# Patient Record
Sex: FEMALE | Race: BLACK OR AFRICAN AMERICAN | NOT HISPANIC OR LATINO | Employment: OTHER | URBAN - METROPOLITAN AREA
[De-identification: names, ages, dates, MRNs, and addresses within clinical notes are randomized per-mention and may not be internally consistent; named-entity substitution may affect disease eponyms.]

---

## 2017-05-24 ENCOUNTER — APPOINTMENT (OUTPATIENT)
Dept: PHYSICAL THERAPY | Facility: CLINIC | Age: 67
End: 2017-05-24
Payer: MEDICARE

## 2017-05-24 PROCEDURE — G8979 MOBILITY GOAL STATUS: HCPCS

## 2017-05-24 PROCEDURE — G8978 MOBILITY CURRENT STATUS: HCPCS

## 2017-05-24 PROCEDURE — 97162 PT EVAL MOD COMPLEX 30 MIN: CPT

## 2017-05-30 ENCOUNTER — APPOINTMENT (OUTPATIENT)
Dept: PHYSICAL THERAPY | Facility: CLINIC | Age: 67
End: 2017-05-30
Payer: MEDICARE

## 2017-05-30 PROCEDURE — 97140 MANUAL THERAPY 1/> REGIONS: CPT

## 2017-05-30 PROCEDURE — 97110 THERAPEUTIC EXERCISES: CPT

## 2017-06-06 ENCOUNTER — APPOINTMENT (OUTPATIENT)
Dept: PHYSICAL THERAPY | Facility: CLINIC | Age: 67
End: 2017-06-06
Payer: MEDICARE

## 2017-06-06 PROCEDURE — 97110 THERAPEUTIC EXERCISES: CPT

## 2017-06-06 PROCEDURE — 97140 MANUAL THERAPY 1/> REGIONS: CPT

## 2017-06-08 ENCOUNTER — APPOINTMENT (OUTPATIENT)
Dept: PHYSICAL THERAPY | Facility: CLINIC | Age: 67
End: 2017-06-08
Payer: MEDICARE

## 2017-06-08 PROCEDURE — 97140 MANUAL THERAPY 1/> REGIONS: CPT

## 2017-06-08 PROCEDURE — 97110 THERAPEUTIC EXERCISES: CPT

## 2017-06-13 ENCOUNTER — APPOINTMENT (OUTPATIENT)
Dept: PHYSICAL THERAPY | Facility: CLINIC | Age: 67
End: 2017-06-13
Payer: MEDICARE

## 2017-06-13 PROCEDURE — 97140 MANUAL THERAPY 1/> REGIONS: CPT

## 2017-06-13 PROCEDURE — 97110 THERAPEUTIC EXERCISES: CPT

## 2017-06-15 ENCOUNTER — APPOINTMENT (OUTPATIENT)
Dept: PHYSICAL THERAPY | Facility: CLINIC | Age: 67
End: 2017-06-15
Payer: MEDICARE

## 2017-06-15 PROCEDURE — 97110 THERAPEUTIC EXERCISES: CPT

## 2017-06-22 ENCOUNTER — APPOINTMENT (OUTPATIENT)
Dept: PHYSICAL THERAPY | Facility: CLINIC | Age: 67
End: 2017-06-22
Payer: MEDICARE

## 2017-06-22 PROCEDURE — 97110 THERAPEUTIC EXERCISES: CPT

## 2017-06-27 ENCOUNTER — APPOINTMENT (OUTPATIENT)
Dept: PHYSICAL THERAPY | Facility: CLINIC | Age: 67
End: 2017-06-27
Payer: MEDICARE

## 2017-06-27 PROCEDURE — 97110 THERAPEUTIC EXERCISES: CPT

## 2017-06-29 ENCOUNTER — APPOINTMENT (OUTPATIENT)
Dept: PHYSICAL THERAPY | Facility: CLINIC | Age: 67
End: 2017-06-29
Payer: MEDICARE

## 2017-06-29 PROCEDURE — 97140 MANUAL THERAPY 1/> REGIONS: CPT

## 2017-06-29 PROCEDURE — 97110 THERAPEUTIC EXERCISES: CPT

## 2017-07-03 ENCOUNTER — APPOINTMENT (OUTPATIENT)
Dept: PHYSICAL THERAPY | Facility: CLINIC | Age: 67
End: 2017-07-03
Payer: MEDICARE

## 2017-07-03 PROCEDURE — 97140 MANUAL THERAPY 1/> REGIONS: CPT

## 2017-07-03 PROCEDURE — 97110 THERAPEUTIC EXERCISES: CPT

## 2017-07-03 PROCEDURE — G8978 MOBILITY CURRENT STATUS: HCPCS

## 2017-07-03 PROCEDURE — G8979 MOBILITY GOAL STATUS: HCPCS

## 2017-07-07 ENCOUNTER — APPOINTMENT (OUTPATIENT)
Dept: PHYSICAL THERAPY | Facility: CLINIC | Age: 67
End: 2017-07-07
Payer: MEDICARE

## 2017-07-11 ENCOUNTER — APPOINTMENT (OUTPATIENT)
Dept: PHYSICAL THERAPY | Facility: CLINIC | Age: 67
End: 2017-07-11
Payer: MEDICARE

## 2017-07-11 PROCEDURE — 97110 THERAPEUTIC EXERCISES: CPT

## 2017-07-13 ENCOUNTER — APPOINTMENT (OUTPATIENT)
Dept: PHYSICAL THERAPY | Facility: CLINIC | Age: 67
End: 2017-07-13
Payer: MEDICARE

## 2017-07-13 PROCEDURE — 97110 THERAPEUTIC EXERCISES: CPT

## 2018-06-21 ENCOUNTER — TRANSCRIBE ORDERS (OUTPATIENT)
Dept: ADMINISTRATIVE | Facility: HOSPITAL | Age: 68
End: 2018-06-21

## 2018-06-21 DIAGNOSIS — Z12.39 SCREENING BREAST EXAMINATION: Primary | ICD-10-CM

## 2018-06-22 ENCOUNTER — HOSPITAL ENCOUNTER (OUTPATIENT)
Dept: RADIOLOGY | Facility: HOSPITAL | Age: 68
Discharge: HOME | End: 2018-06-22
Payer: MEDICARE

## 2018-06-22 DIAGNOSIS — Z12.39 SCREENING BREAST EXAMINATION: ICD-10-CM

## 2018-06-22 PROCEDURE — 77067 SCR MAMMO BI INCL CAD: CPT

## 2018-08-07 ENCOUNTER — EVALUATION (OUTPATIENT)
Dept: PHYSICAL THERAPY | Facility: CLINIC | Age: 68
End: 2018-08-07
Payer: MEDICARE

## 2018-08-07 DIAGNOSIS — M33.20 POLYMYOSITIS (HCC): Primary | ICD-10-CM

## 2018-08-07 PROCEDURE — G8979 MOBILITY GOAL STATUS: HCPCS | Performed by: PHYSICAL THERAPIST

## 2018-08-07 PROCEDURE — 97161 PT EVAL LOW COMPLEX 20 MIN: CPT | Performed by: PHYSICAL THERAPIST

## 2018-08-07 PROCEDURE — G8978 MOBILITY CURRENT STATUS: HCPCS | Performed by: PHYSICAL THERAPIST

## 2018-08-07 NOTE — LETTER
2018    Ez Jones  [place] [address]    Patient: Pam Navarro   YOB: 1950   Date of Visit: 2018     Encounter Diagnosis     ICD-10-CM    1  Polymyositis [facility]) M33 20        Dear Dr Dickson Bring:    Please review the attached Plan of Care from [facility] 131 recent visit  Please verify that you agree therapy should continue by signing the attached document and sending it back to our office  If you have any questions or concerns, please don't hesitate to call  Sincerely,    Donavan Dillard, PT      Referring Provider:      I certify that I have read the below Plan of Care and certify the need for these services furnished under this plan of treatment while under my care  Ez Bravo [facility] [address]  [address]: [phone]          PT Evaluation     Today's date: 2018  Patient name: Pam Navarro  : 1950  MRN: [id number]  Referring provider: Catherine Shields  Dx:   Encounter Diagnosis     ICD-10-CM    1  Polymy[facility] ([facility] ) M33 20        Start Time: 0335  Stop Time: 4235  Total time in clinic (min): 45 minutes    Assessment  Impairments: abnormal gait and activity intolerance    Assessment details: Pam Navarro is a 76 y o  female who presents to physical therapy with pain, decreased LE range of motion, decreased LE strength, poor balance, impaired function and decreased activity tolerance  Patient's clinical presentation is consistent with their referring diagnosis of Pol[facility]yositis ([facility] )  (primary encounter diagnosis)  The pt presents with functional limitations of ADLs, ambulation, performing household chores, self-care and stair negotiation  Pt would benefit from physical therapy services to address these limitations and maximize function  Understanding of Dx/Px/POC: good   Prognosis: good    Goals  Short term goals  (3 weeks)  1  Patient will have no pain bilateral leg  2    Patient will have full range of motion bilateral leg  3  Patient will report a 25% improvement with activities of daily living     Long term goals - (6 weeks)  1  Patient will be independent with home exercise program  2  Patient will ambulating on level surfaces with rollator for 500 feet  3  Patient will report 75 % improvement with activity of daily living function  4   Patient will negotiate stairs up and down pain free with use of railings  Plan  Patient would benefit from: PT eval and skilled physical therapy  Planned modality interventions: cryotherapy and thermotherapy: hydrocollator packs  Planned therapy interventions: abdominal trunk stabilization, ADL training, massage, neuromuscular re-education, patient education, strengthening, stretching, therapeutic exercise, balance/weight bearing training, functional ROM exercises, gait training and home exercise program  Frequency: 2x week  Duration in visits: 16  Duration in weeks: 8  Treatment plan discussed with: patient and family        Subjective Evaluation    History of Present Illness  Mechanism of injury: She reports 6 to 8 weeks ago she fell in her kitchen when she tried to turn around  She had some resultant left posterior hip pain following  She followed up with Dr Theo Bull for a regularly scheduled visit  She was referred to PT at that time because an increase in leg weakness was noted  Pain  Current pain ratin  At best pain ratin  At worst pain ratin  Quality: sharp and dull ache      Diagnostic Tests  No diagnostic tests performed  Patient Goals  Patient goals for therapy: increased strength  Patient's goals regarding treatment: Sit to stand independently ,         Objective     Palpation   Left   Hypertonic in the adductor longus, lateral gastrocnemius, proximal semitendinosus and rectus femoris  Tenderness of the adductor longus, lateral gastrocnemius, proximal semitendinosus and rectus femoris       Right   Hypertonic in the adductor longus, lateral gastrocnemius, proximal semitendinosus and rectus femoris  Tenderness of the adductor longus, lateral gastrocnemius, proximal semitendinosus and rectus femoris  Passive Range of Motion     Additional Passive Range of Motion Details  SLR -  Left = 39 degrees, Right = 48 degrees    Strength/Myotome Testing     Left Hip   Planes of Motion   Flexion: 3+  Extension: 3+  Abduction: 3    Right Hip   Planes of Motion   Flexion: 3+  Extension: 3+  Abduction: 3    Left Knee   Flexion: 4-  Extension: 3+    Right Knee   Flexion: 4-  Extension: 3    Left Ankle/Foot   Dorsiflexion: 3-  Plantar flexion: 4-    Right Ankle/Foot   Dorsiflexion: 3+  Plantar flexion: 4-    Ambulation   Weight-Bearing Status   Assistive device used: rollator walker with seat    Quality of Movement During Gait     Pelvis    Pelvis (Left): Positive Trendelenburg  Pelvis (Right): Positive Trendelenburg  Hip    Hip (Left): Positive circumducted  Hip (Right): Positive circumducted  Knee    Knee (Left): Positive recurvatum  Knee (Right): Positive recurvatum  Functional Assessment     Comments  Sit to stand at 27 inches high    -  She was independent but needed upper extremity assist       Flowsheet Rows      Most Recent Value   PT/OT G-Codes   Current Score  31   Projected Score  38   FOTO information reviewed  Yes   Assessment Type  Evaluation   G code set  Mobility: Walking & Moving Around   Mobility: Walking and Moving Around Current Status ()  CL   Mobility: Walking and Moving Around Goal Status ()  CK          Precautions: - Diabetes, Hypertension, Asthma    Specialty Daily Treatment Diary     Manual  8/7/18       STM quad and HS        Stretch HS                                    Exercise Diary         Alter G        Knee ext        Sit to stand from table        Ball squeeze        Stair taps        Alternating knee lift to arms        SLR        Push rollator with resistance Modalities                        Visit # 1

## 2018-08-09 ENCOUNTER — OFFICE VISIT (OUTPATIENT)
Dept: PHYSICAL THERAPY | Facility: CLINIC | Age: 68
End: 2018-08-09
Payer: MEDICARE

## 2018-08-09 DIAGNOSIS — M33.20 POLYMYOSITIS (HCC): Primary | ICD-10-CM

## 2018-08-09 PROCEDURE — 97140 MANUAL THERAPY 1/> REGIONS: CPT | Performed by: PHYSICAL THERAPIST

## 2018-08-09 PROCEDURE — 97110 THERAPEUTIC EXERCISES: CPT | Performed by: PHYSICAL THERAPIST

## 2018-08-09 NOTE — PROGRESS NOTES
Daily Note     Today's date: 2018  Patient name: Pierrette Mortimer  : 1950  MRN: [id number]  Referring provider: Tiburcio Karimi  Dx:   Encounter Diagnosis     ICD-10-CM    1  Polymyositis (HCC) M33 20                   Subjective:   Pain today 3 / 10  Objective: See treatment diary below     Precautions: - Diabetes, Hypertension, Asthma     Specialty Daily Treatment Diary      Manual  18         STM quad and HS    5 min         Stretch HS , gastroc    5 min                                                          Exercise Diary              Alter G  walk    5 min 0 4mph         Alter G  HR    20x         Alter G hip abd    10x         Alter G march    20x         Alter G SLS    20x         Alter G box step    20x         Alter G retro    3 min 0  1mph         Alter G squat                                          Ball squeeze    20x          Kick walk - seated    20x                                                                                                                               Modalities                                         Visit # 1                  Assessment: Tolerated treatment well  Patient demonstrated fatigue post treatment but was able to walk easier following Alter G  Plan: Continue per plan of care  Progress treatment as tolerated

## 2018-08-14 ENCOUNTER — APPOINTMENT (OUTPATIENT)
Dept: PHYSICAL THERAPY | Facility: CLINIC | Age: 68
End: 2018-08-14
Payer: MEDICARE

## 2018-08-16 ENCOUNTER — APPOINTMENT (OUTPATIENT)
Dept: PHYSICAL THERAPY | Facility: CLINIC | Age: 68
End: 2018-08-16
Payer: MEDICARE

## 2018-08-20 ENCOUNTER — OFFICE VISIT (OUTPATIENT)
Dept: PHYSICAL THERAPY | Facility: CLINIC | Age: 68
End: 2018-08-20
Payer: MEDICARE

## 2018-08-20 DIAGNOSIS — M33.20 POLYMYOSITIS (HCC): Primary | ICD-10-CM

## 2018-08-20 PROCEDURE — 97110 THERAPEUTIC EXERCISES: CPT | Performed by: PHYSICAL THERAPIST

## 2018-08-20 PROCEDURE — 97140 MANUAL THERAPY 1/> REGIONS: CPT | Performed by: PHYSICAL THERAPIST

## 2018-08-20 NOTE — PROGRESS NOTES
Daily Note     Today's date: 2018  Patient name: Shikha Del Valle  : 1950  MRN: [id number]  Referring provider: Howard Ashford  Dx:   Encounter Diagnosis     ICD-10-CM    1  Polymyositis (HCC) M33 20                   Subjective:   Pain today 2/10  Objective: See treatment diary below     Precautions: - Diabetes, Hypertension, Asthma     Specialty Daily Treatment Diary      Manual  18       STM quad and HS    5 min  5 min       Stretch HS , gastroc    5 min  5 min                                                        Exercise Diary              Alter G  walk    5 min 0 4mph  5'    0 4mph       Alter G  HR    20x  20x       Alter G hip abd    10x  20x       Alter G march    20x  20x       Alter G SLS    20x 20x       Alter G box step    20x  20x       Alter G retro    3 min 0  1mph  3'   0 1mph       Alter G squat      20x                                    Ball squeeze    20x  30x        Kick walk - seated    20x  20x                                                                                                                             Modalities                                         Visit # 1  2  3              Assessment:   Unable to get a ride to her appts last week  Plan:   Continue strengthening activities in reduced weight environment of [facility]-

## 2018-08-22 ENCOUNTER — OFFICE VISIT (OUTPATIENT)
Dept: PHYSICAL THERAPY | Facility: CLINIC | Age: 68
End: 2018-08-22
Payer: MEDICARE

## 2018-08-22 DIAGNOSIS — M33.20 POLYMYOSITIS (HCC): Primary | ICD-10-CM

## 2018-08-22 PROCEDURE — 97140 MANUAL THERAPY 1/> REGIONS: CPT

## 2018-08-22 PROCEDURE — 97110 THERAPEUTIC EXERCISES: CPT

## 2018-08-22 NOTE — PROGRESS NOTES
Daily Note     Today's date: 2018  Patient name: Boris Peabody  : 1950  MRN: [id number]  Referring provider: Edvin Mcneill  Dx:   Encounter Diagnosis     ICD-10-CM    1  Polymyositis (HCC) M33 20                   Subjective:   Pain today 3/10  R knee pain today      Objective: See treatment diary below    ALL alter G exercises performed at 40%  10 min nustep     Precautions: - Diabetes, Hypertension, Asthma     Specialty Daily Treatment Diary      Manual  18     STM quad and HS    5 min  5 min  5 min     Stretch HS , gastroc    5 min  5 min  5 min                                                      Exercise Diary             Alter G  walk    5 min 0 4mph  5'    0 4mph  5 min   4 mph @ 40%     Alter G  HR    20x  20x  20     Alter G hip abd    10x  20x  20     Alter G march    20x  20x  20     Alter G SLS    20x 20x 20     Alter G box step    20x  20x  20     Alter G retro    3 min 0  1mph  3'   0 1mph  5 min   3mph     Alter G squat      20x  20                                  Ball squeeze    20x  30x        Kick walk - seated    20x  20x                                                                                                                             Modalities                     ----------                   Visit # 1  2  3  4            Assessment:   Pt displays some swelling in R knee and requires self manual assist for functional activities such as lift the LE while performing sit to sup      Plan:   Continue strengthening activities in reduced weight environment of [facility]-G

## 2018-08-27 ENCOUNTER — OFFICE VISIT (OUTPATIENT)
Dept: PHYSICAL THERAPY | Facility: CLINIC | Age: 68
End: 2018-08-27
Payer: MEDICARE

## 2018-08-27 DIAGNOSIS — M33.20 POLYMYOSITIS (HCC): Primary | ICD-10-CM

## 2018-08-27 PROCEDURE — 97110 THERAPEUTIC EXERCISES: CPT

## 2018-08-27 PROCEDURE — 97140 MANUAL THERAPY 1/> REGIONS: CPT

## 2018-08-27 NOTE — PROGRESS NOTES
Daily Note     Today's date: 2018  Patient name: Gamaliel Hardin  : 1950  MRN: [id number]  Referring provider: Jonathan Nugent  Dx:   Encounter Diagnosis     ICD-10-CM    1  Polymyositis (HCC) M33 20                   Subjective:   Pain today 3/10  R knee pain today  Pt stated that She "just doesn't feel right today"      Objective: See treatment diary below    ALL alter G exercises performed at 40%  10 min nustep     Precautions: - Diabetes, Hypertension, Asthma     Specialty Daily Treatment Diary      Manual  18   STM quad and HS    5 min  5 min  5 min  5 min   Stretch HS , gastroc    5 min  5 min  5 min  5 min                                                    Exercise Diary           Alter G  walk    5 min 0 4mph  5'    0 4mph  5 min   4 mph @ 40%  7 min / 40 % @ 4 mph   Alter G  HR    20x  20x  20  20   Alter G hip abd    10x  20x  20  20   Alter G march    20x  20x  20  20   Alter G SLS    20x 20x 20  20 X 3 sec   Alter G box step    20x  20x  20  20   Alter G retro    3 min 0  1mph  3'   0 1mph  5 min   3mph  5  Min @  3   Alter G squat      20x  20 20    knee flexion          20                  Ball squeeze    20x  30x    30    Kick walk - seated    20x  20x    20    sitting box taps          X10 with Mod assist Bilat                                                                                                           Modalities                   ----------                   Visit # 1  2  3  4 5          Assessment:   Pt displays  tightness in Bilat gastroc   Hyper extension Bilat knees       Plan:   Continue strengthening activities in reduced weight environment of [facility]-G  regular

## 2018-08-29 ENCOUNTER — OFFICE VISIT (OUTPATIENT)
Dept: PHYSICAL THERAPY | Facility: CLINIC | Age: 68
End: 2018-08-29
Payer: MEDICARE

## 2018-08-29 DIAGNOSIS — M33.20 POLYMYOSITIS (HCC): Primary | ICD-10-CM

## 2018-08-29 PROCEDURE — 97110 THERAPEUTIC EXERCISES: CPT

## 2018-08-29 PROCEDURE — 97140 MANUAL THERAPY 1/> REGIONS: CPT

## 2018-08-29 NOTE — PROGRESS NOTES
Daily Note     Today's date: 2018  Patient name: Sebas Casillas  : 1950  MRN: [id number]  Referring provider: Daniel Cao  Dx:   Encounter Diagnosis     ICD-10-CM    1  Polymyositis (HCC) M33 20                   Subjective:   Pain today 3/10  R knee pain today  Pt stated that her back feels better today    Objective: See treatment diary below    ALL alter G exercises performed at 45%        Precautions: - Diabetes, Hypertension, Asthma     Specialty Daily Treatment Diary      Manual  18       STM quad and HS  5 min       Stretch HS , gastroc  5 min                                                        Exercise Diary           Alter G  walk  10 min    4mph @ 45 %       Alter G  HR  20       Alter G hip abd  20       Alter G march  20       Alter G SLS 20 x 3 sec        Alter G box step  20       Alter G retro  5 min   3 mph       Alter G squat  20        knee flexion  20                   Ball squeeze  30        Kick walk - seated  20        sitting box taps  10 to 6 inch              Clams   20 X red            Sit to stand from HI/LO                                                                                         Modalities                             Visit # 6 FOTO              Assessment:   Pt displays  tightness in Bilat gastroc   Hyper extension Bilat knees showed improved sitting hip flexion  And less difficulty getting on and off alter G requiring min assist X 2      Plan:   Continue strengthening activities in reduced weight environment of [facility]-G

## 2018-09-04 ENCOUNTER — OFFICE VISIT (OUTPATIENT)
Dept: PHYSICAL THERAPY | Facility: CLINIC | Age: 68
End: 2018-09-04
Payer: MEDICARE

## 2018-09-04 DIAGNOSIS — M33.20 POLYMYOSITIS (HCC): Primary | ICD-10-CM

## 2018-09-04 PROCEDURE — 97110 THERAPEUTIC EXERCISES: CPT | Performed by: PHYSICAL THERAPIST

## 2018-09-04 NOTE — PROGRESS NOTES
Daily Note     Today's date: 2018  Patient name: Collins Heck  : 1950  MRN: [id number]  Referring provider: Omega Mariano  Dx:   Encounter Diagnosis     ICD-10-CM    1  Polymyositis (HCC) M33 20                   Subjective:  She c/o fatigue today  Objective: See treatment diary below    ALL alter G exercises performed at 45%        Precautions: - Diabetes, Hypertension, Asthma     Specialty Daily Treatment Diary      Manual  18      STM quad and HS  5 min       Stretch HS , gastroc  5 min 5 min                                                       Exercise Diary         Alter G  walk  10 min    4mph @ 45 % 10 min    4mph @ 45 %      AlterG  HR  20 20      AlterG hip abd  20 20      AlterG march  20 20      AlterG SLS 20 x 3 sec 20 x 3"       AlterG box step  20 20      AlterG retro  5 min   3 mph  5 min   3 mph      AlterG squat  20 20      AlterG knee flex  20 20                  Ball squeeze  30 30x       Kick walk - seated  20 20x       sitting box taps  10 to 6 inch              Clams   20 X red            Sit to stand from HI/LO                                                                                         Modalities                            Visit # 6 FOTO 7             Assessment:   Pt is showing improvement with lifting her leg actively and stepping up onto alter-G with min to mod assist for those activities as opposed to max assist       Plan:   Continue strengthening activities in reduced weight environment of [facility]-G

## 2018-09-06 ENCOUNTER — OFFICE VISIT (OUTPATIENT)
Dept: PHYSICAL THERAPY | Facility: CLINIC | Age: 68
End: 2018-09-06
Payer: MEDICARE

## 2018-09-06 DIAGNOSIS — M33.20 POLYMYOSITIS (HCC): Primary | ICD-10-CM

## 2018-09-06 PROCEDURE — 97110 THERAPEUTIC EXERCISES: CPT | Performed by: PHYSICAL THERAPIST

## 2018-09-06 NOTE — PROGRESS NOTES
Daily Note     Today's date: 2018  Patient name: Angela Anderson  : 1950  MRN: [id number]  Referring provider: Zabrina Keane  Dx:   Encounter Diagnosis     ICD-10-CM    1  Polymyositis ([facility] ) M33 20        Start Time:   Stop Time: 0645  Total time in clinic (min): 50 minutes    Subjective:  She c/o fatigue today  Objective: See treatment diary below    ALL alter G exercises performed at 45%        Precautions: - Diabetes, Hypertension, Asthma     Specialty Daily Treatment Diary      Manual  18     STM quad and HS  5 min       Stretch HS , gastroc  5 min 5 min                                                       Exercise Diary        Alter G  walk  10 min    4mph @ 45 % 10 min    4mph @ 45 % 10 min 0 4 mph @45%     AlterG  HR  20 20 20     AlterG hip abd  20 20 20     AlterG march  20 20 20     AlterG SLS 20 x 3 sec 20 x 3" 20      AlterG box step  20 20 20     AlterG retro  5 min   3 mph  5 min   3 mph 4 min  0 2 mph     AlterG squat  20 20 20     AlterG knee flex  20 20 20                 Ball squeeze  30 30x 30      Kick walk - seated  20 20x 30      sitting box taps  10 to 6 inch              Clams   20 X red            Sit to stand from HI/LO                                                                                         Modalities                           Visit # 6 FOTO 7 8            Assessment:   Pt had some knee discomfort today and was able to complete only 4 min of retro walking  Her ability to step up onto AlterG is improving but she still needs mod to max assist x 2  Plan:   Continue strengthening activities in reduced weight environment of [facility]

## 2018-09-11 ENCOUNTER — OFFICE VISIT (OUTPATIENT)
Dept: PHYSICAL THERAPY | Facility: CLINIC | Age: 68
End: 2018-09-11
Payer: MEDICARE

## 2018-09-11 DIAGNOSIS — M33.20 POLYMYOSITIS (HCC): Primary | ICD-10-CM

## 2018-09-11 PROCEDURE — 97110 THERAPEUTIC EXERCISES: CPT

## 2018-09-11 PROCEDURE — G8979 MOBILITY GOAL STATUS: HCPCS

## 2018-09-11 PROCEDURE — G8978 MOBILITY CURRENT STATUS: HCPCS

## 2018-09-11 NOTE — PROGRESS NOTES
Daily Note     Today's date: 2018  Patient name: Nova Salamanca  : 1950  MRN: [id number]  Referring provider: Rosana Nesbitt  Dx:   Encounter Diagnosis     ICD-10-CM    1  Polymyositis ([facility] [address] ) M33 20                   Subjective:  She reports 8/10 R knee pain today    Objective: See treatment diary below    ALL alter G exercises performed at 45%        Precautions: - Diabetes, Hypertension, Asthma     Specialty Daily Treatment Diary      Manual  18    STM quad and HS  5 min   5 min    Stretch HS , gastroc  5 min 5 min                                                       Exercise Diary       Alter G  walk  10 min    4mph @ 45 % 10 min    4mph @ 45 % 10 min 0 4 mph @45% !0 min @ 4 mph    AlterG  HR  20 20 20 20    AlterG hip abd  20 20 20 20    AlterG march  20 20 20 20    AlterG SLS 20 x 3 sec 20 x 3" 20 20     AlterG box step  20 20 20 20    AlterG retro  5 min   3 mph  5 min   3 mph 4 min  0 2 mph 4 min @  3    AlterG squat  20 20 20 20    AlterG knee flex  20 20 20 20                Ball squeeze  30 30x 30 30     Kick walk - seated  20 20x 30 30     sitting box taps  10 to 6 inch      2 x10        Clams   20 X red      20 X red      Sit to stand from HI/LO        2 X 5 @ 26 inches                                                                                 Modalities                          Visit # 6 FOTO 7 8 9           Assessment:   Pt had some knee discomfort today and was able to complete only 4 min of retro walking  Her ability to step up onto AlterG is improving but she still needs mod to max assist x 2  C/O pain along R ITB       Plan:   Continue strengthening activities in reduced weight environment of [facility]-G

## 2018-09-13 ENCOUNTER — APPOINTMENT (OUTPATIENT)
Dept: PHYSICAL THERAPY | Facility: CLINIC | Age: 68
End: 2018-09-13
Payer: MEDICARE

## 2018-09-18 ENCOUNTER — APPOINTMENT (OUTPATIENT)
Dept: PHYSICAL THERAPY | Facility: CLINIC | Age: 68
End: 2018-09-18
Payer: MEDICARE

## 2018-09-20 ENCOUNTER — APPOINTMENT (OUTPATIENT)
Dept: PHYSICAL THERAPY | Facility: CLINIC | Age: 68
End: 2018-09-20
Payer: MEDICARE

## 2018-09-21 ENCOUNTER — APPOINTMENT (EMERGENCY)
Dept: RADIOLOGY | Facility: HOSPITAL | Age: 68
DRG: 392 | End: 2018-09-21
Payer: MEDICARE

## 2018-09-21 ENCOUNTER — HOSPITAL ENCOUNTER (INPATIENT)
Facility: HOSPITAL | Age: 68
LOS: 2 days | Discharge: HOME | DRG: 392 | End: 2018-09-23
Attending: EMERGENCY MEDICINE | Admitting: INTERNAL MEDICINE
Payer: MEDICARE

## 2018-09-21 DIAGNOSIS — K57.92 ACUTE DIVERTICULITIS: ICD-10-CM

## 2018-09-21 DIAGNOSIS — K57.92 DIVERTICULITIS: Primary | ICD-10-CM

## 2018-09-21 DIAGNOSIS — R10.9 ABDOMINAL PAIN: ICD-10-CM

## 2018-09-21 DIAGNOSIS — R53.1 WEAKNESS: ICD-10-CM

## 2018-09-21 PROBLEM — A41.9 SEPSIS (HCC): Status: ACTIVE | Noted: 2018-09-21

## 2018-09-21 PROBLEM — A41.9 SEPSIS (HCC): Status: RESOLVED | Noted: 2018-09-21 | Resolved: 2018-09-21

## 2018-09-21 LAB
ALBUMIN SERPL BCP-MCNC: 3.4 G/DL (ref 3.5–5)
ALP SERPL-CCNC: 87 U/L (ref 46–116)
ALT SERPL W P-5'-P-CCNC: 29 U/L (ref 12–78)
ANION GAP SERPL CALCULATED.3IONS-SCNC: 7 MMOL/L (ref 4–13)
AST SERPL W P-5'-P-CCNC: 12 U/L (ref 5–45)
BACTERIA UR QL AUTO: ABNORMAL /HPF
BASOPHILS # BLD AUTO: 0.04 THOUSANDS/ΜL (ref 0–0.1)
BASOPHILS NFR BLD AUTO: 0 % (ref 0–1)
BILIRUB SERPL-MCNC: 0.3 MG/DL (ref 0.2–1)
BILIRUB UR QL STRIP: NEGATIVE
BUN SERPL-MCNC: 9 MG/DL (ref 5–25)
CALCIUM SERPL-MCNC: 8.9 MG/DL (ref 8.3–10.1)
CHLORIDE SERPL-SCNC: 101 MMOL/L (ref 100–108)
CLARITY UR: CLEAR
CO2 SERPL-SCNC: 28 MMOL/L (ref 21–32)
COLOR UR: YELLOW
CREAT SERPL-MCNC: 0.59 MG/DL (ref 0.6–1.3)
EOSINOPHIL # BLD AUTO: 0.27 THOUSAND/ΜL (ref 0–0.61)
EOSINOPHIL NFR BLD AUTO: 2 % (ref 0–6)
ERYTHROCYTE [DISTWIDTH] IN BLOOD BY AUTOMATED COUNT: 15.8 % (ref 11.6–15.1)
GFR SERPL CREATININE-BSD FRML MDRD: 109 ML/MIN/1.73SQ M
GLUCOSE SERPL-MCNC: 159 MG/DL (ref 65–140)
GLUCOSE UR STRIP-MCNC: NEGATIVE MG/DL
HCT VFR BLD AUTO: 36.2 % (ref 34.8–46.1)
HGB BLD-MCNC: 12 G/DL (ref 11.5–15.4)
HGB UR QL STRIP.AUTO: NEGATIVE
IMM GRANULOCYTES # BLD AUTO: 0.08 THOUSAND/UL (ref 0–0.2)
IMM GRANULOCYTES NFR BLD AUTO: 1 % (ref 0–2)
KETONES UR STRIP-MCNC: NEGATIVE MG/DL
LACTATE SERPL-SCNC: 1.7 MMOL/L (ref 0.5–2)
LEUKOCYTE ESTERASE UR QL STRIP: ABNORMAL
LYMPHOCYTES # BLD AUTO: 2.56 THOUSANDS/ΜL (ref 0.6–4.47)
LYMPHOCYTES NFR BLD AUTO: 16 % (ref 14–44)
MCH RBC QN AUTO: 24.9 PG (ref 26.8–34.3)
MCHC RBC AUTO-ENTMCNC: 33.1 G/DL (ref 31.4–37.4)
MCV RBC AUTO: 75 FL (ref 82–98)
MONOCYTES # BLD AUTO: 0.97 THOUSAND/ΜL (ref 0.17–1.22)
MONOCYTES NFR BLD AUTO: 6 % (ref 4–12)
NEUTROPHILS # BLD AUTO: 12.22 THOUSANDS/ΜL (ref 1.85–7.62)
NEUTS SEG NFR BLD AUTO: 75 % (ref 43–75)
NITRITE UR QL STRIP: NEGATIVE
NON-SQ EPI CELLS URNS QL MICRO: ABNORMAL /HPF
NRBC BLD AUTO-RTO: 0 /100 WBCS
PH UR STRIP.AUTO: 6 [PH] (ref 5–9)
PLATELET # BLD AUTO: 369 THOUSANDS/UL (ref 149–390)
PMV BLD AUTO: 10.1 FL (ref 8.9–12.7)
POTASSIUM SERPL-SCNC: 3.7 MMOL/L (ref 3.5–5.3)
PROT SERPL-MCNC: 7.6 G/DL (ref 6.4–8.2)
PROT UR STRIP-MCNC: NEGATIVE MG/DL
RBC # BLD AUTO: 4.82 MILLION/UL (ref 3.81–5.12)
RBC #/AREA URNS AUTO: ABNORMAL /HPF
SODIUM SERPL-SCNC: 136 MMOL/L (ref 136–145)
SP GR UR STRIP.AUTO: <=1.005 (ref 1–1.03)
UROBILINOGEN UR QL STRIP.AUTO: 0.2 E.U./DL
WBC # BLD AUTO: 16.14 THOUSAND/UL (ref 4.31–10.16)
WBC #/AREA URNS AUTO: ABNORMAL /HPF

## 2018-09-21 PROCEDURE — 87081 CULTURE SCREEN ONLY: CPT | Performed by: INTERNAL MEDICINE

## 2018-09-21 PROCEDURE — 80053 COMPREHEN METABOLIC PANEL: CPT | Performed by: EMERGENCY MEDICINE

## 2018-09-21 PROCEDURE — 36415 COLL VENOUS BLD VENIPUNCTURE: CPT | Performed by: EMERGENCY MEDICINE

## 2018-09-21 PROCEDURE — 81001 URINALYSIS AUTO W/SCOPE: CPT | Performed by: EMERGENCY MEDICINE

## 2018-09-21 PROCEDURE — 99285 EMERGENCY DEPT VISIT HI MDM: CPT

## 2018-09-21 PROCEDURE — 99219 PR INITIAL OBSERVATION CARE/DAY 50 MINUTES: CPT | Performed by: NURSE PRACTITIONER

## 2018-09-21 PROCEDURE — 96360 HYDRATION IV INFUSION INIT: CPT

## 2018-09-21 PROCEDURE — 74177 CT ABD & PELVIS W/CONTRAST: CPT

## 2018-09-21 PROCEDURE — 96361 HYDRATE IV INFUSION ADD-ON: CPT

## 2018-09-21 PROCEDURE — 82948 REAGENT STRIP/BLOOD GLUCOSE: CPT

## 2018-09-21 PROCEDURE — 83605 ASSAY OF LACTIC ACID: CPT | Performed by: EMERGENCY MEDICINE

## 2018-09-21 PROCEDURE — 85025 COMPLETE CBC W/AUTO DIFF WBC: CPT | Performed by: EMERGENCY MEDICINE

## 2018-09-21 RX ORDER — PREDNISONE 1 MG/1
1 TABLET ORAL DAILY
Status: DISCONTINUED | OUTPATIENT
Start: 2018-09-22 | End: 2018-09-23 | Stop reason: HOSPADM

## 2018-09-21 RX ORDER — LEVOFLOXACIN 5 MG/ML
750 INJECTION, SOLUTION INTRAVENOUS ONCE
Status: COMPLETED | OUTPATIENT
Start: 2018-09-21 | End: 2018-09-23

## 2018-09-21 RX ORDER — ONDANSETRON 2 MG/ML
4 INJECTION INTRAMUSCULAR; INTRAVENOUS ONCE
Status: DISCONTINUED | OUTPATIENT
Start: 2018-09-21 | End: 2018-09-21

## 2018-09-21 RX ORDER — METRONIDAZOLE 500 MG/1
500 TABLET ORAL ONCE
Status: COMPLETED | OUTPATIENT
Start: 2018-09-21 | End: 2018-09-21

## 2018-09-21 RX ORDER — SODIUM CHLORIDE 9 MG/ML
125 INJECTION, SOLUTION INTRAVENOUS CONTINUOUS
Status: DISCONTINUED | OUTPATIENT
Start: 2018-09-21 | End: 2018-09-22

## 2018-09-21 RX ORDER — INSULIN GLARGINE 100 [IU]/ML
18 INJECTION, SOLUTION SUBCUTANEOUS
Status: DISCONTINUED | OUTPATIENT
Start: 2018-09-21 | End: 2018-09-23 | Stop reason: HOSPADM

## 2018-09-21 RX ORDER — BENAZEPRIL HYDROCHLORIDE 10 MG/1
10 TABLET ORAL DAILY
COMMUNITY
End: 2022-05-04 | Stop reason: SDUPTHER

## 2018-09-21 RX ORDER — AMLODIPINE BESYLATE 5 MG/1
5 TABLET ORAL DAILY
Status: DISCONTINUED | OUTPATIENT
Start: 2018-09-22 | End: 2018-09-23 | Stop reason: HOSPADM

## 2018-09-21 RX ORDER — MYCOPHENOLIC ACID 180 MG/1
360 TABLET, DELAYED RELEASE ORAL 2 TIMES DAILY
Status: DISCONTINUED | OUTPATIENT
Start: 2018-09-22 | End: 2018-09-23 | Stop reason: HOSPADM

## 2018-09-21 RX ORDER — LEVOTHYROXINE SODIUM 0.07 MG/1
75 TABLET ORAL DAILY
Status: DISCONTINUED | OUTPATIENT
Start: 2018-09-22 | End: 2018-09-23 | Stop reason: HOSPADM

## 2018-09-21 RX ORDER — MYCOPHENOLIC ACID 360 MG/1
360 TABLET, DELAYED RELEASE ORAL 2 TIMES DAILY
COMMUNITY
End: 2022-03-31

## 2018-09-21 RX ORDER — LISINOPRIL 10 MG/1
10 TABLET ORAL DAILY
Status: DISCONTINUED | OUTPATIENT
Start: 2018-09-22 | End: 2018-09-23 | Stop reason: HOSPADM

## 2018-09-21 RX ORDER — GABAPENTIN 300 MG/1
900 CAPSULE ORAL
Status: DISCONTINUED | OUTPATIENT
Start: 2018-09-21 | End: 2018-09-23 | Stop reason: HOSPADM

## 2018-09-21 RX ORDER — AMLODIPINE BESYLATE 5 MG/1
5 TABLET ORAL DAILY
COMMUNITY
End: 2022-05-04 | Stop reason: SDUPTHER

## 2018-09-21 RX ORDER — LEVOFLOXACIN 5 MG/ML
750 INJECTION, SOLUTION INTRAVENOUS EVERY 24 HOURS
Status: DISCONTINUED | OUTPATIENT
Start: 2018-09-22 | End: 2018-09-23 | Stop reason: HOSPADM

## 2018-09-21 RX ORDER — MORPHINE SULFATE 4 MG/ML
2 INJECTION, SOLUTION INTRAMUSCULAR; INTRAVENOUS ONCE
Status: DISCONTINUED | OUTPATIENT
Start: 2018-09-21 | End: 2018-09-21

## 2018-09-21 RX ADMIN — IOHEXOL 50 ML: 240 INJECTION, SOLUTION INTRATHECAL; INTRAVASCULAR; INTRAVENOUS; ORAL at 18:25

## 2018-09-21 RX ADMIN — LEVOFLOXACIN 750 MG: 5 INJECTION, SOLUTION INTRAVENOUS at 21:30

## 2018-09-21 RX ADMIN — METRONIDAZOLE 500 MG: 500 TABLET ORAL at 21:30

## 2018-09-21 RX ADMIN — INSULIN GLARGINE 18 UNITS: 100 INJECTION, SOLUTION SUBCUTANEOUS at 23:51

## 2018-09-21 RX ADMIN — GABAPENTIN 900 MG: 300 CAPSULE ORAL at 23:50

## 2018-09-21 RX ADMIN — IOHEXOL 100 ML: 350 INJECTION, SOLUTION INTRAVENOUS at 20:03

## 2018-09-21 RX ADMIN — SODIUM CHLORIDE 125 ML/HR: 0.9 INJECTION, SOLUTION INTRAVENOUS at 17:45

## 2018-09-21 RX ADMIN — SODIUM CHLORIDE 125 ML/HR: 0.9 INJECTION, SOLUTION INTRAVENOUS at 23:50

## 2018-09-21 RX ADMIN — HYDROMORPHONE HYDROCHLORIDE 0.5 MG: 1 INJECTION, SOLUTION INTRAMUSCULAR; INTRAVENOUS; SUBCUTANEOUS at 21:21

## 2018-09-21 NOTE — ED NOTES
Patient transported to [facility], [address] [place] [address]  09/21/18 1951

## 2018-09-21 NOTE — ED PROVIDER NOTES
History  Chief Complaint   Patient presents with    Abdominal Pain     Pt reports LLQ pain since yesterday, started with diarrhea today  Pt also reports increasing weakness  Pt had bloodwork done and was told her inflammation level was high  75 yo female C/o worsening abdominal pain since yesterday  Pain is described as crampy, severe  Pain is off and on   + loose stools  Pain is in left lower quadrant and low back but has h/o back pain all the time  Has been increasingly weak over the last 2-3 weeks  Saw her dermatologist who ordered blood work done 4 days ago  She was called yesterday and told the blood test for inflammation was abnormal and she should see her rheumatologist   No fever  No vomiting  History provided by:  Patient   used: No    Abdominal Pain   Associated symptoms: diarrhea    Associated symptoms: no chest pain, no cough, no dysuria, no fever, no nausea, no shortness of breath and no vomiting        Prior to Admission Medications   Prescriptions Last Dose Informant Patient Reported? Taking? GABAPENTIN PO   Yes Yes   Sig: Take 900 mg by mouth daily at bedtime   albuterol (PROVENTIL HFA,VENTOLIN HFA) 90 mcg/act inhaler   Yes Yes   Sig: Inhale 2 puffs every 6 (six) hours as needed for wheezing  amLODIPine-benazepril (LOTREL 5-10) 5-10 MG per capsule   Yes Yes   Sig: Take 1 capsule by mouth daily  insulin glargine (LANTUS) 100 units/mL subcutaneous injection   Yes Yes   Sig: Inject 36 Units under the skin daily at bedtime     levothyroxine 75 mcg tablet   Yes Yes   Sig: Take 75 mcg by mouth daily  metFORMIN (GLUCOPHAGE) 1000 MG tablet   Yes Yes   Sig: Take 1,000 mg by mouth 2 (two) times a day with meals     mycophenolate (MYFORTIC) 360 MG TBEC   Yes Yes   Sig: Take 360 mg by mouth 2 (two) times a day   predniSONE 10 mg tablet   Yes No   Sig: Take 1 mg by mouth daily        Facility-Administered Medications: None       Past Medical History:   Diagnosis Date    Anemia     Asthma     Chronic pain     bulging discs in back,polymyositis    Diabetes mellitus ([address] )     Disease of thyroid gland     Hyperlipidemia     Hypertension     Polymyositis (HCC)        Past Surgical History:   Procedure Laterality Date    HYSTERECTOMY      SINUS SURGERY      THYROIDECTOMY      TUBAL LIGATION         Family History   Problem Relation Age of Onset    Hepatitis Sister         acute hepatits C virus     I have reviewed and agree with the history as documented  Social History   Substance Use Topics    Smoking status: Never Smoker    Smokeless tobacco: Never Used    Alcohol use No        Review of Systems   Constitutional: Negative for fever  HENT: Negative  Eyes: Negative  Respiratory: Negative  Negative for cough and shortness of breath  Cardiovascular: Negative  Negative for chest pain  Gastrointestinal: Positive for abdominal pain and diarrhea  Negative for nausea and vomiting  Genitourinary: Negative  Negative for dysuria and flank pain  Musculoskeletal: Positive for back pain  Negative for myalgias  Skin: Negative  Negative for rash and wound  Neurological: Positive for weakness  Negative for dizziness and headaches  Hematological: Does not bruise/bleed easily  Psychiatric/Behavioral: Negative  All other systems reviewed and are negative  Physical Exam  Physical Exam   Constitutional: She is oriented to person, place, and time  She appears well-developed and well-nourished  No distress  HENT:   Head: Normocephalic and atraumatic  Eyes: Conjunctivae are normal  Pupils are equal, round, and reactive to light  No scleral icterus  Neck: Normal range of motion  Neck supple  Cardiovascular: Normal rate, regular rhythm and normal heart sounds  No murmur heard  Pulmonary/Chest: Effort normal and breath sounds normal  No respiratory distress  Abdominal: Soft  Bowel sounds are normal  She exhibits no distension   There is tenderness  There is guarding  + ttp LLQ   Musculoskeletal: Normal range of motion  She exhibits edema  She exhibits no deformity  Neurological: She is alert and oriented to person, place, and time  No cranial nerve deficit  She exhibits normal muscle tone  Skin: Skin is warm and dry  No rash noted  She is not diaphoretic  No pallor  Psychiatric: She has a normal mood and affect  Her behavior is normal    Nursing note and vitals reviewed        Vital Signs  ED Triage Vitals [09/21/18 1721]   Temperature Pulse Respirations Blood Pressure SpO2   98 7 °F (37 1 °C) 92 18 159/84 98 %      Temp Source Heart Rate Source Patient Position - Orthostatic VS BP Location FiO2 (%)   Temporal Monitor Sitting Left arm --      Pain Score       --           Vitals:    09/21/18 1721   BP: 159/84   Pulse: 92   Patient Position - Orthostatic VS: Sitting       Visual Acuity      ED Medications  Medications   sodium chloride 0 9 % infusion (0 mL/hr Intravenous Stopped 9/21/18 1921)   levofloxacin (LEVAQUIN) IVPB (premix) 750 mg (not administered)   metroNIDAZOLE (FLAGYL) tablet 500 mg (not administered)   HYDROmorphone (DILAUDID) injection 0 5 mg (not administered)   iohexol (OMNIPAQUE) 240 MG/ML solution 50 mL (50 mL Oral Given 9/21/18 1825)   iohexol (OMNIPAQUE) 350 MG/ML injection (MULTI-DOSE) 100 mL (100 mL Intravenous Given 9/21/18 2003)       Diagnostic Studies  Results Reviewed     Procedure Component Value Units Date/Time    Urine Microscopic [[id number]]  (Abnormal) Collected:  09/21/18 1859    Lab Status:  Final result Specimen:  Urine from Urine, Clean Catch Updated:  09/21/18 1914     RBC, UA None Seen /hpf      WBC, UA 2-4 (A) /hpf      Epithelial Cells Occasional /hpf      Bacteria, UA None Seen /hpf     UA w Reflex to Microscopic [[id number]]  (Abnormal) Collected:  09/21/18 1859    Lab Status:  Final result Specimen:  Urine from Urine, Clean Catch Updated:  09/21/18 1906     Color, UA Yellow     Clarity, UA Clear Specific Gravity, UA <=1 005     pH, UA 6 0     Leukocytes, UA Trace (A)     Nitrite, UA Negative     Protein, UA Negative mg/dl      Glucose, UA Negative mg/dl      Ketones, UA Negative mg/dl      Urobilinogen, UA 0 2 E U /dl      Bilirubin, UA Negative     Blood, UA Negative    Lactic acid, plasma [[id number]]  (Normal) Collected:  09/21/18 1745    Lab Status:  Final result Specimen:  Blood from Arm, Right Updated:  09/21/18 1818     LACTIC ACID 1 7 mmol/L     Narrative:         Result may be elevated if tourniquet was used during collection  Comprehensive metabolic panel [[id number]]  (Abnormal) Collected:  09/21/18 1745    Lab Status:  Final result Specimen:  Blood from Arm, Right Updated:  09/21/18 1813     Sodium 136 mmol/L      Potassium 3 7 mmol/L      Chloride 101 mmol/L      CO2 28 mmol/L      ANION GAP 7 mmol/L      BUN 9 mg/dL      Creatinine 0 59 (L) mg/dL      Glucose 159 (H) mg/dL      Calcium 8 9 mg/dL      AST 12 U/L      ALT 29 U/L      Alkaline Phosphatase 87 U/L      Total Protein 7 6 g/dL      Albumin 3 4 (L) g/dL      Total Bilirubin 0 30 mg/dL      eGFR 109 ml/min/1 73sq m     Narrative:         National Kidney Disease Education Program recommendations are as follows:  GFR calculation is accurate only with a steady state creatinine  Chronic Kidney disease less than 60 ml/min/1 73 sq  meters  Kidney failure less than 15 ml/min/1 73 sq  meters      CBC and differential [[id number]]  (Abnormal) Collected:  09/21/18 1745    Lab Status:  Final result Specimen:  Blood from Arm, Right Updated:  09/21/18 1753     WBC 16 14 (H) Thousand/uL      RBC 4 82 Million/uL      Hemoglobin 12 0 g/dL      Hematocrit 36 2 %      MCV 75 (L) fL      MCH 24 9 (L) pg      MCHC 33 1 g/dL      RDW 15 8 (H) %      MPV 10 1 fL      Platelets 153 Thousands/uL      nRBC 0 /100 WBCs      Neutrophils Relative 75 %      Immat GRANS % 1 %      Lymphocytes Relative 16 %      Monocytes Relative 6 %      Eosinophils Relative 2 % Basophils Relative 0 %      Neutrophils Absolute 12 22 (H) Thousands/µL      Immature Grans Absolute 0 08 Thousand/uL      Lymphocytes Absolute 2 56 Thousands/µL      Monocytes Absolute 0 97 Thousand/µL      Eosinophils Absolute 0 27 Thousand/µL      Basophils Absolute 0 04 Thousands/µL                  CT abdomen pelvis with contrast   Final Result by Radu Pavon MD (09/21 2039)      1  Findings consistent with acute uncomplicated diverticulitis  2   Calcification in the pancreas, can be seen with prior episodes of pancreatitis  The study was marked in [facility] for immediate notification  Workstation performed: [id number]                    Procedures  Procedures       Phone Contacts  ED Phone Contact    ED Course                               MDM  Number of Diagnoses or Management Options  Abdominal pain:   Diverticulitis:   Weakness:   Diagnosis management comments: Note:  Pt  Does not want morphine or anything stronger right now  Is allergic to aspirin and naproxen so can't have toradol  Pt  Says she will hold off on pain medicine right now  2050- pt  Still in pain, she will take something now  CT confirms diverticulitis, abx ordered and will medicate for pain  WBC 16 and pt  With chronic illness and pain and weakness, I advised admission      CritCare Time    Disposition  Final diagnoses:   Diverticulitis   Abdominal pain   Weakness     Time reflects when diagnosis was documented in both MDM as applicable and the Disposition within this note     Time User Action Codes Description Comment    0/38/2308  4:01 PM Jf Singh Add [Y23 28] Diverticulitis     7/86/2320  8:51 PM Jfeliezer Singh Add [H28 3] Abdominal pain     3/24/7199  4:46 PM Adealejandrae Maribell A Add [Y91 1] Weakness       ED Disposition     ED Disposition Condition Comment    Admit  Case was discussed with **hospitalist* and the patient's admission status         Follow-up Information    None         Patient's Medications Discharge Prescriptions    No medications on file     No discharge procedures on file      ED Provider  Electronically Signed by           Danyelle Billy MD  79/59/85 7508

## 2018-09-22 LAB
ANION GAP SERPL CALCULATED.3IONS-SCNC: 10 MMOL/L (ref 4–13)
BUN SERPL-MCNC: 6 MG/DL (ref 5–25)
CALCIUM SERPL-MCNC: 8.2 MG/DL (ref 8.3–10.1)
CHLORIDE SERPL-SCNC: 106 MMOL/L (ref 100–108)
CHOLEST SERPL-MCNC: 138 MG/DL (ref 50–200)
CO2 SERPL-SCNC: 25 MMOL/L (ref 21–32)
CREAT SERPL-MCNC: 0.5 MG/DL (ref 0.6–1.3)
ERYTHROCYTE [DISTWIDTH] IN BLOOD BY AUTOMATED COUNT: 15.8 % (ref 11.6–15.1)
EST. AVERAGE GLUCOSE BLD GHB EST-MCNC: 146 MG/DL
GFR SERPL CREATININE-BSD FRML MDRD: 115 ML/MIN/1.73SQ M
GLUCOSE SERPL-MCNC: 106 MG/DL (ref 65–140)
GLUCOSE SERPL-MCNC: 117 MG/DL (ref 65–140)
GLUCOSE SERPL-MCNC: 120 MG/DL (ref 65–140)
GLUCOSE SERPL-MCNC: 131 MG/DL (ref 65–140)
GLUCOSE SERPL-MCNC: 151 MG/DL (ref 65–140)
GLUCOSE SERPL-MCNC: 98 MG/DL (ref 65–140)
HBA1C MFR BLD: 6.7 % (ref 4.2–6.3)
HCT VFR BLD AUTO: 31.8 % (ref 34.8–46.1)
HDLC SERPL-MCNC: 52 MG/DL (ref 40–60)
HGB BLD-MCNC: 10.3 G/DL (ref 11.5–15.4)
LDLC SERPL CALC-MCNC: 71 MG/DL (ref 0–100)
MCH RBC QN AUTO: 24.6 PG (ref 26.8–34.3)
MCHC RBC AUTO-ENTMCNC: 32.4 G/DL (ref 31.4–37.4)
MCV RBC AUTO: 76 FL (ref 82–98)
NONHDLC SERPL-MCNC: 86 MG/DL
PLATELET # BLD AUTO: 308 THOUSANDS/UL (ref 149–390)
PMV BLD AUTO: 10.2 FL (ref 8.9–12.7)
POTASSIUM SERPL-SCNC: 3.2 MMOL/L (ref 3.5–5.3)
RBC # BLD AUTO: 4.18 MILLION/UL (ref 3.81–5.12)
SODIUM SERPL-SCNC: 141 MMOL/L (ref 136–145)
TRIGL SERPL-MCNC: 75 MG/DL
TSH SERPL DL<=0.05 MIU/L-ACNC: 1.6 UIU/ML (ref 0.36–3.74)
WBC # BLD AUTO: 10.7 THOUSAND/UL (ref 4.31–10.16)

## 2018-09-22 PROCEDURE — 83036 HEMOGLOBIN GLYCOSYLATED A1C: CPT | Performed by: NURSE PRACTITIONER

## 2018-09-22 PROCEDURE — 99232 SBSQ HOSP IP/OBS MODERATE 35: CPT | Performed by: INTERNAL MEDICINE

## 2018-09-22 PROCEDURE — 85027 COMPLETE CBC AUTOMATED: CPT | Performed by: NURSE PRACTITIONER

## 2018-09-22 PROCEDURE — 84443 ASSAY THYROID STIM HORMONE: CPT | Performed by: NURSE PRACTITIONER

## 2018-09-22 PROCEDURE — 80048 BASIC METABOLIC PNL TOTAL CA: CPT | Performed by: NURSE PRACTITIONER

## 2018-09-22 PROCEDURE — 82948 REAGENT STRIP/BLOOD GLUCOSE: CPT

## 2018-09-22 PROCEDURE — 80061 LIPID PANEL: CPT | Performed by: NURSE PRACTITIONER

## 2018-09-22 PROCEDURE — 99221 1ST HOSP IP/OBS SF/LOW 40: CPT | Performed by: INTERNAL MEDICINE

## 2018-09-22 RX ORDER — SODIUM CHLORIDE 9 MG/ML
75 INJECTION, SOLUTION INTRAVENOUS CONTINUOUS
Status: DISCONTINUED | OUTPATIENT
Start: 2018-09-22 | End: 2018-09-23

## 2018-09-22 RX ORDER — POTASSIUM CHLORIDE 20 MEQ/1
40 TABLET, EXTENDED RELEASE ORAL ONCE
Status: COMPLETED | OUTPATIENT
Start: 2018-09-22 | End: 2018-09-22

## 2018-09-22 RX ORDER — POTASSIUM CHLORIDE 14.9 MG/ML
20 INJECTION INTRAVENOUS ONCE
Status: COMPLETED | OUTPATIENT
Start: 2018-09-22 | End: 2018-09-22

## 2018-09-22 RX ORDER — POTASSIUM CHLORIDE 29.8 MG/ML
40 INJECTION INTRAVENOUS ONCE
Status: DISCONTINUED | OUTPATIENT
Start: 2018-09-22 | End: 2018-09-22 | Stop reason: CLARIF

## 2018-09-22 RX ADMIN — METRONIDAZOLE 500 MG: 500 INJECTION, SOLUTION INTRAVENOUS at 21:18

## 2018-09-22 RX ADMIN — MYCOPHENOLIC ACID 360 MG: 180 TABLET, DELAYED RELEASE ORAL at 09:12

## 2018-09-22 RX ADMIN — METRONIDAZOLE 500 MG: 500 INJECTION, SOLUTION INTRAVENOUS at 06:40

## 2018-09-22 RX ADMIN — LISINOPRIL 10 MG: 10 TABLET ORAL at 09:12

## 2018-09-22 RX ADMIN — PREDNISONE 1 MG: 1 TABLET ORAL at 09:12

## 2018-09-22 RX ADMIN — ENOXAPARIN SODIUM 40 MG: 40 INJECTION SUBCUTANEOUS at 09:11

## 2018-09-22 RX ADMIN — AMLODIPINE BESYLATE 5 MG: 5 TABLET ORAL at 09:11

## 2018-09-22 RX ADMIN — MYCOPHENOLIC ACID 360 MG: 180 TABLET, DELAYED RELEASE ORAL at 18:16

## 2018-09-22 RX ADMIN — POTASSIUM CHLORIDE 20 MEQ: 200 INJECTION, SOLUTION INTRAVENOUS at 09:06

## 2018-09-22 RX ADMIN — SODIUM CHLORIDE 125 ML/HR: 0.9 INJECTION, SOLUTION INTRAVENOUS at 09:55

## 2018-09-22 RX ADMIN — LEVOFLOXACIN 750 MG: 5 INJECTION, SOLUTION INTRAVENOUS at 22:31

## 2018-09-22 RX ADMIN — POTASSIUM CHLORIDE 40 MEQ: 1500 TABLET, EXTENDED RELEASE ORAL at 09:12

## 2018-09-22 RX ADMIN — METRONIDAZOLE 500 MG: 500 INJECTION, SOLUTION INTRAVENOUS at 14:10

## 2018-09-22 RX ADMIN — LEVOTHYROXINE SODIUM 75 MCG: 75 TABLET ORAL at 06:40

## 2018-09-22 RX ADMIN — INSULIN LISPRO 1 UNITS: 100 INJECTION, SOLUTION INTRAVENOUS; SUBCUTANEOUS at 21:18

## 2018-09-22 RX ADMIN — GABAPENTIN 900 MG: 300 CAPSULE ORAL at 21:18

## 2018-09-22 RX ADMIN — INSULIN GLARGINE 18 UNITS: 100 INJECTION, SOLUTION SUBCUTANEOUS at 21:18

## 2018-09-22 RX ADMIN — POTASSIUM CHLORIDE 20 MEQ: 200 INJECTION, SOLUTION INTRAVENOUS at 11:52

## 2018-09-22 NOTE — ASSESSMENT & PLAN NOTE
· CT scan of the abdomen showed acute uncomplicated did diverticulitis of the sigmoid colon  · Patient is still having tenderness  · Patient did have a colonoscopy almost 5-7 years ago and had few colon polyps removed  · Continue NPO, IV hydration, Levaquin and Flagyl  · GI evaluation as patient will need outpatient colonoscopy

## 2018-09-22 NOTE — CONSULTS
Consultation - [facility]  Mena Godoy 76 y o  female MRN: [id number]  Unit/Bed#: [address] Encounter: [id number]        Consults    ASSESSMENT/PLAN:   1  Acute uncomplicated sigmoid diverticulitis-initial episode, no evidence of perforation or abscess on CT scan  Abdominal exam is benign, nontender, no guarding or rigidity  Her last colonoscopy was 5-7 years ago and was noted to have 3 polyps  She has not had subsequent colonoscopy since then   -would recommend to continue IV antibiotics including Levaquin and Flagyl for 7-10 days   -continue supportive care with IV fluids, pain medications as necessary   -given symptomatic improvement, can start on clear liquid diet and advance to low residue as tolerated  -will plan for outpatient colonoscopy in the next 4-6 weeks  2  Change in bowel habits with diarrhea over the past several weeks-suspect likely infectious versus overflow diarrhea in setting of colonic lesion  If diarrhea persists, would recommend to check stool studies for C diff, ova and parasites and stool culture  3  Microcytic anemia-likely dilutional, hemoglobin was normal on admission, no reports of hematochezia, melena or hematemesis  Would recommend to monitor the color stools, check iron studies, vitamin B12 and folate     ______________________________________________________________________    Reason for Consult / Principal Problem: Acute diverticulitis    HPI: Mena Godoy is a 76y o  year old female who presents with Acute diverticulitis  This is a 70-year-old female with history of diabetes, asthma, polymyositis, hypertension, hyperlipidemia who presents with acute onset of left lower quadrant pain  Patient states that she has been feeling unwell for the past several weeks  She states that she has been having loose watery diarrhea multiple times during the day  She states that symptoms became increasingly worse over the past 1 day    She states that diarrhea seems to have gotten worse in addition, she developed left lower quadrant pain bring her to the hospital   She also endorses chills but does not endorse fever  Labs on admission were notable for leukocytosis  CT of abdomen and pelvis was notable for acute uncomplicated sigmoid diverticulitis  She states that she underwent colonoscopy 5-7 years ago and was noted to have several polyps  She denies family history of GI malignancy otherwise  Review of Systems: The remainder of the review of systems was negative except for the pertinent positives noted in HPI       Historical Information   Past Medical History:   Diagnosis Date    Anemia     Asthma     Chronic pain     bulging discs in back,polymyositis    Diabetes mellitus ([address] )     Disease of thyroid gland     Hyperlipidemia     Hypertension     Polymyositis (HCC)      Past Surgical History:   Procedure Laterality Date    HYSTERECTOMY      SINUS SURGERY      THYROIDECTOMY      TUBAL LIGATION       Social History   History   Alcohol Use No     History   Drug Use No     History   Smoking Status    Never Smoker   Smokeless Tobacco    Never Used     Family History   Problem Relation Age of Onset    Hepatitis Sister         acute hepatits C virus       Meds/Allergies     Prescriptions Prior to Admission   Medication    albuterol (PROVENTIL HFA,VENTOLIN HFA) 90 mcg/act inhaler    amLODIPine (NORVASC) 5 mg tablet    benazepril (LOTENSIN) 10 mg tablet    GABAPENTIN PO    insulin glargine (LANTUS) 100 units/mL subcutaneous injection    levothyroxine 75 mcg tablet    metFORMIN (GLUCOPHAGE) 1000 MG tablet    mycophenolate (MYFORTIC) 360 MG TBEC    predniSONE 10 mg tablet     Current Facility-Administered Medications   Medication Dose Route Frequency    amLODIPine (NORVASC) tablet 5 mg  5 mg Oral Daily    enoxaparin (LOVENOX) subcutaneous injection 40 mg  40 mg Subcutaneous Daily    gabapentin (NEURONTIN) capsule 900 mg  900 mg Oral HS  insulin glargine (LANTUS) subcutaneous injection 18 Units 0 18 mL  18 Units Subcutaneous HS    insulin lispro (HumaLOG) 100 units/mL subcutaneous injection 1-6 Units  1-6 Units Subcutaneous Q6H [facility]    levofloxacin (LEVAQUIN) IVPB (premix) 750 mg  750 mg Intravenous Q24H    And    metroNIDAZOLE (FLAGYL) IVPB (premix) 500 mg  500 mg Intravenous Q8H    levothyroxine tablet 75 mcg  75 mcg Oral Daily    lisinopril (ZESTRIL) tablet 10 mg  10 mg Oral Daily    mycophenolic acid (MYFORTIC) EC tablet 360 mg  360 mg Oral BID    predniSONE tablet 1 mg  1 mg Oral Daily    sodium chloride 0 9 % infusion  125 mL/hr Intravenous Continuous       Allergies   Allergen Reactions    Anaprox [Naproxen Sodium] Shortness Of Breath    Penicillins Swelling    Aspirin Rash    Sulfa Antibiotics Rash       Objective     Blood pressure 138/67, pulse 82, temperature 98 4 °F (36 9 °C), temperature source Oral, resp  rate 18, height 5' 11" (1 803 m), weight 106 kg (234 lb 2 1 oz), SpO2 98 %        Intake/Output Summary (Last 24 hours) at 09/22/18 1620  Last data filed at 09/22/18 0955   Gross per 24 hour   Intake             2000 ml   Output              200 ml   Net             1800 ml       PHYSICAL EXAM     GEN: well nourished, well developed, no acute distress  HEENT: anicteric, MMM, no cervical or supraclavicular lymphadenopathy  CV: RRR, no m/r/g  CHEST: CTA b/l, no WRR  ABD: +BS, soft, NT/ND, no hepatosplenomegaly  EXT: no c/c/e  SKIN: no rashes,  NEURO: aaox3    Lab Results:   Admission on 09/21/2018   Component Date Value    Sodium 09/21/2018 136     Potassium 09/21/2018 3 7     Chloride 09/21/2018 101     CO2 09/21/2018 28     ANION GAP 09/21/2018 7     BUN 09/21/2018 9     Creatinine 09/21/2018 0 59*    Glucose 09/21/2018 159*    Calcium 09/21/2018 8 9     AST 09/21/2018 12     ALT 09/21/2018 29     Alkaline Phosphatase 09/21/2018 87     Total Protein 09/21/2018 7 6     Albumin 09/21/2018 3 4*    Total Bilirubin 09/21/2018 0 30     eGFR 09/21/2018 109     WBC 09/21/2018 16 14*    RBC 09/21/2018 4 82     Hemoglobin 09/21/2018 12 0     Hematocrit 09/21/2018 36 2     MCV 09/21/2018 75*    MCH 09/21/2018 24 9*    MCHC 09/21/2018 33 1     RDW 09/21/2018 15 8*    MPV 09/21/2018 10 1     Platelets 06/31/7758 369     nRBC 09/21/2018 0     Neutrophils Relative 09/21/2018 75     Immat GRANS % 09/21/2018 1     Lymphocytes Relative 09/21/2018 16     Monocytes Relative 09/21/2018 6     Eosinophils Relative 09/21/2018 2     Basophils Relative 09/21/2018 0     Neutrophils Absolute 09/21/2018 12 22*    Immature Grans Absolute 09/21/2018 0 08     Lymphocytes Absolute 09/21/2018 2 56     Monocytes Absolute 09/21/2018 0 97     Eosinophils Absolute 09/21/2018 0 27     Basophils Absolute 09/21/2018 0 04     Color, UA 09/21/2018 Yellow     Clarity, UA 09/21/2018 Clear     Specific Gravity, UA 09/21/2018 <=1 005     pH, UA 09/21/2018 6 0     Leukocytes, UA 09/21/2018 Trace*    Nitrite, UA 09/21/2018 Negative     Protein, UA 09/21/2018 Negative     Glucose, UA 09/21/2018 Negative     Ketones, UA 09/21/2018 Negative     Urobilinogen, UA 09/21/2018 0 2     Bilirubin, UA 09/21/2018 Negative     Blood, UA 09/21/2018 Negative     LACTIC ACID 09/21/2018 1 7     RBC, UA 09/21/2018 None Seen     WBC, UA 09/21/2018 2-4*    Epithelial Cells 09/21/2018 Occasional     Bacteria, UA 09/21/2018 None Seen     TSH 3RD GENERATON 09/22/2018 1 600     Hemoglobin A1C 09/22/2018 6 7*    EAG 09/22/2018 146     POC Glucose 09/21/2018 120     Sodium 09/22/2018 141     Potassium 09/22/2018 3 2*    Chloride 09/22/2018 106     CO2 09/22/2018 25     ANION GAP 09/22/2018 10     BUN 09/22/2018 6     Creatinine 09/22/2018 0 50*    Glucose 09/22/2018 117     Calcium 09/22/2018 8 2*    eGFR 09/22/2018 115     WBC 09/22/2018 10 70*    RBC 09/22/2018 4 18     Hemoglobin 09/22/2018 10 3*    Hematocrit 09/22/2018 31 8*    MCV 09/22/2018 76*    MCH 09/22/2018 24 6*    MCHC 09/22/2018 32 4     RDW 09/22/2018 15 8*    Platelets 45/77/1290 308     MPV 09/22/2018 10 2     Cholesterol 09/22/2018 138     Triglycerides 09/22/2018 75     HDL, Direct 09/22/2018 52     LDL Calculated 09/22/2018 71     Non-HDL-Chol (CHOL-HDL) 09/22/2018 86     POC Glucose 09/22/2018 131     POC Glucose 09/22/2018 106     POC Glucose 09/22/2018 98      Imaging Studies: I have personally reviewed pertinent films in PACS

## 2018-09-22 NOTE — H&P
H&P- Benjamin Campbell 1950, 76 y o  female MRN: [id number]    Unit/Bed#: MAXIMINO Encounter: [id number]    Primary Care Provider: Shruthi Segura MD   Date and time admitted to hospital: 9/21/2018  5:15 PM        * Diverticulitis   Assessment & Plan    · Patient has been feeling weak and tired for about 2 weeks and has had some diarrhea, no blood in the stool  · Yesterday she began with left lower quadrant pain and chills, nausea with no vomiting  · She had had routine blood work with her rheumatologist on Tuesday and was advised today to come to the emergency department as her labs were abnormal  · In the ER patient was found to have a white count of 16 14 and a CT revealing uncomplicated diverticulitis  · Admit patient to observation  · IV hydration, continue Levaquin and Flagyl  · NPO and advance diet as patient tolerates        Chronic pain   Assessment & Plan    · Continue gabapentin        Asthma   Assessment & Plan    · Albuterol p r n          Diabetes mellitus ([facility] [address] )   Assessment & Plan    · Hold metformin  · Patient is NPO at this time, will give half of her nightly dose of Lantus and check Accu-Cheks with sliding scale coverage every 6 hours  · Check hemoglobin A1c          Disease of thyroid gland   Assessment & Plan    · Continue Synthroid, check TSH        Hyperlipidemia   Assessment & Plan    · Not on any medications, check lipid profile        Hypertension   Assessment & Plan    · Continue Norvasc and benazepril        Polymyositis (HCC)   Assessment & Plan    · Continue Myfortic, prednisone  · At baseline, patient struggles to ambulate however she states she has all the resources at home she needs at this time  · Case Management consult        Sepsis (HCC)-resolved as of 9/21/2018   Assessment & Plan    · As evidenced by heart rate of 92 and WBC count of 16 14 - afebrile, normal lactate  · Sepsis secondary to diverticulitis, see treatment plan above            VTE Prophylaxis: Enoxaparin (Lovenox)  / sequential compression device   Code Status: No Order    Anticipated Length of Stay:  Patient will be admitted on an Observation basis with an anticipated length of stay of  less than 2 midnights  Justification for Hospital Stay:  Diverticulitis    Total Time for Visit, including Counseling / Coordination of Care: 30 minutes  Greater than 50% of this total time spent on direct patient counseling and coordination of care  Chief Complaint:   Abdominal Pain (Pt reports LLQ pain since yesterday, started with diarrhea today  Pt also reports increasing weakness  Pt had bloodwork done and was told her inflammation level was high )      History of Present Illness:    Ricki Pearce is a 76 y o  female with a PMH of hyperlipidemia, diabetes on insulin, asthma, polymyositis, hypertension, hyperlipidemia who presents with complaints of left lower quadrant pain  Patient was in her usual state of health until about 2 weeks ago when she started to feel weak and had multiple episodes of diarrhea daily  She states the diarrhea was intermittent since then  She denies any blood in her stool  She reports subjective chills, but did not check her temperature  She went for routine doctor's appointment on Tuesday and received the result of her blood work today which revealed that her white blood cell count was high and she was advised to come to the emergency department  Yesterday patient states she developed left lower quadrant pain which is constant and sharp  There is no radiation of the pain  Upon arrival to the emergency department patient was found to have leukocytosis with a white blood cell count of 16 and a CT consistent with uncomplicated diverticulitis  She is admitted for observation  Review of Systems:    Review of Systems   Constitutional: Positive for chills and fatigue  HENT: Negative  Eyes: Negative  Respiratory: Negative  Cardiovascular: Negative      Gastrointestinal: Positive for abdominal pain, diarrhea and nausea  Negative for blood in stool and vomiting  Endocrine: Negative  Genitourinary: Negative  Musculoskeletal: Positive for gait problem and myalgias  Skin: Negative  Allergic/Immunologic: Negative  Hematological: Negative  Psychiatric/Behavioral: Negative  Past Medical and Surgical History:     Past Medical History:   Diagnosis Date    Anemia     Asthma     Chronic pain     bulging discs in back,polymyositis    Diabetes mellitus ([facility] [address] )     Disease of thyroid gland     Hyperlipidemia     Hypertension     Polymyositis (HCC)        Past Surgical History:   Procedure Laterality Date    HYSTERECTOMY      SINUS SURGERY      THYROIDECTOMY      TUBAL LIGATION         Meds/Allergies:    Prior to Admission medications    Medication Sig Start Date End Date Taking? Authorizing Provider   albuterol (PROVENTIL HFA,VENTOLIN HFA) 90 mcg/act inhaler Inhale 2 puffs every 6 (six) hours as needed for wheezing  Yes Historical Provider, MD   amLODIPine (NORVASC) 5 mg tablet Take 5 mg by mouth daily   Yes Historical Provider, MD   benazepril (LOTENSIN) 10 mg tablet Take 10 mg by mouth daily   Yes Historical Provider, MD   GABAPENTIN PO Take 900 mg by mouth daily at bedtime   Yes Historical Provider, MD   insulin glargine (LANTUS) 100 units/mL subcutaneous injection Inject 36 Units under the skin daily at bedtime     Yes Historical Provider, MD   levothyroxine 75 mcg tablet Take 75 mcg by mouth daily  Yes Historical Provider, MD   metFORMIN (GLUCOPHAGE) 1000 MG tablet Take 1,000 mg by mouth 2 (two) times a day with meals  Yes Historical Provider, MD   mycophenolate (MYFORTIC) 360 MG TBEC Take 360 mg by mouth 2 (two) times a day   Yes Historical Provider, MD   predniSONE 10 mg tablet Take 1 mg by mouth daily     Yes Historical Provider, MD   amLODIPine-benazepril (LOTREL 5-10) 5-10 MG per capsule Take 1 capsule by mouth daily    9/21/18 Yes Historical Provider, MD calcium carbonate 200 MG capsule Take 500 mg by mouth daily  9/21/18  Historical Provider, MD   ferrous sulfate 325 (65 Fe) mg tablet Take 325 mg by mouth daily with breakfast   9/21/18  Historical Provider, MD   methocarbamol (ROBAXIN) 500 mg tablet 1000mg po qid prn muscle spasm (may start with 1500mg po qid x2-3 days) 4/8/16 9/21/18  Milla Gomes MD   multivitamin [facility]) TABS Take 1 tablet by mouth daily  9/21/18  Historical Provider, MD   mycophenolate (CELLCEPT) 500 mg tablet Take 1,000 mg by mouth 2 (two) times a day Indications: polymyositis  9/21/18  Historical Provider, MD   omeprazole (PriLOSEC) 40 MG capsule Take 40 mg by mouth daily  9/21/18  Historical Provider, MD   oxybutynin (DITROPAN-XL) 10 MG 24 hr tablet Take 10 mg by mouth daily  9/21/18  Historical Provider, MD   oxyCODONE-acetaminophen (PERCOCET) 5-325 mg per tablet Take 1-2 tablets by mouth every 6 (six) hours as needed for severe pain for up to 30 doses  Max Daily Amount: 8 tablets 4/8/16 9/21/18  Milla Gomes MD   Vitamin D, Cholecalciferol, 1000 UNITS TABS Take by mouth   9/21/18  Historical Provider, MD       Allergies: Allergies   Allergen Reactions    Anaprox [Naproxen Sodium] Shortness Of Breath    Penicillins Swelling    Aspirin Rash    Sulfa Antibiotics Rash       Social History:     Marital Status: Single   Substance Use History:   History   Alcohol Use No     History   Smoking Status    Never Smoker   Smokeless Tobacco    Never Used     History   Drug Use No       Family History:    non-contributory    Physical Exam:     Vitals:   Blood Pressure: (!) 178/87 (09/21/18 2145)  Pulse: 84 (09/21/18 2137)  Temperature: 98 7 °F (37 1 °C) (09/21/18 2230)  Temp Source: Temporal (09/21/18 2230)  Respirations: 18 (09/21/18 2137)  Weight - Scale: 103 kg (227 lb) (09/21/18 1721)  SpO2: 98 % (09/21/18 2137)    Physical Exam   Constitutional: She is oriented to person, place, and time   She appears well-developed and well-nourished  HENT:   Head: Normocephalic  Eyes: Pupils are equal, round, and reactive to light  Neck: Normal range of motion  Neck supple  Cardiovascular: Normal rate, regular rhythm and normal heart sounds  Pulmonary/Chest: Effort normal and breath sounds normal    Abdominal: Soft  Bowel sounds are normal  There is tenderness in the left lower quadrant  Musculoskeletal:        Right shoulder: She exhibits deformity  Bilateral hands contracted, patient is stiff   Neurological: She is alert and oriented to person, place, and time  Skin: Skin is warm and dry  Psychiatric: She has a normal mood and affect  Additional Data:     Lab Results: I have personally reviewed pertinent reports  Results from last 7 days  Lab Units 09/21/18  1745   WBC Thousand/uL 16 14*   HEMOGLOBIN g/dL 12 0   HEMATOCRIT % 36 2   PLATELETS Thousands/uL 369   NEUTROS PCT % 75   LYMPHS PCT % 16   MONOS PCT % 6   EOS PCT % 2       Results from last 7 days  Lab Units 09/21/18  1745   SODIUM mmol/L 136   POTASSIUM mmol/L 3 7   CHLORIDE mmol/L 101   CO2 mmol/L 28   BUN mg/dL 9   CREATININE mg/dL 0 59*   CALCIUM mg/dL 8 9   ALK PHOS U/L 87   ALT U/L 29   AST U/L 12           Imaging: I have personally reviewed pertinent reports  CT abdomen pelvis with contrast   Final Result by Mariza Sandoval MD (09/21 2039)      1  Findings consistent with acute uncomplicated diverticulitis  2   Calcification in the pancreas, can be seen with prior episodes of pancreatitis  The study was marked in [facility] for immediate notification  Workstation performed: [id number]             CT abdomen pelvis with contrast   Final Result      1  Findings consistent with acute uncomplicated diverticulitis  2   Calcification in the pancreas, can be seen with prior episodes of pancreatitis  The study was marked in [facility] for immediate notification              Workstation performed: [id number]             EKG, Pathology, and Other Studies Reviewed on Admission:   · EKG:  Not applicable    Allscripts / Epic Records Reviewed: Yes     ** Please Note: This note has been constructed using a voice recognition system   **

## 2018-09-22 NOTE — ASSESSMENT & PLAN NOTE
· Patient has been feeling weak and tired for about 2 weeks and has had some diarrhea, no blood in the stool  · Yesterday she began with left lower quadrant pain and chills, nausea with no vomiting  · She had had routine blood work with her rheumatologist on Tuesday and was advised today to come to the emergency department as her labs were abnormal  · In the ER patient was found to have a white count of 16 14 and a CT revealing uncomplicated diverticulitis  · Admit patient to observation  · IV hydration, continue Levaquin and Flagyl  · NPO and advance diet as patient tolerates

## 2018-09-22 NOTE — CASE MANAGEMENT
Initial Clinical Review    Admission: Date/Time/Statement: Pt placed in Observation status  On 9/21/18 @ 2120     Orders Placed This Encounter   Procedures    Place in Observation (expected length of stay for this patient is less than two midnights)     Standing Status:   Standing     Number of Occurrences:   1     Order Specific Question:   Admitting Physician     Answer:   Lionel Bae     Order Specific Question:   Level of Care     Answer:   Med Surg [16]         ED: Date/Time/Mode of Arrival:   ED Arrival Information     Expected Arrival Acuity Means of Arrival Escorted By Service Admission Type    - 9/21/2018 17:08 Urgent Walk-In Family Member Hospitalist Urgent    Arrival Complaint    abdominal pain          Chief Complaint:   Chief Complaint   Patient presents with    Abdominal Pain     Pt reports LLQ pain since yesterday, started with diarrhea today  Pt also reports increasing weakness  Pt had bloodwork done and was told her inflammation level was high  History of Illness: Rosa Walker is a 76 y o  female  who presents with complaints of left lower quadrant pain  Patient was in her usual state of health until about 2 weeks ago when she started to feel weak and had multiple episodes of diarrhea daily  She states the diarrhea was intermittent since then  She denies any blood in her stool  She reports subjective chills, but did not check her temperature  She went for routine doctor's appointment on Tuesday and received the result of her blood work today which revealed that her white blood cell count was high and she was advised to come to the emergency department  Yesterday patient states she developed left lower quadrant pain which is constant and sharp  There is no radiation of the pain  Upon arrival to the emergency department patient was found to have leukocytosis with a white blood cell count of 16 and a CT consistent with uncomplicated diverticulitis    She is admitted for observation  ED Vital Signs:   ED Triage Vitals   Temperature Pulse Respirations Blood Pressure SpO2   09/21/18 1721 09/21/18 1721 09/21/18 1721 09/21/18 1721 09/21/18 1721   98 7 °F (37 1 °C) 92 18 159/84 98 %      Temp Source Heart Rate Source Patient Position - Orthostatic VS BP Location FiO2 (%)   09/21/18 1721 09/21/18 1721 09/21/18 1721 09/21/18 1721 --   Temporal Monitor Sitting Left arm       Pain Score       09/21/18 2121       6        Wt Readings from Last 1 Encounters:   09/21/18 106 kg (234 lb 2 1 oz)       Abnormal Labs/Diagnostic Test Results:   9/21 - Bun/cr 9/0 59 - Glucose 159 - Wbc 16 14   Urine Leukocytes - trace   9/22 - K 3 2 - Bun/cr 6/0 50 - Aleksander 8 2 - Wbc 10 70 - H/H 10 3/31 8    Ct A & P -   1   Findings consistent with acute uncomplicated diverticulitis  2   Calcification in the pancreas, can be seen with prior episodes of pancreatitis        ED Treatment:   Medication Administration from 09/21/2018 1708 to 09/21/2018 2256       Date/Time Order Dose Route Action     09/21/2018 1745 sodium chloride 0 9 % infusion 125 mL/hr Intravenous New Bag     09/21/2018 1825 iohexol (OMNIPAQUE) 240 MG/ML solution 50 mL 50 mL Oral Given     09/21/2018 2003 iohexol (OMNIPAQUE) 350 MG/ML injection (MULTI-DOSE) 100 mL 100 mL Intravenous Given     09/21/2018 2130 levofloxacin (LEVAQUIN) IVPB (premix) 750 mg 750 mg Intravenous New Bag     09/21/2018 2130 metroNIDAZOLE (FLAGYL) tablet 500 mg 500 mg Oral Given     09/21/2018 2121 HYDROmorphone (DILAUDID) injection 0 5 mg 0 5 mg Intravenous Given       Past Medical/Surgical History:   Diagnosis    Anemia    Asthma    Chronic pain    Diabetes mellitus ([facility] [address] )    Disease of thyroid gland    Hyperlipidemia    Hypertension    Polymyositis ([facility] [address] )       Admitting Diagnosis: Diverticulitis [K57 92]  Weakness [R53 1]  Abdominal pain [R10 9]    Age/Sex: 76 y o  female    Assessment/Plan:   Diverticulitis   Assessment & Plan     · Patient has been feeling weak and tired for about 2 weeks and has had some diarrhea, no blood in the stool  · Yesterday she began with left lower quadrant pain and chills, nausea with no vomiting  · She had had routine blood work with her rheumatologist on Tuesday and was advised today to come to the emergency department as her labs were abnormal  · In the ER patient was found to have a white count of 16 14 and a CT revealing uncomplicated diverticulitis  · Admit patient to observation  · IV hydration, continue Levaquin and Flagyl  · NPO and advance diet as patient tolerates          Chronic pain   Assessment & Plan     · Continue gabapentin          Asthma   Assessment & Plan     · Albuterol p r n           Diabetes mellitus ([facility] [address] )   Assessment & Plan     · Hold metformin  · Patient is NPO at this time, will give half of her nightly dose of Lantus and check Accu-Cheks with sliding scale coverage every 6 hours  · Check hemoglobin A1c             Disease of thyroid gland   Assessment & Plan     · Continue Synthroid, check TSH          Hyperlipidemia   Assessment & Plan     · Not on any medications, check lipid profile          Hypertension   Assessment & Plan     · Continue Norvasc and benazepril          Polymyositis (HCC)   Assessment & Plan     · Continue Myfortic, prednisone  · At baseline, patient struggles to ambulate however she states she has all the resources at home she needs at this time  · Case Management consult          Sepsis (HCC)-resolved as of 9/21/2018   Assessment & Plan     · As evidenced by heart rate of 92 and WBC count of 16 14 - afebrile, normal lactate  · Sepsis secondary to diverticulitis, see treatment plan above            Admission Orders:  Scheduled Meds:   Current Facility-Administered Medications:  amLODIPine 5 mg Oral Daily    enoxaparin 40 mg Subcutaneous Daily    gabapentin 900 mg Oral HS    insulin glargine 18 Units Subcutaneous HS    insulin lispro 1-6 Units Subcutaneous Q6H [address]    levofloxacin 750 mg Intravenous Q24H    And       metroNIDAZOLE 500 mg Intravenous Q8H    levothyroxine 75 mcg Oral Daily    lisinopril 10 mg Oral Daily    mycophenolate 360 mg Oral BID    potassium chloride 40 mEq Oral Once    potassium chloride 20 mEq Intravenous Once    Followed by       potassium chloride 20 mEq Intravenous Once    predniSONE 1 mg Oral Daily      Continuous Infusions:   sodium chloride 125 mL/hr     Nursing orders - VS q 4 - Up with assistance - scd's to le's - Diet NPO

## 2018-09-22 NOTE — ASSESSMENT & PLAN NOTE
· Hold metformin  · Continue Lantus 18 units subcutaneously daily with Humalog sliding scale  · Hemoglobin A1c 6 7

## 2018-09-22 NOTE — ASSESSMENT & PLAN NOTE
· Continue Myfortic, prednisone  · At baseline, patient struggles to ambulate however she states she has all the resources at home she needs at this time  · Case Management consult

## 2018-09-22 NOTE — PROGRESS NOTES
Progress Note - Clay Purdy 1950, 76 y o  female MRN: [id number]    Unit/Bed#: [address] [place] [address] Encounter: [id number]    Primary Care Provider: Adarsh Tobar MD   Date and time admitted to hospital: 9/21/2018  5:15 PM        * Acute diverticulitis   Assessment & Plan    · CT scan of the abdomen showed acute uncomplicated did diverticulitis of the sigmoid colon  · Patient is still having tenderness  · Patient did have a colonoscopy almost 5-7 years ago and had few colon polyps removed  · Continue NPO, IV hydration, Levaquin and Flagyl  · GI evaluation as patient will need outpatient colonoscopy        Diabetes mellitus ([facility] [address] )   Assessment & Plan    · Hold metformin  · Continue Lantus 18 units subcutaneously daily with Humalog sliding scale  · Hemoglobin A1c 6 7          Hyperlipidemia   Assessment & Plan    · Lipid panel reviewed-LDL is 71        Hypertension   Assessment & Plan    · Continue Norvasc and benazepril        Polymyositis (HCC)   Assessment & Plan    · Continue Myfortic, prednisone  · At baseline, patient struggles to ambulate however she states she has all the resources at home she needs at this time  · PT/OT evaluation treatment        Disease of thyroid gland   Assessment & Plan    · Continue Synthroid        Asthma   Assessment & Plan    · Albuterol p r n  Chronic pain   Assessment & Plan    · Continue gabapentin              VTE Pharmacologic Prophylaxis:   Pharmacologic: Enoxaparin (Lovenox)  Mechanical VTE Prophylaxis in Place: No    Patient Centered Rounds: I have performed bedside rounds with nursing staff today  Discussions with Specialists or Other Care Team Provider: No    Education and Discussions with Family / Patient:Yes    Time Spent for Care: 30 minutes  More than 50% of total time spent on counseling and coordination of care as described above      Current Length of Stay: 1 day(s)    Current Patient Status: Inpatient     Discharge Plan: Home    Code Status: Level 1 - Full Code      Subjective:   Patient is still having some abdominal tenderness  Denies any nausea or vomiting  Did not have any bowel movement  Objective:       Vitals:   Temp (24hrs), Av 1 °F (32 8 °C), Min:47 7 °F (8 7 °C), Max:98 7 °F (37 1 °C)    HR:  [78-92] 82  Resp:  [18] 18  BP: (108-178)/(55-87) 138/67  SpO2:  [94 %-98 %] 98 %  Body mass index is 32 65 kg/m²  Input and Output Summary (last 24 hours): Intake/Output Summary (Last 24 hours) at 18 1326  Last data filed at 18 0955   Gross per 24 hour   Intake             2000 ml   Output              200 ml   Net             1800 ml       Physical Exam:     Physical Exam   Constitutional: No distress  HENT:   Head: Normocephalic and atraumatic  Nose: Nose normal    Eyes: Conjunctivae and EOM are normal  Pupils are equal, round, and reactive to light  Neck: Normal range of motion  Neck supple  No JVD present  Cardiovascular: Normal rate, regular rhythm and normal heart sounds  Exam reveals no gallop and no friction rub  No murmur heard  Pulmonary/Chest: Effort normal and breath sounds normal  No respiratory distress  She has no wheezes  She has no rales  She exhibits no tenderness  Abdominal: Soft  Bowel sounds are normal  She exhibits no distension  There is tenderness  There is no rebound and no guarding  Moderate tenderness in the left lower quadrant   Musculoskeletal: She exhibits no edema  Neurological: She is alert  No cranial nerve deficit  Skin: Skin is warm and dry  No rash noted  Psychiatric: She has a normal mood and affect         Additional Data:     Labs:      Results from last 7 days  Lab Units 1857 18  1745   WBC Thousand/uL 10 70* 16 14*   HEMOGLOBIN g/dL 10 3* 12 0   HEMATOCRIT % 31 8* 36 2   PLATELETS Thousands/uL 308 369   NEUTROS PCT %  --  75   LYMPHS PCT %  --  16   MONOS PCT %  --  6   EOS PCT %  --  2       Results from last 7 days  Lab Units 18  0557 09/21/18  1745   SODIUM mmol/L 141 136   POTASSIUM mmol/L 3 2* 3 7   CHLORIDE mmol/L 106 101   CO2 mmol/L 25 28   BUN mg/dL 6 9   CREATININE mg/dL 0 50* 0 59*   CALCIUM mg/dL 8 2* 8 9   ALK PHOS U/L  --  87   ALT U/L  --  29   AST U/L  --  12           * I Have Reviewed All Lab Data Listed Above  * Additional Pertinent Lab Tests Reviewed: Kristy 66 Admission Reviewed    Imaging:  Ct Abdomen Pelvis With Contrast    Result Date: 9/21/2018  Narrative: CT ABDOMEN AND PELVIS WITH IV CONTRAST INDICATION:   LLQ pain, suspect diverticulitis  COMPARISON:  None  TECHNIQUE:  CT examination of the abdomen and pelvis was performed  Axial, sagittal, and coronal 2D reformatted images were created from the source data and submitted for interpretation  Radiation dose length product (DLP) for this visit:  923 mGy-cm   This examination, like all CT scans performed in the [facility], was performed utilizing techniques to minimize radiation dose exposure, including the use of iterative reconstruction and automated exposure control  IV Contrast:  50 mL of iohexol (OMNIPAQUE) 100 mL of iohexol (OMNIPAQUE) Enteric Contrast:  Enteric contrast was administered  FINDINGS: ABDOMEN LOWER CHEST:  No clinically significant abnormality identified in the visualized lower chest  LIVER/BILIARY TREE:  Unremarkable  GALLBLADDER:  No calcified gallstones  No pericholecystic inflammatory change  SPLEEN:  Unremarkable  PANCREAS:  Ossification near the body/tail ADRENAL GLANDS:  Unremarkable  KIDNEYS/URETERS:  Unremarkable  No hydronephrosis  STOMACH AND BOWEL:  Short segment area of wall thickening and adjacent fat stranding seen within the sigmoid colon  There is a adjacent loop of small bowel, however fat planes are maintained  Colonic diverticulosis  APPENDIX:  No findings to suggest appendicitis  ABDOMINOPELVIC CAVITY:  No ascites or free intraperitoneal air  No lymphadenopathy   VESSELS:  Atherosclerotic changes are present  No evidence of aneurysm  PELVIS REPRODUCTIVE ORGANS:  Unremarkable for patient's age  URINARY BLADDER:  Unremarkable  ABDOMINAL WALL/INGUINAL REGIONS:  There is a small fat-containing umbilical hernia  OSSEOUS STRUCTURES:  No acute fracture or destructive osseous lesion  Impression: 1  Findings consistent with acute uncomplicated diverticulitis  2   Calcification in the pancreas, can be seen with prior episodes of pancreatitis  The study was marked in [facility] for immediate notification  Workstation performed: [id number]     Imaging Reports Reviewed by myself    Cultures:   Blood Culture: No results found for: BLOODCX  Urine Culture: No results found for: URINECX  Sputum Culture: No components found for: SPUTUMCX  Wound Culture: No results found for: WOUNDCULT    Last 24 Hours Medication List:     Current Facility-Administered Medications:  amLODIPine 5 mg Oral Daily Clayborne Don, CRNP    enoxaparin 40 mg Subcutaneous Daily Clayborne Don, CRNP    gabapentin 900 mg Oral HS Clayborne Don, CRNP    insulin glargine 18 Units Subcutaneous HS Clayborne Don, CRNP    insulin lispro 1-6 Units Subcutaneous Q6H [address] Clayborne Don, CRNP    levofloxacin 750 mg Intravenous Q24H Clayborne Don, CRNP    And        metroNIDAZOLE 500 mg Intravenous Q8H Clayborne Don, CRNP Last Rate: 500 mg (09/22/18 0640)   levothyroxine 75 mcg Oral Daily Clayborne Don, CRNP    lisinopril 10 mg Oral Daily Clayborne Don, CRNP    mycophenolate 360 mg Oral BID Clayborne Don, CRNP    potassium chloride 20 mEq Intravenous Once Davis Helm MD Last Rate: 20 mEq (09/22/18 1152)   predniSONE 1 mg Oral Daily Clayborne Don, CRNP    sodium chloride 125 mL/hr Intravenous Continuous Danyelle Billy MD Last Rate: 477 mL/hr (09/22/18 0955)        Today, Patient Was Seen By: Stacia Lee MD    ** Please Note: Dragon 360 Dictation voice to text software may have been used in the creation of this document  **

## 2018-09-22 NOTE — ASSESSMENT & PLAN NOTE
· As evidenced by heart rate of 92 and WBC count of 16 14 - afebrile, normal lactate  · Sepsis secondary to diverticulitis, see treatment plan above

## 2018-09-22 NOTE — ASSESSMENT & PLAN NOTE
· Hold metformin  · Patient is NPO at this time, will give half of her nightly dose of Lantus and check Accu-Cheks with sliding scale coverage every 6 hours  · Check hemoglobin A1c

## 2018-09-22 NOTE — ASSESSMENT & PLAN NOTE
· Continue Myfortic, prednisone  · At baseline, patient struggles to ambulate however she states she has all the resources at home she needs at this time  · PT/OT evaluation treatment

## 2018-09-23 VITALS
HEIGHT: 71 IN | BODY MASS INDEX: 32.78 KG/M2 | WEIGHT: 234.13 LBS | DIASTOLIC BLOOD PRESSURE: 83 MMHG | OXYGEN SATURATION: 97 % | RESPIRATION RATE: 18 BRPM | SYSTOLIC BLOOD PRESSURE: 156 MMHG | HEART RATE: 82 BPM | TEMPERATURE: 98.4 F

## 2018-09-23 LAB
ANION GAP SERPL CALCULATED.3IONS-SCNC: 9 MMOL/L (ref 4–13)
BASOPHILS # BLD AUTO: 0.03 THOUSANDS/ΜL (ref 0–0.1)
BASOPHILS NFR BLD AUTO: 0 % (ref 0–1)
BUN SERPL-MCNC: 4 MG/DL (ref 5–25)
CALCIUM SERPL-MCNC: 8.2 MG/DL (ref 8.3–10.1)
CHLORIDE SERPL-SCNC: 107 MMOL/L (ref 100–108)
CO2 SERPL-SCNC: 24 MMOL/L (ref 21–32)
CREAT SERPL-MCNC: 0.54 MG/DL (ref 0.6–1.3)
EOSINOPHIL # BLD AUTO: 0.28 THOUSAND/ΜL (ref 0–0.61)
EOSINOPHIL NFR BLD AUTO: 4 % (ref 0–6)
ERYTHROCYTE [DISTWIDTH] IN BLOOD BY AUTOMATED COUNT: 15.6 % (ref 11.6–15.1)
GFR SERPL CREATININE-BSD FRML MDRD: 112 ML/MIN/1.73SQ M
GLUCOSE SERPL-MCNC: 113 MG/DL (ref 65–140)
GLUCOSE SERPL-MCNC: 122 MG/DL (ref 65–140)
GLUCOSE SERPL-MCNC: 144 MG/DL (ref 65–140)
GLUCOSE SERPL-MCNC: 229 MG/DL (ref 65–140)
HCT VFR BLD AUTO: 32.7 % (ref 34.8–46.1)
HGB BLD-MCNC: 10.9 G/DL (ref 11.5–15.4)
IMM GRANULOCYTES # BLD AUTO: 0.04 THOUSAND/UL (ref 0–0.2)
IMM GRANULOCYTES NFR BLD AUTO: 1 % (ref 0–2)
LYMPHOCYTES # BLD AUTO: 1.26 THOUSANDS/ΜL (ref 0.6–4.47)
LYMPHOCYTES NFR BLD AUTO: 17 % (ref 14–44)
MCH RBC QN AUTO: 25.2 PG (ref 26.8–34.3)
MCHC RBC AUTO-ENTMCNC: 33.3 G/DL (ref 31.4–37.4)
MCV RBC AUTO: 76 FL (ref 82–98)
MONOCYTES # BLD AUTO: 0.59 THOUSAND/ΜL (ref 0.17–1.22)
MONOCYTES NFR BLD AUTO: 8 % (ref 4–12)
MRSA NOSE QL CULT: NORMAL
NEUTROPHILS # BLD AUTO: 5.2 THOUSANDS/ΜL (ref 1.85–7.62)
NEUTS SEG NFR BLD AUTO: 70 % (ref 43–75)
NRBC BLD AUTO-RTO: 0 /100 WBCS
PLATELET # BLD AUTO: 325 THOUSANDS/UL (ref 149–390)
PMV BLD AUTO: 10.8 FL (ref 8.9–12.7)
POTASSIUM SERPL-SCNC: 3.6 MMOL/L (ref 3.5–5.3)
RBC # BLD AUTO: 4.33 MILLION/UL (ref 3.81–5.12)
SODIUM SERPL-SCNC: 140 MMOL/L (ref 136–145)
WBC # BLD AUTO: 7.4 THOUSAND/UL (ref 4.31–10.16)

## 2018-09-23 PROCEDURE — 99239 HOSP IP/OBS DSCHRG MGMT >30: CPT | Performed by: INTERNAL MEDICINE

## 2018-09-23 PROCEDURE — G8979 MOBILITY GOAL STATUS: HCPCS

## 2018-09-23 PROCEDURE — 97162 PT EVAL MOD COMPLEX 30 MIN: CPT

## 2018-09-23 PROCEDURE — 82948 REAGENT STRIP/BLOOD GLUCOSE: CPT

## 2018-09-23 PROCEDURE — G8978 MOBILITY CURRENT STATUS: HCPCS

## 2018-09-23 PROCEDURE — 80048 BASIC METABOLIC PNL TOTAL CA: CPT | Performed by: INTERNAL MEDICINE

## 2018-09-23 PROCEDURE — 85025 COMPLETE CBC W/AUTO DIFF WBC: CPT | Performed by: INTERNAL MEDICINE

## 2018-09-23 PROCEDURE — 97530 THERAPEUTIC ACTIVITIES: CPT

## 2018-09-23 RX ORDER — PANTOPRAZOLE SODIUM 40 MG/1
40 TABLET, DELAYED RELEASE ORAL
Status: DISCONTINUED | OUTPATIENT
Start: 2018-09-23 | End: 2018-09-23 | Stop reason: HOSPADM

## 2018-09-23 RX ORDER — MAGNESIUM HYDROXIDE/ALUMINUM HYDROXICE/SIMETHICONE 120; 1200; 1200 MG/30ML; MG/30ML; MG/30ML
15 SUSPENSION ORAL EVERY 4 HOURS PRN
Status: DISCONTINUED | OUTPATIENT
Start: 2018-09-23 | End: 2018-09-23 | Stop reason: HOSPADM

## 2018-09-23 RX ORDER — ONDANSETRON 2 MG/ML
4 INJECTION INTRAMUSCULAR; INTRAVENOUS EVERY 6 HOURS PRN
Status: DISCONTINUED | OUTPATIENT
Start: 2018-09-23 | End: 2018-09-23 | Stop reason: HOSPADM

## 2018-09-23 RX ORDER — LEVOFLOXACIN 500 MG/1
500 TABLET, FILM COATED ORAL DAILY
Qty: 8 TABLET | Refills: 0 | Status: SHIPPED | OUTPATIENT
Start: 2018-09-23 | End: 2018-10-01

## 2018-09-23 RX ORDER — METRONIDAZOLE 500 MG/1
500 TABLET ORAL EVERY 8 HOURS SCHEDULED
Qty: 24 TABLET | Refills: 0 | Status: SHIPPED | OUTPATIENT
Start: 2018-09-23 | End: 2018-10-01

## 2018-09-23 RX ADMIN — INSULIN LISPRO 3 UNITS: 100 INJECTION, SOLUTION INTRAVENOUS; SUBCUTANEOUS at 12:19

## 2018-09-23 RX ADMIN — PANTOPRAZOLE SODIUM 40 MG: 40 TABLET, DELAYED RELEASE ORAL at 05:07

## 2018-09-23 RX ADMIN — METRONIDAZOLE 500 MG: 500 INJECTION, SOLUTION INTRAVENOUS at 13:24

## 2018-09-23 RX ADMIN — LISINOPRIL 10 MG: 10 TABLET ORAL at 09:14

## 2018-09-23 RX ADMIN — MYCOPHENOLIC ACID 360 MG: 180 TABLET, DELAYED RELEASE ORAL at 09:15

## 2018-09-23 RX ADMIN — LEVOTHYROXINE SODIUM 75 MCG: 75 TABLET ORAL at 05:07

## 2018-09-23 RX ADMIN — AMLODIPINE BESYLATE 5 MG: 5 TABLET ORAL at 09:14

## 2018-09-23 RX ADMIN — METRONIDAZOLE 500 MG: 500 INJECTION, SOLUTION INTRAVENOUS at 05:07

## 2018-09-23 RX ADMIN — PREDNISONE 1 MG: 1 TABLET ORAL at 09:14

## 2018-09-23 RX ADMIN — ALUMINUM HYDROXIDE, MAGNESIUM HYDROXIDE, AND SIMETHICONE 15 ML: 200; 200; 20 SUSPENSION ORAL at 01:31

## 2018-09-23 RX ADMIN — ENOXAPARIN SODIUM 40 MG: 40 INJECTION SUBCUTANEOUS at 09:15

## 2018-09-23 NOTE — PHYSICAL THERAPY NOTE
PT EVALUATION     09/23/18 0854   Note Type   Note type Eval/Treat   Pain Assessment   Pain Assessment No/denies pain   Home Living   Type of [address] Two level  (pt has stair glide, no steps)   Bathroom Shower/Tub Tub only   Bathroom Toilet Raised   Bathroom Equipment Shower chair;Grab bars in shower   Home Equipment Quad cane   Prior Function   Level of [place] (ambulatory with rollator or cane, assist to shower)   Lives With Daughter   Receives Help From Family   ADL Assistance Needs assistance   Falls in the last 6 months 1 to 4   Comments pt has been going to PT for LE strengthening   Restrictions/Precautions   Other Precautions Fall Risk   General   Additional Pertinent History pt admitted with diverticultis now improved  pt has history of polymyositis and shingles with significant whole body weakness   Cognition   Overall Cognitive Status WFL   Arousal/Participation Cooperative   Orientation Level Oriented X4   Memory Within functional limits   Following Commands Follows all commands and directions without difficulty   RLE Assessment   RLE Assessment (ROM WFL, MMT 2/5)   LLE Assessment   LLE Assessment (ROM WFL, MMT 2/5)   Light Touch   RLE Light Touch Grossly intact   LLE Light Touch Grossly intact   Bed Mobility   Supine to Sit 5  Supervision   Sit to Supine 5  Supervision   Transfers   Sit to Stand 5  Supervision   Additional items (from raised bed and commode)   Stand to Sit 5  Supervision   Stand pivot 5  Supervision   Additional items (from raised bed/commode)   Toilet transfer 5  Supervision   Ambulation/Elevation   Gait pattern (no heel strike, genu recurvatum)   Gait Assistance 5  Supervision   Assistive Device Rolling walker   Distance 10 feet in room   Balance   Static Sitting Good   Dynamic Sitting Fair   Static Standing Fair   Dynamic Standing Fair -   Assessment   Prognosis Good   Problem List Decreased strength; Impaired balance;Decreased mobility   Assessment Patient seen for Physical Therapy evaluation  Patient admitted with Acute diverticulitis  Comorbidities affecting patient's physical performance include: polymyositis, DM, shingles/chronic pain, htn, falls   Personal factors affecting patient at time of initial evaluation include: ambulating with assistive device and positive fall history  Prior to admission, patient was independent with functional mobility with rollator and requiring assist for ADLS  Please find objective findings from Physical Therapy assessment regarding body systems outlined above with impairments and limitations including weakness, impaired balance, decreased endurance, decreased activity tolerance, decreased functional mobility tolerance and fall risk  The Barthel Index was used as a functional outcome tool presenting with a score of 65 today indicating moderate limitations of functional mobility and ADLS  Patient's clinical presentation is currently evolving as seen in patient's presentation of increased fall risk, new onset of impairment of functional mobility and new onset of weakness  Pt would benefit from continued Physical Therapy treatment to address deficits as defined above and maximize level of functional mobility  As demonstrated by objective findings, the assigned level of complexity for this evaluation is moderate  Goals   Patient Goals "get stronger, remain independent"   STG Expiration Date 09/30/18   LTG Expiration Date 10/07/18   Treatment Day 1   Plan   Treatment/Interventions Functional transfer training;LE strengthening/ROM; Gait training;Equipment eval/education;Patient/family training; Therapeutic exercise   PT Frequency 5x/wk   Recommendation   Recommendation (resume OP PT, recommend continue exercise at wellness center)   Equipment Recommended (pt would like a bedside commode to raise her toilet seat hei)   Barthel Index   Feeding 10   Bathing 0   Grooming Score 5   Dressing Score 5   Bladder Score 10   Bowels Score 10 Toilet Use Score 5   Transfers (Bed/Chair) Score 10   Mobility (Level Surface) Score 10   Stairs Score 0   Barthel Index Score 72   Licensure   [place] License Number  Mary Grace Gómez PT  [id number]       Time PZ:6171  Time DOS:5405  Total Time: 39      S:  "I feel so good that I got up"  O: Supervision transfer from raised bed  Pt stood with supervision without UE support x 5 minutes for therapist to adjust height of walker and commode  Pt ambulated 150 feet with supervision with slow felecia with rolling walker  No heel strike and genu recurvatum  Discussed with pt using a commode over her downstairs bathroom, pt in agreement  A:  Pt is doing well, likely near baseline level of function  P:  Plan to ask CM to order pt a commode for home  Pt would like it to be able to fold away as her bathroom is used by the whole family      Sincere Wells, PT [phone]

## 2018-09-23 NOTE — ASSESSMENT & PLAN NOTE
· Continue Myfortic, prednisone  · At baseline, patient struggles to ambulate however she states she has all the resources at home she needs at this time  · Patient did have physical therapy and she was doing well with walker

## 2018-09-23 NOTE — PLAN OF CARE
DISCHARGE PLANNING     Discharge to home or other facility with appropriate resources Progressing        GASTROINTESTINAL - ADULT     Minimal or absence of nausea and/or vomiting Progressing        INFECTION - ADULT     Absence or prevention of progression during hospitalization Progressing     Absence of fever/infection during neutropenic period Progressing        Knowledge Deficit     Patient/family/caregiver demonstrates understanding of disease process, treatment plan, medications, and discharge instructions Progressing        Nutrition/Hydration-ADULT     Nutrient/Hydration intake appropriate for improving, restoring or maintaining nutritional needs Progressing        PAIN - ADULT     Verbalizes/displays adequate comfort level or baseline comfort level Progressing        Potential for Falls     Patient will remain free of falls Progressing

## 2018-09-23 NOTE — SOCIAL WORK
DASH discussion completed  Discussed goals of making sure pt's needs are met upon discharge, pt's preferences are taken into account, pt understands her health condition, medications and symptoms to watch for after returning home and pt is aware of any follow up appointments recommended by hospital physician  CM spoke with the pt and her family at the bedside  Pt lives with her dtr at home and has a cane, walker, Shower chair and WC at home  Pt has no HHC but does go to the OP Physical Therapy  Pt does not drive and uses the [address] in [place] [address] Yasmeen Michelle spoke with PT this am, recommending an adjustable commode for pt d/t her height, referral placed to [address] [place] [address], will deliver from consignment if pt DC today, otherwise, [facility] delivery to the room tomorrow or on DC

## 2018-09-23 NOTE — ASSESSMENT & PLAN NOTE
· CT scan of the abdomen showed acute uncomplicated did diverticulitis of the sigmoid colon  · Patient is still having tenderness  · Patient did have a colonoscopy almost 5-7 years ago and had few colon polyps removed  · Patient was advanced to clear liquid diet which she was tolerated well yesterday and today she was advanced to low residue low-fat diet which she tolerated without any abdominal pain  · Patient be discharged on Levaquin and Flagyl for another 8 days to follow up with GI outpatient for colonoscopy

## 2018-09-23 NOTE — SOCIAL WORK
Per PCP, pt stable for DC to home  Pt asked for [facility] that is adjustable, referral out to [place], [facility] delivered to room  No further DCP needs noted

## 2018-09-23 NOTE — NURSING NOTE
Discharge instructions discussed with family, all questions answered  Patient left 2S stable, all belongings at side

## 2018-09-23 NOTE — DISCHARGE SUMMARY
Discharge- Nataliya Jones 1950, 76 y o  female MRN: [id number]    Unit/Bed#: [address] [place] [address] Encounter: [id number]    Primary Care Provider: Lexis Denney MD   Date and time admitted to hospital: 9/21/2018  5:15 PM        * Acute diverticulitis   Assessment & Plan    · CT scan of the abdomen showed acute uncomplicated did diverticulitis of the sigmoid colon  · Patient is still having tenderness  · Patient did have a colonoscopy almost 5-7 years ago and had few colon polyps removed  · Patient was advanced to clear liquid diet which she was tolerated well yesterday and today she was advanced to low residue low-fat diet which she tolerated without any abdominal pain  · Patient be discharged on Levaquin and Flagyl for another 8 days to follow up with GI outpatient for colonoscopy  Diabetes mellitus ([facility] [address] )   Assessment & Plan      · Continue Lantus 36 units subcu a c  daily and metformin  · Hemoglobin A1c 6 7          Hyperlipidemia   Assessment & Plan    · Lipid panel reviewed-LDL is 71        Hypertension   Assessment & Plan    · Continue Norvasc and benazepril        Polymyositis (HCC)   Assessment & Plan    · Continue Myfortic, prednisone  · At baseline, patient struggles to ambulate however she states she has all the resources at home she needs at this time  · Patient did have physical therapy and she was doing well with walker  Disease of thyroid gland   Assessment & Plan    · Continue Synthroid        Asthma   Assessment & Plan    · Albuterol p r n  Chronic pain   Assessment & Plan    · Continue gabapentin              Discharging Physician / Practitioner: Emily Weaver MD  PCP: Lexis Denney MD  Admission Date: 9/21/2018  Discharge Date: 09/23/18    Reason for Admission: Abdominal Pain (Pt reports LLQ pain since yesterday, started with diarrhea today  Pt also reports increasing weakness    Pt had bloodwork done and was told her inflammation level was high )        Resolved Problems Date Reviewed: 9/23/2018          Resolved    Sepsis ([address] ) 9/21/2018     Resolved by  Dakotah Mendosa, [address] [place] [address] Stay:  Juma Steel    Significant Findings / Test Results:     ·   Ct Abdomen Pelvis With Contrast    Result Date: 9/21/2018    Impression: 1  Findings consistent with acute uncomplicated diverticulitis  2   Calcification in the pancreas, can be seen with prior episodes of pancreatitis  The study was marked in [facility] for immediate notification  Workstation performed: [id number]      Outpatient Tests Requested:  · Follow up outpatient with GI for colonoscopy    Complications:  None    Reason for Admission:  Abdominal pain    Hospital Course:     Eliana Thornton is a 76 y o  female patient with a PMH of  diabetes, asthma, polymyositis, hypertension, hyperlipidemia who originally presented to the hospital on 9/21/2018 due to left lower quadrant abdominal pain and also generalized weakness associated some diarrhea in the ED patient had CT scan of the abdomen which showed uncomplicated diverticulitis and patient's white count was elevated to 16,000  Patient was started on IV Levaquin and Flagyl, NPO and IV hydration  Patient was seen by Gastroenterology  Patient's diet was slowly advanced to low-fat low residue diet which she was tolerating well  Patient had resolution of abdominal pain, white count and patient remained in a stable condition be discharged home on antibiotics for 8 more days to follow up with outpatient GI for colonoscopy  Please see above list of diagnoses and related plan for additional information  Condition at Discharge: stable     Discharge Day Visit / Exam:     Subjective:  Patient denies any abdominal pain, nausea, vomiting    Patient did have 2 bowel movements yesterday P  Vitals: Blood Pressure: 156/83 (09/23/18 1442)  Pulse: 82 (09/23/18 1442)  Temperature: 98 4 °F (36 9 °C) (09/23/18 1442)  Temp Source: Oral (09/23/18 1442)  Respirations: 18 (09/23/18 1442)  Height: 5' 11" (180 3 cm) (09/21/18 2300)  Weight - Scale: 106 kg (234 lb 2 1 oz) (09/21/18 2300)  SpO2: 97 % (09/23/18 1442)  Exam:   Physical Exam   Constitutional: No distress  HENT:   Head: Normocephalic and atraumatic  Nose: Nose normal    Eyes: Conjunctivae and EOM are normal  Pupils are equal, round, and reactive to light  Neck: Normal range of motion  Neck supple  No JVD present  Cardiovascular: Normal rate, regular rhythm and normal heart sounds  Exam reveals no gallop and no friction rub  No murmur heard  Pulmonary/Chest: Effort normal and breath sounds normal  No respiratory distress  She has no wheezes  She has no rales  She exhibits no tenderness  Abdominal: Soft  Bowel sounds are normal  She exhibits no distension  There is no tenderness  There is no rebound and no guarding  Musculoskeletal: She exhibits no edema  Neurological: She is alert  No cranial nerve deficit  Skin: Skin is warm and dry  No rash noted  Psychiatric: She has a normal mood and affect  Discharge instructions/Information to patient and family:   See after visit summary for information provided to patient and family  Provisions for Follow-Up Care:  See after visit summary for information related to follow-up care and any pertinent home health orders  Disposition:     Home    Planned Readmission: No     Discharge Statement:  I spent 35 minutes discharging the patient  This time was spent on the day of discharge  I had direct contact with the patient on the day of discharge  Greater than 50% of the total time was spent examining patient, answering all patient questions, arranging and discussing plan of care with patient as well as directly providing post-discharge instructions  Additional time then spent on discharge activities      Discharge Medications:  See after visit summary for reconciled discharge medications provided to patient and family        ** Please Note: This note has been constructed using a voice recognition system **

## 2018-09-23 NOTE — PLAN OF CARE
Problem: DISCHARGE PLANNING - CARE MANAGEMENT  Goal: Discharge to post-acute care or home with appropriate resources  INTERVENTIONS:  - Conduct assessment to determine patient/family and health care team treatment goals, and need for post-acute services based on payer coverage, community resources, and patient preferences, and barriers to discharge  - Address psychosocial, clinical, and financial barriers to discharge as identified in assessment in conjunction with the patient/family and health care team  - Arrange appropriate level of post-acute services according to patient's   needs and preference and payer coverage in collaboration with the physician and health care team  - Communicate with and update the patient/family, physician, and health care team regarding progress on the discharge plan  - Arrange appropriate transportation to post-acute venues  Return to home with OP services     Outcome: Progressing

## 2018-09-24 NOTE — CASE MANAGEMENT
Continued Stay Review 9/22/18  OBSERVATION 9/21/18 @ 2120 CONVERTED TO INPATIENT ADMISSION 9/22/18 @ 1320 FOR CONTINUED CARE & TX FOR ACUTE DIVERTICULITIS  PATIENT WITH PERSISTENT TENDERNESS LLQ, REMAINS NPO WITH IVF & IVABT THERAPY CONTINUED   TRIAL ADVANCING DIET TO CLEAR LIQUIDS, GI FOLLOWING  Vital Signs: /83 (BP Location: Right arm)   Pulse 82   Temp 98 4 °F (36 9 °C) (Oral)   Resp 18   Ht 5' 11" (1 803 m)   Wt 106 kg (234 lb 2 1 oz)   SpO2 97%   BMI 32 65 kg/m²     Medications:   Scheduled Meds:   Current Facility-Administered Medications:  amLODIPine 5 mg Oral Daily     enoxaparin 40 mg Subcutaneous Daily     gabapentin 900 mg Oral HS     insulin glargine 18 Units Subcutaneous HS     insulin lispro 1-6 Units Subcutaneous Q6H ANTONETTE     levofloxacin 750 mg Intravenous Q24H     And           metroNIDAZOLE 500 mg Intravenous Q8H     levothyroxine 75 mcg Oral Daily     lisinopril 10 mg Oral Daily     mycophenolate 360 mg Oral BID     potassium chloride 40 mEq Oral Once     potassium chloride 20 mEq Intravenous Once     Followed by           potassium chloride 20 mEq Intravenous Once     predniSONE 1 mg Oral Daily        Continuous Infusions:   sodium chloride 125 mL/hr     Abnormal Labs/Diagnostic Results:   HGB A1C 6 7 K 3 2 CR 0 50 CA 8 2 WBC 10 70 HGB 10 3  Age/Sex: 76 y o  female   Discharge Plan: TBD

## 2018-09-24 NOTE — PHYSICIAN ADVISOR
Current patient class: Inpatient  The patient is currently on Hospital Day: 3 at [address] [place] [address]      The patient was admitted to the hospital at 2119 on 9/21/18 for the following diagnosis:  Diverticulitis [K57 92]  Weakness [R53 1]  Abdominal pain [R10 9]       There is documentation in the medical record of an expected length of stay of at least 2 midnights  The patient is therefore expected to satisfy the 2 midnight benchmark and given the 2 midnight presumption is appropriate for INPATIENT ADMISSION  Given this expectation of a satisfying stay, CMS instructs us that the patient is most often appropriate for inpatient admission under part A provided medical necessity is documented in the chart  After review of the relevant documentation, labs, vital signs and test results, the patient is appropriate for INPATIENT ADMISSION  Admission to the hospital as an inpatient is a complex decision making process which requires the practitioner to consider the patients presenting complaint, history and physical examination and all relevant testing  With this in mind, in this case, the patient was deemed appropriate for INPATIENT ADMISSION  After review of the documentation and testing available at the time of the admission I concur with this clinical determination of medical necessity  35-year-old male admitted to the hospital for diverticulitis which was treated with IV antibiotics and NPO status until he was able to tolerate a diet  Since the patient required more than 2 midnight stay for the evaluation of their condition, they are appropriate for INPATIENT status  Rationale is as follows: The patient is a 76 yrs old Female who presented to the ED at 9/21/2018  5:15 PM with a chief complaint of Abdominal Pain (Pt reports LLQ pain since yesterday, started with diarrhea today  Pt also reports increasing weakness    Pt had bloodwork done and was told her inflammation level was high )    The patients vitals on arrival were ED Triage Vitals   Temperature Pulse Respirations Blood Pressure SpO2   09/21/18 1721 09/21/18 1721 09/21/18 1721 09/21/18 1721 09/21/18 1721   98 7 °F (37 1 °C) 92 18 159/84 98 %      Temp Source Heart Rate Source Patient Position - Orthostatic VS BP Location FiO2 (%)   09/21/18 1721 09/21/18 1721 09/21/18 1721 09/21/18 1721 --   Temporal Monitor Sitting Left arm       Pain Score       09/21/18 2121       6           Past Medical History:   Diagnosis Date    Anemia     Asthma     Chronic pain     bulging discs in back,polymyositis    [facility] [address] [facility]     Disease of thyroid gland     Hyperlipidemia     Hypertension     Polymyositis ([facility] [address] )      Past Surgical History:   Procedure Laterality Date    HYSTERECTOMY      SINUS SURGERY      THYROIDECTOMY      TUBAL LIGATION             Consults have been placed to:   IP CONSULT TO CASE MANAGEMENT  IP CONSULT TO GASTROENTEROLOGY    Vitals:    09/22/18 1313 09/22/18 2010 09/23/18 0805 09/23/18 1442   BP: 138/67 147/75 120/64 156/83   BP Location: Left arm Left arm Left arm Right arm   Pulse: 82 85 71 82   Resp: 18 18 18 18   Temp: 98 4 °F (36 9 °C) 98 3 °F (36 8 °C) 98 8 °F (37 1 °C) 98 4 °F (36 9 °C)   TempSrc: Oral Oral Oral Oral   SpO2: 98% 97% 97% 97%   Weight:       Height:           Most recent labs:    Recent Labs      09/21/18   1745   09/23/18   0514   WBC  16 14*   < >  7 40   HGB  12 0   < >  10 9*   HCT  36 2   < >  32 7*   PLT  369   < >  325   K  3 7   < >  3 6   NA  136   < >  140   CALCIUM  8 9   < >  8 2*   BUN  9   < >  4*   CREATININE  0 59*   < >  0 54*   AST  12   --    --    ALT  29   --    --    ALKPHOS  87   --    --     < > = values in this interval not displayed  Scheduled Meds:  Continuous Infusions:  No current facility-administered medications for this encounter

## 2018-09-25 ENCOUNTER — APPOINTMENT (OUTPATIENT)
Dept: PHYSICAL THERAPY | Facility: CLINIC | Age: 68
End: 2018-09-25
Payer: MEDICARE

## 2018-09-25 ENCOUNTER — TELEPHONE (OUTPATIENT)
Dept: GASTROENTEROLOGY | Facility: AMBULARY SURGERY CENTER | Age: 68
End: 2018-09-25

## 2018-09-25 DIAGNOSIS — R19.7 DIARRHEA, UNSPECIFIED TYPE: Primary | ICD-10-CM

## 2018-09-25 NOTE — TELEPHONE ENCOUNTER
I spoke with the patient  She states that she developed a rash after she was started on antibiotics in the hospital   She states that the rash is not extending to her arms and back  She is on day 3 of antibiotics including Levaquin and Flagyl  I have asked her to stop the Levaquin and continue Flagyl for now  In addition she has also contained planing of explosive nonbloody diarrhea, she states that the pain is better, was much more intense earlier but she does not have any pain now  I have ordered stool studies for C diff, ova and parasites and stool culture  Also check CBC and CMP, please call her to have these done tomorrow  Patient is advised to go to the emergency room should she have recurrent abdominal pain or worsening diarrhea  In addition, she is to remain on liquid diet for the next 1 day, if symptoms are improving to advance to low residue diet  In addition, she will need a colonoscopy in 4-6 weeks  Please call the patient tomorrow to see how she is doing  Furthermore, she needs to see her PCP regarding rash if it persists

## 2018-09-25 NOTE — TELEPHONE ENCOUNTER
DR Sonya Velasquez PT    Pt daughter called requesting advise  Pt is experiencing diarrhea and severe abd pain  They were also advise to schedule a 4 weeks fu but requesting advise now  unknown

## 2018-09-25 NOTE — TELEPHONE ENCOUNTER
Patient is s/p ED , dx diverticulitis, placed on levaquin daily and flagyl 3x/day  She developed a rash on neck prior to discharge, but was told ok to continue medication as no pruritis  Her daughter is reporting rash is now spreading from neck now on chest, arms and back with itching  I advised to hold abx and take benadyl (she will only be missing one dose of flagyl today) and go to ED with tongue,lip swelling and/or SOB  Also, reporting what seems to be explosive non-bloody diarrhea x 2 today with abdominal pain "10" earlier today, denies abdominal pain presently  She had increased her diet to low fiber, solid foods today  I advised to reduce to clear liquids for bowel rest but to go to ED if abdominal pain becomes severe with increase in frequency of diarrhea  Please advise your recommendation moving forward  Thank you!

## 2018-09-26 ENCOUNTER — TELEPHONE (OUTPATIENT)
Dept: GASTROENTEROLOGY | Facility: CLINIC | Age: 68
End: 2018-09-26

## 2018-09-26 DIAGNOSIS — R19.7 DIARRHEA OF PRESUMED INFECTIOUS ORIGIN: Primary | ICD-10-CM

## 2018-09-26 NOTE — TELEPHONE ENCOUNTER
----- Message from Aurora Esquivel MD sent at 9/23/2018  4:18 PM EDT -----  Regarding: follow up   Patient is being discharged from our hospital for acute diverticulitis, needs outpatient colonoscopy in 4-6 weeks  Also needs to follow up in the office in the next 4 weeks with the PA

## 2018-09-26 NOTE — TELEPHONE ENCOUNTER
I spoke to the pt re: scheduling a 4 week office visit w/ isaak and also out pt colonoscopy  Pt states her daughter will have to call and coordinate her schedule  Pt needs her daughters assistance with her transportation  She will call me back to arrange her appt's

## 2018-09-26 NOTE — TELEPHONE ENCOUNTER
I spoke to patient, she will have stool studies and blood work done today  She will go to a [facility], orders are in her chart in EPIC  I also clarified instructions for abx; patient stopped both;, I advised Dr Deangelo Sue recommended she resume flagyl and stop levaquin  She will resume flagyl; if rash recurs, she will hold

## 2018-09-27 ENCOUNTER — TELEPHONE (OUTPATIENT)
Dept: GASTROENTEROLOGY | Facility: CLINIC | Age: 68
End: 2018-09-27

## 2018-09-27 ENCOUNTER — APPOINTMENT (OUTPATIENT)
Dept: PHYSICAL THERAPY | Facility: CLINIC | Age: 68
End: 2018-09-27
Payer: MEDICARE

## 2019-04-12 ENCOUNTER — APPOINTMENT (EMERGENCY)
Dept: RADIOLOGY | Facility: HOSPITAL | Age: 69
End: 2019-04-12
Payer: MEDICARE

## 2019-04-12 ENCOUNTER — HOSPITAL ENCOUNTER (EMERGENCY)
Facility: HOSPITAL | Age: 69
Discharge: HOME | End: 2019-04-12
Attending: EMERGENCY MEDICINE | Admitting: EMERGENCY MEDICINE
Payer: MEDICARE

## 2019-04-12 VITALS
TEMPERATURE: 99.2 F | HEART RATE: 76 BPM | RESPIRATION RATE: 18 BRPM | OXYGEN SATURATION: 98 % | DIASTOLIC BLOOD PRESSURE: 95 MMHG | SYSTOLIC BLOOD PRESSURE: 162 MMHG

## 2019-04-12 DIAGNOSIS — R10.32 LEFT LOWER QUADRANT PAIN: Primary | ICD-10-CM

## 2019-04-12 LAB
ALBUMIN SERPL BCP-MCNC: 3.7 G/DL (ref 3.5–5)
ALP SERPL-CCNC: 91 U/L (ref 46–116)
ALT SERPL W P-5'-P-CCNC: 26 U/L (ref 12–78)
ANION GAP SERPL CALCULATED.3IONS-SCNC: 9 MMOL/L (ref 4–13)
APTT PPP: 26 SECONDS (ref 26–38)
AST SERPL W P-5'-P-CCNC: 12 U/L (ref 5–45)
BASOPHILS # BLD AUTO: 0.03 THOUSANDS/ΜL (ref 0–0.1)
BASOPHILS NFR BLD AUTO: 0 % (ref 0–1)
BILIRUB SERPL-MCNC: 0.2 MG/DL (ref 0.2–1)
BILIRUB UR QL STRIP: NEGATIVE
BUN SERPL-MCNC: 11 MG/DL (ref 5–25)
CALCIUM SERPL-MCNC: 9.3 MG/DL (ref 8.3–10.1)
CHLORIDE SERPL-SCNC: 103 MMOL/L (ref 100–108)
CLARITY UR: CLEAR
CO2 SERPL-SCNC: 28 MMOL/L (ref 21–32)
COLOR UR: YELLOW
CREAT SERPL-MCNC: 0.55 MG/DL (ref 0.6–1.3)
EOSINOPHIL # BLD AUTO: 0.07 THOUSAND/ΜL (ref 0–0.61)
EOSINOPHIL NFR BLD AUTO: 1 % (ref 0–6)
ERYTHROCYTE [DISTWIDTH] IN BLOOD BY AUTOMATED COUNT: 15.5 % (ref 11.6–15.1)
GFR SERPL CREATININE-BSD FRML MDRD: 111 ML/MIN/1.73SQ M
GLUCOSE SERPL-MCNC: 181 MG/DL (ref 65–140)
GLUCOSE UR STRIP-MCNC: ABNORMAL MG/DL
HCT VFR BLD AUTO: 37.7 % (ref 34.8–46.1)
HGB BLD-MCNC: 12.4 G/DL (ref 11.5–15.4)
HGB UR QL STRIP.AUTO: NEGATIVE
IMM GRANULOCYTES # BLD AUTO: 0.03 THOUSAND/UL (ref 0–0.2)
IMM GRANULOCYTES NFR BLD AUTO: 0 % (ref 0–2)
INR PPP: 1.01 (ref 0.86–1.16)
KETONES UR STRIP-MCNC: NEGATIVE MG/DL
LEUKOCYTE ESTERASE UR QL STRIP: NEGATIVE
LYMPHOCYTES # BLD AUTO: 2.65 THOUSANDS/ΜL (ref 0.6–4.47)
LYMPHOCYTES NFR BLD AUTO: 32 % (ref 14–44)
MCH RBC QN AUTO: 24.5 PG (ref 26.8–34.3)
MCHC RBC AUTO-ENTMCNC: 32.9 G/DL (ref 31.4–37.4)
MCV RBC AUTO: 75 FL (ref 82–98)
MONOCYTES # BLD AUTO: 0.66 THOUSAND/ΜL (ref 0.17–1.22)
MONOCYTES NFR BLD AUTO: 8 % (ref 4–12)
NEUTROPHILS # BLD AUTO: 4.96 THOUSANDS/ΜL (ref 1.85–7.62)
NEUTS SEG NFR BLD AUTO: 59 % (ref 43–75)
NITRITE UR QL STRIP: NEGATIVE
NRBC BLD AUTO-RTO: 0 /100 WBCS
PH UR STRIP.AUTO: 7.5 [PH]
PLATELET # BLD AUTO: 381 THOUSANDS/UL (ref 149–390)
PMV BLD AUTO: 9.4 FL (ref 8.9–12.7)
POTASSIUM SERPL-SCNC: 4 MMOL/L (ref 3.5–5.3)
PROT SERPL-MCNC: 7.7 G/DL (ref 6.4–8.2)
PROT UR STRIP-MCNC: NEGATIVE MG/DL
PROTHROMBIN TIME: 10.6 SECONDS (ref 9.4–11.7)
RBC # BLD AUTO: 5.06 MILLION/UL (ref 3.81–5.12)
SODIUM SERPL-SCNC: 140 MMOL/L (ref 136–145)
SP GR UR STRIP.AUTO: 1.01 (ref 1–1.03)
UROBILINOGEN UR QL STRIP.AUTO: 0.2 E.U./DL
WBC # BLD AUTO: 8.4 THOUSAND/UL (ref 4.31–10.16)

## 2019-04-12 PROCEDURE — 80053 COMPREHEN METABOLIC PANEL: CPT | Performed by: EMERGENCY MEDICINE

## 2019-04-12 PROCEDURE — 81003 URINALYSIS AUTO W/O SCOPE: CPT | Performed by: EMERGENCY MEDICINE

## 2019-04-12 PROCEDURE — 85610 PROTHROMBIN TIME: CPT | Performed by: EMERGENCY MEDICINE

## 2019-04-12 PROCEDURE — 36415 COLL VENOUS BLD VENIPUNCTURE: CPT | Performed by: EMERGENCY MEDICINE

## 2019-04-12 PROCEDURE — 96374 THER/PROPH/DIAG INJ IV PUSH: CPT

## 2019-04-12 PROCEDURE — 99285 EMERGENCY DEPT VISIT HI MDM: CPT

## 2019-04-12 PROCEDURE — 85025 COMPLETE CBC W/AUTO DIFF WBC: CPT | Performed by: EMERGENCY MEDICINE

## 2019-04-12 PROCEDURE — 96361 HYDRATE IV INFUSION ADD-ON: CPT

## 2019-04-12 PROCEDURE — 93005 ELECTROCARDIOGRAM TRACING: CPT

## 2019-04-12 PROCEDURE — 74177 CT ABD & PELVIS W/CONTRAST: CPT

## 2019-04-12 PROCEDURE — 85730 THROMBOPLASTIN TIME PARTIAL: CPT | Performed by: EMERGENCY MEDICINE

## 2019-04-12 RX ADMIN — SODIUM CHLORIDE 1000 ML: 0.9 INJECTION, SOLUTION INTRAVENOUS at 20:17

## 2019-04-12 RX ADMIN — IOHEXOL 100 ML: 350 INJECTION, SOLUTION INTRAVENOUS at 20:56

## 2019-04-12 RX ADMIN — MEROPENEM 1000 MG: 1 INJECTION, POWDER, FOR SOLUTION INTRAVENOUS at 21:41

## 2019-04-14 LAB
ATRIAL RATE: 73 BPM
P AXIS: 48 DEGREES
PR INTERVAL: 162 MS
QRS AXIS: 15 DEGREES
QRSD INTERVAL: 78 MS
QT INTERVAL: 354 MS
QTC INTERVAL: 389 MS
T WAVE AXIS: 28 DEGREES
VENTRICULAR RATE: 73 BPM

## 2019-04-14 PROCEDURE — 93010 ELECTROCARDIOGRAM REPORT: CPT | Performed by: INTERNAL MEDICINE

## 2019-04-24 ENCOUNTER — EVALUATION (OUTPATIENT)
Dept: PHYSICAL THERAPY | Facility: CLINIC | Age: 69
End: 2019-04-24
Payer: MEDICARE

## 2019-04-24 DIAGNOSIS — M33.20 POLYMYOSITIS (HCC): Primary | ICD-10-CM

## 2019-04-24 PROCEDURE — G8979 MOBILITY GOAL STATUS: HCPCS | Performed by: PHYSICAL THERAPIST

## 2019-04-24 PROCEDURE — 97162 PT EVAL MOD COMPLEX 30 MIN: CPT | Performed by: PHYSICAL THERAPIST

## 2019-04-24 PROCEDURE — G8978 MOBILITY CURRENT STATUS: HCPCS | Performed by: PHYSICAL THERAPIST

## 2019-04-29 ENCOUNTER — TRANSCRIBE ORDERS (OUTPATIENT)
Dept: PHYSICAL THERAPY | Facility: CLINIC | Age: 69
End: 2019-04-29

## 2019-04-29 DIAGNOSIS — M33.20 POLYMYOSITIS (HCC): Primary | ICD-10-CM

## 2019-04-30 ENCOUNTER — OFFICE VISIT (OUTPATIENT)
Dept: PHYSICAL THERAPY | Facility: CLINIC | Age: 69
End: 2019-04-30
Payer: MEDICARE

## 2019-04-30 DIAGNOSIS — M33.20 POLYMYOSITIS (HCC): Primary | ICD-10-CM

## 2019-04-30 PROCEDURE — 97110 THERAPEUTIC EXERCISES: CPT | Performed by: PHYSICAL THERAPIST

## 2019-05-02 ENCOUNTER — OFFICE VISIT (OUTPATIENT)
Dept: PHYSICAL THERAPY | Facility: CLINIC | Age: 69
End: 2019-05-02
Payer: MEDICARE

## 2019-05-02 DIAGNOSIS — M33.20 POLYMYOSITIS (HCC): Primary | ICD-10-CM

## 2019-05-02 PROCEDURE — 97110 THERAPEUTIC EXERCISES: CPT

## 2019-05-07 ENCOUNTER — OFFICE VISIT (OUTPATIENT)
Dept: PHYSICAL THERAPY | Facility: CLINIC | Age: 69
End: 2019-05-07
Payer: MEDICARE

## 2019-05-07 DIAGNOSIS — M33.20 POLYMYOSITIS (HCC): Primary | ICD-10-CM

## 2019-05-07 PROCEDURE — 97110 THERAPEUTIC EXERCISES: CPT | Performed by: PHYSICAL THERAPIST

## 2019-05-09 ENCOUNTER — OFFICE VISIT (OUTPATIENT)
Dept: PHYSICAL THERAPY | Facility: CLINIC | Age: 69
End: 2019-05-09
Payer: MEDICARE

## 2019-05-09 DIAGNOSIS — M33.20 POLYMYOSITIS (HCC): Primary | ICD-10-CM

## 2019-05-09 PROCEDURE — 97110 THERAPEUTIC EXERCISES: CPT

## 2019-05-14 ENCOUNTER — OFFICE VISIT (OUTPATIENT)
Dept: PHYSICAL THERAPY | Facility: CLINIC | Age: 69
End: 2019-05-14
Payer: MEDICARE

## 2019-05-14 DIAGNOSIS — M33.20 POLYMYOSITIS (HCC): Primary | ICD-10-CM

## 2019-05-14 PROCEDURE — 97110 THERAPEUTIC EXERCISES: CPT | Performed by: PHYSICAL THERAPIST

## 2019-05-16 ENCOUNTER — APPOINTMENT (OUTPATIENT)
Dept: PHYSICAL THERAPY | Facility: CLINIC | Age: 69
End: 2019-05-16
Payer: MEDICARE

## 2019-05-21 ENCOUNTER — OFFICE VISIT (OUTPATIENT)
Dept: PHYSICAL THERAPY | Facility: CLINIC | Age: 69
End: 2019-05-21
Payer: MEDICARE

## 2019-05-21 DIAGNOSIS — M33.20 POLYMYOSITIS (HCC): Primary | ICD-10-CM

## 2019-05-21 PROCEDURE — 97110 THERAPEUTIC EXERCISES: CPT | Performed by: PHYSICAL THERAPIST

## 2019-05-23 ENCOUNTER — OFFICE VISIT (OUTPATIENT)
Dept: PHYSICAL THERAPY | Facility: CLINIC | Age: 69
End: 2019-05-23
Payer: MEDICARE

## 2019-05-23 DIAGNOSIS — M33.20 POLYMYOSITIS (HCC): Primary | ICD-10-CM

## 2019-05-23 PROCEDURE — 97110 THERAPEUTIC EXERCISES: CPT

## 2019-05-28 ENCOUNTER — APPOINTMENT (OUTPATIENT)
Dept: PHYSICAL THERAPY | Facility: CLINIC | Age: 69
End: 2019-05-28
Payer: MEDICARE

## 2019-05-30 ENCOUNTER — OFFICE VISIT (OUTPATIENT)
Dept: PHYSICAL THERAPY | Facility: CLINIC | Age: 69
End: 2019-05-30
Payer: MEDICARE

## 2019-05-30 DIAGNOSIS — M33.20 POLYMYOSITIS (HCC): Primary | ICD-10-CM

## 2019-05-30 PROCEDURE — 97110 THERAPEUTIC EXERCISES: CPT

## 2019-06-04 ENCOUNTER — OFFICE VISIT (OUTPATIENT)
Dept: PHYSICAL THERAPY | Facility: CLINIC | Age: 69
End: 2019-06-04
Payer: MEDICARE

## 2019-06-04 DIAGNOSIS — M33.20 POLYMYOSITIS (HCC): Primary | ICD-10-CM

## 2019-06-04 PROCEDURE — 97110 THERAPEUTIC EXERCISES: CPT

## 2019-06-11 ENCOUNTER — OFFICE VISIT (OUTPATIENT)
Dept: PHYSICAL THERAPY | Facility: CLINIC | Age: 69
End: 2019-06-11
Payer: MEDICARE

## 2019-06-11 DIAGNOSIS — M33.20 POLYMYOSITIS (HCC): Primary | ICD-10-CM

## 2019-06-11 PROCEDURE — 97110 THERAPEUTIC EXERCISES: CPT

## 2019-06-13 ENCOUNTER — APPOINTMENT (OUTPATIENT)
Dept: PHYSICAL THERAPY | Facility: CLINIC | Age: 69
End: 2019-06-13
Payer: MEDICARE

## 2019-06-18 ENCOUNTER — OFFICE VISIT (OUTPATIENT)
Dept: PHYSICAL THERAPY | Facility: CLINIC | Age: 69
End: 2019-06-18
Payer: MEDICARE

## 2019-06-18 DIAGNOSIS — M33.20 POLYMYOSITIS (HCC): Primary | ICD-10-CM

## 2019-06-18 PROCEDURE — 97110 THERAPEUTIC EXERCISES: CPT

## 2019-08-09 ENCOUNTER — APPOINTMENT (EMERGENCY)
Dept: RADIOLOGY | Facility: HOSPITAL | Age: 69
End: 2019-08-09
Payer: MEDICARE

## 2019-08-09 ENCOUNTER — HOSPITAL ENCOUNTER (EMERGENCY)
Facility: HOSPITAL | Age: 69
Discharge: HOME | End: 2019-08-09
Attending: EMERGENCY MEDICINE
Payer: MEDICARE

## 2019-08-09 VITALS
OXYGEN SATURATION: 97 % | RESPIRATION RATE: 18 BRPM | WEIGHT: 225 LBS | TEMPERATURE: 97.9 F | HEART RATE: 93 BPM | DIASTOLIC BLOOD PRESSURE: 100 MMHG | BODY MASS INDEX: 31.38 KG/M2 | SYSTOLIC BLOOD PRESSURE: 162 MMHG

## 2019-08-09 DIAGNOSIS — S92.902A CLOSED FRACTURE OF LEFT FOOT, INITIAL ENCOUNTER: Primary | ICD-10-CM

## 2019-08-09 PROCEDURE — 99283 EMERGENCY DEPT VISIT LOW MDM: CPT

## 2019-08-09 PROCEDURE — 73630 X-RAY EXAM OF FOOT: CPT

## 2019-08-09 NOTE — ED PROVIDER NOTES
History  Chief Complaint   Patient presents with    Foot Injury     States she fell yesterday injurying left foot  pain behind toes  Denies other injuries     Patient presents for evaluation of left foot injury  States yesterday had a mechanical fall and twisted her left foot under her  Denies head injury or LOC  No blood thinners  Hisotry of polymyositis and uses a walker at baseline  Only complaint from the fall is foot pain  History provided by:  Patient   used: No        Prior to Admission Medications   Prescriptions Last Dose Informant Patient Reported? Taking? GABAPENTIN PO   Yes No   Sig: Take 900 mg by mouth daily at bedtime   albuterol (PROVENTIL HFA,VENTOLIN HFA) 90 mcg/act inhaler   Yes No   Sig: Inhale 2 puffs every 6 (six) hours as needed for wheezing  amLODIPine (NORVASC) 5 mg tablet   Yes No   Sig: Take 5 mg by mouth daily   benazepril (LOTENSIN) 10 mg tablet   Yes No   Sig: Take 10 mg by mouth daily   insulin glargine (LANTUS) 100 units/mL subcutaneous injection   Yes No   Sig: Inject 36 Units under the skin daily at bedtime     levothyroxine 75 mcg tablet   Yes No   Sig: Take 75 mcg by mouth daily  metFORMIN (GLUCOPHAGE) 1000 MG tablet   Yes No   Sig: Take 1,000 mg by mouth 2 (two) times a day with meals     mycophenolate (MYFORTIC) 360 MG TBEC   Yes No   Sig: Take 360 mg by mouth 2 (two) times a day   predniSONE 10 mg tablet   Yes No   Sig: Take 1 mg by mouth daily        Facility-Administered Medications: None       Past Medical History:   Diagnosis Date    Anemia     Asthma     Chronic pain     bulging discs in back,polymyositis    Diabetes mellitus ([address] )     Disease of thyroid gland     Diverticulitis     Hyperlipidemia     Hypertension     Polymyositis ([address] )        Past Surgical History:   Procedure Laterality Date    HYSTERECTOMY      SINUS SURGERY      THYROIDECTOMY      TUBAL LIGATION         Family History   Problem Relation Age of Onset    Hepatitis Sister         acute hepatits C virus     I have reviewed and agree with the history as documented  Social History     Tobacco Use    Smoking status: Never Smoker    Smokeless tobacco: Never Used   Substance Use Topics    Alcohol use: No    Drug use: No        Review of Systems   All other systems reviewed and are negative  Physical Exam  Physical Exam   Constitutional: No distress  HENT:   Head: Atraumatic  Cardiovascular: Normal rate, regular rhythm and intact distal pulses  Pulmonary/Chest: Effort normal and breath sounds normal  No respiratory distress  Musculoskeletal: She exhibits tenderness  Feet:    Neurological: She is alert  Skin: Capillary refill takes less than 2 seconds  She is not diaphoretic  Nursing note and vitals reviewed  Vital Signs  ED Triage Vitals [08/09/19 1415]   Temperature Pulse Respirations Blood Pressure SpO2   97 9 °F (36 6 °C) 93 18 162/100 97 %      Temp Source Heart Rate Source Patient Position - Orthostatic VS BP Location FiO2 (%)   Tympanic Monitor Lying Right arm --      Pain Score       Worst Possible Pain           Vitals:    08/09/19 1415   BP: 162/100   Pulse: 93   Patient Position - Orthostatic VS: Lying         Visual Acuity      ED Medications  Medications - No data to display    Diagnostic Studies  Results Reviewed     None                 XR foot 3+ views LEFT   Final Result by Donal Duke MD (08/09 1449)      Nondisplaced fracture 3rd metatarsal neck              Workstation performed: [id number]                    Procedures  Procedures       ED Course                               MDM  Number of Diagnoses or Management Options  Closed fracture of left foot, initial encounter:   Diagnosis management comments: Pulse ox 97% on RA indicating adequate oxygenation  Xray L foot: 3rd metatarsal fx as read by me           Amount and/or Complexity of Data Reviewed  Tests in the radiology section of CPT®: ordered and reviewed  Decide to obtain previous medical records or to obtain history from someone other than the patient: yes  Review and summarize past medical records: yes  Independent visualization of images, tracings, or specimens: yes    Patient Progress  Patient progress: stable      Disposition  Final diagnoses:   Closed fracture of left foot, initial encounter     Time reflects when diagnosis was documented in both MDM as applicable and the Disposition within this note     Time User Action Codes Description Comment    8/9/2019  3:12 PM Earl, [address] Closed fracture of left foot, initial encounter       ED Disposition     ED Disposition Condition Date/Time Comment    Discharge Stable Fri Aug 9, 2019  3:12 PM Brian Rosa discharge to home/self care  Follow-up Information     Follow up With Specialties Details Why D[address] , DO Orthopedic Surgery In 3 days  [address]  [phone]            Patient's Medications   Discharge Prescriptions    No medications on file     No discharge procedures on file      ED Provider  Electronically Signed by           Julisa Karimi DO  08/09/19 8570

## 2019-08-09 NOTE — ED NOTES
Surgical shoe applied as ordered,  Follow up reviewed with pt   Pt discharged home with [address] [place] [address] Rd, RN  08/09/19 1182

## 2020-03-12 ENCOUNTER — TRANSCRIBE ORDERS (OUTPATIENT)
Dept: ADMINISTRATIVE | Facility: HOSPITAL | Age: 70
End: 2020-03-12

## 2020-03-12 DIAGNOSIS — K57.30 DIVERTICULOSIS OF LARGE INTESTINE WITHOUT DIVERTICULITIS: Primary | ICD-10-CM

## 2020-03-12 DIAGNOSIS — R10.9 STOMACH ACHE: ICD-10-CM

## 2020-03-13 ENCOUNTER — TRANSCRIBE ORDERS (OUTPATIENT)
Dept: ADMINISTRATIVE | Facility: HOSPITAL | Age: 70
End: 2020-03-13

## 2020-03-13 ENCOUNTER — HOSPITAL ENCOUNTER (OUTPATIENT)
Dept: RADIOLOGY | Facility: HOSPITAL | Age: 70
Discharge: HOME | End: 2020-03-13
Attending: INTERNAL MEDICINE
Payer: MEDICARE

## 2020-03-13 ENCOUNTER — APPOINTMENT (OUTPATIENT)
Dept: LAB | Facility: HOSPITAL | Age: 70
End: 2020-03-13
Attending: INTERNAL MEDICINE
Payer: MEDICARE

## 2020-03-13 DIAGNOSIS — E08.00 DIABETES MELLITUS DUE TO UNDERLYING CONDITION WITH HYPEROSMOLARITY WITHOUT COMA, WITHOUT LONG-TERM CURRENT USE OF INSULIN (HCC): ICD-10-CM

## 2020-03-13 DIAGNOSIS — M54.9 DORSALGIA: ICD-10-CM

## 2020-03-13 DIAGNOSIS — Z00.00 ROUTINE GENERAL MEDICAL EXAMINATION AT A HEALTH CARE FACILITY: ICD-10-CM

## 2020-03-13 DIAGNOSIS — E03.9 MYXEDEMA HEART DISEASE: ICD-10-CM

## 2020-03-13 DIAGNOSIS — R10.9 STOMACH ACHE: Primary | ICD-10-CM

## 2020-03-13 DIAGNOSIS — I51.9 MYXEDEMA HEART DISEASE: ICD-10-CM

## 2020-03-13 DIAGNOSIS — N39.3 STRESS INCONTINENCE OF URINE: ICD-10-CM

## 2020-03-13 DIAGNOSIS — I10 ESSENTIAL HYPERTENSION, MALIGNANT: ICD-10-CM

## 2020-03-13 DIAGNOSIS — R10.9 STOMACH ACHE: ICD-10-CM

## 2020-03-13 DIAGNOSIS — K57.30 DIVERTICULOSIS OF LARGE INTESTINE WITHOUT DIVERTICULITIS: ICD-10-CM

## 2020-03-13 DIAGNOSIS — K21.00 GASTROESOPHAGEAL REFLUX DISEASE WITH ESOPHAGITIS: ICD-10-CM

## 2020-03-13 LAB
25(OH)D3 SERPL-MCNC: 37.7 NG/ML (ref 30–100)
ALBUMIN SERPL BCP-MCNC: 3.5 G/DL (ref 3.5–5)
ALP SERPL-CCNC: 90 U/L (ref 46–116)
ALT SERPL W P-5'-P-CCNC: 25 U/L (ref 12–78)
ANION GAP SERPL CALCULATED.3IONS-SCNC: 12 MMOL/L (ref 4–13)
AST SERPL W P-5'-P-CCNC: 10 U/L (ref 5–45)
BACTERIA UR QL AUTO: ABNORMAL /HPF
BASOPHILS # BLD AUTO: 0.02 THOUSANDS/ΜL (ref 0–0.1)
BASOPHILS NFR BLD AUTO: 0 % (ref 0–1)
BILIRUB SERPL-MCNC: 0.4 MG/DL (ref 0.2–1)
BILIRUB UR QL STRIP: NEGATIVE
BUN SERPL-MCNC: 10 MG/DL (ref 5–25)
CALCIUM SERPL-MCNC: 8.6 MG/DL (ref 8.3–10.1)
CHLORIDE SERPL-SCNC: 98 MMOL/L (ref 100–108)
CLARITY UR: CLEAR
CO2 SERPL-SCNC: 28 MMOL/L (ref 21–32)
COLOR UR: YELLOW
CREAT SERPL-MCNC: 0.53 MG/DL (ref 0.6–1.3)
EOSINOPHIL # BLD AUTO: 0.08 THOUSAND/ΜL (ref 0–0.61)
EOSINOPHIL NFR BLD AUTO: 1 % (ref 0–6)
ERYTHROCYTE [DISTWIDTH] IN BLOOD BY AUTOMATED COUNT: 16.8 % (ref 11.6–15.1)
GFR SERPL CREATININE-BSD FRML MDRD: 112 ML/MIN/1.73SQ M
GLUCOSE P FAST SERPL-MCNC: 130 MG/DL (ref 65–99)
GLUCOSE UR STRIP-MCNC: NEGATIVE MG/DL
HCT VFR BLD AUTO: 35.7 % (ref 34.8–46.1)
HGB BLD-MCNC: 11.6 G/DL (ref 11.5–15.4)
HGB UR QL STRIP.AUTO: NEGATIVE
IMM GRANULOCYTES # BLD AUTO: 0.05 THOUSAND/UL (ref 0–0.2)
IMM GRANULOCYTES NFR BLD AUTO: 1 % (ref 0–2)
IRON SERPL-MCNC: 43 UG/DL (ref 50–170)
KETONES UR STRIP-MCNC: NEGATIVE MG/DL
LEUKOCYTE ESTERASE UR QL STRIP: ABNORMAL
LYMPHOCYTES # BLD AUTO: 1.8 THOUSANDS/ΜL (ref 0.6–4.47)
LYMPHOCYTES NFR BLD AUTO: 18 % (ref 14–44)
MAGNESIUM SERPL-MCNC: 1.7 MG/DL (ref 1.6–2.6)
MCH RBC QN AUTO: 23.9 PG (ref 26.8–34.3)
MCHC RBC AUTO-ENTMCNC: 32.5 G/DL (ref 31.4–37.4)
MCV RBC AUTO: 74 FL (ref 82–98)
MONOCYTES # BLD AUTO: 0.54 THOUSAND/ΜL (ref 0.17–1.22)
MONOCYTES NFR BLD AUTO: 5 % (ref 4–12)
NEUTROPHILS # BLD AUTO: 7.49 THOUSANDS/ΜL (ref 1.85–7.62)
NEUTS SEG NFR BLD AUTO: 75 % (ref 43–75)
NITRITE UR QL STRIP: NEGATIVE
NON-SQ EPI CELLS URNS QL MICRO: ABNORMAL /HPF
NRBC BLD AUTO-RTO: 0 /100 WBCS
PH UR STRIP.AUTO: 6 [PH]
PHOSPHATE SERPL-MCNC: 3.3 MG/DL (ref 2.3–4.1)
PLATELET # BLD AUTO: 373 THOUSANDS/UL (ref 149–390)
PMV BLD AUTO: 9.7 FL (ref 8.9–12.7)
POTASSIUM SERPL-SCNC: 3.8 MMOL/L (ref 3.5–5.3)
PROT SERPL-MCNC: 7.2 G/DL (ref 6.4–8.2)
PROT UR STRIP-MCNC: NEGATIVE MG/DL
RBC # BLD AUTO: 4.85 MILLION/UL (ref 3.81–5.12)
RBC #/AREA URNS AUTO: ABNORMAL /HPF
SODIUM SERPL-SCNC: 138 MMOL/L (ref 136–145)
SP GR UR STRIP.AUTO: 1.01 (ref 1–1.03)
UROBILINOGEN UR QL STRIP.AUTO: 0.2 E.U./DL
VIT B12 SERPL-MCNC: 251 PG/ML (ref 100–900)
WBC # BLD AUTO: 9.98 THOUSAND/UL (ref 4.31–10.16)
WBC #/AREA URNS AUTO: ABNORMAL /HPF

## 2020-03-13 PROCEDURE — 83540 ASSAY OF IRON: CPT

## 2020-03-13 PROCEDURE — 80053 COMPREHEN METABOLIC PANEL: CPT | Performed by: INTERNAL MEDICINE

## 2020-03-13 PROCEDURE — 82306 VITAMIN D 25 HYDROXY: CPT

## 2020-03-13 PROCEDURE — 36415 COLL VENOUS BLD VENIPUNCTURE: CPT

## 2020-03-13 PROCEDURE — 74177 CT ABD & PELVIS W/CONTRAST: CPT

## 2020-03-13 PROCEDURE — 85025 COMPLETE CBC W/AUTO DIFF WBC: CPT

## 2020-03-13 PROCEDURE — 87147 CULTURE TYPE IMMUNOLOGIC: CPT

## 2020-03-13 PROCEDURE — 84100 ASSAY OF PHOSPHORUS: CPT | Performed by: INTERNAL MEDICINE

## 2020-03-13 PROCEDURE — 83735 ASSAY OF MAGNESIUM: CPT | Performed by: INTERNAL MEDICINE

## 2020-03-13 PROCEDURE — 82607 VITAMIN B-12: CPT

## 2020-03-13 PROCEDURE — 81001 URINALYSIS AUTO W/SCOPE: CPT

## 2020-03-13 PROCEDURE — 87086 URINE CULTURE/COLONY COUNT: CPT

## 2020-03-13 RX ADMIN — IOHEXOL 100 ML: 350 INJECTION, SOLUTION INTRAVENOUS at 09:31

## 2020-03-14 LAB — BACTERIA UR CULT: ABNORMAL

## 2021-05-05 ENCOUNTER — HOSPITAL ENCOUNTER (EMERGENCY)
Facility: HOSPITAL | Age: 71
Discharge: HOME | End: 2021-05-05
Attending: EMERGENCY MEDICINE
Payer: MEDICARE

## 2021-05-05 ENCOUNTER — APPOINTMENT (EMERGENCY)
Dept: RADIOLOGY | Facility: HOSPITAL | Age: 71
End: 2021-05-05
Payer: MEDICARE

## 2021-05-05 VITALS
DIASTOLIC BLOOD PRESSURE: 87 MMHG | TEMPERATURE: 97 F | HEART RATE: 79 BPM | OXYGEN SATURATION: 99 % | SYSTOLIC BLOOD PRESSURE: 159 MMHG | RESPIRATION RATE: 18 BRPM

## 2021-05-05 DIAGNOSIS — R53.1 WEAKNESS: ICD-10-CM

## 2021-05-05 DIAGNOSIS — M33.20 POLYMYOSITIS (HCC): Primary | ICD-10-CM

## 2021-05-05 DIAGNOSIS — R26.9 NEUROLOGIC GAIT DYSFUNCTION: ICD-10-CM

## 2021-05-05 LAB
ALBUMIN SERPL BCP-MCNC: 3.2 G/DL (ref 3.5–5)
ALP SERPL-CCNC: 78 U/L (ref 46–116)
ALT SERPL W P-5'-P-CCNC: 32 U/L (ref 12–78)
ANION GAP SERPL CALCULATED.3IONS-SCNC: 10 MMOL/L (ref 4–13)
APTT PPP: 29 SECONDS (ref 23–37)
AST SERPL W P-5'-P-CCNC: 30 U/L (ref 5–45)
ATRIAL RATE: 85 BPM
BACTERIA UR QL AUTO: ABNORMAL /HPF
BASOPHILS # BLD AUTO: 0.04 THOUSANDS/ΜL (ref 0–0.1)
BASOPHILS NFR BLD AUTO: 0 % (ref 0–1)
BILIRUB SERPL-MCNC: 0.34 MG/DL (ref 0.2–1)
BILIRUB UR QL STRIP: NEGATIVE
BUN SERPL-MCNC: 18 MG/DL (ref 5–25)
CALCIUM ALBUM COR SERPL-MCNC: 9.3 MG/DL (ref 8.3–10.1)
CALCIUM SERPL-MCNC: 8.7 MG/DL (ref 8.3–10.1)
CHLORIDE SERPL-SCNC: 104 MMOL/L (ref 100–108)
CLARITY UR: CLEAR
CO2 SERPL-SCNC: 28 MMOL/L (ref 21–32)
COLOR UR: YELLOW
CREAT SERPL-MCNC: 0.66 MG/DL (ref 0.6–1.3)
CRP SERPL QL: 16.6 MG/L
EOSINOPHIL # BLD AUTO: 0.44 THOUSAND/ΜL (ref 0–0.61)
EOSINOPHIL NFR BLD AUTO: 5 % (ref 0–6)
ERYTHROCYTE [DISTWIDTH] IN BLOOD BY AUTOMATED COUNT: 17.2 % (ref 11.6–15.1)
GFR SERPL CREATININE-BSD FRML MDRD: 104 ML/MIN/1.73SQ M
GLUCOSE SERPL-MCNC: 149 MG/DL (ref 65–140)
GLUCOSE UR STRIP-MCNC: NEGATIVE MG/DL
HCT VFR BLD AUTO: 33.9 % (ref 34.8–46.1)
HGB BLD-MCNC: 11 G/DL (ref 11.5–15.4)
HGB UR QL STRIP.AUTO: NEGATIVE
IMM GRANULOCYTES # BLD AUTO: 0.05 THOUSAND/UL (ref 0–0.2)
IMM GRANULOCYTES NFR BLD AUTO: 1 % (ref 0–2)
INR PPP: 1 (ref 0.84–1.19)
KETONES UR STRIP-MCNC: NEGATIVE MG/DL
LEUKOCYTE ESTERASE UR QL STRIP: ABNORMAL
LYMPHOCYTES # BLD AUTO: 2.94 THOUSANDS/ΜL (ref 0.6–4.47)
LYMPHOCYTES NFR BLD AUTO: 30 % (ref 14–44)
MCH RBC QN AUTO: 23.3 PG (ref 26.8–34.3)
MCHC RBC AUTO-ENTMCNC: 32.4 G/DL (ref 31.4–37.4)
MCV RBC AUTO: 72 FL (ref 82–98)
MONOCYTES # BLD AUTO: 0.8 THOUSAND/ΜL (ref 0.17–1.22)
MONOCYTES NFR BLD AUTO: 8 % (ref 4–12)
NEUTROPHILS # BLD AUTO: 5.5 THOUSANDS/ΜL (ref 1.85–7.62)
NEUTS SEG NFR BLD AUTO: 56 % (ref 43–75)
NITRITE UR QL STRIP: NEGATIVE
NON-SQ EPI CELLS URNS QL MICRO: ABNORMAL /HPF
NRBC BLD AUTO-RTO: 0 /100 WBCS
P AXIS: 47 DEGREES
PH UR STRIP.AUTO: 6 [PH]
PLATELET # BLD AUTO: 384 THOUSANDS/UL (ref 149–390)
PMV BLD AUTO: 10.2 FL (ref 8.9–12.7)
POTASSIUM SERPL-SCNC: 4.7 MMOL/L (ref 3.5–5.3)
PR INTERVAL: 160 MS
PROT SERPL-MCNC: 7.2 G/DL (ref 6.4–8.2)
PROT UR STRIP-MCNC: NEGATIVE MG/DL
PROTHROMBIN TIME: 13.1 SECONDS (ref 11.6–14.5)
QRS AXIS: 11 DEGREES
QRSD INTERVAL: 74 MS
QT INTERVAL: 350 MS
QTC INTERVAL: 416 MS
RBC # BLD AUTO: 4.72 MILLION/UL (ref 3.81–5.12)
RBC #/AREA URNS AUTO: ABNORMAL /HPF
SARS-COV-2 RNA RESP QL NAA+PROBE: NEGATIVE
SODIUM SERPL-SCNC: 142 MMOL/L (ref 136–145)
SP GR UR STRIP.AUTO: 1.01 (ref 1–1.03)
T WAVE AXIS: 23 DEGREES
TROPONIN I SERPL-MCNC: <0.02 NG/ML
TSH SERPL DL<=0.05 MIU/L-ACNC: 2.43 UIU/ML (ref 0.36–3.74)
UROBILINOGEN UR QL STRIP.AUTO: 0.2 E.U./DL
VENTRICULAR RATE: 85 BPM
WBC # BLD AUTO: 9.77 THOUSAND/UL (ref 4.31–10.16)
WBC #/AREA URNS AUTO: ABNORMAL /HPF

## 2021-05-05 PROCEDURE — 80053 COMPREHEN METABOLIC PANEL: CPT | Performed by: EMERGENCY MEDICINE

## 2021-05-05 PROCEDURE — 93005 ELECTROCARDIOGRAM TRACING: CPT

## 2021-05-05 PROCEDURE — 86140 C-REACTIVE PROTEIN: CPT | Performed by: EMERGENCY MEDICINE

## 2021-05-05 PROCEDURE — 81001 URINALYSIS AUTO W/SCOPE: CPT | Performed by: EMERGENCY MEDICINE

## 2021-05-05 PROCEDURE — 85730 THROMBOPLASTIN TIME PARTIAL: CPT | Performed by: EMERGENCY MEDICINE

## 2021-05-05 PROCEDURE — 85025 COMPLETE CBC W/AUTO DIFF WBC: CPT | Performed by: EMERGENCY MEDICINE

## 2021-05-05 PROCEDURE — U0003 INFECTIOUS AGENT DETECTION BY NUCLEIC ACID (DNA OR RNA); SEVERE ACUTE RESPIRATORY SYNDROME CORONAVIRUS 2 (SARS-COV-2) (CORONAVIRUS DISEASE [COVID-19]), AMPLIFIED PROBE TECHNIQUE, MAKING USE OF HIGH THROUGHPUT TECHNOLOGIES AS DESCRIBED BY CMS-2020-01-R: HCPCS | Performed by: EMERGENCY MEDICINE

## 2021-05-05 PROCEDURE — 36415 COLL VENOUS BLD VENIPUNCTURE: CPT | Performed by: EMERGENCY MEDICINE

## 2021-05-05 PROCEDURE — 99285 EMERGENCY DEPT VISIT HI MDM: CPT | Performed by: EMERGENCY MEDICINE

## 2021-05-05 PROCEDURE — 85610 PROTHROMBIN TIME: CPT | Performed by: EMERGENCY MEDICINE

## 2021-05-05 PROCEDURE — 84484 ASSAY OF TROPONIN QUANT: CPT | Performed by: EMERGENCY MEDICINE

## 2021-05-05 PROCEDURE — 71045 X-RAY EXAM CHEST 1 VIEW: CPT

## 2021-05-05 PROCEDURE — 99285 EMERGENCY DEPT VISIT HI MDM: CPT

## 2021-05-05 PROCEDURE — 84443 ASSAY THYROID STIM HORMONE: CPT | Performed by: EMERGENCY MEDICINE

## 2021-05-05 PROCEDURE — 93010 ELECTROCARDIOGRAM REPORT: CPT | Performed by: INTERNAL MEDICINE

## 2021-05-05 PROCEDURE — 97167 OT EVAL HIGH COMPLEX 60 MIN: CPT

## 2021-05-05 PROCEDURE — U0005 INFEC AGEN DETEC AMPLI PROBE: HCPCS | Performed by: EMERGENCY MEDICINE

## 2021-05-05 PROCEDURE — 97163 PT EVAL HIGH COMPLEX 45 MIN: CPT

## 2021-05-05 RX ORDER — PREDNISONE 20 MG/1
60 TABLET ORAL DAILY
Qty: 15 TABLET | Refills: 0
Start: 2021-05-05 | End: 2021-05-10

## 2021-05-05 RX ORDER — PREDNISONE 20 MG/1
60 TABLET ORAL ONCE
Status: COMPLETED | OUTPATIENT
Start: 2021-05-05 | End: 2021-05-05

## 2021-05-05 RX ADMIN — PREDNISONE 60 MG: 20 TABLET ORAL at 10:09

## 2021-05-05 NOTE — ED NOTES
Pt transported by family to [facility] after nursing supervisor approval        Mandi Bullard, NOAH  05/05/21 7512

## 2021-05-05 NOTE — TRANSPORTATION MEDICAL NECESSITY
Section I - General Information    Name of Patient: Linda Gusman                 : 1950    Medicare #: [id number]  Transport Date: 21 (PCS is valid for round trips on this date and for all repetitive trips in the 60-day range as noted below )  Origin: [address]: [facility]  Is the pt's stay covered under Medicare Part A (PPS/DRG)   []     Closest appropriate facility? If no, why is transport to more distant facility required? Yes  If hospice pt, is this transport related to pt's terminal illness? NA       Section II - Medical Necessity Questionnaire  Ambulance transportation is medically necessary only if other means of transport are contraindicated or would be potentially harmful to the patient  To meet this requirement, the patient must either be "bed confined" or suffer from a condition such that transport by means other than ambulance is contraindicated by the patient's condition  The following questions must be answered by the medical professional signing below for this form to be valid:    1)  Describe the MEDICAL CONDITION (physical and/or mental) of this patient AT [address] [place] [address] that requires the patient to be transported in an ambulance and why transport by other means is contraindicated by the patient's condition: Ambulatory Dysfunction, Generalized Weakness, Polymyositis    2) Is the patient "bed confined" as defined below? No  To be "be confined" the patient must satisfy all three of the following conditions: (1) unable to get up from bed without Assistance; AND (2) unable to ambulate; AND (3) unable to sit in a chair or wheelchair  3) Can this patient safely be transported by car or wheelchair van (i e , seated during transport without a medical attendant or monitoring)?    No    4) In addition to completing questions 1-3 above, please check any of the following conditions that apply*:   *Note: supporting documentation for any boxes checked must be maintained in the patient's medical records  If hosp-hosp transfer, describe services needed at 2nd facility not available at 1st facility? Moderate/severe pain on movement   Unable to tolerate seated position for time needed to transport       Section III - Signature of Physician or Healthcare Professional  I certify that the above information is true and correct based on my evaluation of this patient, and represent that the patient requires transport by ambulance and that other forms of transport are contraindicated  I understand that this information will be used by the Centers for Medicare and Medicaid Services (CMS) to support the determination of medical necessity for ambulance services, and I represent that I have personal knowledge of the patient's condition at time of transport  []  If this box is checked, I also certify that the patient is physically or mentally incapable of signing the ambulance service's claim and that the institution with which I am affiliated has furnished care, services, or assistance to the patient  My signature below is made on behalf of the patient pursuant to 42 CFR §424 36(b)(4)  In accordance with 42 CFR §424 37, the specific reason(s) that the patient is physically or mentally incapable of signing the claim form is as follows:       Signature of Physician* or Healthcare Professional______________________________________________________________  Signature Date 05/05/21 (For scheduled repetitive transports, this form is not valid for transports performed more than 60 days after this date)    Printed Name & Credentials of Physician or Healthcare Professional (MD, DO, RN, etc )__LIZABETH Simons_  *Form must be signed by patient's attending physician for scheduled, repetitive transports   For non-repetitive, unscheduled ambulance transports, if unable to obtain the signature of the attending physician, any of the following may sign (choose appropriate option below)  [] Physician Assistant []  Clinical Nurse Specialist []  Registered Nurse  []  Nurse Practitioner  [x] Discharge Planner

## 2021-05-05 NOTE — CASE MANAGEMENT
SW consulted by the ED Attending for assistance in STR placement  Pt presented to [facility] ED with complaint of generalized weakness/ambulatory dysfunction  PT/OT evaluated pt and is recommending STR  ALEJANDRO met with pt bedside  Dtr Tisha Torre ([phone]) was on speaker phone  SW discussed recommendation, Medicare SNF benefit, and discussed SNF preference list  Pt requested to be close to home as possible and chose [facility] as #[facility] preference  [facility] referral sent  ALEJANDRO spoke with liaison [facility]re[facility] who confirmed they can accept pt today  [facility] transport requested  Awaiting confirmation of pickup time  Attending and unit nurse made aware

## 2021-05-05 NOTE — PHYSICAL THERAPY NOTE
PT EVALUATION     05/05/21 1105   Note Type   Note type Evaluation   Pain Assessment   Pain Assessment Tool 0-10   Pain Score Worst Possible Pain  (R knee and LS area with mobility)   Home Living   Type of [address] [facility] Two level;Stairs to enter with rails  (3 stairs to enter)   [address]; Wheelchair-manual;Stair glide  (2 recliner lift chairs)   Additional Comments patient ambulating short distances in home from bed to bathroom with roller walker prior to one week ago   Prior Function   Level of [place] Needs assistance with ADLs and functional mobility   Lives With Daughter   Receives Help From Family   ADL Assistance Needs assistance   IADLs Needs assistance   Comments patient reports assist with ADLs required and increasing needs over the past week with declinig strength   Restrictions/Precautions   Other Precautions Chair Alarm; Bed Alarm; Fall Risk;Pain   General   Additional Pertinent History chart reviewed, patient admitted with weakness, polymositis   Patient now presents as generally weak and requiring mod to max assist of 2 persons for safe transfers and gait and will require STR prior to return home   Family/Caregiver Present Yes   Cognition   Overall Cognitive Status WFL   Arousal/Participation Cooperative   Attention Within functional limits   Orientation Level Oriented X4   Following Commands Follows all commands and directions without difficulty   RLE Assessment   RLE Assessment   (ROM WFL, strength 2+/5)   LLE Assessment   LLE Assessment   (ROM WFL, strength 3/3-)   Coordination   Movements are Fluid and Coordinated 0   Bed Mobility   Supine to Sit 3  Moderate assistance   Additional items Assist x 2   Sit to Supine 3  Moderate assistance   Additional items Assist x 2   Transfers   Sit to Stand 3  Moderate assistance   Additional items Assist x 2   Stand to Sit 3  Moderate assistance   Additional items Assist x 2   Ambulation/Elevation   Gait Assistance 3  Moderate assist   Additional items Assist x 2;Verbal cues; Tactile cues   Assistive Device Rolling walker   Distance 3feet at bedside with R knee blocking at times to prevent knee buckling and fall prevention   Balance   Static Sitting Fair   Dynamic Sitting Fair -   Static Standing Poor +   Dynamic Standing Poor -   Ambulatory Poor -   Activity Tolerance   Activity Tolerance Patient limited by fatigue;Patient limited by pain  (limited by weakness)   Medical Staff Made Aware yes   Assessment   Prognosis Good   Problem List Decreased strength;Decreased range of motion;Decreased endurance; Impaired balance;Decreased mobility; Decreased coordination;Pain   Assessment Patient seen for Physical Therapy evaluation  Patient admitted with <principal problem not specified>  Comorbidities affecting patient's physical performance include: polymyositis, asthma, chronic pain, diabetes and hypertension, fall   Personal factors affecting patient at time of initial evaluation include: ambulating with assistive device, inability to ambulate household distances, inability to navigate community distances, inability to navigate level surfaces without external assistance, inability to perform dynamic tasks in community, positive fall history, inability to perform physical activity, inability to perform ADLS and inability to perform IADLS   Prior to admission, patient was independent with functional mobility with walker, requiring assist for ADLS, requiring assist for IADLS, ambulating household distance and home with family assist   Please find objective findings from Physical Therapy assessment regarding body systems outlined above with impairments and limitations including weakness, decreased ROM, impaired balance, decreased endurance, impaired coordination, gait deviations, pain, decreased activity tolerance, decreased functional mobility tolerance and fall risk    The Barthel Index was used as a functional outcome tool presenting with a score of 45 today indicating marked limitations of functional mobility and ADLS  Patient's clinical presentation is currently unstable/unpredictable as seen in patient's presentation of vital sign response, changing level of pain, increased fall risk, new onset of impairment of functional mobility, decreased endurance and new onset of weakness  Pt would benefit from continued Physical Therapy treatment to address deficits as defined above and maximize level of functional mobility  As demonstrated by objective findings, the assigned level of complexity for this evaluation is high  The patient's [facility] Basic Mobility Inpatient Short Form Raw Score is 6, Standardized Score is    A standardized score less than 42 9 suggests the patient may benefit from discharge to post-acute rehabilitation services  Please also refer to the recommendation of the Physical Therapist for safe discharge planning  Goals   Patient Goals to get stronger and walk   STG Expiration Date 05/12/21   Short Term Goal #1 transfers and gait with roller walker with mod assist of 1   Short Term Goal #2 gait endurance to 20 feet, strength BLEs 3+/5 all to meet patient goal of improved gait patterning   LTG Expiration Date 05/19/21   Long Term Goal #1 transfers and gait with roller walker with min assist   Long Term Goal #2 gait endurance to 40 feet   Plan   Treatment/Interventions ADL retraining;Functional transfer training;LE strengthening/ROM; Therapeutic exercise; Endurance training;Elevations; Patient/family training;Equipment eval/education; Bed mobility;Gait training; Compensatory technique education   PT Frequency 5x/wk   Recommendation   PT Discharge Recommendation Post acute rehabilitation services   [facility] Basic Mobility Inpatient   Turning in Bed Without Bedrails 1   Lying on Back to Sitting on Edge of Flat Bed 1   Moving Bed to Chair 1   Standing Up From Chair 1   Walk in Room 1   Climb 3-5 Stairs 1   Basic Mobility Inpatient Raw Score 6   Turning Head Towards Sound 4   Follow Simple Instructions 4   Low Function Basic Mobility Raw Score 14   Low Function Basic Mobility Standardized Score 22 01   Barthel Index   Feeding 10   Bathing 0   Grooming Score 0   Dressing Score 5   Bladder Score 10   Bowels Score 10   Toilet Use Score 5   Transfers (Bed/Chair) Score 5   Mobility (Level Surface) Score 0   Stairs Score 0   Barthel Index Score 39   Licensure   [place] License Number  [facility] [id number]

## 2021-05-05 NOTE — ED PROVIDER NOTES
History  Chief Complaint   Patient presents with    Weakness - Generalized     pt states she has been feeling progressively more weak (generalized) over the past week  This morning she was unable to get out of her bed      Patient has a history of polymyositis and is under the care of a rheumatologist for years  She states that she has noticed increased weakness in both lower extremities for weeks  In the last several days she has had difficulty getting up from the seated position and from the toilet  Today she was unable to get out of bed without assistance  Patient denies any fall or injury  She states she is maintained on 1 mg of prednisone daily for over year  Prior to Admission Medications   Prescriptions Last Dose Informant Patient Reported? Taking? GABAPENTIN PO 5/4/2021 at Unknown time  Yes Yes   Sig: Take 900 mg by mouth daily at bedtime   albuterol (PROVENTIL HFA,VENTOLIN HFA) 90 mcg/act inhaler   Yes No   Sig: Inhale 2 puffs every 6 (six) hours as needed for wheezing  amLODIPine (NORVASC) 5 mg tablet 5/4/2021 at Unknown time  Yes Yes   Sig: Take 5 mg by mouth daily   benazepril (LOTENSIN) 10 mg tablet   Yes No   Sig: Take 10 mg by mouth daily   insulin glargine (LANTUS) 100 units/mL subcutaneous injection 5/4/2021 at Unknown time  Yes Yes   Sig: Inject 36 Units under the skin daily at bedtime     levothyroxine 75 mcg tablet 5/5/2021 at Unknown time  Yes Yes   Sig: Take 75 mcg by mouth daily  metFORMIN (GLUCOPHAGE) 1000 MG tablet 5/4/2021 at Unknown time  Yes Yes   Sig: Take 1,000 mg by mouth 2 (two) times a day with meals     mycophenolate (MYFORTIC) 360 MG TBEC 5/4/2021 at Unknown time  Yes Yes   Sig: Take 360 mg by mouth 2 (two) times a day   predniSONE 10 mg tablet 5/4/2021 at Unknown time  Yes Yes   Sig: Take 1 mg by mouth daily        Facility-Administered Medications: None       Past Medical History:   Diagnosis Date    Anemia     Asthma     Chronic pain     bulging discs in back,polymyositis    Diabetes mellitus ([address] )     Disease of thyroid gland     Diverticulitis     Hyperlipidemia     Hypertension     Polymyositis (HCC)        Past Surgical History:   Procedure Laterality Date    HYSTERECTOMY      SINUS SURGERY      THYROIDECTOMY      TUBAL LIGATION         Family History   Problem Relation Age of Onset    Hepatitis Sister         acute hepatits C virus     I have reviewed and agree with the history as documented  E-Cigarette/Vaping     E-Cigarette/Vaping Substances     Social History     Tobacco Use    Smoking status: Never Smoker    Smokeless tobacco: Never Used   Substance Use Topics    Alcohol use: No    Drug use: No       Review of Systems   Constitutional: Negative for chills and fever  HENT: Negative for congestion and sore throat  Eyes: Negative for visual disturbance  Respiratory: Negative for cough and shortness of breath  Cardiovascular: Negative for chest pain  Gastrointestinal: Negative for abdominal pain and vomiting  Genitourinary: Negative for dysuria  Musculoskeletal: Positive for gait problem  Negative for back pain and neck pain  Skin: Negative for rash  Neurological: Positive for weakness  Negative for light-headedness and numbness  Hematological: Does not bruise/bleed easily  Psychiatric/Behavioral: Negative for confusion  All other systems reviewed and are negative  Physical Exam  Physical Exam  Vitals signs and nursing note reviewed  Constitutional:       Appearance: Normal appearance  HENT:      Head: Normocephalic and atraumatic  Right Ear: External ear normal       Left Ear: External ear normal       Nose: Nose normal       Mouth/Throat:      Pharynx: Oropharynx is clear  Eyes:      Conjunctiva/sclera: Conjunctivae normal    Neck:      Musculoskeletal: Normal range of motion  Cardiovascular:      Rate and Rhythm: Normal rate and regular rhythm  Pulses: Normal pulses     Pulmonary: Effort: Pulmonary effort is normal       Breath sounds: Normal breath sounds  Abdominal:      Palpations: Abdomen is soft  Tenderness: There is no abdominal tenderness  Musculoskeletal: Normal range of motion  General: No signs of injury  Skin:     General: Skin is warm and dry  Capillary Refill: Capillary refill takes less than 2 seconds  Neurological:      General: No focal deficit present  Mental Status: She is alert and oriented to person, place, and time  Comments: Patient has approximately 2/5 weakness in bilateral hip flexors  Psychiatric:         Mood and Affect: Mood normal          Behavior: Behavior normal          Vital Signs  ED Triage Vitals   Temperature Pulse Respirations Blood Pressure SpO2   05/05/21 0819 05/05/21 0819 05/05/21 0819 05/05/21 0819 05/05/21 0819   (!) 96 4 °F (35 8 °C) 86 18 158/76 99 %      Temp Source Heart Rate Source Patient Position - Orthostatic VS BP Location FiO2 (%)   05/05/21 0819 05/05/21 0819 05/05/21 0819 05/05/21 0819 --   Tympanic Monitor Lying Right arm       Pain Score       05/05/21 1045       7           Vitals:    05/05/21 1000 05/05/21 1030 05/05/21 1045 05/05/21 1826   BP: 152/72 161/81 167/85 159/87   Pulse: 80 82 84 79   Patient Position - Orthostatic VS:    Lying         Visual Acuity      ED Medications  Medications   predniSONE tablet 60 mg (60 mg Oral Given 5/5/21 1009)       Diagnostic Studies  Results Reviewed     Procedure Component Value Units Date/Time    Novel Coronavirus [facility] [[id number]]  (Normal) Collected: 05/05/21 1119    Lab Status: Final result Specimen: Nares from Nose Updated: 05/05/21 1221     SARS-CoV-2 Negative    Narrative:       The specimen collection materials, transport medium, and/or testing methodology utilized in the production of these test results have been proven to be reliable in a limited validation with an abbreviated program under the Emergency Utilization Authorization provided by the FDA  Testing reported as "Presumptive positive" will be confirmed with secondary testing to ensure result accuracy  Clinical caution and judgement should be used with the interpretation of these results with consideration of the clinical impression and other laboratory testing  Testing reported as "Positive" or "Negative" has been proven to be accurate according to standard laboratory validation requirements  All testing is performed with control materials showing appropriate reactivity at standard intervals  Urine Microscopic [[id number]]  (Abnormal) Collected: 05/05/21 0858    Lab Status: Final result Specimen: Urine, Clean Catch Updated: 05/05/21 0912     RBC, UA None Seen /hpf      WBC, UA 4-10 /hpf      Epithelial Cells Moderate /hpf      Bacteria, UA Occasional /hpf     UA (URINE) with reflex to Scope [[id number]]  (Abnormal) Collected: 05/05/21 0858    Lab Status: Final result Specimen: Urine, Clean Catch Updated: 05/05/21 0906     Color, UA Yellow     Clarity, UA Clear     Specific Gravity, UA 1 010     pH, UA 6 0     Leukocytes, UA Trace     Nitrite, UA Negative     Protein, UA Negative mg/dl      Glucose, UA Negative mg/dl      Ketones, UA Negative mg/dl      Urobilinogen, UA 0 2 E U /dl      Bilirubin, UA Negative     Blood, UA Negative    TSH [[id number]]  (Normal) Collected: 05/05/21 0832    Lab Status: Final result Specimen: Blood from Arm, Left Updated: 05/05/21 0903     TSH 3RD GENERATON 2 432 uIU/mL     Narrative:      Patients undergoing fluorescein dye angiography may retain small amounts of fluorescein in the body for 48-72 hours post procedure  Samples containing fluorescein can produce falsely depressed TSH values  If the patient had this procedure,a specimen should be resubmitted post fluorescein clearance        Comprehensive metabolic panel [[id number]]  (Abnormal) Collected: 05/05/21 0832    Lab Status: Final result Specimen: Blood from Arm, Left Updated: 05/05/21 0683 Sodium 142 mmol/L      Potassium 4 7 mmol/L      Chloride 104 mmol/L      CO2 28 mmol/L      ANION GAP 10 mmol/L      BUN 18 mg/dL      Creatinine 0 66 mg/dL      Glucose 149 mg/dL      Calcium 8 7 mg/dL      Corrected Calcium 9 3 mg/dL      AST 30 U/L      ALT 32 U/L      Alkaline Phosphatase 78 U/L      Total Protein 7 2 g/dL      Albumin 3 2 g/dL      Total Bilirubin 0 34 mg/dL      eGFR 104 ml/min/1 73sq m     Narrative:      [place] guidelines for Chronic Kidney Disease (CKD):     Stage 1 with normal or high GFR (GFR > 90 mL/min/1 73 square meters)    Stage 2 Mild CKD (GFR = 60-89 mL/min/1 73 square meters)    Stage 3A Moderate CKD (GFR = 45-59 mL/min/1 73 square meters)    Stage 3B Moderate CKD (GFR = 30-44 mL/min/1 73 square meters)    Stage 4 Severe CKD (GFR = 15-29 mL/min/1 73 square meters)    Stage 5 End Stage CKD (GFR <15 mL/min/1 73 square meters)  Note: GFR calculation is accurate only with a steady state creatinine    C-reactive protein [[id number]]  (Abnormal) Collected: 05/05/21 0832    Lab Status: Final result Specimen: Blood from Arm, Left Updated: 05/05/21 0903     CRP 16 6 mg/L     Troponin I [[id number]]  (Normal) Collected: 05/05/21 0832    Lab Status: Final result Specimen: Blood from Arm, Left Updated: 05/05/21 0901     Troponin I <0 02 ng/mL     Protime-INR [[id number]]  (Normal) Collected: 05/05/21 0832    Lab Status: Final result Specimen: Blood from Arm, Left Updated: 05/05/21 0852     Protime 13 1 seconds      INR 1 00    APTT [[id number]]  (Normal) Collected: 05/05/21 0832    Lab Status: Final result Specimen: Blood from Arm, Left Updated: 05/05/21 0852     PTT 29 seconds     CBC and differential [[id number]]  (Abnormal) Collected: 05/05/21 0832    Lab Status: Final result Specimen: Blood from Arm, Left Updated: 05/05/21 0837     WBC 9 77 Thousand/uL      RBC 4 72 Million/uL      Hemoglobin 11 0 g/dL      Hematocrit 33 9 %      MCV 72 fL      MCH 23 3 pg MCHC 32 4 g/dL      RDW 17 2 %      MPV 10 2 fL      Platelets 593 Thousands/uL      nRBC 0 /100 WBCs      Neutrophils Relative 56 %      Immat GRANS % 1 %      Lymphocytes Relative 30 %      Monocytes Relative 8 %      Eosinophils Relative 5 %      Basophils Relative 0 %      Neutrophils Absolute 5 50 Thousands/µL      Immature Grans Absolute 0 05 Thousand/uL      Lymphocytes Absolute 2 94 Thousands/µL      Monocytes Absolute 0 80 Thousand/µL      Eosinophils Absolute 0 44 Thousand/µL      Basophils Absolute 0 04 Thousands/µL                  XR chest 1 view portable   Final Result by Silva Sharma MD (05/05 1207)   No acute cardiopulmonary disease  Findings are stable            Workstation performed: [id number]                    Procedures  ECG 12 Lead Documentation Only    Date/Time: 5/5/2021 8:18 AM  Performed by: Ariana Bryan MD  Authorized by: Ariana Bryan MD     Indications / Diagnosis:  Weakness  ECG reviewed by me, the ED Provider: yes    Patient location:  ED  Interpretation:     Interpretation: normal    Rate:     ECG rate:  85    ECG rate assessment: normal    Rhythm:     Rhythm: sinus rhythm    Ectopy:     Ectopy: none    QRS:     QRS axis:  Normal    QRS intervals:  Normal  Conduction:     Conduction: normal    ST segments:     ST segments:  Normal  T waves:     T waves: normal               ED Course                                           MDM  Number of Diagnoses or Management Options  Diagnosis management comments: Patient has symmetrical weakness in both lower extremities  I suspect progression of her inflammatory condition  Check metabolic profile, electrolytes and thyroid  I have discussed the case with the patient's rheumatologist Dr Svitlana Duong   She suggested increasing prednisone 60 mg p o  Once a day x5 days and re-evaluation at that time    Will also have PT OT evaluate the patient      Disposition  Final diagnoses:   Polymyositis ([address] )   Neurologic gait dysfunction Weakness     Time reflects when diagnosis was documented in both MDM as applicable and the Disposition within this note     Time User Action Codes Description Comment    5/5/2021  1:49 PM Vallarijennifer Villavicencio Add [M33 20] Polymyositis ([address] )     5/5/2021  1:49 PM Nilda MYLES Add [R26 9] Neurologic gait dysfunction     5/5/2021  1:49 PM Valtatye Mons Add [R53 1] Weakness       ED Disposition     ED Disposition Condition Date/Time Comment    Discharge Stable Wed May 5, 2021  1:49 PM Katelynn Doan discharge to home/self care  MD Documentation      Most Recent Value   Accepting Facility Name, [address]    (Name & Tel number)  [facility]   Transported by (Company and Unit #)  [place]      RN Documentation      [facility] [address] [facility], [address]    (Name & Tel number)  [facility]   Transported by [employer] and Unit #)  [place]   Level of Care  Basic life support   Transfer Date  05/05/21   Transfer Time  1700      Follow-up Information     Follow up With Specialties Details Why Contact Info    Dr Brent Dobson  [phone]          Discharge Medication List as of 5/5/2021  6:23 PM      START taking these medications    Details   ! ! predniSONE 20 mg tablet Take 3 tablets (60 mg total) by mouth daily for 5 days, Starting Wed 5/5/2021, Until Mon 5/10/2021, No Print       !! - Potential duplicate medications found  Please discuss with provider  CONTINUE these medications which have NOT CHANGED    Details   amLODIPine (NORVASC) 5 mg tablet Take 5 mg by mouth daily, Historical Med      GABAPENTIN PO Take 900 mg by mouth daily at bedtime, Historical Med      insulin glargine (LANTUS) 100 units/mL subcutaneous injection Inject 36 Units under the skin daily at bedtime  , Historical Med      levothyroxine 75 mcg tablet Take 75 mcg by mouth daily  , Until Discontinued, Historical Med      metFORMIN (GLUCOPHAGE) 1000 MG tablet Take 1,000 mg by mouth 2 (two) times a day with meals  , Until Discontinued, Historical Med      mycophenolate (MYFORTIC) 360 MG TBEC Take 360 mg by mouth 2 (two) times a day, Historical Med      !! predniSONE 10 mg tablet Take 1 mg by mouth daily  , Historical Med      albuterol (PROVENTIL HFA,VENTOLIN HFA) 90 mcg/act inhaler Inhale 2 puffs every 6 (six) hours as needed for wheezing , Until Discontinued, Historical Med      benazepril (LOTENSIN) 10 mg tablet Take 10 mg by mouth daily, Historical Med       !! - Potential duplicate medications found  Please discuss with provider  No discharge procedures on file      PDMP Review     None          ED Provider  Electronically Signed by           Ashwin Linares MD  05/05/21 1200       Ashwin Linares MD  05/06/21 5606

## 2021-05-05 NOTE — ED NOTES
had spoken with pt about possible rehab admission, awaiting disposition   Side rails up, call bell in reach     Italo Martínez RN  05/05/21 7213

## 2021-05-05 NOTE — OCCUPATIONAL THERAPY NOTE
OT EVALUATION       05/05/21 1045   Note Type   Note type Evaluation   Restrictions/Precautions   Other Precautions Chair Alarm; Bed Alarm; Fall Risk;Pain   Pain Assessment   Pain Assessment Tool 0-10   Pain Score 7   Pain Location/Orientation Orientation: Right;Location: Leg;Location: Back   Home Living   Type of Home House   Home Layout Two level  (3 [address], stair glide to 2nd floor )   Bathroom Shower/Tub Tub/shower unit   Bathroom Toilet Raised   Bathroom Equipment Commode  (commode over toilet)   [address] [facility],[address]; Wheelchair-manual  (2 recliner lift chairs )   Prior Function   Level of [place] Needs assistance with ADLs and functional mobility; Needs assistance with IADLs   Lives With Daughter   Receives Help From Family   ADL Assistance Needs assistance   IADLs Needs assistance   Falls in the last 6 months 1 to 4   Comments pt needs assist of 2 to get into home on steps, wasnt able to get OOB or off the commode  Patient reports being weak x 1 week    Patient requires assist for ADLS at baseline but recent decline in function due to pain and RLE weakness   ADL   Eating Assistance 5  Supervision/Setup   Grooming Assistance 5  Supervision/Setup   UB Bathing Assistance 3  Moderate Assistance   LB Bathing Assistance 2  Maximal Assistance   UB Dressing Assistance 3  Moderate Assistance   LB Dressing Assistance 2  Maximal Assistance   Toileting Assistance  2  Maximal Assistance   Bed Mobility   Supine to Sit 3  Moderate assistance   Additional items Assist x 2;Verbal cues   Sit to Supine 3  Moderate assistance   Additional items Assist x 2;Verbal cues   Transfers   Sit to Stand 3  Moderate assistance   Additional items Assist x 2;Verbal cues   Stand to Sit 3  Moderate assistance   Additional items Assist x 2;Verbal cues   Functional Mobility   Functional Mobility 3  Moderate assistance   Additional Comments 4 steps to head of bed with RW   Additional items Rolling walker   Balance   Static Sitting Fair   Dynamic Sitting Fair -   Static Standing Poor +   Dynamic Standing Poor   Activity Tolerance   Activity Tolerance Patient limited by pain; Patient limited by fatigue  (weakness)   Krishna Castellanos  (grossly 3+/4-/5 MMT)   LUE Assessment   LUE Assessment WFL  (grossly 3+/4-/5 MMT)   Cognition   Overall Cognitive Status WFL   Arousal/Participation Cooperative   Attention Within functional limits   Orientation Level Oriented X4   Following Commands Follows all commands and directions without difficulty; son present for session    Assessment   Limitation Decreased ADL status; Decreased UE strength;Decreased Safe judgement during ADL;Decreased endurance;Decreased self-care trans;Decreased high-level ADLs  (decreased balance and mobilty )   Prognosis Good   Assessment Patient evaluated by Occupational Therapy  Patient admitted with <principal problem not specified>  The patients occupational profile, medical and therapy history includes a extensive additional review of physical, cognitive, or psychosocial history related to current functional performance  Comorbidities affecting functional mobility and ADLS include: polymyositis, asthma, chronic pain, diabetes and hypertension  Prior to admission, patient was independent with functional mobility with walker up to a week ago, requiring assist for ADLS and requiring assist for IADLS  The evaluation identifies the following performance deficits: weakness, impaired balance, decreased endurance, increased fall risk, new onset of impairment of functional mobility, decreased ADLS, decreased IADLS, pain, decreased activity tolerance, decreased safety awareness, impaired judgement and decreased strength, that result in activity limitations and/or participation restrictions   This evaluation requires clinical decision making of high complexity, because the patient presents with comorbidites that affect occupational performance and required significant modification of tasks or assistance with consideration of multiple treatment options  The Barthel Index was used as a functional outcome tool presenting with a score of 45, indicating marked limitations of functional mobility and ADLS  The patient's raw score on the AM-PAC Daily Activity inpatient short form is 11, standardized score is 29 04, less than 39 4  Patients at this level are likely to benefit from DC to post-acute rehabilitation services  Please refer to the recommendation of the Occupational Therapist for safe DC planning  Patient will benefit from skilled Occupational Therapy services to address above deficits and facilitate a safe return to prior level of function  Goals   Patient Goals to walk to the bathroom    STG Time Frame   (1-7 days )   Short Term Goal  Goals established to promote patient goal of to walk to the bathroom:  Patient will increase standing tolerance to 2 minutes during ADL task to decrease assistance level and decrease fall risk; Patient will increase bed mobility to mod assist in preparation for ADLS and transfers;  Patient will increase functional mobility to and from bathroom with rolling walker with mod assist to increase performance with ADLS and to use a toilet; Patient will tolerate 10 minutes of UE ROM/strengthening to increase general activity tolerance and performance in ADLS/IADLS; Patient will improve functional activity tolerance to 10 minutes of sustained functional tasks to increase participation in basic self-care and decrease assistance level;  Patient will be able to to verbalize understanding and perform energy conservation/proper body mechanics during ADLS and functional mobility at least 75% of the time with minimal cueing to decrease signs of fatigue and increase stamina to return to prior level of function; Patient will increase dynamic sitting balance to fair to improve the ability to sit at edge of bed or on a chair for ADLS; Patient will increase dynamic standing balance to poor+ to improve postural stability and decrease fall risk during standing ADLS and transfers  LTG Time Frame   (8-14 days )   Long Term Goal Goals established to promote patient goal of to walk to the bathroom:  Patient will increase standing tolerance to 4 minutes during ADL task to decrease assistance level and decrease fall risk; Patient will increase bed mobility to min assist in preparation for ADLS and transfers; Patient will increase functional mobility to and from bathroom with rolling walker with min assist to increase performance with ADLS and to use a toilet; Patient will tolerate 20 minutes of UE ROM/strengthening to increase general activity tolerance and performance in ADLS/IADLS; Patient will improve functional activity tolerance to 20 minutes of sustained functional tasks to increase participation in basic self-care and decrease assistance level;  Patient will be able to to verbalize understanding and perform energy conservation/proper body mechanics during ADLS and functional mobility at least 90% of the time without cueing to decrease signs of fatigue and increase stamina to return to prior level of function; Patient will increase dynamic sitting balance to fair+ to improve the ability to sit at edge of bed or on a chair for ADLS;  Patient will increase dynamic standing balance to fair- to improve postural stability and decrease fall risk during standing ADLS and transfers       Functional Transfer Goals   Pt Will Perform All Functional Transfers   (STG mod assist LTG min assist )   ADL Goals   Pt Will Perform Eating   (STG independent )   Pt Will Perform Grooming   (STG independent )   Pt Will Perform Bathing   (STG mod assist LTG Min assist )   Pt Will Perform UE Dressing   (STG supervision LTG Independent )   Pt Will Perform LE Dressing   (STG mod assist LTG Min assist )   Pt Will Perform Toileting   (STG mod assist LTG Min assist )   Plan Treatment Interventions ADL retraining;Functional transfer training;UE strengthening/ROM; Endurance training;Patient/family training;Equipment evaluation/education; Activityengagement; Compensatory technique education   Goal Expiration Date 05/19/21   OT Frequency 3-5x/wk   Recommendation   OT Discharge Recommendation Post acute rehabilitation services   AM-PAC Daily Activity Inpatient   Lower Body Dressing 1   Bathing 1   Toileting 1   Upper Body Dressing 2   Grooming 3   Eating 3   Daily Activity Raw Score 11   Daily Activity Standardized Score (Calc for Raw Score >=11) 29 04   AM-PAC Applied Cognition Inpatient   Following a Speech/Presentation 4   Understanding Ordinary Conversation 4   Taking Medications 4   Remembering Where Things Are Placed or Put Away 4   Remembering List of 4-5 Errands 4   Taking Care of Complicated Tasks 4   Applied Cognition Raw Score 24   Applied Cognition Standardized Score 62 21   Barthel Index   Feeding 10   Bathing 0   Grooming Score 0   Dressing Score 5   Bladder Score 10   Bowels Score 10   Toilet Use Score 5   Transfers (Bed/Chair) Score 5   Mobility (Level Surface) Score 0   Stairs Score 0   Barthel Index Score 45   Licensure   [place] License Number  Guyjonathan Denys Samson Mynor 87 OTR/L [id number]

## 2021-05-26 NOTE — PROGRESS NOTES
PT Evaluation     Today's date: 2018  Patient name: Eliana Thornton  : 1950  MRN: [id number]  Referring provider: Lillie Spencer  Dx:   Encounter Diagnosis     ICD-10-CM    1  Polymyositis ([address] ) M33 20        Start Time: 335  Stop Time: 357  Total time in clinic (min): 45 minutes    Assessment  Impairments: abnormal gait and activity intolerance    Assessment details: Eliana Thornton is a 76 y o  female who presents to physical therapy with pain, decreased LE range of motion, decreased LE strength, poor balance, impaired function and decreased activity tolerance  Patient's clinical presentation is consistent with their referring diagnosis of Polymyositis ([facility] )  (primary encounter diagnosis)  The pt presents with functional limitations of ADLs, ambulation, performing household chores, self-care and stair negotiation  Pt would benefit from physical therapy services to address these limitations and maximize function  Understanding of Dx/Px/POC: good   Prognosis: good    Goals  Short term goals  (3 weeks)  1  Patient will have no pain bilateral leg  2   Patient will have full range of motion bilateral leg  3  Patient will report a 25% improvement with activities of daily living     Long term goals - (6 weeks)  1  Patient will be independent with home exercise program  2  Patient will ambulating on level surfaces with rollator for 500 feet  3  Patient will report 75 % improvement with activity of daily living function  4   Patient will negotiate stairs up and down pain free with use of railings         Plan  Patient would benefit from: PT eval and skilled physical therapy  Planned modality interventions: cryotherapy and thermotherapy: hydrocollator packs  Planned therapy interventions: abdominal trunk stabilization, ADL training, massage, neuromuscular re-education, patient education, strengthening, stretching, therapeutic exercise, balance/weight bearing training, functional ROM exercises, gait training and home exercise program  Frequency: 2x week  Duration in visits: 16  Duration in weeks: 8  Treatment plan discussed with: patient and family        Subjective Evaluation    History of Present Illness  Mechanism of injury: She reports 6 to 8 weeks ago she fell in her kitchen when she tried to turn around  She had some resultant left posterior hip pain following  She followed up with Dr Archana Ceron for a regularly scheduled visit  She was referred to PT at that time because an increase in leg weakness was noted  Pain  Current pain ratin  At best pain ratin  At worst pain ratin  Quality: sharp and dull ache      Diagnostic Tests  No diagnostic tests performed  Patient Goals  Patient goals for therapy: increased strength  Patient's goals regarding treatment: Sit to stand independently ,         Objective     Palpation   Left   Hypertonic in the adductor longus, lateral gastrocnemius, proximal semitendinosus and rectus femoris  Tenderness of the adductor longus, lateral gastrocnemius, proximal semitendinosus and rectus femoris  Right   Hypertonic in the adductor longus, lateral gastrocnemius, proximal semitendinosus and rectus femoris  Tenderness of the adductor longus, lateral gastrocnemius, proximal semitendinosus and rectus femoris       Passive Range of Motion     Additional Passive Range of Motion Details  SLR -  Left = 39 degrees, Right = 48 degrees    Strength/Myotome Testing     Left Hip   Planes of Motion   Flexion: 3+  Extension: 3+  Abduction: 3    Right Hip   Planes of Motion   Flexion: 3+  Extension: 3+  Abduction: 3    Left Knee   Flexion: 4-  Extension: 3+    Right Knee   Flexion: 4-  Extension: 3    Left Ankle/Foot   Dorsiflexion: 3-  Plantar flexion: 4-    Right Ankle/Foot   Dorsiflexion: 3+  Plantar flexion: 4-    Ambulation   Weight-Bearing Status   Assistive device used: rollator walker with seat    Quality of Movement During Gait     Pelvis    Pelvis (Left): Positive Trendelenburg  Pelvis (Right): Positive Trendelenburg  Hip    Hip (Left): Positive circumducted  Hip (Right): Positive circumducted  Knee    Knee (Left): Positive recurvatum  Knee (Right): Positive recurvatum  Functional Assessment     Comments  Sit to stand at 27 inches high    -  She was independent but needed upper extremity assist       Flowsheet Rows      Most Recent Value   PT/OT G-Codes   Current Score  31   Projected Score  38   FOTO information reviewed  Yes   Assessment Type  Evaluation   G code set  Mobility: Walking & Moving Around   Mobility: Walking and Moving Around Current Status ()  CL   Mobility: Walking and Moving Around Goal Status ()  CK          Precautions: - Diabetes, Hypertension, Asthma    Specialty Daily Treatment Diary     Manual  8/7/18       STM quad and HS        Stretch HS                                    Exercise Diary         Alter G        Knee ext        Sit to stand from table        Ball squeeze        Stair taps        Alternating knee lift to arms        SLR        Push rollator with resistance                                                                                                            Modalities                        Visit # 1 Vaccine status unknown

## 2022-03-17 ENCOUNTER — OFFICE VISIT (OUTPATIENT)
Dept: ENDOCRINOLOGY | Facility: CLINIC | Age: 72
End: 2022-03-17
Payer: COMMERCIAL

## 2022-03-17 VITALS — TEMPERATURE: 97 F | SYSTOLIC BLOOD PRESSURE: 130 MMHG | DIASTOLIC BLOOD PRESSURE: 80 MMHG | HEART RATE: 90 BPM

## 2022-03-17 DIAGNOSIS — E11.65 TYPE 2 DIABETES MELLITUS WITH HYPERGLYCEMIA, WITH LONG-TERM CURRENT USE OF INSULIN (HCC): Primary | ICD-10-CM

## 2022-03-17 DIAGNOSIS — Z79.4 TYPE 2 DIABETES MELLITUS WITH HYPERGLYCEMIA, WITH LONG-TERM CURRENT USE OF INSULIN (HCC): Primary | ICD-10-CM

## 2022-03-17 DIAGNOSIS — I10 PRIMARY HYPERTENSION: ICD-10-CM

## 2022-03-17 DIAGNOSIS — R73.9 STEROID-INDUCED HYPERGLYCEMIA: ICD-10-CM

## 2022-03-17 DIAGNOSIS — M33.20 POLYMYOSITIS (HCC): ICD-10-CM

## 2022-03-17 DIAGNOSIS — E78.5 HYPERLIPIDEMIA, UNSPECIFIED HYPERLIPIDEMIA TYPE: ICD-10-CM

## 2022-03-17 DIAGNOSIS — E03.9 ACQUIRED HYPOTHYROIDISM: ICD-10-CM

## 2022-03-17 DIAGNOSIS — T38.0X5A STEROID-INDUCED HYPERGLYCEMIA: ICD-10-CM

## 2022-03-17 LAB — SL AMB POCT HEMOGLOBIN AIC: 7.3 (ref ?–6.5)

## 2022-03-17 PROCEDURE — 83036 HEMOGLOBIN GLYCOSYLATED A1C: CPT | Performed by: INTERNAL MEDICINE

## 2022-03-17 PROCEDURE — 99205 OFFICE O/P NEW HI 60 MIN: CPT | Performed by: INTERNAL MEDICINE

## 2022-03-17 RX ORDER — MULTIVIT,CALC,MINS/IRON/FOLIC 500-18-0.4
TABLET ORAL
COMMUNITY
End: 2022-05-04 | Stop reason: ALTCHOICE

## 2022-03-17 RX ORDER — LEVOTHYROXINE SODIUM 0.1 MG/1
TABLET ORAL
COMMUNITY
End: 2022-03-17

## 2022-03-17 RX ORDER — MYCOPHENOLATE MOFETIL 250 MG/1
CAPSULE ORAL
COMMUNITY
End: 2022-03-31

## 2022-03-17 RX ORDER — FERROUS SULFATE 325(65) MG
1 TABLET ORAL
COMMUNITY
Start: 2021-08-11 | End: 2022-08-11

## 2022-03-17 RX ORDER — GLIMEPIRIDE 1 MG/1
1 TABLET ORAL
COMMUNITY
Start: 2021-10-22 | End: 2022-03-17

## 2022-03-17 RX ORDER — OMEGA3/DHA/EPA/FISH OIL/VIT D3 120MG-1000
CAPSULE ORAL
COMMUNITY
End: 2022-05-04 | Stop reason: ALTCHOICE

## 2022-03-17 RX ORDER — METFORMIN HYDROCHLORIDE EXTENDED-RELEASE TABLETS 1000 MG/1
TABLET, FILM COATED, EXTENDED RELEASE ORAL
COMMUNITY
Start: 2014-09-29 | End: 2022-03-17

## 2022-03-17 RX ORDER — COLESEVELAM HYDROCHLORIDE 3.75 G/1
POWDER, FOR SUSPENSION ORAL
COMMUNITY
End: 2022-05-04 | Stop reason: ALTCHOICE

## 2022-03-17 RX ORDER — LEVOTHYROXINE SODIUM 88 UG/1
1 TABLET ORAL DAILY
COMMUNITY

## 2022-03-17 RX ORDER — LEVOTHYROXINE SODIUM 88 MCG
88 TABLET ORAL DAILY
COMMUNITY
Start: 2021-12-31 | End: 2022-05-04 | Stop reason: SDUPTHER

## 2022-03-17 RX ORDER — METFORMIN HYDROCHLORIDE 500 MG/1
500 TABLET, EXTENDED RELEASE ORAL 2 TIMES DAILY WITH MEALS
COMMUNITY
Start: 2022-01-23 | End: 2022-03-17 | Stop reason: SDUPTHER

## 2022-03-17 RX ORDER — INSULIN GLARGINE 100 [IU]/ML
30 INJECTION, SOLUTION SUBCUTANEOUS
Qty: 15 ML | Refills: 3 | Status: SHIPPED | OUTPATIENT
Start: 2022-03-17 | End: 2022-04-11 | Stop reason: SDUPTHER

## 2022-03-17 RX ORDER — LANCETS 30 GAUGE
EACH MISCELLANEOUS
COMMUNITY
Start: 2022-03-04

## 2022-03-17 RX ORDER — OMEPRAZOLE 40 MG/1
CAPSULE, DELAYED RELEASE ORAL
COMMUNITY

## 2022-03-17 RX ORDER — METRONIDAZOLE 250 MG/1
TABLET ORAL
COMMUNITY
End: 2022-05-04 | Stop reason: ALTCHOICE

## 2022-03-17 RX ORDER — PEN NEEDLE, DIABETIC 31 GX5/16"
NEEDLE, DISPOSABLE MISCELLANEOUS
COMMUNITY
Start: 2022-01-12 | End: 2022-04-11 | Stop reason: SDUPTHER

## 2022-03-17 RX ORDER — ZOSTER VACCINE RECOMBINANT, ADJUVANTED 50 MCG/0.5
KIT INTRAMUSCULAR
COMMUNITY
End: 2022-06-08 | Stop reason: ALTCHOICE

## 2022-03-17 RX ORDER — LEVOTHYROXINE SODIUM 0.07 MG/1
TABLET ORAL
COMMUNITY
End: 2022-03-17

## 2022-03-17 RX ORDER — BENAZEPRIL HYDROCHLORIDE 10 MG/1
TABLET ORAL
COMMUNITY

## 2022-03-17 RX ORDER — AMLODIPINE BESYLATE AND BENAZEPRIL HYDROCHLORIDE 5; 10 MG/1; MG/1
CAPSULE ORAL
COMMUNITY
End: 2022-05-04 | Stop reason: SDUPTHER

## 2022-03-17 RX ORDER — AMLODIPINE BESYLATE 5 MG/1
TABLET ORAL
COMMUNITY

## 2022-03-17 RX ORDER — METFORMIN HYDROCHLORIDE 500 MG/1
500 TABLET, EXTENDED RELEASE ORAL 2 TIMES DAILY WITH MEALS
Qty: 180 TABLET | Refills: 2 | Status: SHIPPED | OUTPATIENT
Start: 2022-03-17

## 2022-03-17 RX ORDER — CIPROFLOXACIN 500 MG/1
TABLET, FILM COATED ORAL
COMMUNITY
End: 2022-05-04 | Stop reason: ALTCHOICE

## 2022-03-17 RX ORDER — BACLOFEN 5 MG/1
TABLET ORAL DAILY
COMMUNITY
End: 2022-05-04 | Stop reason: ALTCHOICE

## 2022-03-17 RX ORDER — GABAPENTIN 300 MG/1
CAPSULE ORAL
COMMUNITY
Start: 2022-03-08 | End: 2022-04-26 | Stop reason: SDUPTHER

## 2022-03-17 RX ORDER — GLIMEPIRIDE 1 MG/1
TABLET ORAL
COMMUNITY
Start: 2022-01-20 | End: 2022-03-17

## 2022-03-17 RX ORDER — BLOOD SUGAR DIAGNOSTIC
STRIP MISCELLANEOUS
COMMUNITY
Start: 2022-03-04 | End: 2022-04-11 | Stop reason: SDUPTHER

## 2022-03-17 NOTE — PROGRESS NOTES
Amy Vera 70 y o  female MRN: [id number]    Encounter: [id number]  Referring Provider  Referral [address] [facility],  [address]    Assessment/Plan     1  Type 2 diabetes mellitus on long term insulin therapy with hyperglycemia   2  Steroid induced hyperglycemia   POCT A1c 7 3%    On basal insulin and metformin only; has post prandial highs     Discussed different medications that can be used for glycemic management-oral hypoglycemic agents as well as injectable insulin and non-insulin medications (GLP 1 agonist) along with risks, benefits, side effect profile  No h/o pancreatitis/ MEN syndrome/ Medullary thyroid cancer   Would like to avoid sulfonylureas in this elderly patient with comorbidities, avoid glp 1 agonist since patient already has a bothersome diarrhea which could potentially worsen  Recommend the following at this time  Start Jardiance 12 5 mg orally daily   Decrease Lantus to 30 units subcutaneously at bedtime   Continue metformin at current dose   Please keep well hydrated   - check blood sugars qAC and HS   - send for review in 2-3 weeks  - referred for diabetes education/medical nutrition therapy   - ophthalmology     - Recommended a consistent carbohydrate diet   - weight control and exercise as discussed ( ideal is 30 min, at least 5 times a week)   - home glucose monitoring and goals emphasized, requested patient bring in glucose monitor or log sheets to next visit   - discussed signs and symptoms of hypoglycemia and how to correct them appropriately  - counseled about the long term complications of uncontrolled diabetes, including, Nephropathy, Neuropathy, CVD, Retinopathy and importance  of adherence to diet, treatment plan and life style modifications   - importance of following up with Opthalmology and podiatry   - glycohemoglobin and other lab monitoring discussed, A1c goal value reviewed  - long term diabetic complications discussed    3   Hypertension  Blood pressure at goal  - continue current medications including ACE-I/ ARB    4  lrillrvknitg-[id number]-ay with Rheumatology  5  Hyperlipidemia-not on statin  6  Hypothyroidism-continue current dose of levothyroxine   Check TSH, free T4     Check labs-CMP, fasting lipid panel, urine microalbumin      CC: Diabetes    History of Present Illness     HPI:  Aminah Castro is a 70 y o  female presents for a new visit regarding diabetes management  Also has a h/o myositis, hyperlipidemia, asthma, hypertension, hypothyroidism      DM history:   Diagnosed > 20 years ago   No known complications of CAD/ CVA/CKD  Last Eye exam:  Early 2020-  Was told to go a specialist     Current regimen:   Metformin 1000 mg orally twice a day   lantus 35 units at bedtime     Synthroid 88 mcg orally daily     Prednisone 7 5 mg orally daily-for polymyositis   Has been on variable doses over the last 20 years; follows up with rheumatology     Statin:  --   ACE-I/ARB:  Benazepril     Appetite is good  [place] weight when she is on a higher dose of steroids  Occasional numbness in the hands    Has changes in vision; floaters   No chest pain  SOB  Due to asthma  Occasional diarrhea  Exercise:  Limited due to myositis and post herpetic neuralgia   Unable to stand for long periods of time due to weakness  Uses a wheelchair; no recent physical therapy     Home glucose monitoring: on recall   Before breakfast:   Before lunch: 240s  Before dinner:  Mid 200s  Bedtime: 270-280    Symptoms of hypoglycemia :  No lows     All other systems were reviewed and are negative      Review of Systems      Historical Information   Past Medical History:   Diagnosis Date    Anemia     Asthma     Chronic pain     bulging discs in back,polymyositis    Diabetes mellitus ([address] )     Disease of thyroid gland     Diverticulitis     Hyperlipidemia     Hypertension     Polymyositis ([address] )      Past Surgical History:   Procedure Laterality Date    HYSTERECTOMY      SINUS SURGERY      THYROIDECTOMY      TUBAL LIGATION       Social History   Social History     Substance and Sexual Activity   Alcohol Use No     Social History     Substance and Sexual Activity   Drug Use No     Social History     Tobacco Use   Smoking Status Never Smoker   Smokeless Tobacco Never Used     Family History:   Family History   Problem Relation Age of Onset    Hepatitis Sister         acute hepatits C virus       Meds/Allergies   Current Outpatient Medications   Medication Sig Dispense Refill    albuterol (PROVENTIL HFA,VENTOLIN HFA) 90 mcg/act inhaler Inhale 2 puffs every 6 (six) hours as needed for wheezing   amLODIPine (NORVASC) 5 mg tablet amlodipine 5 mg tablet   TAKE 1 TABLET BY MOUTH ONCE DAILY      B-D UF III MINI PEN NEEDLES 31G X 5 MM MISC USE 1 ONCE DAILY      benazepril (LOTENSIN) 10 mg tablet benazepril 10 mg tablet   TAKE 1 TABLET BY MOUTH ONCE DAILY      Cholecalciferol 25 MCG (1000 UT) tablet Take 1 tablet by mouth daily      ferrous sulfate 325 (65 Fe) mg tablet Take 1 tablet by mouth daily with breakfast      GABAPENTIN PO Take 900 mg by mouth daily at bedtime      glimepiride (AMARYL) 1 mg tablet TAKE 1 TABLET BY MOUTH ONCE DAILY IN THE MORNING BEFORE BREAKFAST      insulin glargine (LANTUS) 100 units/mL subcutaneous injection Inject 35 Units under the skin daily at bedtime        Lancets (OneTouch Delica Plus [id number]) MISC       levothyroxine (Synthroid) 88 mcg tablet Take 1 tablet by mouth daily      metFORMIN (GLUCOPHAGE) 1000 MG tablet Take 1,000 mg by mouth 2 (two) times a day with meals        omeprazole (PriLOSEC) 40 MG capsule omeprazole 40 mg capsule,delayed release      OneTouch Verio test strip       predniSONE 10 mg tablet Take 7 5 mg by mouth daily        Synthroid 88 MCG tablet Take 88 mcg by mouth daily      Zoster Vac Recomb Adjuvanted (Shingrix) 50 MCG/0 5ML SUSR Shingrix (PF) 50 mcg/0 5 mL intramuscular suspension, kit      amLODIPine (NORVASC) 5 mg tablet Take 5 mg by mouth daily (Patient not taking: Reported on 3/17/2022 )      amLODIPine-benazepril (Lotrel) 5-10 MG per capsule Take by mouth (Patient not taking: Reported on 3/17/2022 )      Baclofen 5 MG TABS Daily (Patient not taking: Reported on 3/17/2022 )      benazepril (LOTENSIN) 10 mg tablet Take 10 mg by mouth daily (Patient not taking: Reported on 3/17/2022 )      ciprofloxacin (CIPRO) 500 mg tablet ciprofloxacin 500 mg tablet (Patient not taking: Reported on 3/17/2022)      Colesevelam HCl (Welchol) 3 75 g PACK Take by mouth (Patient not taking: Reported on 3/17/2022 )      Diclofenac Sodium (VOLTAREN) 1 % diclofenac 1 % topical gel (Patient not taking: Reported on 3/17/2022)      Fish Oil-Vitamin D 7442-6668 MG-UNIT CAPS Take by mouth (Patient not taking: Reported on 3/17/2022 )      gabapentin (NEURONTIN) 300 mg capsule  (Patient not taking: Reported on 3/17/2022 )      glimepiride (AMARYL) 1 mg tablet Take 1 mg by mouth (Patient not taking: Reported on 3/17/2022 )      levothyroxine (Synthroid) 100 mcg tablet Synthroid 100 mcg tablet (Patient not taking: Reported on 3/17/2022)      levothyroxine (Synthroid) 75 mcg tablet Synthroid 75 mcg tablet (Patient not taking: Reported on 3/17/2022)      levothyroxine 75 mcg tablet Take 75 mcg by mouth daily   (Patient not taking: Reported on 3/17/2022 )      metFORMIN (FORTAMET) 1000 MG (OSM) 24 hr tablet metformin ER 1,000 mg tablet,extended release 24hr   BID (Patient not taking: Reported on 3/17/2022)      metFORMIN (GLUCOPHAGE-XR) 500 mg 24 hr tablet Take 500 mg by mouth 2 (two) times a day with meals (Patient not taking: Reported on 3/17/2022 )      metFORMIN HCl  MG/5ML SRER Take 2 tablets by mouth 2 (two) times a day (Patient not taking: Reported on 3/17/2022 )      metroNIDAZOLE (FLAGYL) 250 mg tablet metronidazole 250 mg tablet (Patient not taking: Reported on 3/17/2022)      Multiple Vitamins-Calcium (One-A-Day Womens Formula) TABS Take by mouth (Patient not taking: Reported on 3/17/2022 )      mycophenolate (CellCept) 250 mg capsule Take by mouth (Patient not taking: Reported on 3/17/2022 )      mycophenolate (MYFORTIC) 360 MG TBEC Take 360 mg by mouth 2 (two) times a day (Patient not taking: Reported on 3/17/2022 )       No current facility-administered medications for this visit  Allergies   Allergen Reactions    Anaprox [Naproxen Sodium] Shortness Of Breath    Levofloxacin Hives    Sulfamethoxazole-Trimethoprim Anaphylaxis    Penicillins Swelling    Aspirin Rash    Sulfa Antibiotics Rash       Objective   Vitals: Blood pressure 130/80, pulse 90, temperature (!) 97 °F (36 1 °C)  Physical Exam  Constitutional:       Appearance: She is well-developed  HENT:      Head: Normocephalic and atraumatic  Eyes:      Conjunctiva/sclera: Conjunctivae normal       Pupils: Pupils are equal, round, and reactive to light  Neck:      Thyroid: No thyromegaly  Cardiovascular:      Rate and Rhythm: Normal rate and regular rhythm  Heart sounds: Normal heart sounds  No murmur heard  Pulmonary:      Effort: Pulmonary effort is normal       Breath sounds: Normal breath sounds  No wheezing  Abdominal:      General: There is no distension  Palpations: Abdomen is soft  Tenderness: There is no abdominal tenderness  Musculoskeletal:      Cervical back: Normal range of motion and neck supple  Comments: Sitting comfortably in a wheelchair   Skin:     General: Skin is warm and dry  Neurological:      Mental Status: She is alert and oriented to person, place, and time  Deep Tendon Reflexes: Reflexes normal       Comments: Fine tremors on the outstretched arm   Psychiatric:         Behavior: Behavior normal          The history was obtained from the review of the chart, patient      Lab Results:   Lab Results   Component Value Date/Time    WBC 9 77 05/05/2021 08:32 AM    Hemoglobin 11 0 (L) 05/05/2021 08:32 AM    Hematocrit 33 9 (L) 05/05/2021 08:32 AM    MCV 72 (L) 05/05/2021 08:32 AM    Platelets 770 66/00/9978 08:32 AM    BUN 18 05/05/2021 08:32 AM    Potassium 4 7 05/05/2021 08:32 AM    Chloride 104 05/05/2021 08:32 AM    CO2 28 05/05/2021 08:32 AM    Creatinine 0 66 05/05/2021 08:32 AM    AST 30 05/05/2021 08:32 AM    ALT 32 05/05/2021 08:32 AM    Albumin 3 2 (L) 05/05/2021 08:32 AM           Imaging Studies: I have personally reviewed pertinent reports  Portions of the record may have been created with voice recognition software  Occasional wrong word or "sound a like" substitutions may have occurred due to the inherent limitations of voice recognition software  Read the chart carefully and recognize, using context, where substitutions have occurred

## 2022-03-17 NOTE — PATIENT INSTRUCTIONS
Start Jardiance 12 5 mg orally daily   Decrease Lantus to 30 units subcutaneously at bedtime   Continue metformin at current dose   Please keep well hydrated   Get labs done as ordered   Check blood sugars before meals and at bedtime, keep a log, send for review in 2-3 weeks   You are being referred for diabetes education, please bring your Glucometer so they can show you how to use it   Follow-up in 3 months

## 2022-03-18 ENCOUNTER — TELEMEDICINE (OUTPATIENT)
Dept: DIABETES SERVICES | Facility: CLINIC | Age: 72
End: 2022-03-18
Payer: COMMERCIAL

## 2022-03-18 VITALS — WEIGHT: 230 LBS | BODY MASS INDEX: 32.08 KG/M2

## 2022-03-18 DIAGNOSIS — E11.65 TYPE 2 DIABETES MELLITUS WITH HYPERGLYCEMIA, WITH LONG-TERM CURRENT USE OF INSULIN (HCC): Primary | ICD-10-CM

## 2022-03-18 DIAGNOSIS — Z79.4 TYPE 2 DIABETES MELLITUS WITH HYPERGLYCEMIA, WITH LONG-TERM CURRENT USE OF INSULIN (HCC): Primary | ICD-10-CM

## 2022-03-18 PROCEDURE — 97802 MEDICAL NUTRITION INDIV IN: CPT

## 2022-03-18 NOTE — PROGRESS NOTES
Medical Nutrition Therapy        Assessment    Visit Type: Initial visit  Chief complaint/Medical Diagnosis/reason for visit E11 65 Type 2 Diabetes Mellitus with long term use of insulin    HPI Jadyn's initial nutrition visit today was over the phone, as we were not able to connect virtually as scheduled  Jadyn's daughter Joseph Talbert, was also on the phone, as she prepares all meals and snacks  Joseph Talbert stated the reason that Jadyn's A1C went up to 7 2, is because Joseph Talbert was very sick, Joseph Talbert was hospitalized and could not make Alvalyn's meals  Jadyn's meals have been mostly "take out " However, Joseph Talbert stated she is now on the mend and has started making Alvalyn's meals again  Problems identified in food recall include inconsistent carbohydrate intake, and some unbalanced meals  Discussed with Joseph Talbert and patient a sample meal plan to assist with consistency, proper timing, balance and portion control  Encouraged the consumption of regular meals at regular times  Advised patient to keep carbohydrate intake to 30 grams per meal and 15 grams carbohydrate per snack to assist with glycemic control  A portion booklet with consistent carbohydrate intake for meals and snack has been mailed to patient  Patient and daughter Joseph Talbert, stated they will let me know at a later date if and when they would like a nutrition follow up appointment  RD will remain available for further dietary questions/concerns  Ht Readings from Last 1 Encounters:   09/21/18 5' 11" (1 803 m)     Wt Readings from Last 3 Encounters:   08/09/19 102 kg (225 lb)   09/21/18 106 kg (234 lb 2 1 oz)   04/08/16 109 kg (240 lb)     Weight Change: No    Barriers to Learning: no barriers    Do you follow any special diet presently?: Yes - Daughter Joseph Talbert prepares patient's diabetic meals    Who shops: Patient's daughter Joseph Talbert  Who cooks: Patient's daughter Ranjith Garcia Log: Completed via the method of food recall    Breakfast:8 - 9AM - 1 cup oatmeal, 1/4 c blueberries, small banana  Morning Snack:None  Lunch:12 - 1PM Curried chicken with rice,  Afternoon Snack: None  Dinner:Chicken or fish, rice or yam or 2 baked potatoes, veggies  Evening Snack:None  Beverages: water  Eating out/Take out:Was having take out everyday for the past weeks since daughter Reina Chowdhury was very sick  Exercise Seldom    Carbs: 30g/meal, 15g/snack         Nutrition Diagnosis:  Food and nutrition related knowledge deficit  related to Lack of prior exposure to accurate nutrition related information as evidenced by Verbalizes inaccurate or incomplete information    Intervention: carbohydrate counting, meal timing, meal planning and exercise guidelines     Treatment Goals: Patient will consume 3 meals a day, Patient will count carbohydrates and Patient will exercise    Monitoring and evaluation:    Term code indicator  FH 1 3 2 Food Intake Criteria: Consume 3 balanced meals/day and 1 snack  Term code indicator  FH 1 6 3 Carbohydrate Intake Criteria: 30 gram Carbohydrate/meal and 15 grams Carbohydrate/snack  Term code indicator  CH 2 2 Treatments/Therapy/Alternative Medicine Criteria: Do daily exercises as recommended from PT  Materials Provided: Portion booklet, CHO counting, sample meal plan  Patients Response to Instruction:  Comprehensiongood  Motivationgood  Expected Compliancegood    Begin Time: 2:30 PM  End Time: 3:20 PM  Referring Provider: Dr Grayson Hidalgo    Thank you for coming to the [facility] for education today  Please feel free to call with any questions or concerns      Lauren Jean  [address] [place] [id number] [phone]

## 2022-03-18 NOTE — PATIENT INSTRUCTIONS
1  Consume 3 balanced meals/day and 1 snack  2  30 gram Carbohydrate/meal and 15 grams carbohydrate/snack    3  Do daily exercises as recommended from PT

## 2022-03-29 ENCOUNTER — TELEPHONE (OUTPATIENT)
Dept: ENDOCRINOLOGY | Facility: CLINIC | Age: 72
End: 2022-03-29

## 2022-03-29 NOTE — TELEPHONE ENCOUNTER
Called and spoke with Ms Lenin cSott  Started jardiance approximately 10 days ago  After eating experiences pain  Once she uses the restroom gets headache, weakness but then feels better and is okay the rest of the day  Occasional nausea, no vomiting  No radiation to the back  Advised to hold Jardiance for now, monitor symptoms  If there is any worsening of symptoms, nausea, patient is unable to keep food down or abdominal pain persists, I have advised patient to go to urgent care/ER   Patient will update me on how she is feeling the next few days without taking Jardiance  If symptoms do not reoccur, will need to start a different medication for her diabetes    If symptoms persists, should follow-up with her primary care

## 2022-03-30 NOTE — PROGRESS NOTES
Assessment and Plan:   Ms PUGH [facility] is a 79-year-old female with history significant for polymyositis diagnosed in 2002 and primary generalized osteoarthritis who presents to establish with [facility]  She is currently on prednisone 5 mg once daily  She is transferring care from Dr Shashank Hensley at [facility]  She is self-referred today  # Polymyositis  - Jadyn presents today to establish with [facility] for polymyositis which was diagnosed in 2002  Per the prior rheumatologist's note it appears that there were no major concerns for ongoing active inflammation and she has likely developed residual irreversible weakness as a result of the myopathy  She is currently on prednisone at 5 mg once daily but this has not helped significantly  Prior medication options included methotrexate which was discontinued due to pulmonary issues, mycophenolate which was discontinued due to diarrhea and IVIG which she does recall as being beneficial     - To determine if we need to restart treatment for active inflammatory muscle disease I would like to obtain her CK levels and an ultrasound of her bilateral lower extremity proximal muscles to assess for activity  She reports that she is claustrophobic and unable to undergo an MRI  Depending on these results I will determine if she needs an increased dose of prednisone or possibly plan to restart her on IVIG infusions  If the disease appears to be "burnt out" then I would recommend home physical therapy  # Chronic steroid use  - She is aware of the side effects of chronic steroid use including but not limited to risk for infections, hypertension, diabetes, cataracts/glaucoma, gastritis, osteoporosis and avascular necrosis  As she has not had a DEXA scan done I recommend a baseline screen        Plan:  Diagnoses and all orders for this visit:    Polymyositis ([facility] [address] )  -     CBC and differential; Future  -     Comprehensive metabolic panel; Future  -     C-reactive protein; Future  -     Sedimentation rate, automated; Future  -     CK; Future  -     SILVANO Screen w/ Reflex to Titer/Pattern; Future  -     Sjogren's Antibodies; Future  -     RF Screen w/ Reflex to Titer; Future  -     Cyclic citrul peptide antibody, IgG; Future  -     MyoMarker 3 Plus Profile (RDL); Future  -     Urinalysis with microscopic  -     Protein / creatinine ratio, urine  -     XR hand 3+ vw right; Future  -     US MSK limited; Future  -     Ambulatory Referral to Physical Therapy; Future    Current chronic use of systemic steroids    Primary generalized (osteo)arthritis  -     XR hand 3+ vw right; Future    Age-related osteoporosis without current pathological fracture  -     DXA bone density spine hip and pelvis; Future    Vitamin D deficiency  -     Vitamin D 25 hydroxy; Future    Need for hepatitis C screening test  -     Chronic Hepatitis Panel; Future    Other orders  -     cyanocobalamin (VITAMIN B-12) 1000 MCG tablet; Vitamin B-12  1,000 mcg tablet  -     glimepiride (AMARYL) 1 mg tablet; glimepiride 1 mg tablet   Take 1 tablet every day by oral route  -     traMADol (ULTRAM) 50 mg tablet; every 6 (six) hours      I have personally reviewed pertinent films in PACS of the CXR which is normal        Activities as tolerated  Continue other medications as prescribed by PCP and other specialists  RTC in 2 months  HPI  Ms Maykel Hebert is a 66-year-old female with history significant for polymyositis diagnosed in 2002 and primary generalized osteoarthritis who presents to establish with [facility]  She is currently on prednisone 5 mg once daily  She is transferring care from Dr Arnaud Valle at [facility]  She is self-referred today  Patient reports in 2002 after she presented with proximal muscle weakness primarily affecting her bilateral lower extremities she underwent a muscle biopsy which was consistent with polymyositis    She was also referred to [facility] for their opinion  She reports since then she has been on prednisone constantly but at varying doses  She was initially started on methotrexate which she was on for a few years but cannot recall if this specifically helped her  It was discontinued eventually due to shortness of breath  She was then started on mycophenolate which she was on for approximately 10 years and states that it was discontinued 1-2 years ago due to diarrhea  The diarrhea resolved upon discontinuation of the medication  She is unsure if this really helped with the polymyositis either  She has also received about 3-4 doses of IVIG in 2009 and she feels like this would help her the most   This has not been done any time recently  She reports over the years she may experience 1-2 flare ups of leg weakness for which the prednisone dose is increased  This most recently occurred about 6-9 months ago at which time her prednisone was increased to 50 mg once daily and gradually tapered to her current dose of 5 mg once daily  She reports with this dose she is still feeling very weak and usually the lowest effective dose is about 15 mg once daily  She does not want to increase the steroids due to side effects such as weight gain  She does not experience significant upper body weakness  She reports with her lower extremities she is unable to get up from a seated position without assistance and unable to stand without assistance  She has had home physical therapy in the past but none recently      She denies fevers, unintentional weight loss, alopecia, dry eyes (report some dry mouth in the morning), inflammatory eye disease, skin rash, psoriasis, photosensitivity, skin thickening/tightening, recurrent sinus disease, epistaxis, mouth/nose ulcers, swollen glands, chest pain, persistent shortness of breath (does experience this sometimes due to asthma), cough, hemoptysis, dysphagia, persistent acid reflux, inflammatory bowel disease, blood clots, miscarriages, Raynaud's, concerning joint pain/swelling/stiffness or a family history of autoimmune disease  The following portions of the patient's history were reviewed and updated as appropriate: allergies, current medications, past family history, past medical history, past social history, past surgical history and problem list       Review of Systems  Constitutional: Negative for fevers, chills, night sweats, fatigue  Positive for weight gain  ENT/Mouth: Negative for hearing changes, ear pain, nasal congestion, sinus pain, hoarseness, sore throat, rhinorrhea, swallowing difficulty  Eyes: Negative for pain, redness, discharge, vision changes  Cardiovascular: Negative for chest pain, SOB, palpitations  Respiratory: Negative for cough, sputum, wheezing, dyspnea  Gastrointestinal: Negative for nausea, vomiting, diarrhea, constipation, pain  Positive for heartburn  Genitourinary: Negative for dysuria, urinary frequency, hematuria  Musculoskeletal: As per HPI  Skin: Negative for skin rash, color changes  Neuro: Negative for numbness, tingling, loss of consciousness  Positive for weakness  Psych: Negative for anxiety, depression  Heme/Lymph: Negative for easy bruising, bleeding, lymphadenopathy          Past Medical History:   Diagnosis Date    Anemia     Asthma     Chronic pain     bulging discs in back,polymyositis    Diabetes mellitus ([address] )     Disease of thyroid gland     Diverticulitis     Hyperlipidemia     Hypertension     Polymyositis (HCC)        Past Surgical History:   Procedure Laterality Date    HYSTERECTOMY      SINUS SURGERY      THYROIDECTOMY      TUBAL LIGATION         Social History     Socioeconomic History    Marital status: Single     Spouse name: Not on file    Number of children: Not on file    Years of education: Not on file    Highest education level: Not on file   Occupational History    Not on file   Tobacco Use    Smoking status: Never Smoker    Smokeless tobacco: Never Used   Substance and Sexual Activity    Alcohol use: No    Drug use: No    Sexual activity: Not on file   Other Topics Concern    Not on file   Social History Narrative    Family history of mother  at age 71: sister-[facility]     Social Determinants of Health     Financial Resource Strain: Not on file   Food Insecurity: Not on file   Transportation Needs: Not on file   Physical Activity: Not on file   Stress: Not on file   Social Connections: Not on file   Intimate Partner Violence: Not on file   Housing Stability: Not on file       Family History   Problem Relation Age of Onset    Hepatitis Sister         acute hepatits C virus       Allergies   Allergen Reactions    Anaprox [Naproxen Sodium] Shortness Of Breath    Levofloxacin Hives    Sulfamethoxazole-Trimethoprim Anaphylaxis    Penicillins Swelling    Aspirin Rash    Metronidazole Rash    Sulfa Antibiotics Rash       Current Outpatient Medications:     albuterol (PROVENTIL HFA,VENTOLIN HFA) 90 mcg/act inhaler, Inhale 2 puffs every 6 (six) hours as needed for wheezing , Disp: , Rfl:     amLODIPine (NORVASC) 5 mg tablet, Take 5 mg by mouth daily (Patient not taking: Reported on 3/17/2022 ), Disp: , Rfl:     amLODIPine (NORVASC) 5 mg tablet, amlodipine 5 mg tablet  TAKE 1 TABLET BY MOUTH ONCE DAILY, Disp: , Rfl:     amLODIPine-benazepril (Lotrel) 5-10 MG per capsule, Take by mouth (Patient not taking: Reported on 3/17/2022 ), Disp: , Rfl:     B-D UF III MINI PEN NEEDLES 31G X 5 MM MISC, USE 1 ONCE DAILY, Disp: , Rfl:     Baclofen 5 MG TABS, Daily (Patient not taking: Reported on 3/17/2022 ), Disp: , Rfl:     benazepril (LOTENSIN) 10 mg tablet, Take 10 mg by mouth daily (Patient not taking: Reported on 3/17/2022 ), Disp: , Rfl:     benazepril (LOTENSIN) 10 mg tablet, benazepril 10 mg tablet  TAKE 1 TABLET BY MOUTH ONCE DAILY, Disp: , Rfl:     Cholecalciferol 25 MCG (1000 UT) tablet, Take 1 tablet by mouth daily, Disp: , Rfl:     ciprofloxacin (CIPRO) 500 mg tablet, ciprofloxacin 500 mg tablet (Patient not taking: Reported on 3/17/2022), Disp: , Rfl:     Colesevelam HCl (Welchol) 3 75 g PACK, Take by mouth (Patient not taking: Reported on 3/17/2022 ), Disp: , Rfl:     cyanocobalamin (VITAMIN B-12) 1000 MCG tablet, Vitamin B-12  1,000 mcg tablet, Disp: , Rfl:     Diclofenac Sodium (VOLTAREN) 1 %, diclofenac 1 % topical gel (Patient not taking: Reported on 3/17/2022), Disp: , Rfl:     Empagliflozin (Jardiance) 25 MG TABS, Take 0 5 tablets (12 5 mg total) by mouth every morning, Disp: 45 tablet, Rfl: 3    ferrous sulfate 325 (65 Fe) mg tablet, Take 1 tablet by mouth daily with breakfast, Disp: , Rfl:     Fish Oil-Vitamin D 0621-4377 MG-UNIT CAPS, Take by mouth (Patient not taking: Reported on 3/17/2022 ), Disp: , Rfl:     gabapentin (NEURONTIN) 300 mg capsule, , Disp: , Rfl:     GABAPENTIN PO, Take 900 mg by mouth daily at bedtime, Disp: , Rfl:     glimepiride (AMARYL) 1 mg tablet, glimepiride 1 mg tablet  Take 1 tablet every day by oral route , Disp: , Rfl:     insulin glargine (LANTUS) 100 units/mL subcutaneous injection, Inject 30 Units under the skin daily at bedtime, Disp: 15 mL, Rfl: 3    Lancets (OneTouch Delica Plus [id number]) MISC, , Disp: , Rfl:     levothyroxine (Synthroid) 88 mcg tablet, Take 1 tablet by mouth daily, Disp: , Rfl:     metFORMIN (GLUCOPHAGE-XR) 500 mg 24 hr tablet, Take 1 tablet (500 mg total) by mouth 2 (two) times a day with meals, Disp: 180 tablet, Rfl: 2    metroNIDAZOLE (FLAGYL) 250 mg tablet, metronidazole 250 mg tablet (Patient not taking: Reported on 3/17/2022), Disp: , Rfl:     Multiple Vitamins-Calcium (One-A-Day Womens Formula) TABS, Take by mouth (Patient not taking: Reported on 3/17/2022 ), Disp: , Rfl:     omeprazole (PriLOSEC) 40 MG capsule, omeprazole 40 mg capsule,delayed release, Disp: , Rfl:     OneTouch Verio test strip, , Disp: , Rfl:     predniSONE 10 mg tablet, Take 7 5 mg by mouth daily  , Disp: , Rfl:     Synthroid 88 MCG tablet, Take 88 mcg by mouth daily, Disp: , Rfl:     traMADol (ULTRAM) 50 mg tablet, every 6 (six) hours, Disp: , Rfl:     Zoster Vac Recomb Adjuvanted (Shingrix) 50 MCG/0 5ML SUSR, Shingrix (PF) 50 mcg/0 5 mL intramuscular suspension, kit, Disp: , Rfl:       Objective:    Vitals:    03/31/22 0819   BP: 138/80   Pulse: 90   Temp: (!) 96 4 °F (35 8 °C)       Physical Exam  General: Well appearing, well nourished, in no distress  Oriented x 3, normal mood and affect  Ambulating with aid of a wheelchair  Skin: Good turgor, no rash, unusual bruising or prominent lesions  Hair: Normal texture and distribution  Nails: Normal color, no deformities  HEENT:  Head: Normocephalic, atraumatic  Eyes: Conjunctiva clear, sclera non-icteric, EOM intact  Extremities: No amputations or deformities, cyanosis, edema  Musculoskeletal:  No peripheral joint soft tissue swelling noted  She does have mild ulnar deviation of her right hand noted  Neurologic: Alert and oriented  The strength in her proximal upper extremities is 4+/5  She has no spontaneous movement in her bilateral lower extremities proximally at this time  Psychiatric: Normal mood and affect  MANUELA Pride    Rheumatology

## 2022-03-31 ENCOUNTER — OFFICE VISIT (OUTPATIENT)
Dept: RHEUMATOLOGY | Facility: CLINIC | Age: 72
End: 2022-03-31
Payer: COMMERCIAL

## 2022-03-31 ENCOUNTER — APPOINTMENT (OUTPATIENT)
Dept: RADIOLOGY | Facility: CLINIC | Age: 72
End: 2022-03-31
Payer: COMMERCIAL

## 2022-03-31 VITALS — TEMPERATURE: 96.4 F | HEART RATE: 90 BPM | DIASTOLIC BLOOD PRESSURE: 80 MMHG | SYSTOLIC BLOOD PRESSURE: 138 MMHG

## 2022-03-31 DIAGNOSIS — Z11.59 NEED FOR HEPATITIS C SCREENING TEST: ICD-10-CM

## 2022-03-31 DIAGNOSIS — M15.0 PRIMARY GENERALIZED (OSTEO)ARTHRITIS: ICD-10-CM

## 2022-03-31 DIAGNOSIS — M33.20 POLYMYOSITIS (HCC): Primary | ICD-10-CM

## 2022-03-31 DIAGNOSIS — M33.20 POLYMYOSITIS (HCC): ICD-10-CM

## 2022-03-31 DIAGNOSIS — E55.9 VITAMIN D DEFICIENCY: ICD-10-CM

## 2022-03-31 DIAGNOSIS — M81.0 AGE-RELATED OSTEOPOROSIS WITHOUT CURRENT PATHOLOGICAL FRACTURE: ICD-10-CM

## 2022-03-31 DIAGNOSIS — Z79.52 CURRENT CHRONIC USE OF SYSTEMIC STEROIDS: ICD-10-CM

## 2022-03-31 PROCEDURE — 99205 OFFICE O/P NEW HI 60 MIN: CPT | Performed by: INTERNAL MEDICINE

## 2022-03-31 PROCEDURE — 73130 X-RAY EXAM OF HAND: CPT

## 2022-03-31 RX ORDER — GLIMEPIRIDE 1 MG/1
TABLET ORAL
COMMUNITY
End: 2022-05-04 | Stop reason: ALTCHOICE

## 2022-03-31 RX ORDER — TRAMADOL HYDROCHLORIDE 50 MG/1
TABLET ORAL EVERY 6 HOURS
COMMUNITY
End: 2022-05-04 | Stop reason: ALTCHOICE

## 2022-04-01 ENCOUNTER — TELEPHONE (OUTPATIENT)
Dept: OBGYN CLINIC | Facility: HOSPITAL | Age: 72
End: 2022-04-01

## 2022-04-01 DIAGNOSIS — Z79.4 TYPE 2 DIABETES MELLITUS WITH HYPERGLYCEMIA, WITH LONG-TERM CURRENT USE OF INSULIN (HCC): Primary | ICD-10-CM

## 2022-04-01 DIAGNOSIS — E11.65 TYPE 2 DIABETES MELLITUS WITH HYPERGLYCEMIA, WITH LONG-TERM CURRENT USE OF INSULIN (HCC): Primary | ICD-10-CM

## 2022-04-01 NOTE — TELEPHONE ENCOUNTER
Patient sees Dr Varner Factor  Patient is calling in wanting to get her lab work scripts faxed over to [facility] at [phone] so that they are able to come out to her home to draw the lab work for her  Please fax

## 2022-04-01 NOTE — TELEPHONE ENCOUNTER
If patient is willing, please put in a prescription for Januvia 25 mg orally daily     should send blood sugar log for review in 2-3 weeks of starting the new medication

## 2022-04-08 ENCOUNTER — TELEPHONE (OUTPATIENT)
Dept: ENDOCRINOLOGY | Facility: CLINIC | Age: 72
End: 2022-04-08

## 2022-04-08 ENCOUNTER — NURSE TRIAGE (OUTPATIENT)
Dept: OTHER | Facility: OTHER | Age: 72
End: 2022-04-08

## 2022-04-08 DIAGNOSIS — Z79.4 TYPE 2 DIABETES MELLITUS WITH HYPERGLYCEMIA, WITH LONG-TERM CURRENT USE OF INSULIN (HCC): ICD-10-CM

## 2022-04-08 DIAGNOSIS — E11.65 TYPE 2 DIABETES MELLITUS WITH HYPERGLYCEMIA, WITH LONG-TERM CURRENT USE OF INSULIN (HCC): ICD-10-CM

## 2022-04-08 LAB
ALBUMIN SERPL-MCNC: 3.9 G/DL (ref 3.7–4.7)
ALBUMIN/GLOB SERPL: 1.5 {RATIO} (ref 1.2–2.2)
ALP SERPL-CCNC: 77 IU/L (ref 44–121)
ALT SERPL-CCNC: 21 IU/L (ref 0–32)
AST SERPL-CCNC: 18 IU/L (ref 0–40)
BILIRUB SERPL-MCNC: 0.3 MG/DL (ref 0–1.2)
BUN SERPL-MCNC: 15 MG/DL (ref 8–27)
BUN/CREAT SERPL: 24 (ref 12–28)
CALCIUM SERPL-MCNC: 9.2 MG/DL (ref 8.7–10.3)
CHLORIDE SERPL-SCNC: 98 MMOL/L (ref 96–106)
CHOLEST SERPL-MCNC: 201 MG/DL (ref 100–199)
CO2 SERPL-SCNC: 23 MMOL/L (ref 20–29)
CREAT SERPL-MCNC: 0.62 MG/DL (ref 0.57–1)
EGFR: 95 ML/MIN/1.73
GLOBULIN SER-MCNC: 2.6 G/DL (ref 1.5–4.5)
GLUCOSE SERPL-MCNC: 117 MG/DL (ref 65–99)
HDLC SERPL-MCNC: 68 MG/DL
LDLC SERPL CALC-MCNC: 113 MG/DL (ref 0–99)
POTASSIUM SERPL-SCNC: 4.2 MMOL/L (ref 3.5–5.2)
PROT SERPL-MCNC: 6.5 G/DL (ref 6–8.5)
SL AMB VLDL CHOLESTEROL CALC: 20 MG/DL (ref 5–40)
SODIUM SERPL-SCNC: 140 MMOL/L (ref 134–144)
T4 FREE SERPL-MCNC: 1.39 NG/DL (ref 0.82–1.77)
TRIGL SERPL-MCNC: 116 MG/DL (ref 0–149)
TSH SERPL DL<=0.005 MIU/L-ACNC: 0.62 UIU/ML (ref 0.45–4.5)

## 2022-04-08 NOTE — TELEPHONE ENCOUNTER
Reason for Disposition   [1] Prescription prescribed recently is not at pharmacy AND [2] triager has access to patient's EMR AND [3] prescription is recorded in the EMR    Answer Assessment - Initial Assessment Questions  1  DRUG NAME: "What medicine do you need to have refilled?"      Januvia 25mg  2  REFILLS REMAINING: "How many refills are remaining?" (Note: The label on the medicine or pill bottle will show how many refills are remaining   If there are no refills remaining, then a renewal may be needed )      Was sent to [facility], patient requesting it be sent to the local [facility]    Protocols used: MEDICATION REFILL AND RENEWAL CALL-ADULT-

## 2022-04-08 NOTE — TELEPHONE ENCOUNTER
Thyroid function test within normal limits, continue current dose of levothyroxine   Total cholesterol, LDL have trended up slightly, patient is not on a statin medication, will discuss potentially starting and follow-up

## 2022-04-08 NOTE — TELEPHONE ENCOUNTER
Regarding: Diabetic patient out of new prescription / sitaGLIPtin (JANUVIA) 25 mg tablet  ----- Message from [facility] sent at 4/8/2022  4:32 PM EDT -----  "I need my sitaGLIPtin (JANUVIA) 25 mg tablet prescription transferred from [facility] to [address] [place] [address] to [phone] "

## 2022-04-11 DIAGNOSIS — E11.65 TYPE 2 DIABETES MELLITUS WITH HYPERGLYCEMIA, WITH LONG-TERM CURRENT USE OF INSULIN (HCC): ICD-10-CM

## 2022-04-11 DIAGNOSIS — Z79.4 TYPE 2 DIABETES MELLITUS WITH HYPERGLYCEMIA, WITH LONG-TERM CURRENT USE OF INSULIN (HCC): ICD-10-CM

## 2022-04-11 DIAGNOSIS — I10 PRIMARY HYPERTENSION: ICD-10-CM

## 2022-04-11 RX ORDER — BLOOD SUGAR DIAGNOSTIC
STRIP MISCELLANEOUS
Qty: 100 STRIP | Refills: 3 | Status: SHIPPED | OUTPATIENT
Start: 2022-04-11

## 2022-04-11 RX ORDER — INSULIN GLARGINE 100 [IU]/ML
30 INJECTION, SOLUTION SUBCUTANEOUS
Qty: 15 ML | Refills: 3 | Status: SHIPPED | OUTPATIENT
Start: 2022-04-11

## 2022-04-11 RX ORDER — PEN NEEDLE, DIABETIC 31 GX5/16"
NEEDLE, DISPOSABLE MISCELLANEOUS
Qty: 100 EACH | Refills: 3 | Status: SHIPPED | OUTPATIENT
Start: 2022-04-11

## 2022-04-15 LAB
APPEARANCE UR: CLEAR
BACTERIA URNS QL MICRO: ABNORMAL
BILIRUB UR QL STRIP: NEGATIVE
CASTS URNS QL MICRO: ABNORMAL /LPF
COLOR UR: YELLOW
CREAT UR-MCNC: 34.4 MG/DL
EPI CELLS #/AREA URNS HPF: ABNORMAL /HPF (ref 0–10)
GLUCOSE UR QL: NEGATIVE
HGB UR QL STRIP: NEGATIVE
KETONES UR QL STRIP: NEGATIVE
LEUKOCYTE ESTERASE UR QL STRIP: NEGATIVE
MICRO URNS: NORMAL
MICRO URNS: NORMAL
NITRITE UR QL STRIP: NEGATIVE
PH UR STRIP: 5.5 [PH] (ref 5–7.5)
PROT UR QL STRIP: NEGATIVE
PROT UR-MCNC: 9.8 MG/DL
PROT/CREAT UR: 285 MG/G CREAT (ref 0–200)
RBC #/AREA URNS HPF: ABNORMAL /HPF (ref 0–2)
SP GR UR: 1.01 (ref 1–1.03)
UROBILINOGEN UR STRIP-ACNC: 0.2 MG/DL (ref 0.2–1)
WBC #/AREA URNS HPF: ABNORMAL /HPF (ref 0–5)

## 2022-04-20 ENCOUNTER — TELEPHONE (OUTPATIENT)
Dept: OBGYN CLINIC | Facility: HOSPITAL | Age: 72
End: 2022-04-20

## 2022-04-20 NOTE — TELEPHONE ENCOUNTER
[place] at [facility] nurse, needing demographics, office visit note w/home bound status and dx     [address] [phone]  Fax [phone]

## 2022-04-21 NOTE — TELEPHONE ENCOUNTER
Ginana Lamb from [address] [place] [address], \[facility]\"" patient needs home physical therapy  She's reaching out to see if Dr Hilario Kincaid recommends it  She is requesting the last ovn 3/31/22  Faxed to #[phone]      [facility] EV#[phone]

## 2022-04-25 ENCOUNTER — TELEPHONE (OUTPATIENT)
Dept: RHEUMATOLOGY | Facility: CLINIC | Age: 72
End: 2022-04-25

## 2022-04-25 DIAGNOSIS — M15.0 PRIMARY GENERALIZED (OSTEO)ARTHRITIS: Primary | ICD-10-CM

## 2022-04-25 NOTE — TELEPHONE ENCOUNTER
Patient is calling stating that she is needing a refill of Gabapentin  She does not see the other doctor who was prescribing it  She is asking if a script can be sent to the [facility] DR VANESSA MITCHELL on file?

## 2022-04-26 RX ORDER — GABAPENTIN 300 MG/1
CAPSULE ORAL
Qty: 120 CAPSULE | Refills: 5 | Status: SHIPPED | OUTPATIENT
Start: 2022-04-26 | End: 2022-06-17 | Stop reason: SDUPTHER

## 2022-04-27 ENCOUNTER — TELEPHONE (OUTPATIENT)
Dept: ENDOCRINOLOGY | Facility: CLINIC | Age: 72
End: 2022-04-27

## 2022-05-02 ENCOUNTER — TELEPHONE (OUTPATIENT)
Dept: OBGYN CLINIC | Facility: CLINIC | Age: 72
End: 2022-05-02

## 2022-05-02 NOTE — TELEPHONE ENCOUNTER
Cyndee Tavera called to inform you that there will be a delay in the start of care for this patient  It will start on 5/4/22    Any questions call [phone]

## 2022-05-04 ENCOUNTER — OFFICE VISIT (OUTPATIENT)
Dept: FAMILY MEDICINE CLINIC | Facility: CLINIC | Age: 72
End: 2022-05-04
Payer: COMMERCIAL

## 2022-05-04 VITALS
SYSTOLIC BLOOD PRESSURE: 108 MMHG | TEMPERATURE: 98.3 F | DIASTOLIC BLOOD PRESSURE: 62 MMHG | OXYGEN SATURATION: 98 % | HEART RATE: 97 BPM | RESPIRATION RATE: 18 BRPM

## 2022-05-04 DIAGNOSIS — M33.20 POLYMYOSITIS (HCC): Primary | ICD-10-CM

## 2022-05-04 DIAGNOSIS — E03.9 ACQUIRED HYPOTHYROIDISM: ICD-10-CM

## 2022-05-04 DIAGNOSIS — E78.5 HYPERLIPIDEMIA, UNSPECIFIED HYPERLIPIDEMIA TYPE: ICD-10-CM

## 2022-05-04 DIAGNOSIS — D58.2 HEMOGLOBINOPATHY (HCC): ICD-10-CM

## 2022-05-04 DIAGNOSIS — Z11.59 ENCOUNTER FOR HEPATITIS C SCREENING TEST FOR LOW RISK PATIENT: ICD-10-CM

## 2022-05-04 DIAGNOSIS — J45.20 MILD INTERMITTENT ASTHMA WITHOUT COMPLICATION: ICD-10-CM

## 2022-05-04 DIAGNOSIS — I10 HYPERTENSION, UNSPECIFIED TYPE: ICD-10-CM

## 2022-05-04 DIAGNOSIS — Z79.4 TYPE 2 DIABETES MELLITUS WITHOUT COMPLICATION, WITH LONG-TERM CURRENT USE OF INSULIN (HCC): ICD-10-CM

## 2022-05-04 DIAGNOSIS — Z12.31 ENCOUNTER FOR SCREENING MAMMOGRAM FOR MALIGNANT NEOPLASM OF BREAST: ICD-10-CM

## 2022-05-04 DIAGNOSIS — E11.9 TYPE 2 DIABETES MELLITUS WITHOUT COMPLICATION, WITH LONG-TERM CURRENT USE OF INSULIN (HCC): ICD-10-CM

## 2022-05-04 DIAGNOSIS — Z00.00 MEDICARE ANNUAL WELLNESS VISIT, SUBSEQUENT: ICD-10-CM

## 2022-05-04 PROBLEM — K57.92 ACUTE DIVERTICULITIS: Status: RESOLVED | Noted: 2018-09-21 | Resolved: 2022-05-04

## 2022-05-04 PROCEDURE — 99204 OFFICE O/P NEW MOD 45 MIN: CPT | Performed by: FAMILY MEDICINE

## 2022-05-04 PROCEDURE — G0439 PPPS, SUBSEQ VISIT: HCPCS | Performed by: FAMILY MEDICINE

## 2022-05-04 RX ORDER — NIRMATRELVIR AND RITONAVIR 300-100 MG
KIT ORAL
COMMUNITY
Start: 2022-04-26 | End: 2022-05-04 | Stop reason: ALTCHOICE

## 2022-05-04 RX ORDER — ALBUTEROL SULFATE 90 UG/1
2 AEROSOL, METERED RESPIRATORY (INHALATION) EVERY 6 HOURS PRN
Qty: 18 G | Refills: 1 | Status: SHIPPED | OUTPATIENT
Start: 2022-05-04

## 2022-05-04 NOTE — PATIENT INSTRUCTIONS
Medicare Preventive Visit Patient Instructions  Thank you for completing your Welcome to Medicare Visit or Medicare Annual Wellness Visit today  Your next wellness visit will be due in one year (5/5/2023)  The screening/preventive services that you may require over the next 5-10 years are detailed below  Some tests may not apply to you based off risk factors and/or age  Screening tests ordered at today's visit but not completed yet may show as past due  Also, please note that scanned in results may not display below  Preventive Screenings:  Service Recommendations Previous Testing/Comments   Colorectal Cancer Screening  * Colonoscopy    * Fecal Occult Blood Test (FOBT)/Fecal Immunochemical Test (FIT)  * Fecal DNA/Cologuard Test  * Flexible Sigmoidoscopy Age: 54-65 years old   Colonoscopy: every 10 years (may be performed more frequently if at higher risk)  OR  FOBT/FIT: every 1 year  OR  Cologuard: every 3 years  OR  Sigmoidoscopy: every 5 years  Screening may be recommended earlier than age 48 if at higher risk for colorectal cancer  Also, an individualized decision between you and your healthcare provider will decide whether screening between the ages of 74-80 would be appropriate  Colonoscopy: 11/15/2018  FOBT/FIT: Not on file  Cologuard: Not on file  Sigmoidoscopy: Not on file    Screening Current     Breast Cancer Screening Age: 36 years old  Frequency: every 1-2 years  Not required if history of left and right mastectomy Mammogram: 06/22/2018        Cervical Cancer Screening Between the ages of 21-29, pap smear recommended once every 3 years  Between the ages of 33-67, can perform pap smear with HPV co-testing every 5 years     Recommendations may differ for women with a history of total hysterectomy, cervical cancer, or abnormal pap smears in past  Pap Smear: Not on file    Screening Not Indicated   Hepatitis C Screening Once for adults born between 1945 and 1965  More frequently in patients at high risk for Hepatitis C Hep C Antibody: Not on file        Diabetes Screening 1-2 times per year if you're at risk for diabetes or have pre-diabetes Fasting glucose: 130 mg/dL   A1C: 7 3    Screening Not Indicated  History Diabetes   Cholesterol Screening Once every 5 years if you don't have a lipid disorder  May order more often based on risk factors  Lipid panel: 04/07/2022    Screening Not Indicated  History Lipid Disorder     Other Preventive Screenings Covered by Medicare:  1  Abdominal Aortic Aneurysm (AAA) Screening: covered once if your at risk  You're considered to be at risk if you have a family history of AAA  2  Lung Cancer Screening: covers low dose CT scan once per year if you meet all of the following conditions: (1) Age 50-69; (2) No signs or symptoms of lung cancer; (3) Current smoker or have quit smoking within the last 15 years; (4) You have a tobacco smoking history of at least 30 pack years (packs per day multiplied by number of years you smoked); (5) You get a written order from a healthcare provider  3  Glaucoma Screening: covered annually if you're considered high risk: (1) You have diabetes OR (2) Family history of glaucoma OR (3)  aged 48 and older OR (3)  American aged 72 and older  3  Osteoporosis Screening: covered every 2 years if you meet one of the following conditions: (1) You're estrogen deficient and at risk for osteoporosis based off medical history and other findings; (2) Have a vertebral abnormality; (3) On glucocorticoid therapy for more than 3 months; (4) Have primary hyperparathyroidism; (5) On osteoporosis medications and need to assess response to drug therapy  · Last bone density test (DXA Scan): Not on file  5  HIV Screening: covered annually if you're between the age of 12-76  Also covered annually if you are younger than 13 and older than 72 with risk factors for HIV infection   For pregnant patients, it is covered up to 3 times per pregnancy  Immunizations:  Immunization Recommendations   Influenza Vaccine Annual influenza vaccination during flu season is recommended for all persons aged >= 6 months who do not have contraindications   Pneumococcal Vaccine (Prevnar and Pneumovax)  * Prevnar = PCV13  * Pneumovax = PPSV23   Adults 25-60 years old: 1-3 doses may be recommended based on certain risk factors  Adults 72 years old: Prevnar (PCV13) vaccine recommended followed by Pneumovax (PPSV23) vaccine  If already received PPSV23 since turning 65, then PCV13 recommended at least one year after PPSV23 dose  Hepatitis B Vaccine 3 dose series if at intermediate or high risk (ex: diabetes, end stage renal disease, liver disease)   Tetanus (Td) Vaccine - COST NOT COVERED BY MEDICARE PART B Following completion of primary series, a booster dose should be given every 10 years to maintain immunity against tetanus  Td may also be given as tetanus wound prophylaxis  Tdap Vaccine - COST NOT COVERED BY MEDICARE PART B Recommended at least once for all adults  For pregnant patients, recommended with each pregnancy  Shingles Vaccine (Shingrix) - COST NOT COVERED BY MEDICARE PART B  2 shot series recommended in those aged 48 and above     Health Maintenance Due:      Topic Date Due    Hepatitis C Screening  Never done    Colorectal Cancer Screening  Never done    Breast Cancer Screening: Mammogram  06/22/2019     Immunizations Due:      Topic Date Due    Pneumococcal Vaccine: 65+ Years (1 of 4 - PCV13) Never done    DTaP,Tdap,and Td Vaccines (1 - Tdap) Never done    COVID-19 Vaccine (2 - Pfizer 3-dose series) 11/03/2021     Advance Directives   What are advance directives? Advance directives are legal documents that state your wishes and plans for medical care  These plans are made ahead of time in case you lose your ability to make decisions for yourself   Advance directives can apply to any medical decision, such as the treatments you want, and if you want to donate organs  What are the types of advance directives? There are many types of advance directives, and each state has rules about how to use them  You may choose a combination of any of the following:  · Living will: This is a written record of the treatment you want  You can also choose which treatments you do not want, which to limit, and which to stop at a certain time  This includes surgery, medicine, IV fluid, and tube feedings  · Durable power of  for healthcare [facility]): This is a written record that states who you want to make healthcare choices for you when you are unable to make them for yourself  This person, called a proxy, is usually a family member or a friend  You may choose more than 1 proxy  · Do not resuscitate (DNR) order:  A DNR order is used in case your heart stops beating or you stop breathing  It is a request not to have certain forms of treatment, such as CPR  A DNR order may be included in other types of advance directives  · Medical directive: This covers the care that you want if you are in a coma, near death, or unable to make decisions for yourself  You can list the treatments you want for each condition  Treatment may include pain medicine, surgery, blood transfusions, dialysis, IV or tube feedings, and a ventilator (breathing machine)  · Values history: This document has questions about your views, beliefs, and how you feel and think about life  This information can help others choose the care that you would choose  Why are advance directives important? An advance directive helps you control your care  Although spoken wishes may be used, it is better to have your wishes written down  Spoken wishes can be misunderstood, or not followed  Treatments may be given even if you do not want them  An advance directive may make it easier for your family to make difficult choices about your care     Urinary Incontinence   Urinary incontinence (UI)  is when you lose control of your bladder  UI develops because your bladder cannot store or empty urine properly  The 3 most common types of UI are stress incontinence, urge incontinence, or both  Medicines:   · May be given to help strengthen your bladder control  Report any side effects of medication to your healthcare provider  Do pelvic muscle exercises often:  Your pelvic muscles help you stop urinating  Squeeze these muscles tight for 5 seconds, then relax for 5 seconds  Gradually work up to squeezing for 10 seconds  Do 3 sets of 15 repetitions a day, or as directed  This will help strengthen your pelvic muscles and improve bladder control  Train your bladder:  Go to the bathroom at set times, such as every 2 hours, even if you do not feel the urge to go  You can also try to hold your urine when you feel the urge to go  For example, hold your urine for 5 minutes when you feel the urge to go  As that becomes easier, hold your urine for 10 minutes  Self-care:   · Keep a UI record  Write down how often you leak urine and how much you leak  Make a note of what you were doing when you leaked urine  · Drink liquids as directed  You may need to limit the amount of liquid you drink to help control your urine leakage  Do not drink any liquid right before you go to bed  Limit or do not have drinks that contain caffeine or alcohol  · Prevent constipation  Eat a variety of high-fiber foods  Good examples are high-fiber cereals, beans, vegetables, and whole-grain breads  Walking is the best way to trigger your intestines to have a bowel movement  · Exercise regularly and maintain a healthy weight  Weight loss and exercise will decrease pressure on your bladder and help you control your leakage  · Use a catheter as directed  to help empty your bladder  A catheter is a tiny, plastic tube that is put into your bladder to drain your urine  · Go to behavior therapy as directed    Behavior therapy may be used to help you learn to control your urge to urinate  © Copyright [employer] 2018 Information is for End User's use only and may not be sold, redistributed or otherwise used for commercial purposes   All illustrations and images included in CareNotes® are the copyrighted property of A D A M , Inc  or [address] [place] [address]

## 2022-05-04 NOTE — PROGRESS NOTES
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[facility] )      Other Visit Diagnoses     Medicare annual wellness visit, subsequent    -  Primary        BMI Counseling: BMI was not able to be calculated due to patient refusing height and/or weight  There is no height or weight on file to calculate BMI  Follow-up plan was not completed due to elderly patient (72 years old) where weight reduction/weight gain would complicate underlying health condition such as: illness or physical disability  Depression Screening and Follow-up Plan: Patient was screened for depression during today's encounter  They screened negative with a PHQ-2 score of 0  Preventive health issues were discussed with patient, and age appropriate screening tests were ordered as noted in patient's After Visit Summary  Personalized health advice and appropriate referrals for health education or preventive services given if needed, as noted in patient's After Visit Summary       History of Present Illness:     Patient presents for Medicare Annual Wellness visit    Patient Care Team:  Chuck Mast MD as PCP - General (Internal Medicine)  Chuck Mast MD as PCP - [address] [place], [place] [address]Th [facility] ([facility])     Problem List:     Patient Active Problem List   Diagnosis    Polymyositis ([facility] )    Hyperlipidemia    Diabetes mellitus ([facility] )    Asthma    Chronic pain    Acquired hypothyroidism    Steroid-induced hyperglycemia    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[facility])      Past Medical and Surgical History:     Past Medical History:   Diagnosis Date    Acute diverticulitis 9/21/2018    Anemia     Asthma     Chronic pain     bulging discs in back,polymyositis    Diabetes mellitus ([address] )     Disease of thyroid gland     Diverticulitis     Hyperlipidemia     Hypertension     Polymyositis (HCC)      Past Surgical History:   Procedure Laterality Date    HYSTERECTOMY      SINUS SURGERY      THYROIDECTOMY      TUBAL LIGATION        Family History:     Family History   Problem Relation Age of Onset    Hepatitis Sister         acute hepatits C virus      Social History:     Social History     Socioeconomic History    Marital status: Single     Spouse name: None    Number of children: None    Years of education: None    Highest education level: None   Occupational History    None   Tobacco Use    Smoking status: Never Smoker    Smokeless tobacco: Never Used   Vaping Use    Vaping Use: Never used   Substance and Sexual Activity    Alcohol use: No    Drug use: No    Sexual activity: None   Other Topics Concern    None   Social History Narrative    Family history of mother  at age 71: [facility]     Social Determinants of Health     Financial Resource Strain: Not on file   Food Insecurity: Not on file   Transportation Needs: Not on file   Physical Activity: Not on file   Stress: Not on file   Social Connections: Not on file   Intimate Partner Violence: Not on file   Housing Stability: Not on file      Medications and Allergies:     Current Outpatient Medications   Medication Sig Dispense Refill    albuterol (PROVENTIL HFA,VENTOLIN HFA) 90 mcg/act inhaler Inhale 2 puffs every 6 (six) hours as needed for wheezing        amLODIPine (NORVASC) 5 mg tablet amlodipine 5 mg tablet   TAKE 1 TABLET BY MOUTH ONCE DAILY      B-D UF III MINI PEN NEEDLES 31G X 5 MM MISC Use to inject insulin daily 100 each 3    benazepril (LOTENSIN) 10 mg tablet benazepril 10 mg tablet   TAKE 1 TABLET BY MOUTH ONCE DAILY      Cholecalciferol 25 MCG (1000 UT) tablet Take 1 tablet by mouth daily      cyanocobalamin (VITAMIN B-12) 1000 MCG tablet Vitamin B-12  1,000 mcg tablet  ferrous sulfate 325 (65 Fe) mg tablet Take 1 tablet by mouth daily with breakfast      gabapentin (NEURONTIN) 300 mg capsule Take 1 tablet in the morning and 3 tablets at bedtime  120 capsule 5    insulin glargine (LANTUS) 100 units/mL subcutaneous injection Inject 30 Units under the skin daily at bedtime 15 mL 3    Lancets (OneTouch Delica Plus [id number]) MISC       levothyroxine (Synthroid) 88 mcg tablet Take 1 tablet by mouth daily      metFORMIN (GLUCOPHAGE-XR) 500 mg 24 hr tablet Take 1 tablet (500 mg total) by mouth 2 (two) times a day with meals 180 tablet 2    omeprazole (PriLOSEC) 40 MG capsule omeprazole 40 mg capsule,delayed release      OneTouch Verio test strip Use to test blood sugar 2 times a day 100 strip 3    predniSONE 10 mg tablet Take 7 5 mg by mouth daily Patient reports taking 15 mg daily   sitaGLIPtin (JANUVIA) 25 mg tablet Take 1 tablet (25 mg total) by mouth daily 90 tablet 0    Empagliflozin (Jardiance) 25 MG TABS Take 0 5 tablets by mouth (Patient not taking: Reported on 5/4/2022 )      glimepiride (AMARYL) 1 mg tablet glimepiride 1 mg tablet   Take 1 tablet every day by oral route  (Patient not taking: Reported on 5/4/2022)      Synthroid 88 MCG tablet Take 88 mcg by mouth daily (Patient not taking: Reported on 5/4/2022 )      Zoster Vac Recomb Adjuvanted (Shingrix) 50 MCG/0 5ML SUSR Shingrix (PF) 50 mcg/0 5 mL intramuscular suspension, kit       No current facility-administered medications for this visit       Allergies   Allergen Reactions    Anaprox [Naproxen Sodium] Shortness Of Breath    Levofloxacin Hives    Sulfamethoxazole-Trimethoprim Anaphylaxis    Jardiance [Empagliflozin] GI Intolerance    Penicillins Swelling    Aspirin Rash    Metronidazole Rash    Sulfa Antibiotics Rash      Immunizations:     Immunization History   Administered Date(s) Administered    COVID-19 PFIZER VACCINE 0 3 ML IM 10/13/2021      Health Maintenance:         Topic Date Due  Hepatitis C Screening  Never done    Colorectal Cancer Screening  Never done    Breast Cancer Screening: Mammogram  06/22/2019         Topic Date Due    Pneumococcal Vaccine: 65+ Years (1 of 4 - PCV13) Never done    COVID-19 Vaccine (2 - Pfizer 3-dose series) 11/03/2021      Medicare Health Risk Assessment:     /62   Pulse 97   Temp 98 3 °F (36 8 °C)   Resp 18   SpO2 98%      Jadyn is here for her Subsequent Wellness visit  Health Risk Assessment:   Patient rates overall health as poor  Patient feels that their physical health rating is much worse  Patient is very satisfied with their life  Eyesight was rated as slightly worse  Hearing was rated as same  Patient feels that their emotional and mental health rating is same  Patients states they are sometimes angry  Patient states they are often unusually tired/fatigued  Pain experienced in the last 7 days has been a lot  Patient's pain rating has been 7/10  Patient states that she has experienced no weight loss or gain in last 6 months  Depression Screening:   PHQ-2 Score: 0      Fall Risk Screening: In the past year, patient has experienced: no history of falling in past year      Urinary Incontinence Screening:   Patient has leaked urine accidently in the last six months  Home Safety:  Patient has trouble with stairs inside or outside of their home  Patient has working smoke alarms and has working carbon monoxide detector  Home safety hazards include: none  Nutrition:   Current diet is Regular  Lives with a vegan daughter who mostly meatless meals  Medications:   Patient is currently taking over-the-counter supplements  OTC medications include: see medication list  Patient is able to manage medications  Activities of Daily Living (ADLs)/Instrumental Activities of Daily Living (IADLs):   Walk and transfer into and out of bed and chair?: No  Dress and groom yourself?: No    Bathe or shower yourself?: No    Feed yourself? Yes  Do your laundry/housekeeping?: No  Manage your money, pay your bills and track your expenses?: Yes  Make your own meals?: No    Do your own shopping?: No    Previous Hospitalizations:   Any hospitalizations or ED visits within the last 12 months?: No      Advance Care Planning:   Living will: Yes    Advanced directive: Yes      PREVENTIVE SCREENINGS      Cardiovascular Screening:    General: Screening Not Indicated and History Lipid Disorder      Diabetes Screening:     General: Screening Not Indicated and History Diabetes      Colorectal Cancer Screening:     General: Screening Current    Due for: Cologuard      Breast Cancer Screening:     General: Risks and Benefits Discussed    Due for: Mammogram        Cervical Cancer Screening:    General: Screening Not Indicated      Osteoporosis Screening:    General: Screening Not Indicated and History Osteoporosis      Abdominal Aortic Aneurysm (AAA) Screening:        General: Screening Not Indicated      Lung Cancer Screening:     General: Screening Not Indicated      Hepatitis C Screening:    General: Risks and Benefits Discussed    Hep C Screening Accepted: Yes      Screening, Brief Intervention, and Referral to Treatment (SBIRT)    Screening  Typical number of drinks in a day: 0  Typical number of drinks in a week: 0  Interpretation: Low risk drinking behavior      AUDIT-C Screenin) How often did you have a drink containing alcohol in the past year? never  2) How many drinks did you have on a typical day when you were drinking in the past year? 0  3) How often did you have 6 or more drinks on one occasion in the past year? never    AUDIT-C Score: 0  Interpretation: Score 0-2 (female): Negative screen for alcohol misuse    Single Item Drug Screening:  How often have you used an illegal drug (including marijuana) or a prescription medication for non-medical reasons in the past year? never    Single Item Drug Screen Score: 0  Interpretation: Negative screen for possible drug use disorder      Erin Saucedo MD

## 2022-05-04 NOTE — PROGRESS NOTES
Assessment/Plan:       Diagnoses and all orders for this visit:    Polymyositis [facility])  Comments:  Managed by rheumatologist, Dr Tiffanie Blood  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[facility])  Comments:  stable  had seen a hematologist years ago  Acquired hypothyroidism  Comments:  Managed by endocrinologist, Dr Chace Mendosa  Currently on levothyroxine  Type 2 diabetes mellitus without complication, with long-term current use of insulin (HCC)  Comments:  Sugars have been within range  Currently on januvia, metformin and lantus  Managed by endocrinologist, Dr Chace Mendosa  Mild intermittent asthma without complication  Comments:  Stable  Orders:  -     albuterol (PROVENTIL HFA,VENTOLIN HFA) 90 mcg/act inhaler; Inhale 2 puffs every 6 (six) hours as needed for wheezing    Hyperlipidemia, unspecified hyperlipidemia type  Comments:  controlled on no medication  Hypertension, unspecified type  Comments:  controlled on current medications  Encounter for screening mammogram for malignant neoplasm of breast  -     Mammo screening bilateral w 3d & cad; Future    Encounter for hepatitis C screening test for low risk patient  -     Hepatitis C antibody; Future          Subjective:      Patient ID: Sherri Winchester is a 70 y o  female  HPI  Romelia Mariaon is a 69 yo female who presents today to establish care  She has a history of type II DM, asthma, polymyositis, hyperlipidemia, hypertension, hypothyroidism, hemoglobinopathy  She is a former pt of Dr Noriega Ill  She follows specialists including rheumatology (Dr Tiffanie Blood), endocrinology (Dr Chace Mendosa)  Recently switched her providers to [facility]  She reports  weakness, back pain and fatigue  She recently recovered from COVID-19 virus last week  She will be starting home PT tomorrow         The following portions of the patient's history were reviewed and updated as appropriate: allergies, current medications, past family history, past medical history, past social history, past surgical history and problem list     Review of Systems   Constitutional: Positive for fatigue  HENT: Negative  Eyes: Negative  Respiratory: Negative  Cardiovascular: Negative  Gastrointestinal: Negative  Endocrine: Negative  Genitourinary: Negative  Musculoskeletal: Positive for back pain  Skin: Negative  Allergic/Immunologic: Negative  Neurological: Positive for weakness  Hematological: Negative  Psychiatric/Behavioral: Negative  Objective:      /62   Pulse 97   Temp 98 3 °F (36 8 °C)   Resp 18   SpO2 98%          Physical Exam  Constitutional:       General: She is not in acute distress  Appearance: She is well-developed  She is not diaphoretic  HENT:      Head: Normocephalic and atraumatic  Cardiovascular:      Rate and Rhythm: Normal rate and regular rhythm  Pulses: no weak pulses     Heart sounds: Normal heart sounds  No murmur heard  No friction rub  No gallop  Pulmonary:      Effort: Pulmonary effort is normal  No respiratory distress  Breath sounds: Normal breath sounds  No wheezing or rales  Chest:      Chest wall: No tenderness  Musculoskeletal:         General: No deformity  Normal range of motion  Cervical back: Normal range of motion and neck supple  Feet:      Right foot:      Skin integrity: No ulcer, skin breakdown, erythema, warmth, callus or dry skin  Left foot:      Skin integrity: No ulcer, skin breakdown, erythema, warmth, callus or dry skin  Skin:     General: Skin is warm and dry  Neurological:      Mental Status: She is alert and oriented to person, place, and time  Psychiatric:         Behavior: Behavior normal          Thought Content: Thought content normal          Judgment: Judgment normal        Patient's shoes and socks removed  Right Foot/Ankle   Right Foot Inspection  Skin Exam: skin normal and skin intact   No dry skin, no warmth, no callus, no erythema, no maceration, no abnormal color, no pre-ulcer, no ulcer and no callus  Toe Exam: ROM and strength within normal limits  Sensory   Monofilament testing: intact    Vascular  Capillary refills: < 3 seconds          Left Foot/Ankle  Left Foot Inspection  Skin Exam: skin normal and skin intact  No dry skin, no warmth, no erythema, no maceration, normal color, no pre-ulcer, no ulcer and no callus  Toe Exam: ROM and strength within normal limits       Sensory   Monofilament testing: intact    Vascular  Capillary refills: < 3 seconds        Assign Risk Category  No deformity present  No loss of protective sensation  No weak pulses  Risk: 0

## 2022-05-06 ENCOUNTER — TELEPHONE (OUTPATIENT)
Dept: ADMINISTRATIVE | Facility: OTHER | Age: 72
End: 2022-05-06

## 2022-05-06 NOTE — TELEPHONE ENCOUNTER
----- Message from Kandace Mahoney MD sent at 5/4/2022  3:13 PM EDT -----  05/04/22 3:13 PM    Hello, our patient Bill Hernandez has had CRC: Colonoscopy completed/performed  Please assist in updating the patient chart by pulling the Care Everywhere (CE) document  The date of service is 11/2018       Thank you,  Kandace Mahoney MD  [facility]

## 2022-05-06 NOTE — LETTER
Procedure Request Form: Colonoscopy      Date Requested: /22  Patient: Bard Primas  Patient : 1950   Referring Provider: Ga Heck, MD        Date of Procedure ______________________________       The above patient has informed us that they have completed their   most recent Colonoscopy at your facility  Please complete   this form and attach all corresponding procedure reports/results  Comments __________________________________________________________  ____________________________________________________________________  ____________________________________________________________________  ____________________________________________________________________    Facility Completing Procedure _________________________________________    Form Completed By (print name) _______________________________________      Signature __________________________________________________________      These reports are needed for  compliance  Please fax this completed form and a copy of the procedure report to our office located at [place] [address] as soon as possible to [phone] lissette Berrios: Phone [phone]    We thank you for your assistance in treating our mutual patient

## 2022-05-11 ENCOUNTER — HOSPITAL ENCOUNTER (OUTPATIENT)
Dept: RADIOLOGY | Facility: HOSPITAL | Age: 72
Discharge: HOME | End: 2022-05-11
Attending: INTERNAL MEDICINE
Payer: COMMERCIAL

## 2022-05-11 DIAGNOSIS — M81.0 AGE-RELATED OSTEOPOROSIS WITHOUT CURRENT PATHOLOGICAL FRACTURE: ICD-10-CM

## 2022-05-11 PROCEDURE — 77080 DXA BONE DENSITY AXIAL: CPT

## 2022-05-13 NOTE — TELEPHONE ENCOUNTER
Upon review of the In Basket request and the patient's chart, initial outreach has been made via fax, please see Contacts section for details       Thank you  Ahsan Garner MA

## 2022-05-16 NOTE — TELEPHONE ENCOUNTER
Upon review of the In Basket request we were able to locate, review, and update the patient chart as requested for CRC: Colonoscopy  Any additional questions or concerns should be emailed to the Practice Liaisons via [email or web address] email, please do not reply via In Basket      Thank you  Samia Barragan MA

## 2022-05-24 ENCOUNTER — TELEPHONE (OUTPATIENT)
Dept: OBGYN CLINIC | Facility: HOSPITAL | Age: 72
End: 2022-05-24

## 2022-05-24 NOTE — TELEPHONE ENCOUNTER
Patient calling in stating her BP was 176/92 at her physical therapy appointment  It has been high the last two times  I asked her if she spoke with her family doctor at all and she thought she was calling them  She is going to reach out to them now  She then hung up

## 2022-06-08 ENCOUNTER — HOSPITAL ENCOUNTER (OUTPATIENT)
Dept: ULTRASOUND IMAGING | Facility: HOSPITAL | Age: 72
Discharge: HOME | End: 2022-06-08
Attending: INTERNAL MEDICINE
Payer: COMMERCIAL

## 2022-06-08 ENCOUNTER — OFFICE VISIT (OUTPATIENT)
Dept: FAMILY MEDICINE CLINIC | Facility: CLINIC | Age: 72
End: 2022-06-08
Payer: COMMERCIAL

## 2022-06-08 VITALS
TEMPERATURE: 96.9 F | DIASTOLIC BLOOD PRESSURE: 68 MMHG | SYSTOLIC BLOOD PRESSURE: 118 MMHG | RESPIRATION RATE: 17 BRPM | HEART RATE: 102 BPM

## 2022-06-08 DIAGNOSIS — I10 ESSENTIAL HYPERTENSION: ICD-10-CM

## 2022-06-08 DIAGNOSIS — E11.9 TYPE 2 DIABETES MELLITUS WITHOUT COMPLICATION, WITH LONG-TERM CURRENT USE OF INSULIN (HCC): Primary | ICD-10-CM

## 2022-06-08 DIAGNOSIS — M33.20 POLYMYOSITIS (HCC): ICD-10-CM

## 2022-06-08 DIAGNOSIS — Z79.4 TYPE 2 DIABETES MELLITUS WITHOUT COMPLICATION, WITH LONG-TERM CURRENT USE OF INSULIN (HCC): Primary | ICD-10-CM

## 2022-06-08 PROCEDURE — 99214 OFFICE O/P EST MOD 30 MIN: CPT | Performed by: FAMILY MEDICINE

## 2022-06-08 PROCEDURE — 76882 US LMTD JT/FCL EVL NVASC XTR: CPT

## 2022-06-08 NOTE — PROGRESS NOTES
Assessment/Plan:      Diagnoses and all orders for this visit:    Type 2 diabetes mellitus without complication, with long-term current use of insulin ([facility])  Comments:  A1c ordered  Recommend follow up with her endocrinologist  She thought she had a visit next week, however I do not see it in records  Orders:  -     HEMOGLOBIN A1C W/ EAG ESTIMATION; Future    Essential hypertension  Comments:  BP within range during office visits, but high at home  Did discuss that if BP is high, she can take an extra amlodipine 5mg (total of 10mg)  However, if BP at home is within range, take regular 5mg  Continue benazepril  Subjective:      Patient ID: Bharat Waller is a 70 y o  female  HPI  Jadyn presents today for BP check  States that she checks her blood pressure at home regularly and her therapist checks it when she comes home  There have been multiple instances where her blood pressures have been significantly elevated around 180/94  States that she has associated headaches, dizziness, chest tightness intermittently  She currently takes amlodipine 5mg daily, benazepril 10mg daily regularly  She follows endocrinology for diabetes  States that since being started on januvia, she has noticed the blood pressure fluctuations, headaches, dizziness, weakness  The following portions of the patient's history were reviewed and updated as appropriate: allergies, current medications, past family history, past medical history, past social history, past surgical history and problem list     Review of Systems   Constitutional: Negative  HENT: Negative  Eyes: Negative  Respiratory: Negative  Cardiovascular: Negative  Gastrointestinal: Negative  Endocrine: Negative  Genitourinary: Negative  Musculoskeletal: Negative  Skin: Negative  Allergic/Immunologic: Negative  Neurological: Negative  Hematological: Negative  Psychiatric/Behavioral: Negative            Objective:      BP 118/68   Pulse 102   Temp (!) 96 9 °F (36 1 °C)   Resp 17          Physical Exam  Constitutional:       General: She is not in acute distress  Appearance: She is well-developed  She is not diaphoretic  HENT:      Head: Normocephalic and atraumatic  Cardiovascular:      Rate and Rhythm: Normal rate and regular rhythm  Heart sounds: Normal heart sounds  No murmur heard  No friction rub  No gallop  Pulmonary:      Effort: Pulmonary effort is normal  No respiratory distress  Breath sounds: Normal breath sounds  No wheezing or rales  Chest:      Chest wall: No tenderness  Musculoskeletal:         General: No deformity  Normal range of motion  Cervical back: Normal range of motion and neck supple  Skin:     General: Skin is warm and dry  Neurological:      Mental Status: She is alert and oriented to person, place, and time  Psychiatric:         Behavior: Behavior normal          Thought Content:  Thought content normal          Judgment: Judgment normal

## 2022-06-17 DIAGNOSIS — M15.0 PRIMARY GENERALIZED (OSTEO)ARTHRITIS: ICD-10-CM

## 2022-06-17 LAB
EST. AVERAGE GLUCOSE BLD GHB EST-MCNC: 160 MG/DL
HBA1C MFR BLD: 7.2 % (ref 4.8–5.6)
HCV AB S/CO SERPL IA: <0.1 S/CO RATIO (ref 0–0.9)

## 2022-06-17 RX ORDER — GABAPENTIN 300 MG/1
CAPSULE ORAL
Qty: 120 CAPSULE | Refills: 5 | Status: SHIPPED | OUTPATIENT
Start: 2022-06-17

## 2022-06-21 ENCOUNTER — TELEPHONE (OUTPATIENT)
Dept: FAMILY MEDICINE CLINIC | Facility: CLINIC | Age: 72
End: 2022-06-21

## 2022-06-21 NOTE — TELEPHONE ENCOUNTER
Handicap placard application was dropped off and placed in nurse pending one  Please call when complete

## 2022-06-24 ENCOUNTER — TELEMEDICINE (OUTPATIENT)
Dept: RHEUMATOLOGY | Facility: CLINIC | Age: 72
End: 2022-06-24
Payer: COMMERCIAL

## 2022-06-24 ENCOUNTER — TELEPHONE (OUTPATIENT)
Dept: RHEUMATOLOGY | Facility: CLINIC | Age: 72
End: 2022-06-24

## 2022-06-24 DIAGNOSIS — Z79.899 LONG-TERM CURRENT USE OF INTRAVENOUS IMMUNOGLOBULIN (IVIG): ICD-10-CM

## 2022-06-24 DIAGNOSIS — M33.20 POLYMYOSITIS (HCC): Primary | ICD-10-CM

## 2022-06-24 DIAGNOSIS — Z79.52 CURRENT CHRONIC USE OF SYSTEMIC STEROIDS: ICD-10-CM

## 2022-06-24 DIAGNOSIS — M15.0 PRIMARY GENERALIZED (OSTEO)ARTHRITIS: ICD-10-CM

## 2022-06-24 PROCEDURE — 99214 OFFICE O/P EST MOD 30 MIN: CPT | Performed by: INTERNAL MEDICINE

## 2022-06-24 NOTE — TELEPHONE ENCOUNTER
Please start prior authorization for HOME IVIG infusions 2 gram/kg in divided doses over 2 days on a monthly basis for polymyositis  She has previously been on steroids, methotrexate and mycophenolate  Thanks

## 2022-06-24 NOTE — PROGRESS NOTES
Virtual Regular Visit    Verification of patient location:    Patient is located in the following state in which I hold an active license [place]      Assessment/Plan:    Problem List Items Addressed This Visit        Musculoskeletal and Integument    Polymyositis ([facility] [address] ) - Primary      Other Visit Diagnoses     Current chronic use of systemic steroids        Long-term current use of intravenous immunoglobulin (IVIG)        Primary generalized (osteo)arthritis                   Reason for visit is follow up  Chief Complaint   Patient presents with    Virtual Regular Visit        Encounter provider Melany Oden MD    Provider located at [address] [place] [id number] [phone]      Recent Visits  No visits were found meeting these conditions  Showing recent visits within past 7 days and meeting all other requirements  Today's Visits  Date Type Provider Dept   06/24/22 Telemedicine Melany Oden MD [facility] today's visits and meeting all other requirements  Future Appointments  No visits were found meeting these conditions  Showing future appointments within next 150 days and meeting all other requirements       The patient was identified by name and date of birth  Emma Curtis was informed that this is a telemedicine visit and that the visit is being conducted through Telephone  My office door was closed  No one else was in the room  She acknowledged consent and understanding of privacy and security of the video platform  The patient has agreed to participate and understands they can discontinue the visit at any time  Patient is aware this is a billable service         Subjective    HPI     INITIAL VISIT NOTE (3/2022):  Ms Mario Rosenbaum is a 77-year-old female with history significant for polymyositis diagnosed in 2002 and primary generalized osteoarthritis who presents to establish with [facility]  She is currently on prednisone 5 mg once daily  She is transferring care from Dr Maria De Jesus Ashton at [facility]  She is self-referred today      Patient reports in 2002 after she presented with proximal muscle weakness primarily affecting her bilateral lower extremities she underwent a muscle biopsy which was consistent with polymyositis  She was also referred to [facility] for their opinion  She reports since then she has been on prednisone constantly but at varying doses  She was initially started on methotrexate which she was on for a few years but cannot recall if this specifically helped her  It was discontinued eventually due to shortness of breath  She was then started on mycophenolate which she was on for approximately 10 years and states that it was discontinued 1-2 years ago due to diarrhea  The diarrhea resolved upon discontinuation of the medication  She is unsure if this really helped with the polymyositis either  She has also received about 3-4 doses of IVIG in 2009 and she feels like this would help her the most   This has not been done any time recently  She reports over the years she may experience 1-2 flare ups of leg weakness for which the prednisone dose is increased  This most recently occurred about 6-9 months ago at which time her prednisone was increased to 50 mg once daily and gradually tapered to her current dose of 5 mg once daily  She reports with this dose she is still feeling very weak and usually the lowest effective dose is about 15 mg once daily  She does not want to increase the steroids due to side effects such as weight gain  She does not experience significant upper body weakness  She reports with her lower extremities she is unable to get up from a seated position without assistance and unable to stand without assistance    She has had home physical therapy in the past but none recently      She denies fevers, unintentional weight loss, alopecia, dry eyes (report some dry mouth in the morning), inflammatory eye disease, skin rash, psoriasis, photosensitivity, skin thickening/tightening, recurrent sinus disease, epistaxis, mouth/nose ulcers, swollen glands, chest pain, persistent shortness of breath (does experience this sometimes due to asthma), cough, hemoptysis, dysphagia, persistent acid reflux, inflammatory bowel disease, blood clots, miscarriages, Raynaud's, concerning joint pain/swelling/stiffness or a family history of autoimmune disease  6/24/2022:  Patient presents for a follow-up of polymyositis  She is currently on prednisone 7 5 mg once daily  I reviewed the workup done after the last office visit which showed an elevated CK of 478  The C-reactive protein was elevated at 21  An SILVANO screen, Sjogren's antibodies, rheumatoid factor, anti CCP antibody, ESR, hepatitis panel, CBC and vitamin-D levels were unremarkable  An ultrasound of her bilateral thighs showed increased echotexture throughout the rectus femoris musculature bilaterally without evidence of hyperemia  A DEXA scan was normal   An x-ray of her right hand showed ulnar subluxation of the 2nd through 5th metacarpophalangeal joints with diffuse osteopenia  There was no marginal erosions or juxta-articular osteopenia  She does not report any new complaints from the last office visit  She did start home physical therapy for the bilateral lower extremity muscle strengthening which helps to some degree and she is continuing with exercises on her own        Past Medical History:   Diagnosis Date    Acute diverticulitis 9/21/2018    Anemia     Asthma     Chronic pain     bulging discs in back,polymyositis    Diabetes mellitus ([address] )     Disease of thyroid gland     Diverticulitis     Hyperlipidemia     Hypertension     Polymyositis ([address] )        Past Surgical History:   Procedure Laterality Date    HYSTERECTOMY      SINUS SURGERY      THYROIDECTOMY      TUBAL LIGATION         Current Outpatient Medications   Medication Sig Dispense Refill    gabapentin (NEURONTIN) 300 mg capsule Take 1 tablet in the morning and 3 tablets at bedtime  120 capsule 5    albuterol (PROVENTIL HFA,VENTOLIN HFA) 90 mcg/act inhaler Inhale 2 puffs every 6 (six) hours as needed for wheezing 18 g 1    amLODIPine (NORVASC) 5 mg tablet amlodipine 5 mg tablet   TAKE 1 TABLET BY MOUTH ONCE DAILY      B-D UF III MINI PEN NEEDLES 31G X 5 MM MISC Use to inject insulin daily 100 each 3    benazepril (LOTENSIN) 10 mg tablet benazepril 10 mg tablet   TAKE 1 TABLET BY MOUTH ONCE DAILY      Cholecalciferol 25 MCG (1000 UT) tablet Take 1 tablet by mouth daily      cyanocobalamin (VITAMIN B-12) 1000 MCG tablet Vitamin B-12  1,000 mcg tablet      ferrous sulfate 325 (65 Fe) mg tablet Take 1 tablet by mouth daily with breakfast      insulin glargine (LANTUS) 100 units/mL subcutaneous injection Inject 30 Units under the skin daily at bedtime 15 mL 3    Lancets (OneTouch Delica Plus [id number]) MISC       levothyroxine 88 mcg tablet Take 1 tablet by mouth daily      metFORMIN (GLUCOPHAGE-XR) 500 mg 24 hr tablet Take 1 tablet (500 mg total) by mouth 2 (two) times a day with meals 180 tablet 2    omeprazole (PriLOSEC) 40 MG capsule omeprazole 40 mg capsule,delayed release      OneTouch Verio test strip Use to test blood sugar 2 times a day 100 strip 3    predniSONE 10 mg tablet Take 7 5 mg by mouth daily Patient reports taking 15 mg daily   sitaGLIPtin (JANUVIA) 25 mg tablet Take 1 tablet (25 mg total) by mouth daily 90 tablet 0     No current facility-administered medications for this visit          Allergies   Allergen Reactions    Anaprox [Naproxen Sodium] Shortness Of Breath    Levofloxacin Hives    Sulfamethoxazole-Trimethoprim Anaphylaxis    Jardiance [Empagliflozin] GI Intolerance    Penicillins Swelling    Aspirin Rash    Metronidazole Rash    Sulfa Antibiotics Rash       Review of Systems  Constitutional: Negative for weight change, fevers, chills, night sweats  Positive for fatigue  ENT/Mouth: Negative for hearing changes, ear pain, nasal congestion, sinus pain, hoarseness, sore throat, rhinorrhea, swallowing difficulty  Eyes: Negative for pain, redness, discharge, vision changes  Cardiovascular: Negative for chest pain, SOB, palpitations  Respiratory: Negative for cough, sputum, wheezing, dyspnea  Gastrointestinal: Negative for nausea, vomiting, diarrhea, constipation, pain, heartburn  Genitourinary: Negative for dysuria, urinary frequency, hematuria  Musculoskeletal: As per HPI  Skin: Negative for skin rash, color changes  Neuro: Negative for numbness, tingling, loss of consciousness  Positive for weakness  Psych: Negative for anxiety, depression  Heme/Lymph: Negative for easy bruising, bleeding, lymphadenopathy  Assessment and Plan:   Ms Lieutenant Gustafson is a 72-year-old female with history significant for polymyositis diagnosed in 2002 and primary generalized osteoarthritis who presents for a follow-up  She is currently on prednisone 7 5 mg once daily        # Polymyositis  - Jadyn presents today for a follow-up of polymyositis which was diagnosed in 2002  She primarily presents with bilateral lower extremity weakness which was considered by her prior rheumatologist to be secondary to residual irreversible weakness as a result of the myopathy as opposed to ongoing active inflammation  In view of this she was only maintained on prednisone at 7 5 mg once daily for symptomatic relief  Prior medication options included methotrexate which was discontinued due to pulmonary issues, mycophenolate which was discontinued due to diarrhea and IVIG which she recalls as being beneficial but it was unclear why this was discontinued        - To evaluate if there may be concerns for active muscle disease I proceeded with an ultrasound of her bilateral thigh muscles which did not show hyperemia/evidence of active inflammation  I suspect most likely we may be dealing with burned-out disease but to assess if there may be any degree of improvement I am agreeable to restarting her on monthly home IVIG infusions 2g/kg in divided doses over the next few months to see if this makes a difference  In the meanwhile I strongly recommend she continue with home physical therapy exercises      # Chronic steroid use  - She is aware of the side effects of chronic steroid use including but not limited to risk for infections, hypertension, diabetes, cataracts/glaucoma, gastritis, osteoporosis and avascular necrosis  Reassuringly a DEXA scan was normal   She should continue with daily calcium and vitamin-D supplements        I spent 11 minutes directly with the patient during this visit  VIRTUAL VISIT DISCLAIMER      [employer] verbally agrees to participate in [employer]  Pt is aware that [employer] could be limited without vital signs or the ability to perform a full hands-on physical Lee Dean understands she or the provider may request at any time to terminate the video visit and request the patient to seek care or treatment in person

## 2022-06-27 NOTE — TELEPHONE ENCOUNTER
Spoke with [facility] and we faxed over all the information to see if they will approve patients home infusions, they will let us know

## 2022-07-21 ENCOUNTER — TELEPHONE (OUTPATIENT)
Dept: OBGYN CLINIC | Facility: HOSPITAL | Age: 72
End: 2022-07-21

## 2022-07-21 NOTE — TELEPHONE ENCOUNTER
Patient sees Dr Agustin Campos      Patient is calling to advise her insurance approved her infusions and she received the medication   They are going to start them on 7/26 and wanted to make sure its okay with Dr Agustin Campos to start them          CB: [phone]

## 2022-09-16 ENCOUNTER — TELEPHONE (OUTPATIENT)
Dept: OBGYN CLINIC | Facility: HOSPITAL | Age: 72
End: 2022-09-16

## 2022-09-21 ENCOUNTER — TELEPHONE (OUTPATIENT)
Dept: FAMILY MEDICINE CLINIC | Facility: CLINIC | Age: 72
End: 2022-09-21

## 2022-09-21 DIAGNOSIS — Z79.4 TYPE 2 DIABETES MELLITUS WITH HYPERGLYCEMIA, WITH LONG-TERM CURRENT USE OF INSULIN (HCC): ICD-10-CM

## 2022-09-21 DIAGNOSIS — E03.9 ACQUIRED HYPOTHYROIDISM: ICD-10-CM

## 2022-09-21 DIAGNOSIS — E11.65 TYPE 2 DIABETES MELLITUS WITH HYPERGLYCEMIA, WITH LONG-TERM CURRENT USE OF INSULIN (HCC): ICD-10-CM

## 2022-09-21 DIAGNOSIS — E03.9 ACQUIRED HYPOTHYROIDISM: Primary | ICD-10-CM

## 2022-09-21 RX ORDER — LEVOTHYROXINE SODIUM 88 UG/1
TABLET ORAL
Qty: 90 TABLET | Refills: 0 | Status: SHIPPED | OUTPATIENT
Start: 2022-09-21 | End: 2022-09-21 | Stop reason: SDUPTHER

## 2022-09-21 RX ORDER — LEVOTHYROXINE SODIUM 88 UG/1
TABLET ORAL
Qty: 90 TABLET | Refills: 0 | Status: SHIPPED | OUTPATIENT
Start: 2022-09-21 | End: 2022-09-23 | Stop reason: SDUPTHER

## 2022-09-21 NOTE — TELEPHONE ENCOUNTER
Spoke with patient to make an appt as soon as she can with endocrinologist  Dr Kierra Angel will fill just this once    Aparna Dang MA

## 2022-09-21 NOTE — TELEPHONE ENCOUNTER
DR ZHU    Patient called to say that Dr Rogena Spatz is not refilling her [place] [facility] [place]  She is asking if you could call her back  She is also asking for Levothyroxine  Please call back she seems confused about her medications

## 2022-09-23 DIAGNOSIS — E03.9 ACQUIRED HYPOTHYROIDISM: ICD-10-CM

## 2022-09-23 DIAGNOSIS — Z79.4 TYPE 2 DIABETES MELLITUS WITH HYPERGLYCEMIA, WITH LONG-TERM CURRENT USE OF INSULIN (HCC): ICD-10-CM

## 2022-09-23 DIAGNOSIS — E11.65 TYPE 2 DIABETES MELLITUS WITH HYPERGLYCEMIA, WITH LONG-TERM CURRENT USE OF INSULIN (HCC): ICD-10-CM

## 2022-09-23 RX ORDER — LEVOTHYROXINE SODIUM 88 UG/1
TABLET ORAL
Qty: 90 TABLET | Refills: 0 | Status: SHIPPED | OUTPATIENT
Start: 2022-09-23

## 2022-09-26 ENCOUNTER — TELEPHONE (OUTPATIENT)
Dept: OBGYN CLINIC | Facility: OTHER | Age: 72
End: 2022-09-26

## 2022-09-26 NOTE — TELEPHONE ENCOUNTER
Patient called in , she was looking for a follow up with Dr Uzma Lynn, I offered the next available which was 10-03, she declined,    So I offered the Next available which was january, she said she will call salome

## 2022-10-10 DIAGNOSIS — E03.9 ACQUIRED HYPOTHYROIDISM: Primary | ICD-10-CM

## 2022-10-10 RX ORDER — INSULIN GLARGINE 100 [IU]/ML
INJECTION, SOLUTION SUBCUTANEOUS
Qty: 15 ML | Refills: 1 | Status: SHIPPED | OUTPATIENT
Start: 2022-10-10

## 2022-10-25 DIAGNOSIS — Z79.4 TYPE 2 DIABETES MELLITUS WITH HYPERGLYCEMIA, WITH LONG-TERM CURRENT USE OF INSULIN (HCC): ICD-10-CM

## 2022-10-25 DIAGNOSIS — I10 PRIMARY HYPERTENSION: ICD-10-CM

## 2022-10-25 DIAGNOSIS — E11.65 TYPE 2 DIABETES MELLITUS WITH HYPERGLYCEMIA, WITH LONG-TERM CURRENT USE OF INSULIN (HCC): ICD-10-CM

## 2022-10-25 RX ORDER — BLOOD SUGAR DIAGNOSTIC
STRIP MISCELLANEOUS
Qty: 100 STRIP | Refills: 1 | Status: SHIPPED | OUTPATIENT
Start: 2022-10-25

## 2022-10-27 ENCOUNTER — TELEPHONE (OUTPATIENT)
Dept: OBGYN CLINIC | Facility: HOSPITAL | Age: 72
End: 2022-10-27

## 2022-10-27 NOTE — TELEPHONE ENCOUNTER
Caller: Patient    Doctor: Marina Andrea    Reason for call: Patient was returning a call from our office  Please call her back      Call back#: [phone]

## 2022-10-27 NOTE — TELEPHONE ENCOUNTER
I do not necessarily think that the infusions are making her weaker, I think she is probably getting weaker as a result of the underlying disease  If she does not feel like the infusions are helping though we can stop it  Due to the chronic inflammation process which has affected the muscles I do not think that medications are going to help her and she needs to continue with physical therapy

## 2022-10-27 NOTE — TELEPHONE ENCOUNTER
Spoke with patient and relayed information, she will give us a call back to let us know what she would like to do

## 2022-10-27 NOTE — TELEPHONE ENCOUNTER
Caller: Patient    Doctor: Eunice Mccollum    Reason for call: Patient states her infusions are making her weaker and would like to know if she shout stop them  Also patient has an appt in January but would like to be seen sooner due to her weakness   Please advise    Call back#: [phone]

## 2022-11-17 ENCOUNTER — TELEPHONE (OUTPATIENT)
Dept: FAMILY MEDICINE CLINIC | Facility: CLINIC | Age: 72
End: 2022-11-17

## 2022-11-17 NOTE — TELEPHONE ENCOUNTER
Bossman Plummer is calling asking for a virtual apt  She has chronic back pain and it is getting worse  She usually sees Dr Mary Ann Parr  Can we do a virtual today or tomorrow for patient    Please call back

## 2022-11-22 NOTE — TELEPHONE ENCOUNTER
I spoke to [facility] [place], she says she would have to call back to schedule an appt as her children have to drive her and they will choose a time and date  There is no further action required at this time     Tye Martin MA

## 2022-11-25 ENCOUNTER — OFFICE VISIT (OUTPATIENT)
Dept: FAMILY MEDICINE CLINIC | Facility: CLINIC | Age: 72
End: 2022-11-25

## 2022-11-25 VITALS
HEART RATE: 101 BPM | DIASTOLIC BLOOD PRESSURE: 64 MMHG | OXYGEN SATURATION: 100 % | RESPIRATION RATE: 16 BRPM | TEMPERATURE: 97.9 F | SYSTOLIC BLOOD PRESSURE: 124 MMHG

## 2022-11-25 DIAGNOSIS — M54.50 CHRONIC BILATERAL LOW BACK PAIN WITHOUT SCIATICA: ICD-10-CM

## 2022-11-25 DIAGNOSIS — I10 ESSENTIAL HYPERTENSION: ICD-10-CM

## 2022-11-25 DIAGNOSIS — Z79.4 TYPE 2 DIABETES MELLITUS WITHOUT COMPLICATION, WITH LONG-TERM CURRENT USE OF INSULIN (HCC): ICD-10-CM

## 2022-11-25 DIAGNOSIS — E11.9 TYPE 2 DIABETES MELLITUS WITHOUT COMPLICATION, WITH LONG-TERM CURRENT USE OF INSULIN (HCC): ICD-10-CM

## 2022-11-25 DIAGNOSIS — G89.29 CHRONIC BILATERAL LOW BACK PAIN WITHOUT SCIATICA: ICD-10-CM

## 2022-11-25 DIAGNOSIS — M15.0 PRIMARY GENERALIZED (OSTEO)ARTHRITIS: ICD-10-CM

## 2022-11-25 DIAGNOSIS — M33.20 POLYMYOSITIS (HCC): Primary | ICD-10-CM

## 2022-11-25 DIAGNOSIS — E03.9 ACQUIRED HYPOTHYROIDISM: ICD-10-CM

## 2022-11-25 RX ORDER — GABAPENTIN 300 MG/1
CAPSULE ORAL
Qty: 180 CAPSULE | Refills: 1 | Status: SHIPPED | OUTPATIENT
Start: 2022-11-25

## 2022-11-25 NOTE — PATIENT INSTRUCTIONS
You can increase Gabapentin to 2 capsules in the morning and add 1 capsule in the afternoon  Continue the 3 capsules at bedtime  Please let us know if you this make you feels drowsy  This can be further titrated if needed

## 2022-11-25 NOTE — PROGRESS NOTES
Assessment/Plan:    1  Polymyositis ([facility] [address] )  Assessment & Plan:  Worsening, management per rheumatology  Referral placed for PT for her  Discussed increasing her Gabapentin for better symptom control  F/u with rheumatology as scheduled    Orders:  -     EXTERNAL Referral to  [facility]; Future    2  Chronic bilateral low back pain without sciatica  -     EXTERNAL Referral to Home Health; Future    3  Primary generalized (osteo)arthritis  -     gabapentin (NEURONTIN) 300 mg capsule; Take 2 capsules (600 mg total) by mouth every morning AND 1 capsule (300 mg total) daily after lunch AND 3 capsules (900 mg total) daily at bedtime  Take 1 tablet in the morning and 3 tablets at bedtime       4  Type 2 diabetes mellitus without complication, with long-term current use of insulin ([address] )  Assessment & Plan:  Reviewed labs and medications  Order given for repeat labs prior to next f/u with PCP  Lab Results   Component Value Date    HGBA1C 7 2 (H) 06/16/2022       Orders:  -     Comprehensive metabolic panel; Future  -     Lipid panel; Future  -     Hemoglobin A1C; Future    5  Acquired hypothyroidism  -     TSH, 3rd generation with Free T4 reflex; Future    6  Essential hypertension  Assessment & Plan:  BP stable              Patient Instructions   You can increase Gabapentin to 2 capsules in the morning and add 1 capsule in the afternoon  Continue the 3 capsules at bedtime  Please let us know if you this make you feels drowsy  This can be further titrated if needed  No follow-ups on file  Subjective:      Patient ID: Saw Love is a 67 y o  female  Chief Complaint   Patient presents with   • Headache   • Generalized Body Aches   • Knee Pain   • Back Pain     Cannot even stand up anymore to help herself  She has received Octogam infusions in past but did not work  Prescribed by Dr Jaclyn Foster  Here today with complaints of worsening low back pain    Requiring extra help with ambulation, has been ongoing for the past 2-3 months  Has been going for IVIG infusion, does not feel that it is helping  Was diagnosed with polymyositis approx 20 years ago  Sees rheumatology for this      The following portions of the patient's history were reviewed and updated as appropriate: allergies, current medications, past family history, past medical history, past social history, past surgical history and problem list     Review of Systems   Constitutional: Negative  Respiratory: Negative  Cardiovascular: Negative  Musculoskeletal: Positive for back pain  See HPI   Neurological: Positive for weakness  Current Outpatient Medications   Medication Sig Dispense Refill   • albuterol (PROVENTIL HFA,VENTOLIN HFA) 90 mcg/act inhaler Inhale 2 puffs every 6 (six) hours as needed for wheezing 18 g 1   • amLODIPine (NORVASC) 5 mg tablet amlodipine 5 mg tablet   TAKE 1 TABLET BY MOUTH ONCE DAILY     • B-D UF III MINI PEN NEEDLES 31G X 5 MM MISC Use to inject insulin daily 100 each 3   • benazepril (LOTENSIN) 10 mg tablet benazepril 10 mg tablet   TAKE 1 TABLET BY MOUTH ONCE DAILY     • Cholecalciferol 25 MCG (1000 UT) tablet Take 1 tablet by mouth daily     • cyanocobalamin (VITAMIN B-12) 1000 MCG tablet Vitamin B-12  1,000 mcg tablet     • gabapentin (NEURONTIN) 300 mg capsule Take 2 capsules (600 mg total) by mouth every morning AND 1 capsule (300 mg total) daily after lunch AND 3 capsules (900 mg total) daily at bedtime  Take 1 tablet in the morning and 3 tablets at bedtime    180 capsule 1   • glucose blood (OneTouch Verio) test strip CHECK BLOOD GLUCOSE TWICE DAILY 100 strip 1   • insulin glargine (LANTUS) 100 units/mL subcutaneous injection Inject 30 Units under the skin daily at bedtime 15 mL 3   • Lancets ([place] UKTouch Delica Plus [id number]) MISC      • Lantus SoloStar 100 units/mL SOPN INJECT 30 UNITS UNDER SKIN DAILY AT BEDTIME   15 mL 1   • levothyroxine 88 mcg tablet Take one tablet by mouth daily in early morning on empty stomach 90 tablet 0   • metFORMIN (GLUCOPHAGE-XR) 500 mg 24 hr tablet Take 1 tablet (500 mg total) by mouth 2 (two) times a day with meals 180 tablet 2   • omeprazole (PriLOSEC) 40 MG capsule omeprazole 40 mg capsule,delayed release     • predniSONE 10 mg tablet Take 7 5 mg by mouth daily Patient reports taking 15 mg daily  • sitaGLIPtin (JANUVIA) 25 mg tablet Take 1 tablet (25 mg total) by mouth daily 90 tablet 0     No current facility-administered medications for this visit  Objective:    /64   Pulse 101   Temp 97 9 °F (36 6 °C)   Resp 16   SpO2 100%        Physical Exam  Vitals and nursing note reviewed  Constitutional:       Appearance: Normal appearance  She is well-developed and well-nourished  Comments: In wheelchair     Cardiovascular:      Rate and Rhythm: Normal rate and regular rhythm  Pulses: Normal pulses  Heart sounds: Normal heart sounds  No murmur heard  Pulmonary:      Effort: Pulmonary effort is normal       Breath sounds: Normal breath sounds  Skin:     General: Skin is warm and dry  Neurological:      Mental Status: She is alert     Psychiatric:         Mood and Affect: Mood and affect and mood normal          Behavior: Behavior normal                 CHICA James

## 2022-11-28 ENCOUNTER — TELEPHONE (OUTPATIENT)
Dept: FAMILY MEDICINE CLINIC | Facility: CLINIC | Age: 72
End: 2022-11-28

## 2022-11-28 DIAGNOSIS — M54.50 CHRONIC BILATERAL LOW BACK PAIN WITHOUT SCIATICA: ICD-10-CM

## 2022-11-28 DIAGNOSIS — Z79.4 TYPE 2 DIABETES MELLITUS WITHOUT COMPLICATION, WITH LONG-TERM CURRENT USE OF INSULIN (HCC): ICD-10-CM

## 2022-11-28 DIAGNOSIS — E11.9 TYPE 2 DIABETES MELLITUS WITHOUT COMPLICATION, WITH LONG-TERM CURRENT USE OF INSULIN (HCC): ICD-10-CM

## 2022-11-28 DIAGNOSIS — M33.20 POLYMYOSITIS (HCC): Primary | ICD-10-CM

## 2022-11-28 DIAGNOSIS — G89.29 CHRONIC BILATERAL LOW BACK PAIN WITHOUT SCIATICA: ICD-10-CM

## 2022-11-28 NOTE — ASSESSMENT & PLAN NOTE
Reviewed labs and medications    Order given for repeat labs prior to next f/u with PCP  Lab Results   Component Value Date    HGBA1C 7 2 (H) 06/16/2022

## 2022-11-28 NOTE — ASSESSMENT & PLAN NOTE
Worsening, management per rheumatology  Referral placed for PT for her  Discussed increasing her Gabapentin for better symptom control     F/u with rheumatology as scheduled

## 2022-11-29 NOTE — TELEPHONE ENCOUNTER
Spoke to [facility] please redo [facility] order to reflect [facility]    Adina Khan

## 2022-12-08 LAB — HBA1C MFR BLD HPLC: 6.8 %

## 2022-12-09 LAB
ALBUMIN SERPL-MCNC: 3.7 G/DL (ref 3.7–4.7)
ALBUMIN/GLOB SERPL: 1.3 {RATIO} (ref 1.2–2.2)
ALP SERPL-CCNC: 75 IU/L (ref 44–121)
ALT SERPL-CCNC: 17 IU/L (ref 0–32)
AST SERPL-CCNC: 20 IU/L (ref 0–40)
BILIRUB SERPL-MCNC: 0.3 MG/DL (ref 0–1.2)
BUN SERPL-MCNC: 13 MG/DL (ref 8–27)
BUN/CREAT SERPL: 25 (ref 12–28)
CALCIUM SERPL-MCNC: 8.9 MG/DL (ref 8.7–10.3)
CHLORIDE SERPL-SCNC: 99 MMOL/L (ref 96–106)
CHOLEST SERPL-MCNC: 201 MG/DL (ref 100–199)
CO2 SERPL-SCNC: 24 MMOL/L (ref 20–29)
CREAT SERPL-MCNC: 0.52 MG/DL (ref 0.57–1)
EGFR: 99 ML/MIN/1.73
GLOBULIN SER-MCNC: 2.9 G/DL (ref 1.5–4.5)
GLUCOSE SERPL-MCNC: 114 MG/DL (ref 70–99)
HBA1C MFR BLD: 6.8 % (ref 4.8–5.6)
HDLC SERPL-MCNC: 56 MG/DL
LDLC SERPL CALC-MCNC: 120 MG/DL (ref 0–99)
MICRODELETION SYND BLD/T FISH: NORMAL
POTASSIUM SERPL-SCNC: 4.2 MMOL/L (ref 3.5–5.2)
PROT SERPL-MCNC: 6.6 G/DL (ref 6–8.5)
SL AMB VLDL CHOLESTEROL CALC: 25 MG/DL (ref 5–40)
SODIUM SERPL-SCNC: 141 MMOL/L (ref 134–144)
TRIGL SERPL-MCNC: 142 MG/DL (ref 0–149)
TSH SERPL DL<=0.005 MIU/L-ACNC: 0.82 UIU/ML (ref 0.45–4.5)

## 2022-12-12 DIAGNOSIS — E11.65 TYPE 2 DIABETES MELLITUS WITH HYPERGLYCEMIA, WITH LONG-TERM CURRENT USE OF INSULIN (HCC): ICD-10-CM

## 2022-12-12 DIAGNOSIS — Z79.4 TYPE 2 DIABETES MELLITUS WITH HYPERGLYCEMIA, WITH LONG-TERM CURRENT USE OF INSULIN (HCC): ICD-10-CM

## 2022-12-12 DIAGNOSIS — I10 PRIMARY HYPERTENSION: ICD-10-CM

## 2022-12-13 RX ORDER — METFORMIN HYDROCHLORIDE 500 MG/1
TABLET, EXTENDED RELEASE ORAL
Qty: 180 TABLET | Refills: 0 | Status: SHIPPED | OUTPATIENT
Start: 2022-12-13

## 2023-01-04 DIAGNOSIS — I10 ESSENTIAL HYPERTENSION: Primary | ICD-10-CM

## 2023-01-04 RX ORDER — BENAZEPRIL HYDROCHLORIDE 10 MG/1
10 TABLET ORAL DAILY
Qty: 90 TABLET | Refills: 0 | Status: SHIPPED | OUTPATIENT
Start: 2023-01-04

## 2023-01-06 ENCOUNTER — TELEPHONE (OUTPATIENT)
Dept: OBGYN CLINIC | Facility: HOSPITAL | Age: 73
End: 2023-01-06

## 2023-01-06 NOTE — TELEPHONE ENCOUNTER
Caller: Jadyn    Doctor: Verena Curling    Reason for call: Patient is asking if her 1/9 appt can be a virtual vist  Her son was not able to get off work      Call back#: [phone]

## 2023-01-07 ENCOUNTER — HOSPITAL ENCOUNTER (EMERGENCY)
Facility: HOSPITAL | Age: 73
Discharge: HOME | End: 2023-01-07
Attending: EMERGENCY MEDICINE

## 2023-01-07 ENCOUNTER — APPOINTMENT (EMERGENCY)
Dept: RADIOLOGY | Facility: HOSPITAL | Age: 73
End: 2023-01-07

## 2023-01-07 VITALS
SYSTOLIC BLOOD PRESSURE: 147 MMHG | BODY MASS INDEX: 32.2 KG/M2 | WEIGHT: 230 LBS | HEART RATE: 81 BPM | TEMPERATURE: 97.8 F | RESPIRATION RATE: 20 BRPM | DIASTOLIC BLOOD PRESSURE: 74 MMHG | HEIGHT: 71 IN | OXYGEN SATURATION: 97 %
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
## 2023-01-07 DIAGNOSIS — S09.90XA INJURY OF HEAD, INITIAL ENCOUNTER: ICD-10-CM

## 2023-01-07 DIAGNOSIS — S93.409A ANKLE SPRAIN: ICD-10-CM

## 2023-01-07 DIAGNOSIS — M54.9 BACK PAIN: ICD-10-CM

## 2023-01-07 LAB — GLUCOSE SERPL-MCNC: 156 MG/DL (ref 65–140)

## 2023-01-07 RX ORDER — ACETAMINOPHEN 325 MG/1
650 TABLET ORAL ONCE
Status: COMPLETED | OUTPATIENT
Start: 2023-01-07 | End: 2023-01-07

## 2023-01-07 RX ADMIN — ACETAMINOPHEN 650 MG: 325 TABLET, FILM COATED ORAL at 10:05

## 2023-01-07 NOTE — ED PROVIDER NOTES
History  Chief Complaint   Patient presents with   • Fall   • Ankle Injury     Patient comes thia am states she fell in her bathroom last night around 10pm,hit right side of back of her head,no LOC,hit both elbows and hurt her left ankle     Patient presents for evaluation after a fall  Patient has a history of polymyositis and often has some muscle weakness  She was attempting to use the bathroom last night when her legs became weak and she did not have anything to grab onto  She then fell to the ground around 10 PM   She hit her head but denies any loss of consciousness  She also struck both elbows posteriorly  She also injured her left ankle  States she has been unable to tolerate any weightbearing on the left ankle since the fall  History provided by:  Patient   used: No    Fall  Associated symptoms: back pain and headaches    Associated symptoms: no abdominal pain, no chest pain, no seizures and no vomiting    Ankle Injury  Associated symptoms: headaches    Associated symptoms: no abdominal pain, no chest pain, no cough, no ear pain, no fever, no rash, no shortness of breath, no sore throat and no vomiting        Prior to Admission Medications   Prescriptions Last Dose Informant Patient Reported? Taking? B-D UF III MINI PEN NEEDLES 31G X 5 MM MISC   No Yes   Sig: Use to inject insulin daily   Cholecalciferol 25 MCG (1000 UT) tablet   Yes Yes   Sig: Take 1 tablet by mouth daily   Lancets (OneTouch Delica Plus [id number]) MISC   Yes Yes   Lantus SoloStar 100 units/mL SOPN   No Yes   Sig: INJECT 30 UNITS UNDER SKIN DAILY AT BEDTIME     albuterol (PROVENTIL HFA,VENTOLIN HFA) 90 mcg/act inhaler   No Yes   Sig: Inhale 2 puffs every 6 (six) hours as needed for wheezing   amLODIPine (NORVASC) 5 mg tablet   Yes Yes   Sig: amlodipine 5 mg tablet   TAKE 1 TABLET BY MOUTH ONCE DAILY   benazepril (LOTENSIN) 10 mg tablet   No Yes   Sig: Take 1 tablet (10 mg total) by mouth daily cyanocobalamin (VITAMIN B-12) 1000 MCG tablet   Yes Yes   Sig: Vitamin B-12  1,000 mcg tablet   gabapentin (NEURONTIN) 300 mg capsule   No Yes   Sig: Take 2 capsules (600 mg total) by mouth every morning AND 1 capsule (300 mg total) daily after lunch AND 3 capsules (900 mg total) daily at bedtime  Take 1 tablet in the morning and 3 tablets at bedtime  Sarah Bello glucose blood (OneTouch Verio) test strip   No Yes   Sig: CHECK BLOOD GLUCOSE TWICE DAILY   insulin glargine (LANTUS) 100 units/mL subcutaneous injection   No Yes   Sig: Inject 30 Units under the skin daily at bedtime   levothyroxine 88 mcg tablet   No Yes   Sig: Take one tablet by mouth daily in early morning on empty stomach   metFORMIN (GLUCOPHAGE-XR) 500 mg 24 hr tablet   No Yes   Sig: TAKE ONE TABLET TWO TIMES DAILY WITH MEALS  omeprazole (PriLOSEC) 40 MG capsule   Yes Yes   Sig: omeprazole 40 mg capsule,delayed release   predniSONE 10 mg tablet   Yes Yes   Sig: Take 7 5 mg by mouth daily Patient reports taking 15 mg daily  sitaGLIPtin (JANUVIA) 25 mg tablet   No Yes   Sig: Take 1 tablet (25 mg total) by mouth daily      Facility-Administered Medications: None       Past Medical History:   Diagnosis Date   • Acute diverticulitis 9/21/2018   • Anemia    • Asthma    • Chronic pain     bulging discs in back,polymyositis   • Diabetes mellitus (HCC)    • Disease of thyroid gland    • Diverticulitis    • Hyperlipidemia    • Hypertension    • Polymyositis ([address] )        Past Surgical History:   Procedure Laterality Date   • HYSTERECTOMY     • SINUS SURGERY     • THYROIDECTOMY     • TUBAL LIGATION         Family History   Problem Relation Age of Onset   • Hepatitis Sister         acute hepatits C virus     I have reviewed and agree with the history as documented      E-Cigarette/Vaping   • E-Cigarette Use Never User      E-Cigarette/Vaping Substances   • Nicotine No    • THC No    • CBD No    • Flavoring No    • Other No    • Unknown No      Social History Tobacco Use   • Smoking status: Never   • Smokeless tobacco: Never   Vaping Use   • Vaping Use: Never used   Substance Use Topics   • Alcohol use: No   • Drug use: No       Review of Systems   Constitutional: Negative for chills and fever  HENT: Negative for ear pain and sore throat  Eyes: Negative for pain and visual disturbance  Respiratory: Negative for cough and shortness of breath  Cardiovascular: Negative for chest pain and palpitations  Gastrointestinal: Negative for abdominal pain and vomiting  Genitourinary: Negative for dysuria and hematuria  Musculoskeletal: Positive for arthralgias and back pain  Skin: Negative for color change and rash  Neurological: Positive for headaches  Negative for seizures, syncope, weakness and numbness  All other systems reviewed and are negative  Physical Exam  Physical Exam  Vitals and nursing note reviewed  Constitutional:       General: She is not in acute distress  HENT:      Head:        Right Ear: External ear normal       Left Ear: External ear normal       Nose: Nose normal       Mouth/Throat:      Mouth: Mucous membranes are moist       Pharynx: Oropharynx is clear  Eyes:      General: No scleral icterus  Extraocular Movements: Extraocular movements intact  Conjunctiva/sclera: Conjunctivae normal       Pupils: Pupils are equal, round, and reactive to light  Cardiovascular:      Rate and Rhythm: Normal rate and regular rhythm  Pulses: Normal pulses  Pulmonary:      Effort: Pulmonary effort is normal  No respiratory distress  Breath sounds: Normal breath sounds  Abdominal:      General: Abdomen is flat  Bowel sounds are normal  There is no distension  Palpations: Abdomen is soft  Tenderness: There is no abdominal tenderness  There is no guarding or rebound  Musculoskeletal:         General: Tenderness present  No deformity  Arms:       Cervical back: Normal range of motion  No tenderness  Back:         Legs:    Skin:     Capillary Refill: Capillary refill takes less than 2 seconds  Findings: No rash  Neurological:      General: No focal deficit present  Mental Status: She is alert and oriented to person, place, and time  Vital Signs  ED Triage Vitals   Temperature Pulse Respirations Blood Pressure SpO2   01/07/23 0935 01/07/23 0935 01/07/23 0935 01/07/23 0935 01/07/23 0935   97 8 °F (36 6 °C) 98 20 147/74 98 %      Temp Source Heart Rate Source Patient Position - Orthostatic VS BP Location FiO2 (%)   01/07/23 0935 01/07/23 0935 01/07/23 0935 01/07/23 0935 --   Tympanic Monitor Lying Right arm       Pain Score       01/07/23 1005       5           Vitals:    01/07/23 0935 01/07/23 1045   BP: 147/74    Pulse: 98 81   Patient Position - Orthostatic VS: Lying          Visual Acuity      ED Medications  Medications   acetaminophen (TYLENOL) tablet 650 mg (650 mg Oral Given 1/7/23 1005)       Diagnostic Studies  Results Reviewed     Procedure Component Value Units Date/Time    Fingerstick Glucose (POCT) [[id number]]  (Abnormal) Collected: 01/07/23 1005    Lab Status: Final result Updated: 01/07/23 1007     POC Glucose 156 mg/dl                  XR ankle 3+ views LEFT   Final Result by Stas Hill MD (01/07 1324)      Mild deformity of the lateral malleolus suggest a nondisplaced lateral malleolar fracture   The study was marked in EPIC for immediate notification  Workstation performed: [id number]         XR elbow 3+ views LEFT   Final Result by Daphne Watkins MD (01/07 1444)      No acute osseous abnormality  Workstation performed: [id number]         XR elbow 3+ views RIGHT   Final Result by Stas Hill MD (01/07 1326)      No acute displaced fracture   No elbow effusion         Workstation performed: [id number]         CT head without contrast   Final Result by Maddy Cisneros MD (01/07 1051)      No acute intracranial abnormality                    Workstation performed: [id number]         CT cervical spine without contrast   Final Result by Jackelyn Jacobson MD (01/07 1057)      No cervical spine fracture or traumatic malalignment  Workstation performed: [id number]         CT thoracic spine without contrast   Final Result by Jackelyn Jacobson MD (01/07 1108)      No acute osseous abnormality  Workstation performed: [id number]         CT lumbar spine without contrast   Final Result by Jackelyn Jacobson MD (01/07 1108)      No acute osseous abnormality  Workstation performed: [id number]                    Procedures  Splint application    Date/Time: 1/8/2023 7:20 AM  Performed by: Steph Ko DO  Authorized by: Steph Ko DO   Universal Protocol:  Consent: Verbal consent obtained  Consent given by: patient  Patient identity confirmed: verbally with patient and arm band      Pre-procedure details:     Sensation:  Normal  Procedure details:     Laterality:  Left    Location:  Ankle    Ankle:  L ankle    Splint type: Aircast stirrup splint  Post-procedure details:     Pain:  Unchanged    Sensation:  Normal    Patient tolerance of procedure: Tolerated well, no immediate complications             ED Course                               SBIRT 20yo+    Flowsheet Row Most Recent Value   SBIRT (25 yo +)    In order to provide better care to our patients, we are screening all of our patients for alcohol and drug use  Would it be okay to ask you these screening questions? Yes Filed at: 01/07/2023 1050   Initial Alcohol Screen: US AUDIT-C     1  How often do you have a drink containing alcohol? 1 Filed at: 01/07/2023 1050   2  How many drinks containing alcohol do you have on a typical day you are drinking? 0 Filed at: 01/07/2023 1050   3b  FEMALE Any Age, or MALE 65+: How often do you have 4 or more drinks on one occassion?  0 Filed at: 01/07/2023 1050   Audit-C Score 1 Filed at: 01/07/2023 1050   CHARLEY: How many times in the past year have you    Used an illegal drug or used a prescription medication for non-medical reasons? Never Filed at: 01/07/2023 1050                    Medical Decision Making  Pulse ox 98% on room air indicating adequate oxygenation  Xray L ankle: No acute fracture or dislocation as read by me  Xray L Elbow: No acute fracture or dislocation as read by me  Xray R elbow: No acute fracture or dislocation as read by me    Discussed CT and x-ray results with patient and sons at bedside  Patient ambulation is limited at baseline secondary to polymyositis  She has a wheelchair and uses a walker to ambulate short distances  Recommended rest ice and elevation with weightbearing as tolerated on right ankle  Recommend follow-up with orthopedics  Ankle sprain: acute illness or injury  Back pain: acute illness or injury  Fall, initial encounter: acute illness or injury  Injury of head, initial encounter: acute illness or injury  Amount and/or Complexity of Data Reviewed  Labs: ordered  Radiology: ordered  Risk  OTC drugs  Disposition  Final diagnoses:   Fall, initial encounter   Injury of head, initial encounter   Back pain   Ankle sprain     Time reflects when diagnosis was documented in both MDM as applicable and the Disposition within this note     Time User Action Codes Description Comment    1/7/2023 11:20 AM Dalila Fogo Add [T05  PDMH] Fall, initial encounter     1/7/2023 11:20 AM Mauricio Elliott Add [S09 90XA] Injury of head, initial encounter     1/7/2023 11:20 AM Jani CHONG Add [M54 9] Back pain     1/7/2023 11:21 AM Dalila Fogo Add [S93 409A] Ankle sprain       ED Disposition     ED Disposition   Discharge    Condition   Stable    Date/Time   Sat Jan 7, 2023 11:20 AM    Comment   Leocadia Blood discharge to home/self care                 Follow-up Information     Follow up With Specialties Details Why Contact Info    Silva Dorantes MD Family Medicine In 1 week  [id number] [facility] [address]      Hayden Tobar MD Orthopedic Surgery In 1 week As needed [address] [facility] [address]  [phone]            Discharge Medication List as of 1/7/2023 11:21 AM      CONTINUE these medications which have NOT CHANGED    Details   albuterol (PROVENTIL HFA,VENTOLIN HFA) 90 mcg/act inhaler Inhale 2 puffs every 6 (six) hours as needed for wheezing, Starting Wed 5/4/2022, Normal      amLODIPine (NORVASC) 5 mg tablet amlodipine 5 mg tablet   TAKE 1 TABLET BY MOUTH ONCE DAILY, Historical Med      B-D UF III MINI PEN NEEDLES 31G X 5 MM MISC Use to inject insulin daily, Normal      benazepril (LOTENSIN) 10 mg tablet Take 1 tablet (10 mg total) by mouth daily, Starting Wed 1/4/2023, Normal      Cholecalciferol 25 MCG (1000 UT) tablet Take 1 tablet by mouth daily, Historical Med      cyanocobalamin (VITAMIN B-12) 1000 MCG tablet Vitamin B-12  1,000 mcg tablet, Historical Med      gabapentin (NEURONTIN) 300 mg capsule Multiple Dosages:Starting Fri 11/25/2022Take 2 capsules (600 mg total) by mouth every morning AND 1 capsule (300 mg total) daily after lunch AND 3 capsules (900 mg total) daily at bedtime  Take 1 tablet in the morning and 3 tablets at bedtime   , Normal      glucose blood (OneTouch Verio) test strip CHECK BLOOD GLUCOSE TWICE DAILY, Normal      insulin glargine (LANTUS) 100 units/mL subcutaneous injection Inject 30 Units under the skin daily at bedtime, Starting Mon 4/11/2022, Normal      Lancets (OneTouch Delica Plus [id number]) MISC Starting Fri 3/4/2022, Historical Med      Lantus SoloStar 100 units/mL SOPN INJECT 30 UNITS UNDER SKIN DAILY AT BEDTIME , Normal      levothyroxine 88 mcg tablet Take one tablet by mouth daily in early morning on empty stomach, Normal      metFORMIN (GLUCOPHAGE-XR) 500 mg 24 hr tablet TAKE ONE TABLET TWO TIMES DAILY WITH MEALS , Normal      omeprazole (PriLOSEC) 40 MG capsule omeprazole 40 mg capsule,delayed release, Historical Med      predniSONE 10 mg tablet Take 7 5 mg by mouth daily Patient reports taking 15 mg daily  , Historical Med      sitaGLIPtin (JANUVIA) 25 mg tablet Take 1 tablet (25 mg total) by mouth daily, Starting Fri 9/23/2022, Normal             No discharge procedures on file      PDMP Review     None          ED Provider  Electronically Signed by           Nathanial Cushing, DO  01/08/23 8282

## 2023-01-09 ENCOUNTER — TELEPHONE (OUTPATIENT)
Dept: OBGYN CLINIC | Facility: HOSPITAL | Age: 73
End: 2023-01-09

## 2023-01-09 ENCOUNTER — TELEMEDICINE (OUTPATIENT)
Dept: RHEUMATOLOGY | Facility: CLINIC | Age: 73
End: 2023-01-09

## 2023-01-09 ENCOUNTER — TELEPHONE (OUTPATIENT)
Dept: RHEUMATOLOGY | Facility: CLINIC | Age: 73
End: 2023-01-09

## 2023-01-09 DIAGNOSIS — M15.0 PRIMARY GENERALIZED (OSTEO)ARTHRITIS: ICD-10-CM

## 2023-01-09 DIAGNOSIS — M33.20 POLYMYOSITIS (HCC): Primary | ICD-10-CM

## 2023-01-09 DIAGNOSIS — Z79.52 CURRENT CHRONIC USE OF SYSTEMIC STEROIDS: ICD-10-CM

## 2023-01-09 RX ORDER — PREDNISONE 20 MG/1
20 TABLET ORAL DAILY
Qty: 30 TABLET | Refills: 0 | Status: SHIPPED | OUTPATIENT
Start: 2023-01-09

## 2023-01-09 NOTE — TELEPHONE ENCOUNTER
Caller: Patient    Doctor: Genie King    Reason for call: Patient called to confirm Virtual Visit    Call back#: [phone]

## 2023-01-09 NOTE — TELEPHONE ENCOUNTER
Caller: patient    Doctor: Denisa Melendez    Reason for call: Please fax lab scripts to [phone] so it can be done in her home      Call back#: [phone]    Thank you

## 2023-01-09 NOTE — PROGRESS NOTES
Virtual Regular Visit    Verification of patient location:    Patient is located in the following state in which I hold an active license [place]      Assessment/Plan:    Problem List Items Addressed This Visit        Musculoskeletal and Integument    Polymyositis ([facility] ) - Primary    Relevant Medications    predniSONE 20 mg tablet    Other Relevant Orders    C-reactive protein    CK    Sedimentation rate, automated   Other Visit Diagnoses     Current chronic use of systemic steroids        Primary generalized (osteo)arthritis                   Reason for visit is follow up  Chief Complaint   Patient presents with   • Virtual Regular Visit        Encounter provider Yas Deal MD    Provider located at [address] [place] [id number] [phone]      Recent Visits  Date Type Provider Dept   01[facility]06[facility]23 [facility] recent visits within past 7 days and meeting all other requirements  Today's Visits  Date Type Provider Dept   01/09/23 Telephone Yas Deal MD [facility] Le[facility]ardo Valver[facility]   01/09/23 Telephone MD Angie [facility]el   01/09/23 Telemedicine Yas Deal MD [facility] Norma Calix today's visits and meeting all other requirements  Future Appointments  No visits were found meeting these conditions  Showing future appointments within next 150 days and meeting all other requirements       The patient was identified by name and date of birth  Michele Kristin was informed that this is a telemedicine visit and that the visit is being conducted through Telephone  My office door was closed  No one else was in the room  She acknowledged consent and understanding of privacy and security of the video platform   The patient has agreed to participate and understands they can discontinue the visit at any time     Patient is aware this is a billable service         Subjective    HPI     INITIAL VISIT NOTE (3/2022):  Ms Negrita Boyd a 77-year-old female with history significant for polymyositis diagnosed in 2002 and primary generalized osteoarthritis who presents to establish with [facility]  Pat Thomas is currently on prednisone 5 mg once daily   She is transferring care from Dr Melina Fairbanks  Pat Thomas is self-referred today      Patient reports in 2002 after she presented with proximal muscle weakness primarily affecting her bilateral lower extremities she underwent a muscle biopsy which was consistent with polymyositis  Pat Thomas was also referred to [facility] for their opinion   She reports since then she has been on prednisone constantly but at varying doses   She was initially started on methotrexate which she was on for a few years but cannot recall if this specifically helped her  Lunda Bartholomew was discontinued eventually due to shortness of breath   She was then started on mycophenolate which she was on for approximately 10 years and states that it was discontinued 1-2 years ago due to diarrhea   The diarrhea resolved upon discontinuation of the medication  Pat Thomas is unsure if this really helped with the polymyositis either  Pat Thomas has also received about 3-4 doses of IVIG in 2009 and she feels like this would help her the most   This has not been done any time recently  Pat Thomas reports over the years she may experience 1-2 flare ups of leg weakness for which the prednisone dose is increased   This most recently occurred about 6-9 months ago at which time her prednisone was increased to 50 mg once daily and gradually tapered to her current dose of 5 mg once daily   She reports with this dose she is still feeling very weak and usually the lowest effective dose is about 15 mg once daily   She does not want to increase the steroids due to side effects such as weight gain   She does not experience significant upper body weakness   She reports with her lower extremities she is unable to get up from a seated position without assistance and unable to stand without assistance   She has had home physical therapy in the past but none recently      She denies fevers, unintentional weight loss, alopecia, dry eyes (report some dry mouth in the morning), inflammatory eye disease, skin rash, psoriasis, photosensitivity, skin thickening/tightening, recurrent sinus disease, epistaxis, mouth/nose ulcers, swollen glands, chest pain, persistent shortness of breath (does experience this sometimes due to asthma), cough, hemoptysis, dysphagia, persistent acid reflux, inflammatory bowel disease, blood clots, miscarriages, Raynaud's, concerning joint pain/swelling/stiffness or a family history of autoimmune disease         6/24/2022:  Patient presents for a follow-up of polymyositis  She is currently on prednisone 7 5 mg once daily  I reviewed the workup done after the last office visit which showed an elevated CK of 478  The C-reactive protein was elevated at 21  An SILVANO screen, Sjogren's antibodies, rheumatoid factor, anti CCP antibody, ESR, hepatitis panel, CBC and vitamin-D levels were unremarkable  An ultrasound of her bilateral thighs showed increased echotexture throughout the rectus femoris musculature bilaterally without evidence of hyperemia  A DEXA scan was normal   An x-ray of her right hand showed ulnar subluxation of the 2nd through 5th metacarpophalangeal joints with diffuse osteopenia  There was no marginal erosions or juxta-articular osteopenia      She does not report any new complaints from the last office visit  She did start home physical therapy for the bilateral lower extremity muscle strengthening which helps to some degree and she is continuing with exercises on her own        1/9/2023:  Patient presents for a follow-up of polymyositis  She is currently on prednisone 7 5 mg once daily    After the last office visit we discussed a trial with IVIG to see if this improves her muscle strength but she reports after each infusion she actually felt like she was getting weaker  In view of this she stopped the IVIG infusions in November 2022  She has been receiving home physical therapy and feels like this does help but overall she is reporting significant weakness in her bilateral lower extremities  No new complaints  Past Medical History:   Diagnosis Date   • Acute diverticulitis 9/21/2018   • Anemia    • Asthma    • Chronic pain     bulging discs in back,polymyositis   • Diabetes mellitus ([facility] )    • Disease of thyroid gland    • Diverticulitis    • Hyperlipidemia    • Hypertension    • Polymyositis ([facility] )        Past Surgical History:   Procedure Laterality Date   • HYSTERECTOMY     • SINUS SURGERY     • THYROIDECTOMY     • TUBAL LIGATION         Current Outpatient Medications   Medication Sig Dispense Refill   • predniSONE 20 mg tablet Take 1 tablet (20 mg total) by mouth daily 30 tablet 0   • albuterol (PROVENTIL HFA,VENTOLIN HFA) 90 mcg/act inhaler Inhale 2 puffs every 6 (six) hours as needed for wheezing 18 g 1   • amLODIPine (NORVASC) 5 mg tablet amlodipine 5 mg tablet   TAKE 1 TABLET BY MOUTH ONCE DAILY     • B-D UF III MINI PEN NEEDLES 31G X 5 MM MISC Use to inject insulin daily 100 each 3   • benazepril (LOTENSIN) 10 mg tablet Take 1 tablet (10 mg total) by mouth daily 90 tablet 0   • Cholecalciferol 25 MCG (1000 UT) tablet Take 1 tablet by mouth daily     • cyanocobalamin (VITAMIN B-12) 1000 MCG tablet Vitamin B-12  1,000 mcg tablet     • gabapentin (NEURONTIN) 300 mg capsule Take 2 capsules (600 mg total) by mouth every morning AND 1 capsule (300 mg total) daily after lunch AND 3 capsules (900 mg total) daily at bedtime  Take 1 tablet in the morning and 3 tablets at bedtime    180 capsule 1   • glucose blood (OneTouch Verio) test strip CHECK BLOOD GLUCOSE TWICE DAILY 100 strip 1   • insulin glargine (LANTUS) 100 units/mL subcutaneous injection Inject 30 Units under the skin daily at bedtime 15 mL 3   • Lancets (OneTouch Delica Plus [id number]) MISC      • Lantus SoloStar 100 units/mL SOPN INJECT 30 UNITS UNDER SKIN DAILY AT BEDTIME  15 mL 1   • levothyroxine 88 mcg tablet Take one tablet by mouth daily in early morning on empty stomach 90 tablet 0   • metFORMIN (GLUCOPHAGE-XR) 500 mg 24 hr tablet TAKE ONE TABLET TWO TIMES DAILY WITH MEALS  180 tablet 0   • omeprazole (PriLOSEC) 40 MG capsule omeprazole 40 mg capsule,delayed release     • predniSONE 10 mg tablet Take 7 5 mg by mouth daily Patient reports taking 15 mg daily  • sitaGLIPtin (JANUVIA) 25 mg tablet Take 1 tablet (25 mg total) by mouth daily 90 tablet 0     No current facility-administered medications for this visit  Allergies   Allergen Reactions   • Anaprox [Naproxen Sodium] Shortness Of Breath   • Levofloxacin Hives   • Sulfamethoxazole-Trimethoprim Anaphylaxis   • Jardiance [Empagliflozin] GI Intolerance   • Penicillins Swelling   • Aspirin Rash   • Metronidazole Rash   • Sulfa Antibiotics Rash       Review of Systems  Constitutional: Negative for weight change, fevers, chills, night sweats, fatigue  ENT/Mouth: Negative for hearing changes, ear pain, nasal congestion, sinus pain, hoarseness, sore throat, rhinorrhea, swallowing difficulty  Eyes: Negative for pain, redness, discharge, vision changes  Cardiovascular: Negative for chest pain, SOB, palpitations  Respiratory: Negative for cough, sputum, wheezing, dyspnea  Gastrointestinal: Negative for nausea, vomiting, diarrhea, constipation, pain, heartburn  Genitourinary: Negative for dysuria, urinary frequency, hematuria  Musculoskeletal: As per HPI  Skin: Negative for skin rash, color changes  Neuro: Negative for numbness, tingling, loss of consciousness  Positive for weakness  Psych: Negative for anxiety, depression     Heme/Lymph: Negative for easy bruising, bleeding, lymphadenopathy  Assessment and Plan:   Ms Ac is a 77-year-old female with history significant for polymyositis diagnosed in 2002 and primary generalized osteoarthritis who presents for a follow-up  Amy Miner is currently on prednisone 7 5 mg once daily        # Polymyositis  - Jadyn presents today for a follow-up of polymyositis which was diagnosed in 2002  She primarily presents with bilateral lower extremity weakness which was considered by her prior rheumatologist to be secondary to residual irreversible weakness as a result of the myopathy as opposed to ongoing active inflammation  In view of this she was only maintained on prednisone at 7 5 mg once daily for symptomatic relief, but given the chronicity of use she is unsure how much it actually helps  Prior medication options included methotrexate which was discontinued due to pulmonary issues, mycophenolate which was discontinued due to diarrhea and IVIG which she recalls as being beneficial but it was unclear why this was discontinued (retried from 6/22-11/22 without any benefit)  - To further evaluate if there may be concerns for active muscle disease I did proceed with an ultrasound of her bilateral thighs which did not show hyperemia/evidence of active inflammation  We again discussed that the ongoing weakness is likely a result of chronic myopathic changes which may be irreversible  I primarily recommend continued physical therapy  We discussed as a last resort option we could consider an increase in immunosuppression  As an initial trial I will increase her prednisone to 20 mg once daily and follow-up with her in 2 weeks  If there is improvement noted I may consider options such as azathioprine or rituximab    If her muscle strength remains the same then I will try to taper her off the steroids and continue with physical therapy      # Chronic steroid use  - She is aware of the side effects of chronic steroid use including but not limited to risk for infections, hypertension, diabetes, cataracts/glaucoma, gastritis, osteoporosis and avascular necrosis  Reassuringly a DEXA scan in 5/22 was normal   She should continue with daily calcium and vitamin-D supplements  I spent 21 minutes directly with the patient during this visit

## 2023-01-09 NOTE — TELEPHONE ENCOUNTER
Please schedule 2 week virtual follow-up visit  Can double book  Please also ask her to get blood work done before starting the increased prednisone dose, I placed orders in chart

## 2023-01-12 ENCOUNTER — TELEPHONE (OUTPATIENT)
Dept: OBGYN CLINIC | Facility: CLINIC | Age: 73
End: 2023-01-12

## 2023-01-12 NOTE — TELEPHONE ENCOUNTER
Caller: Patient     Doctor: [facility]     Reason for call: Patient has an outside lab facility coming to her house to draw her labs       Please fax orders to: [phone] ([facility])     Call back#: [phone]

## 2023-01-16 ENCOUNTER — OFFICE VISIT (OUTPATIENT)
Dept: OBGYN CLINIC | Facility: CLINIC | Age: 73
End: 2023-01-16

## 2023-01-16 ENCOUNTER — APPOINTMENT (OUTPATIENT)
Dept: RADIOLOGY | Facility: CLINIC | Age: 73
End: 2023-01-16

## 2023-01-16 VITALS — TEMPERATURE: 98.5 F | SYSTOLIC BLOOD PRESSURE: 144 MMHG | DIASTOLIC BLOOD PRESSURE: 76 MMHG | HEART RATE: 93 BPM

## 2023-01-16 DIAGNOSIS — M25.572 PAIN, JOINT, ANKLE AND FOOT, LEFT: Primary | ICD-10-CM

## 2023-01-16 DIAGNOSIS — M25.572 PAIN, JOINT, ANKLE AND FOOT, LEFT: ICD-10-CM

## 2023-01-16 DIAGNOSIS — S82.66XA NONDISPLACED FRACTURE OF LATERAL MALLEOLUS OF UNSPECIFIED FIBULA, INITIAL ENCOUNTER FOR CLOSED FRACTURE: ICD-10-CM

## 2023-01-16 RX ORDER — IMMUNE GLOBULIN 100 MG/ML
SOLUTION INTRAVENOUS
COMMUNITY
Start: 2022-10-13

## 2023-01-16 NOTE — PROGRESS NOTES
Assessment/Plan:  1  Pain, joint, ankle and foot, left  XR ankle 3+ vw left      2  Nondisplaced fracture of lateral malleolus of unspecified fibula, initial encounter for closed fracture          The patient does have a lateral malleolus fracture that should heal well with nonoperative treatment  This is stable on x-rays today, even with the patient doing some weightbearing at her home  She was placed in to a tall cam boot today  She should limit her weightbearing over the next 3 weeks  She will follow-up in 3 weeks with repeat x-rays at that time  We hope to progress her weightbearing at that time and start physical therapy  She is unable to take an aspirin daily due to being allergic to this  I did advise her to notify us immediately if she develops any calf pain or cramping, as this could be indicative of DVT  She should continue to ice and elevate to improve her swelling  Subjective:   Gianfranco Taveras is a 67 y o  female who presents today for evaluation of her left ankle  She injured this on 1/7/22 when her leg gave out on her and she fell in her bathroom  She did go to the ED and xrays of the left ankle were taken which showed a subtle, nondisplaced lateral malleolus fracture  I am able to view these images today  The patient notes that initially she was not able to bear weight on the left lower extremity  She was placed in a stirrup type Aircast and has been able to do some weightbearing to and from her bathroom as of recently  She notes swelling about the foot and ankle as well as pain about the lateral ankle, which is worse with activity and better with rest  She notes good sensation of the left lower extremity  She denies any calf pain or cramping  Review of Systems   Constitutional: Negative  Negative for chills and fever  HENT: Negative  Negative for ear pain and sore throat  Eyes: Negative  Negative for pain and redness  Respiratory: Negative    Negative for shortness of breath and wheezing  Cardiovascular: Negative for chest pain and palpitations  Gastrointestinal: Negative  Negative for abdominal pain and blood in stool  Endocrine: Negative  Negative for polydipsia and polyuria  Genitourinary: Negative  Negative for difficulty urinating and dysuria  Musculoskeletal:        As noted in HPI   Skin: Negative  Negative for pallor and rash  Neurological: Negative  Negative for dizziness and numbness  Hematological: Negative  Negative for adenopathy  Does not bruise/bleed easily  Psychiatric/Behavioral: Negative  Negative for confusion and suicidal ideas           Past Medical History:   Diagnosis Date   • Acute diverticulitis 9/21/2018   • Anemia    • Asthma    • Chronic pain     bulging discs in back,polymyositis   • Diabetes mellitus ([facility] )    • Disease of thyroid gland    • Diverticulitis    • Hyperlipidemia    • Hypertension    • Polymyositis ([facility] )        Past Surgical History:   Procedure Laterality Date   • HYSTERECTOMY     • SINUS SURGERY     • THYROIDECTOMY     • TUBAL LIGATION         Family History   Problem Relation Age of Onset   • Hepatitis Sister         acute hepatits C virus       Social History     Occupational History   • Not on file   Tobacco Use   • Smoking status: Never   • Smokeless tobacco: Never   Vaping Use   • Vaping Use: Never used   Substance and Sexual Activity   • Alcohol use: No   • Drug use: No   • Sexual activity: Not on file         Current Outpatient Medications:   •  albuterol (PROVENTIL HFA,VENTOLIN HFA) 90 mcg/act inhaler, Inhale 2 puffs every 6 (six) hours as needed for wheezing, Disp: 18 g, Rfl: 1  •  amLODIPine (NORVASC) 5 mg tablet, amlodipine 5 mg tablet  TAKE 1 TABLET BY MOUTH ONCE DAILY, Disp: , Rfl:   •  B-D UF III MINI PEN NEEDLES 31G X 5 MM MISC, Use to inject insulin daily, Disp: 100 each, Rfl: 3  •  benazepril (LOTENSIN) 10 mg tablet, Take 1 tablet (10 mg total) by mouth daily, Disp: 90 tablet, Rfl: 0  • Cholecalciferol 25 MCG (1000 UT) tablet, Take 1 tablet by mouth daily, Disp: , Rfl:   •  cyanocobalamin (VITAMIN B-12) 1000 MCG tablet, Vitamin B-12  1,000 mcg tablet, Disp: , Rfl:   •  gabapentin (NEURONTIN) 300 mg capsule, Take 2 capsules (600 mg total) by mouth every morning AND 1 capsule (300 mg total) daily after lunch AND 3 capsules (900 mg total) daily at bedtime  Take 1 tablet in the morning and 3 tablets at bedtime   , Disp: 180 capsule, Rfl: 1  •  glucose blood (OneTouch Verio) test strip, CHECK BLOOD GLUCOSE TWICE DAILY, Disp: 100 strip, Rfl: 1  •  immune globulin, human (OCTAGAM) 20 GM/200ML, , Disp: , Rfl:   •  insulin glargine (LANTUS) 100 units/mL subcutaneous injection, Inject 30 Units under the skin daily at bedtime, Disp: 15 mL, Rfl: 3  •  Lancets (OneTouch Delica Plus [id number]) MISC, , Disp: , Rfl:   •  Lantus SoloStar 100 units/mL SOPN, INJECT 30 UNITS UNDER SKIN DAILY AT BEDTIME , Disp: 15 mL, Rfl: 1  •  levothyroxine 88 mcg tablet, Take one tablet by mouth daily in early morning on empty stomach, Disp: 90 tablet, Rfl: 0  •  metFORMIN (GLUCOPHAGE-XR) 500 mg 24 hr tablet, TAKE ONE TABLET TWO TIMES DAILY WITH MEALS , Disp: 180 tablet, Rfl: 0  •  omeprazole (PriLOSEC) 40 MG capsule, omeprazole 40 mg capsule,delayed release, Disp: , Rfl:   •  predniSONE 10 mg tablet, Take 7 5 mg by mouth daily Patient reports taking 15 mg daily   , Disp: , Rfl:   •  predniSONE 20 mg tablet, Take 1 tablet (20 mg total) by mouth daily, Disp: 30 tablet, Rfl: 0  •  sitaGLIPtin (JANUVIA) 25 mg tablet, Take 1 tablet (25 mg total) by mouth daily, Disp: 90 tablet, Rfl: 0    Allergies   Allergen Reactions   • Anaprox [Naproxen Sodium] Shortness Of Breath   • Levofloxacin Hives   • Sulfamethoxazole-Trimethoprim Anaphylaxis   • Jardiance [Empagliflozin] GI Intolerance   • Penicillins Swelling   • Aspirin Rash   • Metronidazole Rash   • Sulfa Antibiotics Rash       Objective:  Vitals:    01/16/23 0844   BP: 144/76   Pulse: 93 Temp: 98 5 °F (36 9 °C)       Left Ankle Exam     Tenderness   The patient is experiencing tenderness in the lateral malleolus  Swelling: moderate (ankle and dorsum of foot)    Other   Erythema: absent  Sensation: normal  Pulse: present    Comments:  No calf tenderness  Physical Exam  Constitutional:       General: She is not in acute distress  Appearance: She is well-developed  HENT:      Head: Normocephalic and atraumatic  Eyes:      General: No scleral icterus  Conjunctiva/sclera: Conjunctivae normal    Neck:      Vascular: No JVD  Cardiovascular:      Rate and Rhythm: Normal rate  Pulmonary:      Effort: Pulmonary effort is normal  No respiratory distress  Skin:     General: Skin is warm  Neurological:      Mental Status: She is alert and oriented to person, place, and time  Coordination: Coordination normal          I have personally reviewed pertinent films in PACS and my interpretation is as follows:  Xrays left ankle: Stable, nondisplaced, lateral malleolar fracture  This document was created using speech voice recognition software  Grammatical errors, random word insertions, pronoun errors, and incomplete sentences are an occasional consequence of this system due to software limitations, ambient noise, and hardware issues  Any formal questions or concerns about content, text, or information contained within the body of this dictation should be directly addressed to the provider for clarification

## 2023-01-18 ENCOUNTER — TELEPHONE (OUTPATIENT)
Dept: FAMILY MEDICINE CLINIC | Facility: CLINIC | Age: 73
End: 2023-01-18

## 2023-01-19 NOTE — TELEPHONE ENCOUNTER
Called pt spoke with her and her daughter, she wanted gabapentin but her Dr Bubba Hough doesn't think she should take it   I made her a 30 min appt for February 13 @ 1:00  AMADOU Das

## 2023-01-23 ENCOUNTER — TELEPHONE (OUTPATIENT)
Dept: OBGYN CLINIC | Facility: HOSPITAL | Age: 73
End: 2023-01-23

## 2023-01-23 NOTE — TELEPHONE ENCOUNTER
Caller: Jadyn    Doctor: Gloria Rose    Reason for call: Patient calling to r/s her Virtual appt  States the mobile lab will be coming to her home to collect labs on 1/26        Call back#: [phone]

## 2023-01-23 NOTE — TELEPHONE ENCOUNTER
Caller: Santosh Kelly     Doctor: Melisa Cardona     Reason for call: Patient schedule appointment for BW follow up on June 1, 2023  Patient is having her BW done on 01/26  Please call with cancellation or virtual appointment sooner that June 1 to review her results         Call back: [phone]

## 2023-01-25 DIAGNOSIS — E03.9 ACQUIRED HYPOTHYROIDISM: ICD-10-CM

## 2023-01-25 RX ORDER — INSULIN GLARGINE 100 [IU]/ML
INJECTION, SOLUTION SUBCUTANEOUS
Qty: 15 ML | Refills: 1 | Status: SHIPPED | OUTPATIENT
Start: 2023-01-25

## 2023-02-05 ENCOUNTER — TELEPHONE (OUTPATIENT)
Dept: OTHER | Facility: OTHER | Age: 73
End: 2023-02-05

## 2023-02-05 NOTE — TELEPHONE ENCOUNTER
Patient is calling regarding cancelling an appointment      Date/Time:  02/06/2023 at 9:15 am    Patient was rescheduled: YES [] NO [x]    Patient requesting call back to reschedule: YES [x] NO []

## 2023-02-07 ENCOUNTER — RA CDI HCC (OUTPATIENT)
Dept: OTHER | Facility: HOSPITAL | Age: 73
End: 2023-02-07

## 2023-02-07 NOTE — PROGRESS NOTES
[facility]  coding opportunities       Chart reviewed, no opportunity found:   [address]        Patients Insurance     Medicare Insurance: [employer]

## 2023-03-09 DIAGNOSIS — I10 PRIMARY HYPERTENSION: ICD-10-CM

## 2023-03-09 DIAGNOSIS — Z79.4 TYPE 2 DIABETES MELLITUS WITH HYPERGLYCEMIA, WITH LONG-TERM CURRENT USE OF INSULIN (HCC): ICD-10-CM

## 2023-03-09 DIAGNOSIS — E11.65 TYPE 2 DIABETES MELLITUS WITH HYPERGLYCEMIA, WITH LONG-TERM CURRENT USE OF INSULIN (HCC): ICD-10-CM

## 2023-03-09 RX ORDER — METFORMIN HYDROCHLORIDE 500 MG/1
TABLET, EXTENDED RELEASE ORAL
Qty: 180 TABLET | Refills: 0 | Status: SHIPPED | OUTPATIENT
Start: 2023-03-09

## 2023-03-10 ENCOUNTER — TELEPHONE (OUTPATIENT)
Dept: ENDOCRINOLOGY | Facility: CLINIC | Age: 73
End: 2023-03-10

## 2023-03-17 ENCOUNTER — TELEPHONE (OUTPATIENT)
Dept: FAMILY MEDICINE CLINIC | Facility: CLINIC | Age: 73
End: 2023-03-17

## 2023-03-17 NOTE — TELEPHONE ENCOUNTER
[facility] calling requesting to start patient on statin bc of guidelines of diabetes  They sending over a fax as well      Danie Baldwin  [phone]

## 2023-03-20 NOTE — TELEPHONE ENCOUNTER
She is due for a follow up  Can we schedule  I can discuss this with her at visit  She is due for AWV anytime after 5/4/23, so visit can be scheduled for then

## 2023-03-22 ENCOUNTER — TELEMEDICINE (OUTPATIENT)
Dept: FAMILY MEDICINE CLINIC | Facility: CLINIC | Age: 73
End: 2023-03-22

## 2023-03-22 DIAGNOSIS — J06.9 ACUTE URI: Primary | ICD-10-CM

## 2023-03-22 DIAGNOSIS — J45.20 MILD INTERMITTENT ASTHMA WITHOUT COMPLICATION: ICD-10-CM

## 2023-03-22 RX ORDER — BENZONATATE 100 MG/1
100 CAPSULE ORAL 3 TIMES DAILY PRN
Qty: 20 CAPSULE | Refills: 0 | Status: SHIPPED | OUTPATIENT
Start: 2023-03-22

## 2023-03-22 RX ORDER — ALBUTEROL SULFATE 90 UG/1
2 AEROSOL, METERED RESPIRATORY (INHALATION) EVERY 6 HOURS PRN
Qty: 18 G | Refills: 1 | Status: SHIPPED | OUTPATIENT
Start: 2023-03-22

## 2023-03-22 NOTE — PROGRESS NOTES
COVID-19 Outpatient Progress Note    Assessment/Plan:    Problem List Items Addressed This Visit        Respiratory    Asthma    Relevant Medications    albuterol (PROVENTIL HFA,VENTOLIN HFA) 90 mcg/act inhaler   Other Visit Diagnoses     Acute URI    -  Primary    Relevant Medications    benzonatate (TESSALON PERLES) 100 mg capsule         Alvalyn is advised to take a home COVID-19 test and inform me of result if positive  I did let her know that I could have done influenza testing as well if she was in the office, however at this point, there is no treatment for either COVID or influenza  Counseled on supportive care such as rest, hydration  Albuterol for wheezing  Tessalon perles for cough  If symptoms persist and fail to improve/COVID is negative, I will prescribe antibiotic  Disposition:     I have spent a total time of 15 minutes on the day of the encounter for this patient including       Encounter provider: Seamus Anderson MD     Provider located at: [address] [address]     Recent Visits  Date Type Provider Dept   03/17/23 Telephone MD Cira Dubois recent visits within past 7 days and meeting all other requirements  Today's Visits  Date Type Provider Dept   03/22/23 Telemedicine Cira Dubois today's visits and meeting all other requirements  Future Appointments  No visits were found meeting these conditions  Showing future appointments within next 150 days and meeting all other requirements     This virtual check-in was done via telephone and she agrees to proceed  Patient agrees to participate in a virtual check in via telephone or video visit instead of presenting to the office to address urgent/immediate medical needs  Patient is aware this is a billable service  She acknowledged consent and understanding of privacy and security of the video platform   The patient has agreed to participate and understands they can discontinue the visit at any time  After connecting through Telephone, the patient was identified by name and date of birth  Orlando Roy was informed that this was a telemedicine visit and that the exam was being conducted confidentially over secure lines  My office door was closed  No one else was in the room  Orlando Roy acknowledged consent and understanding of privacy and security of the telemedicine visit  I informed the patient that I have reviewed her record in Epic and presented the opportunity for her to ask any questions regarding the visit today  The patient agreed to participate  It was my intent to perform this visit via video technology but the patient was not able to do a video connection so the visit was completed via audio telephone only  Verification of patient location:  Patient is located in the following state in which I hold an active license: [place]    Subjective:   Orlando Roy is a 67 y o  female who is concerned about COVID-19  Patient's symptoms include fatigue, malaise, rhinorrhea, sore throat, cough, chest tightness, myalgias and headache  Patient denies fever, chills, congestion, anosmia, loss of taste, shortness of breath, abdominal pain, nausea, vomiting and diarrhea       - Date of symptom onset: 3/16/2023      COVID-19 vaccination status: Fully vaccinated (primary series)    Exposure:   Contact with a person who is under investigation (PUI) for or who is positive for COVID-19 within the last 14 days?: No    Hospitalized recently for fever and/or lower respiratory symptoms?: No      Currently a healthcare worker that is involved in direct patient care?: No      Works in a special setting where the risk of COVID-19 transmission may be high? (this may include long-term care, correctional and FDC facilities; homeless shelters; assisted-living facilities and group homes ): No      Resident in a special setting where the risk of COVID-19 transmission may be high? (this may include long-term care, correctional and skilled nursing facilities; homeless shelters; assisted-living facilities and group homes ): No      Lab Results   Component Value Date    SARSCOV2 Negative 05/05/2021       Review of Systems   Constitutional: Positive for fatigue  Negative for chills and fever  HENT: Positive for rhinorrhea and sore throat  Negative for congestion  Respiratory: Positive for cough and chest tightness  Negative for shortness of breath  Gastrointestinal: Negative for abdominal pain, diarrhea, nausea and vomiting  Musculoskeletal: Positive for myalgias  Neurological: Positive for headaches  Current Outpatient Medications on File Prior to Visit   Medication Sig   • amLODIPine (NORVASC) 5 mg tablet amlodipine 5 mg tablet   TAKE 1 TABLET BY MOUTH ONCE DAILY   • benazepril (LOTENSIN) 10 mg tablet Take 1 tablet (10 mg total) by mouth daily   • Cholecalciferol 25 MCG (1000 UT) tablet Take 1 tablet by mouth daily   • cyanocobalamin (VITAMIN B-12) 1000 MCG tablet Vitamin B-12  1,000 mcg tablet   • gabapentin (NEURONTIN) 300 mg capsule Take 2 capsules (600 mg total) by mouth every morning AND 1 capsule (300 mg total) daily after lunch AND 3 capsules (900 mg total) daily at bedtime  Take 1 tablet in the morning and 3 tablets at bedtime      • glucose blood (OneTouch Verio) test strip CHECK BLOOD GLUCOSE TWICE DAILY   • immune globulin, human (OCTAGAM) 20 GM/200ML    • insulin glargine (LANTUS) 100 units/mL subcutaneous injection Inject 30 Units under the skin daily at bedtime   • Insulin Pen Needle (Pen Needles) 31G X 5 MM MISC USE DAILY FOR INSULIN ADMINISTRATION   • Januvia 25 MG tablet TAKE 1 TABLET DAILY   • Lancets (OneTouch Delica Plus [id number]) MISC    • Lantus SoloStar 100 units/mL SOPN INJECT 30 UNITS UNDER SKIN DAILY AT BEDTIME  • metFORMIN (GLUCOPHAGE-XR) 500 mg 24 hr tablet TAKE ONE TABLET TWO TIMES DAILY WITH MEALS  • omeprazole (PriLOSEC) 40 MG capsule omeprazole 40 mg capsule,delayed release   • predniSONE 10 mg tablet Take 7 5 mg by mouth daily Patient reports taking 15 mg daily  • predniSONE 20 mg tablet Take 1 tablet (20 mg total) by mouth daily   • Synthroid 88 MCG tablet TAKE ONE TABLET DAILY IN EARLY MORNING ON EMPTY STOMACH   • [DISCONTINUED] albuterol (PROVENTIL HFA,VENTOLIN HFA) 90 mcg/act inhaler Inhale 2 puffs every 6 (six) hours as needed for wheezing       Objective: There were no vitals taken for this visit  Physical Exam   It was my intent to perform this visit via video technology but the patient was not able to do a video connection so the visit was completed via audio telephone only    Lisa Garcia MD

## 2023-03-27 ENCOUNTER — TELEPHONE (OUTPATIENT)
Dept: FAMILY MEDICINE CLINIC | Facility: CLINIC | Age: 73
End: 2023-03-27

## 2023-03-27 DIAGNOSIS — J06.9 ACUTE URI: Primary | ICD-10-CM

## 2023-03-27 RX ORDER — AZITHROMYCIN 250 MG/1
TABLET, FILM COATED ORAL
Qty: 6 TABLET | Refills: 0 | Status: SHIPPED | OUTPATIENT
Start: 2023-03-27 | End: 2023-04-01

## 2023-03-27 NOTE — TELEPHONE ENCOUNTER
Patient informed and expressed understanding  No further action    Emma Concepicon, [facility]

## 2023-03-27 NOTE — TELEPHONE ENCOUNTER
Patient stated she saw Dr Patt Ponce and was told if she does not feel better to call in for an ABX she is still coughing and chest is congested    [address] Heterosexual

## 2023-04-12 NOTE — TELEPHONE ENCOUNTER
Tried to call patient for lab results- call would not go through [facility] Text: This medication is contraindicated in pregnancy and when breast feeding.

## 2023-05-02 DIAGNOSIS — E11.65 TYPE 2 DIABETES MELLITUS WITH HYPERGLYCEMIA, WITH LONG-TERM CURRENT USE OF INSULIN (HCC): ICD-10-CM

## 2023-05-02 DIAGNOSIS — I10 PRIMARY HYPERTENSION: ICD-10-CM

## 2023-05-02 DIAGNOSIS — Z79.4 TYPE 2 DIABETES MELLITUS WITH HYPERGLYCEMIA, WITH LONG-TERM CURRENT USE OF INSULIN (HCC): ICD-10-CM

## 2023-05-02 DIAGNOSIS — E03.9 ACQUIRED HYPOTHYROIDISM: ICD-10-CM

## 2023-05-02 DIAGNOSIS — I10 ESSENTIAL HYPERTENSION: ICD-10-CM

## 2023-05-02 RX ORDER — INSULIN GLARGINE 100 [IU]/ML
INJECTION, SOLUTION SUBCUTANEOUS
Qty: 15 ML | Refills: 1 | Status: SHIPPED | OUTPATIENT
Start: 2023-05-02

## 2023-05-02 RX ORDER — BENAZEPRIL HYDROCHLORIDE 10 MG/1
TABLET ORAL
Qty: 90 TABLET | Refills: 0 | Status: SHIPPED | OUTPATIENT
Start: 2023-05-02

## 2023-05-02 RX ORDER — METFORMIN HYDROCHLORIDE 500 MG/1
TABLET, EXTENDED RELEASE ORAL
Qty: 180 TABLET | Refills: 0 | Status: SHIPPED | OUTPATIENT
Start: 2023-05-02

## 2023-05-02 RX ORDER — BLOOD SUGAR DIAGNOSTIC
STRIP MISCELLANEOUS
Qty: 100 STRIP | Refills: 1 | Status: SHIPPED | OUTPATIENT
Start: 2023-05-02

## 2023-05-09 ENCOUNTER — TELEMEDICINE (OUTPATIENT)
Dept: FAMILY MEDICINE CLINIC | Facility: CLINIC | Age: 73
End: 2023-05-09

## 2023-05-09 DIAGNOSIS — R53.1 GENERALIZED WEAKNESS: ICD-10-CM

## 2023-05-09 DIAGNOSIS — E03.9 ACQUIRED HYPOTHYROIDISM: ICD-10-CM

## 2023-05-09 DIAGNOSIS — Z12.31 ENCOUNTER FOR SCREENING MAMMOGRAM FOR MALIGNANT NEOPLASM OF BREAST: ICD-10-CM

## 2023-05-09 DIAGNOSIS — E11.9 TYPE 2 DIABETES MELLITUS WITHOUT COMPLICATION, WITH LONG-TERM CURRENT USE OF INSULIN (HCC): ICD-10-CM

## 2023-05-09 DIAGNOSIS — Z00.00 MEDICARE ANNUAL WELLNESS VISIT, SUBSEQUENT: Primary | ICD-10-CM

## 2023-05-09 DIAGNOSIS — M33.20 POLYMYOSITIS (HCC): ICD-10-CM

## 2023-05-09 DIAGNOSIS — Z79.4 TYPE 2 DIABETES MELLITUS WITHOUT COMPLICATION, WITH LONG-TERM CURRENT USE OF INSULIN (HCC): ICD-10-CM

## 2023-05-09 DIAGNOSIS — E78.5 HYPERLIPIDEMIA, UNSPECIFIED HYPERLIPIDEMIA TYPE: ICD-10-CM

## 2023-05-09 NOTE — PROGRESS NOTES
Assessment and Plan:     Problem List Items Addressed This Visit        Endocrine    Diabetes mellitus ([address] )     Continue current medication regimen  Repeat A1c ordered  She is getting eye exam next month- advised to fax results to me  She will need to come into the office at next visit for foot exam    Lab Results   Component Value Date    HGBA1C 6 8 (H) 12/08/2022            Relevant Orders    Albumin / creatinine urine ratio    HEMOGLOBIN A1C W/ EAG ESTIMATION    Acquired hypothyroidism    Relevant Orders    TSH, 3rd generation with Free T4 reflex       Musculoskeletal and Integument    Polymyositis (HCC)    Relevant Orders    CBC and differential    Comprehensive metabolic panel    TSH, 3rd generation with Free T4 reflex    EXTERNAL Referral to [address] [place] [address] Mauricio Jordan       Other    Hyperlipidemia    Relevant Orders    Comprehensive metabolic panel    Lipid Panel with Direct LDL reflex   Other Visit Diagnoses     Medicare annual wellness visit, subsequent    -  Primary    Encounter for screening mammogram for malignant neoplasm of breast        Relevant Orders    Mammo screening bilateral w 3d & cad    Generalized weakness        Relevant Orders    CBC and differential    Comprehensive metabolic panel    EXTERNAL Referral to Home Health        BMI Counseling: There is no height or weight on file to calculate BMI  The BMI is above normal  Nutrition recommendations include decreasing portion sizes, encouraging healthy choices of fruits and vegetables, decreasing fast food intake, consuming healthier snacks, limiting drinks that contain sugar and moderation in carbohydrate intake  Exercise recommendations include moderate physical activity 150 minutes/week  Rationale for BMI follow-up plan is due to patient being overweight or obese  Depression Screening and Follow-up Plan: Patient was screened for depression during today's encounter  They screened negative with a PHQ-2 score of 2      Falls Plan of Care: balance, strength, and gait training instructions were provided and referral to physical therapy  Recommended assistive device to help with gait and balance  Home safety evaluation by OT recommended  Preventive health issues were discussed with patient, and age appropriate screening tests were ordered as noted in patient's After Visit Summary  Personalized health advice and appropriate referrals for health education or preventive services given if needed, as noted in patient's After Visit Summary  History of Present Illness:     Patient presents for a Medicare Wellness Visit    HPI   Patient Care Team:  Jenae Lundberg MD as PCP - General (Family Medicine)  Jenae Lundberg MD as PCP - [address] [place] [address] (RTE)     Review of Systems:     Review of Systems   Constitutional: Negative  HENT: Negative  Eyes: Negative  Respiratory: Negative  Cardiovascular: Negative  Gastrointestinal: Negative  Endocrine: Negative  Genitourinary: Negative  Musculoskeletal: Negative  Skin: Negative  Allergic/Immunologic: Negative  Neurological: Negative  Hematological: Negative  Psychiatric/Behavioral: Negative           Problem List:     Patient Active Problem List   Diagnosis   • Polymyositis ([facility] [address] )   • Hyperlipidemia   • Diabetes mellitus ([facility] )   • Asthma   • Chronic pain   • Acquired hypothyroidism   • Steroid-induced hyperglycemia   • 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[facility])   • Essential hypertension      Past Medical and Surgical History:     Past Medical History:   Diagnosis Date   • Acute diverticulitis 9/21/2018   • Anemia    • Asthma    • Chronic pain     bulging discs in back,polymyositis   • Diabetes mellitus ([facility] [address] )    • Disease of thyroid gland    • Diverticulitis    • Hyperlipidemia    • Hypertension    • Polymyositis ([facility] [address] [facility] )      Past Surgical History:   Procedure Laterality Date   • HYSTERECTOMY     • SINUS SURGERY     • THYROIDECTOMY     • TUBAL LIGATION        Family History:     Family History   Problem Relation Age of Onset   • Hepatitis Sister         acute hepatits C virus      Social History:     Social History     Socioeconomic History   • Marital status: Single     Spouse name: None   • Number of children: None   • Years of education: None   • Highest education level: None   Occupational History   • None   Tobacco Use   • Smoking status: Never   • Smokeless tobacco: Never   Vaping Use   • Vaping Use: Never used   Substance and Sexual Activity   • Alcohol use: No   • Drug use: No   • Sexual activity: None   Other Topics Concern   • None   Social History Narrative    Family history of mother  at age 71: sister-as per [facility]     Social Determinants of Health     Financial Resource Strain: Low Risk    • Difficulty of Paying Living Expenses: Not hard at all   Food Insecurity: Not on file   Transportation Needs: Unmet Transportation Needs   • Lack of Transportation (Medical):  Yes   • Lack of Transportation (Non-Medical): Yes   Physical Activity: Not on file   Stress: Not on file   Social Connections: Not on file   Intimate Partner Violence: Not on file   Housing Stability: Not on file      Medications and Allergies:     Current Outpatient Medications   Medication Sig Dispense Refill   • albuterol (PROVENTIL HFA,VENTOLIN HFA) 90 mcg/act inhaler Inhale 2 puffs every 6 (six) hours as needed for wheezing 18 g 1   • amLODIPine (NORVASC) 5 mg tablet amlodipine 5 mg tablet   TAKE 1 TABLET BY MOUTH ONCE DAILY     • benazepril (LOTENSIN) 10 mg tablet TAKE 1 TABLET DAILY 90 tablet 0   • benzonatate (TESSALON PERLES) 100 mg capsule Take 1 capsule (100 mg total) by mouth 3 (three) times a day as needed for cough 20 capsule 0   • Cholecalciferol 25 MCG (1000 UT) tablet Take 1 tablet by mouth daily     • cyanocobalamin (VITAMIN B-12) 1000 MCG tablet Vitamin B-12  1,000 mcg tablet     • gabapentin (NEURONTIN) 300 mg capsule Take 2 capsules (600 mg total) by mouth every morning AND 1 capsule (300 mg total) daily after lunch AND 3 capsules (900 mg total) daily at bedtime  Take 1 tablet in the morning and 3 tablets at bedtime    180 capsule 1   • immune globulin, human (OCTAGAM) 20 GM/200ML      • insulin glargine (LANTUS) 100 units/mL subcutaneous injection Inject 30 Units under the skin daily at bedtime 15 mL 3   • Insulin Pen Needle (Pen Needles) 31G X 5 MM MISC USE DAILY FOR INSULIN ADMINISTRATION 100 each 1   • Januvia 25 MG tablet TAKE 1 TABLET DAILY 90 tablet 0   • Lancets (OneTouch Delica Plus [id number]) MISC      • Lantus SoloStar 100 units/mL SOPN INJECT 30 UNITS UNDER SKIN DAILY AT BEDTIME  15 mL 1   • metFORMIN (GLUCOPHAGE-XR) 500 mg 24 hr tablet TAKE ONE TABLET TWO TIMES DAILY WITH MEALS  180 tablet 0   • omeprazole (PriLOSEC) 40 MG capsule omeprazole 40 mg capsule,delayed release     • OneTouch Verio test strip CHECK BLOOD GLUCOSE TWICE DAILY 100 strip 1   • predniSONE 10 mg tablet Take 7 5 mg by mouth daily Patient reports taking 15 mg daily  • predniSONE 20 mg tablet Take 1 tablet (20 mg total) by mouth daily 30 tablet 0   • Synthroid 88 MCG tablet TAKE ONE TABLET DAILY IN EARLY MORNING ON EMPTY STOMACH 90 tablet 0     No current facility-administered medications for this visit  Allergies   Allergen Reactions   • Anaprox [Naproxen Sodium] Shortness Of Breath   • Levofloxacin Hives   • Sulfamethoxazole-Trimethoprim Anaphylaxis   • Jardiance [Empagliflozin] GI Intolerance   • Penicillins Swelling   • Aspirin Rash   • Metronidazole Rash   • Sulfa Antibiotics Rash      Immunizations:     Immunization History   Administered Date(s) Administered   • COVID-19 PFIZER VACCINE 0 3 ML IM 10/13/2021      Health Maintenance:         Topic Date Due   • Breast Cancer Screening: Mammogram  06/22/2019   • Colorectal Cancer Screening  11/15/2028   • Hepatitis C Screening  Completed     There are no preventive care reminders to display for this patient     Medicare Screening Tests and Risk Assessments:     Jadyn is here for her Subsequent Wellness visit  Health Risk Assessment:   Patient rates overall health as poor  Patient feels that their physical health rating is slightly worse  Patient is satisfied with their life  Eyesight was rated as slightly worse  Hearing was rated as same  Patient feels that their emotional and mental health rating is same  Patients states they are never, rarely angry  Patient states they are often unusually tired/fatigued  Pain experienced in the last 7 days has been a lot  Patient's pain rating has been 10/10  Patient states that she has experienced weight loss or gain in last 6 months  gained    Depression Screening:   PHQ-2 Score: 2      Fall Risk Screening: In the past year, patient has experienced: history of falling in past year    Number of falls: 1  Injured during fall?: Yes    Feels unsteady when standing or walking?: Yes    Worried about falling?: No      Urinary Incontinence Screening:   Patient has not leaked urine accidently in the last six months  Home Safety:  Patient has trouble with stairs inside or outside of their home  Patient has working smoke alarms and has working carbon monoxide detector  Home safety hazards include: none  Nutrition:   Current diet is Regular and Frequent junk food  Medications:   Patient is currently taking over-the-counter supplements  OTC medications include: see medication list  Patient is able to manage medications  Activities of Daily Living (ADLs)/Instrumental Activities of Daily Living (IADLs):   Walk and transfer into and out of bed and chair?: Yes  Dress and groom yourself?: Yes    Bathe or shower yourself?: No    Feed yourself?  Yes  Do your laundry/housekeeping?: No  Manage your money, pay your bills and track your expenses?: Yes  Make your own meals?: No    Do your own shopping?: No    ADL comments: Children helps patient    Previous Hospitalizations:   Any hospitalizations or ED visits within the last 12 months?: Yes    How many hospitalizations have you had in the last year?: 1-2    Advance Care Planning:   Living will: Yes    Advanced directive: Yes      PREVENTIVE SCREENINGS      Cardiovascular Screening:    General: Screening Not Indicated and History Lipid Disorder      Diabetes Screening:     General: Screening Not Indicated and History Diabetes      Colorectal Cancer Screening:     General: Screening Current      Breast Cancer Screening:     General: Screening Current    Due for: Mammogram        Cervical Cancer Screening:    General: Screening Not Indicated      Osteoporosis Screening:    General: Screening Not Indicated and History Osteoporosis      Abdominal Aortic Aneurysm (AAA) Screening:        General: Screening Not Indicated      Lung Cancer Screening:     General: Screening Not Indicated      Hepatitis C Screening:    General: Screening Current    Screening, Brief Intervention, and Referral to Treatment (SBIRT)    Screening      AUDIT-C Screenin) How often did you have a drink containing alcohol in the past year? never  2) How many drinks did you have on a typical day when you were drinking in the past year? 0  3) How often did you have 6 or more drinks on one occasion in the past year? never    AUDIT-C Score: 0  Interpretation: Score 0-2 (female): Negative screen for alcohol misuse    Single Item Drug Screening:  How often have you used an illegal drug (including marijuana) or a prescription medication for non-medical reasons in the past year? never    Single Item Drug Screen Score: 0  Interpretation: Negative screen for possible drug use disorder    Brief Intervention  Alcohol & drug use screenings were reviewed  No concerns regarding substance use disorder identified  No results found  Physical Exam:     There were no vitals taken for this visit      Physical Exam   It was my intent to perform this visit via video technology but the patient was not able to do a video connection so the visit was completed via audio telephone only  Loulou Loyola MD  Virtual AWV Consent    Verification of patient location:    Patient is located at Home in the following state in which I hold an active license [place]    The patient was identified by name and date of birth  Michael Sagastume was informed that this is a telemedicine visit and that the visit is being conducted through the [employer]  She agrees to proceed     My office door was closed  No one else was in the room  She acknowledged consent and understanding of privacy and security of the video platform  The patient has agreed to participate and understands they can discontinue the visit at any time  Patient is aware this is a billable service  Reason for visit is AWV and routine follow up  Encounter provider Loulou Loyola MD    Provider located at [address] [address]      Recent Visits  No visits were found meeting these conditions  Showing recent visits within past 7 days and meeting all other requirements  Today's Visits  Date Type Provider Dept   05/09/23 Telemedicine Loulou Loyola, [address] [facility] today's visits and meeting all other requirements  Future Appointments  No visits were found meeting these conditions  Showing future appointments within next 150 days and meeting all other requirements           Visit Time  Total Visit Duration: 30 min

## 2023-05-09 NOTE — ASSESSMENT & PLAN NOTE
Continue current medication regimen  Repeat A1c ordered  She is getting eye exam next month- advised to fax results to me   She will need to come into the office at next visit for foot exam    Lab Results   Component Value Date    HGBA1C 6 8 (H) 12/08/2022

## 2023-05-09 NOTE — PATIENT INSTRUCTIONS
Medicare Preventive Visit Patient Instructions  Thank you for completing your Welcome to Medicare Visit or Medicare Annual Wellness Visit today  Your next wellness visit will be due in one year (5/9/2024)  The screening/preventive services that you may require over the next 5-10 years are detailed below  Some tests may not apply to you based off risk factors and/or age  Screening tests ordered at today's visit but not completed yet may show as past due  Also, please note that scanned in results may not display below  Preventive Screenings:  Service Recommendations Previous Testing/Comments   Colorectal Cancer Screening  * Colonoscopy    * Fecal Occult Blood Test (FOBT)/Fecal Immunochemical Test (FIT)  * Fecal DNA/Cologuard Test  * Flexible Sigmoidoscopy Age: 39-70 years old   Colonoscopy: every 10 years (may be performed more frequently if at higher risk)  OR  FOBT/FIT: every 1 year  OR  Cologuard: every 3 years  OR  Sigmoidoscopy: every 5 years  Screening may be recommended earlier than age 39 if at higher risk for colorectal cancer  Also, an individualized decision between you and your healthcare provider will decide whether screening between the ages of 74-80 would be appropriate  Colonoscopy: 11/15/2018  FOBT/FIT: Not on file  Cologuard: Not on file  Sigmoidoscopy: Not on file    Screening Current     Breast Cancer Screening Age: 36 years old  Frequency: every 1-2 years  Not required if history of left and right mastectomy Mammogram: 06/22/2018        Cervical Cancer Screening Between the ages of 21-29, pap smear recommended once every 3 years  Between the ages of 33-67, can perform pap smear with HPV co-testing every 5 years     Recommendations may differ for women with a history of total hysterectomy, cervical cancer, or abnormal pap smears in past  Pap Smear: Not on file    Screening Not Indicated   Hepatitis C Screening Once for adults born between 1945 and 1965  More frequently in patients at high risk for Hepatitis C Hep C Antibody: 06/16/2022    Screening Current   Diabetes Screening 1-2 times per year if you're at risk for diabetes or have pre-diabetes Fasting glucose: 130 mg/dL (3/13/2020)  A1C: 6 8 % (12/8/2022)  Screening Not Indicated  History Diabetes   Cholesterol Screening Once every 5 years if you don't have a lipid disorder  May order more often based on risk factors  Lipid panel: 12/08/2022    Screening Not Indicated  History Lipid Disorder     Other Preventive Screenings Covered by Medicare:  1  Abdominal Aortic Aneurysm (AAA) Screening: covered once if your at risk  You're considered to be at risk if you have a family history of AAA  2  Lung Cancer Screening: covers low dose CT scan once per year if you meet all of the following conditions: (1) Age 50-69; (2) No signs or symptoms of lung cancer; (3) Current smoker or have quit smoking within the last 15 years; (4) You have a tobacco smoking history of at least 20 pack years (packs per day multiplied by number of years you smoked); (5) You get a written order from a healthcare provider  3  Glaucoma Screening: covered annually if you're considered high risk: (1) You have diabetes OR (2) Family history of glaucoma OR (3)  aged 48 and older OR (3)  American aged 72 and older  3  Osteoporosis Screening: covered every 2 years if you meet one of the following conditions: (1) You're estrogen deficient and at risk for osteoporosis based off medical history and other findings; (2) Have a vertebral abnormality; (3) On glucocorticoid therapy for more than 3 months; (4) Have primary hyperparathyroidism; (5) On osteoporosis medications and need to assess response to drug therapy  · Last bone density test (DXA Scan): 05/11/2022   5  HIV Screening: covered annually if you're between the age of 15-65  Also covered annually if you are younger than 13 and older than 72 with risk factors for HIV infection   For pregnant patients, it is covered up to 3 times per pregnancy  Immunizations:  Immunization Recommendations   Influenza Vaccine Annual influenza vaccination during flu season is recommended for all persons aged >= 6 months who do not have contraindications   Pneumococcal Vaccine   * Pneumococcal conjugate vaccine = PCV13 (Prevnar 13), PCV15 (Vaxneuvance), PCV20 (Prevnar 20)  * Pneumococcal polysaccharide vaccine = PPSV23 (Pneumovax) Adults 25-60 years old: 1-3 doses may be recommended based on certain risk factors  Adults 72 years old: 1-2 doses may be recommended based off what pneumonia vaccine you previously received   Hepatitis B Vaccine 3 dose series if at intermediate or high risk (ex: diabetes, end stage renal disease, liver disease)   Tetanus (Td) Vaccine - COST NOT COVERED BY MEDICARE PART B Following completion of primary series, a booster dose should be given every 10 years to maintain immunity against tetanus  Td may also be given as tetanus wound prophylaxis  Tdap Vaccine - COST NOT COVERED BY MEDICARE PART B Recommended at least once for all adults  For pregnant patients, recommended with each pregnancy  Shingles Vaccine (Shingrix) - COST NOT COVERED BY MEDICARE PART B  2 shot series recommended in those aged 48 and above     Health Maintenance Due:      Topic Date Due   • Breast Cancer Screening: Mammogram  06/22/2019   • Colorectal Cancer Screening  11/15/2028   • Hepatitis C Screening  Completed     Immunizations Due:      Topic Date Due   • Pneumococcal Vaccine: 65+ Years (1 - PCV) Never done   • COVID-19 Vaccine (2 - Pfizer risk series) 11/03/2021     Advance Directives   What are advance directives? Advance directives are legal documents that state your wishes and plans for medical care  These plans are made ahead of time in case you lose your ability to make decisions for yourself  Advance directives can apply to any medical decision, such as the treatments you want, and if you want to donate organs     What are the types of advance directives? There are many types of advance directives, and each state has rules about how to use them  You may choose a combination of any of the following:  · Living will: This is a written record of the treatment you want  You can also choose which treatments you do not want, which to limit, and which to stop at a certain time  This includes surgery, medicine, IV fluid, and tube feedings  · Durable power of  for healthcare [facility]): This is a written record that states who you want to make healthcare choices for you when you are unable to make them for yourself  This person, called a proxy, is usually a family member or a friend  You may choose more than 1 proxy  · Do not resuscitate (DNR) order:  A DNR order is used in case your heart stops beating or you stop breathing  It is a request not to have certain forms of treatment, such as CPR  A DNR order may be included in other types of advance directives  · Medical directive: This covers the care that you want if you are in a coma, near death, or unable to make decisions for yourself  You can list the treatments you want for each condition  Treatment may include pain medicine, surgery, blood transfusions, dialysis, IV or tube feedings, and a ventilator (breathing machine)  · Values history: This document has questions about your views, beliefs, and how you feel and think about life  This information can help others choose the care that you would choose  Why are advance directives important? An advance directive helps you control your care  Although spoken wishes may be used, it is better to have your wishes written down  Spoken wishes can be misunderstood, or not followed  Treatments may be given even if you do not want them  An advance directive may make it easier for your family to make difficult choices about your care  Fall Prevention    Fall prevention  includes ways to make your home and other areas safer   It also includes ways you can move more carefully to prevent a fall  Health conditions that cause changes in your blood pressure, vision, or muscle strength and coordination may increase your risk for falls  Medicines may also increase your risk for falls if they make you dizzy, weak, or sleepy  Fall prevention tips:   · Stand or sit up slowly  · Use assistive devices as directed  · Wear shoes that fit well and have soles that   · Wear a personal alarm  · Stay active  · Manage your medical conditions  Home Safety Tips:  · Add items to prevent falls in the bathroom  · Keep paths clear  · Install bright lights in your home  · Keep items you use often on shelves within reach  · Paint or place reflective tape on the edges of your stairs  Weight Management   Why it is important to manage your weight:  Being overweight increases your risk of health conditions such as heart disease, high blood pressure, type 2 diabetes, and certain types of cancer  It can also increase your risk for osteoarthritis, sleep apnea, and other respiratory problems  Aim for a slow, steady weight loss  Even a small amount of weight loss can lower your risk of health problems  How to lose weight safely:  A safe and healthy way to lose weight is to eat fewer calories and get regular exercise  You can lose up about 1 pound a week by decreasing the number of calories you eat by 500 calories each day  Healthy meal plan for weight management:  A healthy meal plan includes a variety of foods, contains fewer calories, and helps you stay healthy  A healthy meal plan includes the following:  · Eat whole-grain foods more often  A healthy meal plan should contain fiber  Fiber is the part of grains, fruits, and vegetables that is not broken down by your body  Whole-grain foods are healthy and provide extra fiber in your diet   Some examples of whole-grain foods are whole-wheat breads and pastas, oatmeal, brown rice, and bulgur  · Eat a variety of vegetables every day  Include dark, leafy greens such as spinach, kale, yany greens, and mustard greens  Eat yellow and orange vegetables such as carrots, sweet potatoes, and winter squash  · Eat a variety of fruits every day  Choose fresh or canned fruit (canned in its own juice or light syrup) instead of juice  Fruit juice has very little or no fiber  · Eat low-fat dairy foods  Drink fat-free (skim) milk or 1% milk  Eat fat-free yogurt and low-fat cottage cheese  Try low-fat cheeses such as mozzarella and other reduced-fat cheeses  · Choose meat and other protein foods that are low in fat  Choose beans or other legumes such as split peas or lentils  Choose fish, skinless poultry (chicken or turkey), or lean cuts of red meat (beef or pork)  Before you cook meat or poultry, cut off any visible fat  · Use less fat and oil  Try baking foods instead of frying them  Add less fat, such as margarine, sour cream, regular salad dressing and mayonnaise to foods  Eat fewer high-fat foods  Some examples of high-fat foods include french fries, doughnuts, ice cream, and cakes  · Eat fewer sweets  Limit foods and drinks that are high in sugar  This includes candy, cookies, regular soda, and sweetened drinks  Exercise:  Exercise at least 30 minutes per day on most days of the week  Some examples of exercise include walking, biking, dancing, and swimming  You can also fit in more physical activity by taking the stairs instead of the elevator or parking farther away from stores  Ask your healthcare provider about the best exercise plan for you  © Copyright [employer] 2018 Information is for End User's use only and may not be sold, redistributed or otherwise used for commercial purposes   All illustrations and images included in CareNotes® are the copyrighted property of [employer]  or [address] [place] [address] [employer]

## 2023-05-16 LAB
CREAT UR-MCNC: 64.5 MG/DL
MICROALBUMIN UR-MCNC: 5.8 MG/L (ref 0–20)
MICROALBUMIN/CREAT 24H UR: 9 MG/G CREATININE (ref 0–30)

## 2023-05-22 DIAGNOSIS — E11.9 TYPE 2 DIABETES MELLITUS WITHOUT COMPLICATION, WITH LONG-TERM CURRENT USE OF INSULIN (HCC): Primary | ICD-10-CM

## 2023-05-22 DIAGNOSIS — Z79.4 TYPE 2 DIABETES MELLITUS WITHOUT COMPLICATION, WITH LONG-TERM CURRENT USE OF INSULIN (HCC): Primary | ICD-10-CM

## 2023-05-22 LAB
ALBUMIN SERPL-MCNC: 4.2 G/DL (ref 3.7–4.7)
ALBUMIN/GLOB SERPL: 1.8 {RATIO} (ref 1.2–2.2)
ALP SERPL-CCNC: 80 IU/L (ref 44–121)
ALT SERPL-CCNC: 17 IU/L (ref 0–32)
AST SERPL-CCNC: 16 IU/L (ref 0–40)
BASOPHILS # BLD AUTO: 0.1 X10E3/UL (ref 0–0.2)
BASOPHILS NFR BLD AUTO: 0 %
BILIRUB SERPL-MCNC: <0.2 MG/DL (ref 0–1.2)
BUN SERPL-MCNC: 13 MG/DL (ref 8–27)
BUN/CREAT SERPL: 22 (ref 12–28)
CALCIUM SERPL-MCNC: 9.4 MG/DL (ref 8.7–10.3)
CHLORIDE SERPL-SCNC: 104 MMOL/L (ref 96–106)
CHOLEST SERPL-MCNC: 198 MG/DL (ref 100–199)
CO2 SERPL-SCNC: 22 MMOL/L (ref 20–29)
CREAT SERPL-MCNC: 0.58 MG/DL (ref 0.57–1)
EGFR: 96 ML/MIN/1.73
EOSINOPHIL # BLD AUTO: 0.4 X10E3/UL (ref 0–0.4)
EOSINOPHIL NFR BLD AUTO: 3 %
ERYTHROCYTE [DISTWIDTH] IN BLOOD BY AUTOMATED COUNT: 19.3 % (ref 11.7–15.4)
EST. AVERAGE GLUCOSE BLD GHB EST-MCNC: 137 MG/DL
GLOBULIN SER-MCNC: 2.4 G/DL (ref 1.5–4.5)
GLUCOSE SERPL-MCNC: 88 MG/DL (ref 70–99)
HBA1C MFR BLD: 6.4 % (ref 4.8–5.6)
HCT VFR BLD AUTO: 34.1 % (ref 34–46.6)
HDLC SERPL-MCNC: 60 MG/DL
HGB BLD-MCNC: 11 G/DL (ref 11.1–15.9)
IMM GRANULOCYTES # BLD: 0 X10E3/UL (ref 0–0.1)
IMM GRANULOCYTES NFR BLD: 0 %
LDLC SERPL CALC-MCNC: 118 MG/DL (ref 0–99)
LYMPHOCYTES # BLD AUTO: 5.2 X10E3/UL (ref 0.7–3.1)
LYMPHOCYTES NFR BLD AUTO: 40 %
MCH RBC QN AUTO: 23 PG (ref 26.6–33)
MCHC RBC AUTO-ENTMCNC: 32.3 G/DL (ref 31.5–35.7)
MCV RBC AUTO: 71 FL (ref 79–97)
MICRODELETION SYND BLD/T FISH: NORMAL
MONOCYTES # BLD AUTO: 0.7 X10E3/UL (ref 0.1–0.9)
MONOCYTES NFR BLD AUTO: 6 %
NEUTROPHILS # BLD AUTO: 6.6 X10E3/UL (ref 1.4–7)
NEUTROPHILS NFR BLD AUTO: 51 %
PLATELET # BLD AUTO: 358 X10E3/UL (ref 150–450)
POTASSIUM SERPL-SCNC: 4.4 MMOL/L (ref 3.5–5.2)
PROT SERPL-MCNC: 6.6 G/DL (ref 6–8.5)
RBC # BLD AUTO: 4.78 X10E6/UL (ref 3.77–5.28)
SODIUM SERPL-SCNC: 146 MMOL/L (ref 134–144)
TRIGL SERPL-MCNC: 110 MG/DL (ref 0–149)
TSH SERPL DL<=0.005 MIU/L-ACNC: 1.67 UIU/ML (ref 0.45–4.5)
WBC # BLD AUTO: 13 X10E3/UL (ref 3.4–10.8)

## 2023-05-24 ENCOUNTER — TELEPHONE (OUTPATIENT)
Dept: FAMILY MEDICINE CLINIC | Facility: CLINIC | Age: 73
End: 2023-05-24

## 2023-05-24 DIAGNOSIS — G89.4 CHRONIC PAIN SYNDROME: ICD-10-CM

## 2023-05-24 DIAGNOSIS — M33.20 POLYMYOSITIS (HCC): Primary | ICD-10-CM

## 2023-05-24 DIAGNOSIS — M54.50 CHRONIC BILATERAL LOW BACK PAIN WITHOUT SCIATICA: ICD-10-CM

## 2023-05-24 DIAGNOSIS — R53.1 GENERALIZED WEAKNESS: Primary | ICD-10-CM

## 2023-05-24 DIAGNOSIS — M33.20 POLYMYOSITIS (HCC): ICD-10-CM

## 2023-05-24 DIAGNOSIS — G89.29 CHRONIC BILATERAL LOW BACK PAIN WITHOUT SCIATICA: ICD-10-CM

## 2023-05-24 DIAGNOSIS — R53.1 GENERALIZED WEAKNESS: ICD-10-CM

## 2023-05-24 NOTE — TELEPHONE ENCOUNTER
Called and spoke w patient  Relayed below info  She is asking about her urine results     She also asked about an order PT  Gayatri De Los Santos CMA

## 2023-05-24 NOTE — TELEPHONE ENCOUNTER
Urine test is normal  What would PT be for?  Is this home PT or can she go to [facility] PT?

## 2023-05-24 NOTE — TELEPHONE ENCOUNTER
Patient informed and expressed understanding   She also stated she wants to do home PT  Heidy Redman CMA

## 2023-05-24 NOTE — TELEPHONE ENCOUNTER
Jadyn left a message requesting a call back  She has questions about her blood work and urine test results Milasea Abreu,   Your blood work shows that your sodium (salt) level is a bit high  If you are consuming a lot of salt in your diet, please cut down  Your cholesterol levels are improving which is good  Keep up the good work        Written by Geri Albright MD on 5/22/2023  2:23 PM

## 2023-05-30 ENCOUNTER — TELEPHONE (OUTPATIENT)
Dept: OBGYN CLINIC | Facility: HOSPITAL | Age: 73
End: 2023-05-30

## 2023-05-30 NOTE — TELEPHONE ENCOUNTER
Caller: Jadyn    Doctor: Trena Flattery    Reason for call: Requesting appt on Thursday 6/1 to be a virtual?  Please advise    Call back#: [phone]

## 2023-06-01 ENCOUNTER — TELEMEDICINE (OUTPATIENT)
Dept: RHEUMATOLOGY | Facility: CLINIC | Age: 73
End: 2023-06-01

## 2023-06-01 ENCOUNTER — TELEPHONE (OUTPATIENT)
Dept: RHEUMATOLOGY | Facility: CLINIC | Age: 73
End: 2023-06-01

## 2023-06-01 ENCOUNTER — TELEPHONE (OUTPATIENT)
Dept: OBGYN CLINIC | Facility: CLINIC | Age: 73
End: 2023-06-01

## 2023-06-01 DIAGNOSIS — Z79.52 CURRENT CHRONIC USE OF SYSTEMIC STEROIDS: ICD-10-CM

## 2023-06-01 DIAGNOSIS — Z79.624 ENCOUNTER FOR LONG TERM CURRENT USE OF AZATHIOPRINE: ICD-10-CM

## 2023-06-01 DIAGNOSIS — M15.0 PRIMARY GENERALIZED (OSTEO)ARTHRITIS: ICD-10-CM

## 2023-06-01 DIAGNOSIS — M33.20 POLYMYOSITIS (HCC): Primary | ICD-10-CM

## 2023-06-01 RX ORDER — PREDNISONE 5 MG/1
TABLET ORAL
Qty: 30 TABLET | Refills: 0 | Status: SHIPPED | OUTPATIENT
Start: 2023-06-01

## 2023-06-01 NOTE — PROGRESS NOTES
Virtual Regular Visit    Verification of patient location:    Patient is located at Home in the following state in which I hold an active license [place]      Assessment/Plan:    Problem List Items Addressed This Visit        Musculoskeletal and Integument    Polymyositis ([facility] [address] ) - Primary    Relevant Medications    predniSONE 5 mg tablet    Other Relevant Orders    CK    C-reactive protein    Sedimentation rate, automated   Other Visit Diagnoses     Current chronic use of systemic steroids        Encounter for long term current use of azathioprine        Relevant Orders    TPMT ENZYME ACTIVITY,BLOOD    Primary generalized (osteo)arthritis                   Reason for visit is follow up  Chief Complaint   Patient presents with   • Virtual Regular Visit        Encounter provider Mal Whiteside MD    Provider located at [address] [place] [address]  [address] [place] [address]  [phone]      Recent Visits  No visits were found meeting these conditions  Showing recent visits within past 7 days and meeting all other requirements  Today's Visits  Date Type Provider Dept   06/01/23 Telemedicine Mal Whiteside MD [facility] today's visits and meeting all other requirements  Future Appointments  No visits were found meeting these conditions  Showing future appointments within next 150 days and meeting all other requirements       The patient was identified by name and date of birth  Huey Bauman was informed that this is a telemedicine visit and that the visit is being conducted through Telephone  My office door was closed  No one else was in the room  She acknowledged consent and understanding of privacy and security of the video platform  The patient has agreed to participate and understands they can discontinue the visit at any time  Patient is aware this is a billable service         Subjective    HPI     INITIAL VISIT NOTE (3/2022):  Ms Nicolas Cassette a 70-year-old female with history significant for polymyositis diagnosed in 2002 and primary generalized osteoarthritis who presents to establish with [facility]  Myrtlekelly Lugo is currently on prednisone 5 mg once daily   She is transferring care from Dr Tye Perkins  Myrtle Lugo is self-referred today      Patient reports in 2002 after she presented with proximal muscle weakness primarily affecting her bilateral lower extremities she underwent a muscle biopsy which was consistent with polymyositis  Myrtle Lugo was also referred to [facility] for their opinion   She reports since then she has been on prednisone constantly but at varying doses   She was initially started on methotrexate which she was on for a few years but cannot recall if this specifically helped her  Valentino Ortez was discontinued eventually due to shortness of breath   She was then started on mycophenolate which she was on for approximately 10 years and states that it was discontinued 1-2 years ago due to diarrhea   The diarrhea resolved upon discontinuation of the medication  Myrtle Lugo is unsure if this really helped with the polymyositis either  Myrtle Lugo has also received about 3-4 doses of IVIG in 2009 and she feels like this would help her the most   This has not been done any time recently  Myrtle Lugo reports over the years she may experience 1-2 flare ups of leg weakness for which the prednisone dose is increased   This most recently occurred about 6-9 months ago at which time her prednisone was increased to 50 mg once daily and gradually tapered to her current dose of 5 mg once daily   She reports with this dose she is still feeling very weak and usually the lowest effective dose is about 15 mg once daily   She does not want to increase the steroids due to side effects such as weight gain   She does not experience significant upper body weakness   She reports with her lower extremities she is unable to get up from a seated position without assistance and unable to stand without assistance   She has had home physical therapy in the past but none recently      She denies fevers, unintentional weight loss, alopecia, dry eyes (report some dry mouth in the morning), inflammatory eye disease, skin rash, psoriasis, photosensitivity, skin thickening/tightening, recurrent sinus disease, epistaxis, mouth/nose ulcers, swollen glands, chest pain, persistent shortness of breath (does experience this sometimes due to asthma), cough, hemoptysis, dysphagia, persistent acid reflux, inflammatory bowel disease, blood clots, miscarriages, Raynaud's, concerning joint pain/swelling/stiffness or a family history of autoimmune disease         6/24/2022:  Patient presents for a follow-up of polymyositis   She is currently on prednisone 7 5 mg once daily   I reviewed the workup done after the last office visit which showed an elevated CK of 478   The C-reactive protein was elevated at 21   An SILVANO screen, Sjogren's antibodies, rheumatoid factor, anti CCP antibody, ESR, hepatitis panel, CBC and vitamin-D levels were unremarkable   An ultrasound of her bilateral thighs showed increased echotexture throughout the rectus femoris musculature bilaterally without evidence of hyperemia   A DEXA scan was normal   An x-ray of her right hand showed ulnar subluxation of the 2nd through 5th metacarpophalangeal joints with diffuse osteopenia   There was no marginal erosions or juxta-articular osteopenia      She does not report any new complaints from the last office visit   She did start home physical therapy for the bilateral lower extremity muscle strengthening which helps to some degree and she is continuing with exercises on her own         1/9/2023:  Patient presents for a follow-up of polymyositis  She is currently on prednisone 7 5 mg once daily    After the last office visit we discussed a trial with IVIG to see if this improves her muscle strength but she reports after each infusion she actually felt like she was getting weaker  In view of this she stopped the IVIG infusions in November 2022      She has been receiving home physical therapy and feels like this does help but overall she is reporting significant weakness in her bilateral lower extremities  No new complaints  6/1/2023:  Patient presents for a follow-up of polymyositis  She is currently on prednisone 7 5 mg once daily  I reviewed her labs done in January which showed an elevated C-reactive protein of 19 and a CK of 543  After the last visit we did try prednisone 20 mg once daily which she states she was on for 2 months  This did not make any significant change in her symptoms as she did not notice any improvement in the weakness  She is now on 7 5 mg once daily and does not think that this helps either  She reports for short durations of time when she has been off steroids she actually does not feel any different from being on steroids versus off  She is still feeling very fatigued and reports generalized weakness  She is enrolled with home physical therapy  Past Medical History:   Diagnosis Date   • Acute diverticulitis 9/21/2018   • Anemia    • Asthma    • Chronic pain     bulging discs in back,polymyositis   • Diabetes mellitus ([facility] [address] )    • Disease of thyroid gland    • Diverticulitis    • Hyperlipidemia    • Hypertension    • Polymyositis ([facility] [address] )        Past Surgical History:   Procedure Laterality Date   • HYSTERECTOMY     • SINUS SURGERY     • THYROIDECTOMY     • TUBAL LIGATION         Current Outpatient Medications   Medication Sig Dispense Refill   • predniSONE 5 mg tablet Take 5 mg every day for 2 weeks and then decrease to 5 mg every other day for 2 weeks and then stop   30 tablet 0   • albuterol (PROVENTIL HFA,VENTOLIN HFA) 90 mcg/act inhaler Inhale 2 puffs every 6 (six) hours as needed for wheezing 18 g 1   • amLODIPine (NORVASC) 5 mg tablet amlodipine 5 mg tablet   TAKE 1 TABLET BY MOUTH ONCE DAILY     • benazepril (LOTENSIN) 10 mg tablet TAKE 1 TABLET DAILY 90 tablet 0   • benzonatate (TESSALON PERLES) 100 mg capsule Take 1 capsule (100 mg total) by mouth 3 (three) times a day as needed for cough 20 capsule 0   • Cholecalciferol 25 MCG (1000 UT) tablet Take 1 tablet by mouth daily     • cyanocobalamin (VITAMIN B-12) 1000 MCG tablet Vitamin B-12  1,000 mcg tablet     • gabapentin (NEURONTIN) 300 mg capsule Take 2 capsules (600 mg total) by mouth every morning AND 1 capsule (300 mg total) daily after lunch AND 3 capsules (900 mg total) daily at bedtime  Take 1 tablet in the morning and 3 tablets at bedtime    180 capsule 1   • insulin glargine (LANTUS) 100 units/mL subcutaneous injection Inject 30 Units under the skin daily at bedtime 15 mL 3   • Insulin Pen Needle (Pen Needles) 31G X 5 MM MISC USE DAILY FOR INSULIN ADMINISTRATION 100 each 1   • Januvia 25 MG tablet TAKE 1 TABLET DAILY 90 tablet 0   • Lancets (OneTouch Delica Plus [id number]) MISC      • Lantus SoloStar 100 units/mL SOPN INJECT 30 UNITS UNDER SKIN DAILY AT BEDTIME  15 mL 1   • metFORMIN (GLUCOPHAGE-XR) 500 mg 24 hr tablet TAKE ONE TABLET TWO TIMES DAILY WITH MEALS  180 tablet 0   • omeprazole (PriLOSEC) 40 MG capsule omeprazole 40 mg capsule,delayed release     • OneTouch Verio test strip CHECK BLOOD GLUCOSE TWICE DAILY 100 strip 1   • Synthroid 88 MCG tablet TAKE ONE TABLET DAILY IN EARLY MORNING ON EMPTY STOMACH 90 tablet 0     No current facility-administered medications for this visit  Allergies   Allergen Reactions   • Anaprox [Naproxen Sodium] Shortness Of Breath   • Levofloxacin Hives   • Sulfamethoxazole-Trimethoprim Anaphylaxis   • Jardiance [Empagliflozin] GI Intolerance   • Penicillins Swelling   • Aspirin Rash   • Metronidazole Rash   • Sulfa Antibiotics Rash       Review of Systems  Constitutional: Negative for weight change, fevers, chills, night sweats  Positive for fatigue    ENT/Mouth: Negative for hearing changes, ear pain, nasal congestion, sinus pain, hoarseness, sore throat, rhinorrhea, swallowing difficulty  Eyes: Negative for pain, redness, discharge, vision changes  Cardiovascular: Negative for chest pain, SOB, palpitations  Respiratory: Negative for cough, sputum, wheezing, dyspnea  Gastrointestinal: Negative for nausea, vomiting, diarrhea, constipation, pain, heartburn  Genitourinary: Negative for dysuria, urinary frequency, hematuria  Musculoskeletal: As per HPI  Skin: Negative for skin rash, color changes  Neuro: Negative for numbness, tingling, loss of consciousness  Positive for weakness  Psych: Negative for anxiety, depression  Heme/Lymph: Negative for easy bruising, bleeding, lymphadenopathy  Assessment and Plan:   Ms Ac is a 63-year-old female with history significant for polymyositis diagnosed in 2002 and primary generalized osteoarthritis who presents for a follow-up   She is currently on prednisone 7 5 mg once daily        # Polymyositis  - Jadyn presents today for a follow-up of polymyositis which was diagnosed in 2002   She primarily presents with bilateral lower extremity weakness which was considered by her prior rheumatologist to be secondary to residual irreversible weakness as a result of the myopathy as opposed to ongoing active inflammation   In view of this she was only maintained on prednisone at 7 5 mg once daily for symptomatic relief but given the chronicity of use she is unsure how much it actually helps   Prior medication options included methotrexate which was discontinued due to pulmonary issues, mycophenolate which was discontinued due to diarrhea and IVIG which she recalls as being beneficial but it was unclear why this was discontinued (retried from 6/22-11/22 without any benefit)        - To further evaluate if there may be concerns for active muscle disease I did proceed with an ultrasound of her bilateral thighs which did not show hyperemia/evidence of active inflammation  We again discussed that the ongoing weakness is likely a result of chronic myopathic changes which may be irreversible  I primarily recommend continued physical therapy  We discussed as a last resort option we could consider an increase in immunosuppression  In view of this I will trial her on azathioprine but obtain a TPMT level beforehand  If her muscle strength remains the same then I will discontinue steroids and immunosuppressants and continue with physical therapy      # Chronic steroid use  - She is aware of the side effects of chronic steroid use including but not limited to risk for infections, hypertension, diabetes, cataracts/glaucoma, gastritis, osteoporosis and avascular necrosis   Reassuringly a DEXA scan in 5/22 was normal   She should continue with daily calcium and vitamin-D supplements  Visit Time  Total Visit Duration: 11 minutes

## 2023-06-01 NOTE — TELEPHONE ENCOUNTER
Caller: Patient    Doctor: Carroll Jones    Reason for call: Please fax blood work script for pt  Fax #: [phone]  [facility]    Call back#: [phone]

## 2023-06-02 ENCOUNTER — TELEPHONE (OUTPATIENT)
Dept: FAMILY MEDICINE CLINIC | Facility: CLINIC | Age: 73
End: 2023-06-02

## 2023-06-02 NOTE — TELEPHONE ENCOUNTER
Patient stated that she has severe back pain and says that she cannot come in because she is on a wheel chair and its hard for her  Patient stated that she will be ok with doing virtual  Is this ok?   Lissette Jacques

## 2023-06-02 NOTE — TELEPHONE ENCOUNTER
All over pain and wants to know if she can get something presribed from doctor       [address] [phone] calling for patient    [address]

## 2023-06-02 NOTE — TELEPHONE ENCOUNTER
This can be virtual, but I am fully booked today  Can put on another providers schedule, possibly tomorrow, if they are ok with it as well

## 2023-06-06 ENCOUNTER — TELEMEDICINE (OUTPATIENT)
Dept: FAMILY MEDICINE CLINIC | Facility: CLINIC | Age: 73
End: 2023-06-06
Payer: COMMERCIAL

## 2023-06-06 DIAGNOSIS — G89.29 CHRONIC BILATERAL LOW BACK PAIN WITHOUT SCIATICA: Primary | ICD-10-CM

## 2023-06-06 DIAGNOSIS — M54.50 CHRONIC BILATERAL LOW BACK PAIN WITHOUT SCIATICA: Primary | ICD-10-CM

## 2023-06-06 LAB
LEFT EYE DIABETIC RETINOPATHY: NORMAL
RIGHT EYE DIABETIC RETINOPATHY: NORMAL

## 2023-06-06 PROCEDURE — 99213 OFFICE O/P EST LOW 20 MIN: CPT | Performed by: FAMILY MEDICINE

## 2023-06-06 RX ORDER — CYCLOBENZAPRINE HCL 5 MG
5 TABLET ORAL 3 TIMES DAILY PRN
Qty: 20 TABLET | Refills: 0 | Status: SHIPPED | OUTPATIENT
Start: 2023-06-06

## 2023-06-06 NOTE — PROGRESS NOTES
Virtual Regular Visit    Verification of patient location:    Patient is located at Home in the following state in which I hold an active license [place]      Assessment/Plan:    Problem List Items Addressed This Visit    None  Visit Diagnoses     Chronic bilateral low back pain without sciatica    -  Primary    Relevant Medications    cyclobenzaprine (FLEXERIL) 5 mg tablet      She has been on daily prednisone for her polymyositis which has not helped  She is allergic to NSAIDs  Tylenol has not been helping  Was previously on gabapentin which also did not help  Will trial flexeril  Continue working with physical therapy  Recommend heat to the area and topical pain creams  Reason for visit is   Chief Complaint   Patient presents with   • Virtual Regular Visit        Encounter provider Amarilis Tobin MD    Provider located at [address] [address]      Recent Visits  Date Type Provider Dept   06/02/23 Telephone MD Cira Foley recent visits within past 7 days and meeting all other requirements  Today's Visits  Date Type Provider Dept   06/06/23 Telemedicine MD Cira Foley today's visits and meeting all other requirements  Future Appointments  No visits were found meeting these conditions  Showing future appointments within next 150 days and meeting all other requirements       The patient was identified by name and date of birth  Damon Hoffmann was informed that this is a telemedicine visit and that the visit is being conducted through Telephone  My office door was closed  No one else was in the room  She acknowledged consent and understanding of privacy and security of the video platform  The patient has agreed to participate and understands they can discontinue the visit at any time  Patient is aware this is a billable service  Subjective  Jadyn Parikh is a 67 y o  female     Back Pain  This is a chronic problem  The current episode started more than 1 year ago  The problem occurs constantly  The pain is present in the lumbar spine  The quality of the pain is described as aching  The pain radiates to the right foot  The pain is at a severity of 5/10  Pertinent negatives include no abdominal pain, bladder incontinence, bowel incontinence, chest pain, dysuria, fever, headaches, leg pain, numbness, paresis, paresthesias, pelvic pain, perianal numbness, tingling, weakness or weight loss  The treatment provided no relief          Past Medical History:   Diagnosis Date   • Acute diverticulitis 9/21/2018   • Anemia    • Asthma    • Chronic pain     bulging discs in back,polymyositis   • Diabetes mellitus ([place] [address] )    • Disease of thyroid gland    • Diverticulitis    • Hyperlipidemia    • Hypertension    • Polymyositis ([place] [address] )        Past Surgical History:   Procedure Laterality Date   • HYSTERECTOMY     • SINUS SURGERY     • THYROIDECTOMY     • TUBAL LIGATION         Current Outpatient Medications   Medication Sig Dispense Refill   • cyclobenzaprine (FLEXERIL) 5 mg tablet Take 1 tablet (5 mg total) by mouth 3 (three) times a day as needed for muscle spasms 20 tablet 0   • albuterol (PROVENTIL HFA,VENTOLIN HFA) 90 mcg/act inhaler Inhale 2 puffs every 6 (six) hours as needed for wheezing 18 g 1   • amLODIPine (NORVASC) 5 mg tablet amlodipine 5 mg tablet   TAKE 1 TABLET BY MOUTH ONCE DAILY     • benazepril (LOTENSIN) 10 mg tablet TAKE 1 TABLET DAILY 90 tablet 0   • benzonatate (TESSALON PERLES) 100 mg capsule Take 1 capsule (100 mg total) by mouth 3 (three) times a day as needed for cough 20 capsule 0   • Cholecalciferol 25 MCG (1000 UT) tablet Take 1 tablet by mouth daily     • cyanocobalamin (VITAMIN B-12) 1000 MCG tablet Vitamin B-12  1,000 mcg tablet     • gabapentin (NEURONTIN) 300 mg capsule Take 2 capsules (600 mg total) by mouth every morning AND 1 capsule (300 mg total) daily after lunch AND 3 capsules (900 mg total) daily at bedtime  Take 1 tablet in the morning and 3 tablets at bedtime    180 capsule 1   • insulin glargine (LANTUS) 100 units/mL subcutaneous injection Inject 30 Units under the skin daily at bedtime 15 mL 3   • Insulin Pen Needle (Pen Needles) 31G X 5 MM MISC USE DAILY FOR INSULIN ADMINISTRATION 100 each 1   • Januvia 25 MG tablet TAKE 1 TABLET DAILY 90 tablet 0   • Lancets (OneTouch Delica Plus [id number]) MISC      • Lantus SoloStar 100 units/mL SOPN INJECT 30 UNITS UNDER SKIN DAILY AT BEDTIME  15 mL 1   • metFORMIN (GLUCOPHAGE-XR) 500 mg 24 hr tablet TAKE ONE TABLET TWO TIMES DAILY WITH MEALS  180 tablet 0   • omeprazole (PriLOSEC) 40 MG capsule omeprazole 40 mg capsule,delayed release     • OneTouch Verio test strip CHECK BLOOD GLUCOSE TWICE DAILY 100 strip 1   • predniSONE 5 mg tablet Take 5 mg every day for 2 weeks and then decrease to 5 mg every other day for 2 weeks and then stop  30 tablet 0   • Synthroid 88 MCG tablet TAKE ONE TABLET DAILY IN EARLY MORNING ON EMPTY STOMACH 90 tablet 0     No current facility-administered medications for this visit  Allergies   Allergen Reactions   • Anaprox [Naproxen Sodium] Shortness Of Breath   • Levofloxacin Hives   • Sulfamethoxazole-Trimethoprim Anaphylaxis   • Jardiance [Empagliflozin] GI Intolerance   • Penicillins Swelling   • Aspirin Rash   • Metronidazole Rash   • Sulfa Antibiotics Rash       Review of Systems   Constitutional: Negative for fever and weight loss  Cardiovascular: Negative for chest pain  Gastrointestinal: Negative for abdominal pain and bowel incontinence  Genitourinary: Negative for bladder incontinence, dysuria and pelvic pain  Musculoskeletal: Positive for back pain  Neurological: Negative for tingling, weakness, numbness, headaches and paresthesias  Video Exam    There were no vitals filed for this visit      Physical Exam   It was my intent to perform this visit via video technology but the patient was not able to do a video connection so the visit was completed via audio telephone only    Visit Time  Total Visit Duration: 15

## 2023-06-09 DIAGNOSIS — Z79.4 TYPE 2 DIABETES MELLITUS WITH HYPERGLYCEMIA, WITH LONG-TERM CURRENT USE OF INSULIN (HCC): ICD-10-CM

## 2023-06-09 DIAGNOSIS — J45.20 MILD INTERMITTENT ASTHMA WITHOUT COMPLICATION: ICD-10-CM

## 2023-06-09 DIAGNOSIS — E11.65 TYPE 2 DIABETES MELLITUS WITH HYPERGLYCEMIA, WITH LONG-TERM CURRENT USE OF INSULIN (HCC): ICD-10-CM

## 2023-06-09 DIAGNOSIS — I10 PRIMARY HYPERTENSION: ICD-10-CM

## 2023-06-09 RX ORDER — PEN NEEDLE, DIABETIC 30 GX3/16"
NEEDLE, DISPOSABLE MISCELLANEOUS
Qty: 100 EACH | Refills: 1 | Status: SHIPPED | OUTPATIENT
Start: 2023-06-09

## 2023-06-09 RX ORDER — ALBUTEROL SULFATE 90 UG/1
2 AEROSOL, METERED RESPIRATORY (INHALATION) EVERY 6 HOURS PRN
Qty: 8.5 G | Refills: 1 | Status: SHIPPED | OUTPATIENT
Start: 2023-06-09

## 2023-06-13 ENCOUNTER — TELEPHONE (OUTPATIENT)
Dept: OBGYN CLINIC | Facility: HOSPITAL | Age: 73
End: 2023-06-13

## 2023-06-13 NOTE — TELEPHONE ENCOUNTER
Caller: Patient     Doctor: Dr Kasia Horowitz    Reason for call: Patient calling stating that she had her blood work done last Thursday and she was wondering if Dr Kasia Horowitz got a chance to see them because she is suppose to be starting a new medication  She has her results but could not confirm if it was through [address]   Patient asking to speak to clinical team     Call back#: [phone]

## 2023-06-14 ENCOUNTER — TELEPHONE (OUTPATIENT)
Dept: RHEUMATOLOGY | Facility: CLINIC | Age: 73
End: 2023-06-14

## 2023-06-15 NOTE — TELEPHONE ENCOUNTER
Caller: Patient     Doctor: Ravinder Juarez    Reason for call:     Patient is calling back about the blood work, she states [facility] comes to her home to take the blood work, she gave them  Dr Fabien Tate fax number to fax to the office  She is having side effects, she feels weak and short of breath from stopping the  Prednisone over the past 2 days    ([facility] # is [phone])    Call back#: [phone]

## 2023-06-15 NOTE — TELEPHONE ENCOUNTER
Did we receive labs yet? If she is noticing a difference from stopping the prednisone she may restart it, I think she was on 7 5 mg daily

## 2023-06-15 NOTE — TELEPHONE ENCOUNTER
Requested again from [address] [place] [address]  Spine appears normal, range of motion is not limited, no muscle or joint tenderness

## 2023-06-19 ENCOUNTER — TELEPHONE (OUTPATIENT)
Dept: FAMILY MEDICINE CLINIC | Facility: CLINIC | Age: 73
End: 2023-06-19

## 2023-06-19 NOTE — TELEPHONE ENCOUNTER
OT called to let Dr know that pt daughter, Geeta Raymond requested OT armandal to be pushed back to 6/28/23 as she will be out of town

## 2023-06-21 DIAGNOSIS — M33.20 POLYMYOSITIS (HCC): ICD-10-CM

## 2023-06-21 RX ORDER — PREDNISONE 5 MG/1
TABLET ORAL
Qty: 45 TABLET | Refills: 0 | Status: SHIPPED | OUTPATIENT
Start: 2023-06-21

## 2023-06-21 NOTE — TELEPHONE ENCOUNTER
Pt contacted Call Center requested refill of their medication  Medication Name: Prednisone      Dosage of Med: 7 5mg      Frequency of Med:       Remaining Medication: 0      Pharmacy and Location: [facility]        Pt  Preferred Callback Phone Number: [phone]      Thank you  PLEASE ADVISE PATIENTS:    REFILL REQUESTS WILL BE PROCESSED WITHIN 24-48 HOURS

## 2023-06-26 ENCOUNTER — RA CDI HCC (OUTPATIENT)
Dept: OTHER | Facility: HOSPITAL | Age: 73
End: 2023-06-26

## 2023-06-26 DIAGNOSIS — E11.65 TYPE 2 DIABETES MELLITUS WITH HYPERGLYCEMIA, WITH LONG-TERM CURRENT USE OF INSULIN (HCC): ICD-10-CM

## 2023-06-26 DIAGNOSIS — Z79.4 TYPE 2 DIABETES MELLITUS WITH HYPERGLYCEMIA, WITH LONG-TERM CURRENT USE OF INSULIN (HCC): ICD-10-CM

## 2023-06-26 DIAGNOSIS — E03.9 ACQUIRED HYPOTHYROIDISM: ICD-10-CM

## 2023-06-26 RX ORDER — LEVOTHYROXINE SODIUM 88 MCG
TABLET ORAL
Qty: 90 TABLET | Refills: 0 | Status: SHIPPED | OUTPATIENT
Start: 2023-06-26

## 2023-06-26 RX ORDER — SITAGLIPTIN 25 MG/1
TABLET, FILM COATED ORAL
Qty: 90 TABLET | Refills: 0 | Status: SHIPPED | OUTPATIENT
Start: 2023-06-26

## 2023-06-26 NOTE — TELEPHONE ENCOUNTER
Patient needs  depend on her daughter to give her a  ride    She will call me back to schedule her appointment

## 2023-06-26 NOTE — PROGRESS NOTES
[facility] [address]  coding opportunities       Chart reviewed, no opportunity found: CHART REVIEWED, NO OPPORTUNITY FOUND        Patients Insurance     Medic[facility]

## 2023-06-30 ENCOUNTER — OFFICE VISIT (OUTPATIENT)
Dept: FAMILY MEDICINE CLINIC | Facility: CLINIC | Age: 73
End: 2023-06-30
Payer: COMMERCIAL

## 2023-06-30 VITALS
RESPIRATION RATE: 20 BRPM | HEART RATE: 92 BPM | OXYGEN SATURATION: 99 % | TEMPERATURE: 97.4 F | BODY MASS INDEX: 32.9 KG/M2 | SYSTOLIC BLOOD PRESSURE: 130 MMHG | DIASTOLIC BLOOD PRESSURE: 80 MMHG | WEIGHT: 235 LBS | HEIGHT: 71 IN

## 2023-06-30 DIAGNOSIS — H26.9 CATARACT OF BOTH EYES, UNSPECIFIED CATARACT TYPE: ICD-10-CM

## 2023-06-30 DIAGNOSIS — Z79.4 TYPE 2 DIABETES MELLITUS WITH HYPERGLYCEMIA, WITH LONG-TERM CURRENT USE OF INSULIN (HCC): ICD-10-CM

## 2023-06-30 DIAGNOSIS — Z01.818 PREOP EXAMINATION: ICD-10-CM

## 2023-06-30 DIAGNOSIS — E11.65 TYPE 2 DIABETES MELLITUS WITH HYPERGLYCEMIA, WITH LONG-TERM CURRENT USE OF INSULIN (HCC): ICD-10-CM

## 2023-06-30 DIAGNOSIS — E03.9 ACQUIRED HYPOTHYROIDISM: ICD-10-CM

## 2023-06-30 DIAGNOSIS — I10 ESSENTIAL HYPERTENSION: Primary | ICD-10-CM

## 2023-06-30 DIAGNOSIS — J45.20 MILD INTERMITTENT ASTHMA WITHOUT COMPLICATION: ICD-10-CM

## 2023-06-30 DIAGNOSIS — D58.2 HEMOGLOBINOPATHY (HCC): ICD-10-CM

## 2023-06-30 DIAGNOSIS — M33.20 POLYMYOSITIS (HCC): ICD-10-CM

## 2023-06-30 PROCEDURE — 93000 ELECTROCARDIOGRAM COMPLETE: CPT | Performed by: FAMILY MEDICINE

## 2023-06-30 PROCEDURE — 99214 OFFICE O/P EST MOD 30 MIN: CPT | Performed by: FAMILY MEDICINE

## 2023-06-30 NOTE — ASSESSMENT & PLAN NOTE
Controlled on current dose of insulin, metformin and januvia     Lab Results   Component Value Date    HGBA1C 6 4 (H) 05/18/2023

## 2023-06-30 NOTE — PROGRESS NOTES
FAMILY PRACTICE PRE-OPERATIVE EVALUATION  [facility]    NAME: Liliana Marin  AGE: 67 y o  SEX: female  : 1950     DATE: 2023        Surgeon:  Dr Vijaya Dumont  Planned procedure:  Bilateral Cataract Surgery  Diagnosis for procedure:  Bilateral Cataracts  Procedure date:  and 23    Assessment/Plan:    Patient is medically optimized (cleared) for the planned procedure as long as BMP is stable  She will be getting it drawn soon  Further testing/evaluation is not required  Postop concerns: no    Problem List Items Addressed This Visit        Endocrine    Type 2 diabetes mellitus with hyperglycemia, with long-term current use of insulin (HCC)     Controlled on current dose of insulin, metformin and januvia  Lab Results   Component Value Date    HGBA1C 6 4 (H) 2023            Acquired hypothyroidism     Stable on levothyroxine 88 mcg  Respiratory    Asthma     Controlled  Cardiovascular and Mediastinum    Essential hypertension - Primary     Controlled  Musculoskeletal and Integument    Polymyositis ([facility] [address] )     Managed by her rheumatologist Dr Tereza Ames  Takes prednisone daily               Other    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[address] )   Other Visit Diagnoses     Preop examination        Relevant Orders    Comprehensive metabolic panel    POCT ECG (Completed)    Cataract of both eyes, unspecified cataract type                Pre-Surgery Instructions:   Medication Instructions   • albuterol (PROVENTIL HFA,VENTOLIN HFA) 90 mcg/act inhaler per anesthesia guidelines    • amLODIPine (NORVASC) 5 mg tablet per anesthesia guidelines    • benazepril (LOTENSIN) 10 mg tablet per anesthesia guidelines    • benzonatate (TESSALON PERLES) 100 mg capsule per anesthesia guidelines    • Cholecalciferol 25 MCG (1000 UT) tablet per anesthesia guidelines    • cyanocobalamin (VITAMIN B-12) 1000 MCG tablet per anesthesia guidelines    • cyclobenzaprine (FLEXERIL) 5 mg tablet per anesthesia guidelines    • gabapentin (NEURONTIN) 300 mg capsule per anesthesia guidelines    • insulin glargine (LANTUS) 100 units/mL subcutaneous injection per anesthesia guidelines    • Insulin Pen Needle (Pen Needles) 31G X 5 MM MISC per anesthesia guidelines    • Januvia 25 MG tablet per anesthesia guidelines    • Lancets (OneTouch Delica Plus [id number]) MISC per anesthesia guidelines    • Lantus SoloStar 100 units/mL SOPN per anesthesia guidelines    • metFORMIN (GLUCOPHAGE-XR) 500 mg 24 hr tablet per anesthesia guidelines    • omeprazole (PriLOSEC) 40 MG capsule per anesthesia guidelines    • OneTouch Verio test strip per anesthesia guidelines    • predniSONE 5 mg tablet per anesthesia guidelines    • Synthroid 88 MCG tablet per anesthesia guidelines         Subjective:      Patient ID: Elidia Epstein is a 67 y o  female  Chief Complaint   Patient presents with   • Pre-op Exam     [facility] Dr Maycol Dobson  07/17/2023 and 08/04/2023  [facility]       HPI    The following portions of the patient's history were reviewed and updated as appropriate: allergies, current medications, past family history, past medical history, past social history, past surgical history and problem list       Prior anesthesia: Yes   General; Complications:  None / Tolerated well    CAD History: None    Pulmonary History: Asthma- stable on prednisone    Renal history: None    Diabetes History:  Type 2  Controlled     Neurological History: None     On Immunosuppressant meds/biologics: Yes      Review of Systems   Constitutional: Positive for fatigue  HENT: Negative  Eyes: Negative  Respiratory: Negative  Cardiovascular: Negative  Gastrointestinal: Negative  Endocrine: Negative  Genitourinary: Negative  Musculoskeletal: Negative  Skin: Negative  Allergic/Immunologic: Negative  Neurological: Negative  Hematological: Negative      Psychiatric/Behavioral: Negative  Current Outpatient Medications   Medication Sig Dispense Refill   • albuterol (PROVENTIL HFA,VENTOLIN HFA) 90 mcg/act inhaler INHALE 2 PUFFS EVERY 6 (SIX) HOURS AS NEEDED FOR WHEEZING 8 5 g 1   • amLODIPine (NORVASC) 5 mg tablet amlodipine 5 mg tablet   TAKE 1 TABLET BY MOUTH ONCE DAILY     • benazepril (LOTENSIN) 10 mg tablet TAKE 1 TABLET DAILY 90 tablet 0   • benzonatate (TESSALON PERLES) 100 mg capsule Take 1 capsule (100 mg total) by mouth 3 (three) times a day as needed for cough 20 capsule 0   • Cholecalciferol 25 MCG (1000 UT) tablet Take 1 tablet by mouth daily     • cyanocobalamin (VITAMIN B-12) 1000 MCG tablet Vitamin B-12  1,000 mcg tablet     • cyclobenzaprine (FLEXERIL) 5 mg tablet Take 1 tablet (5 mg total) by mouth 3 (three) times a day as needed for muscle spasms 20 tablet 0   • gabapentin (NEURONTIN) 300 mg capsule Take 2 capsules (600 mg total) by mouth every morning AND 1 capsule (300 mg total) daily after lunch AND 3 capsules (900 mg total) daily at bedtime  Take 1 tablet in the morning and 3 tablets at bedtime    180 capsule 1   • insulin glargine (LANTUS) 100 units/mL subcutaneous injection Inject 30 Units under the skin daily at bedtime 15 mL 3   • Insulin Pen Needle (Pen Needles) 31G X 5 MM MISC USE DAILY FOR INSULIN ADMINISTRATION 100 each 1   • Januvia 25 MG tablet TAKE 1 TABLET DAILY 90 tablet 0   • Lancets (OneTouch Delica Plus [id number]) MISC      • Lantus SoloStar 100 units/mL SOPN INJECT 30 UNITS UNDER SKIN DAILY AT BEDTIME   15 mL 1   • metFORMIN (GLUCOPHAGE-XR) 500 mg 24 hr tablet TAKE ONE TABLET TWO TIMES DAILY WITH MEALS  180 tablet 0   • omeprazole (PriLOSEC) 40 MG capsule omeprazole 40 mg capsule,delayed release     • OneTouch Verio test strip CHECK BLOOD GLUCOSE TWICE DAILY 100 strip 1   • predniSONE 5 mg tablet Take 1 5 tabs daily to equal 7 5mg daily 45 tablet 0   • Synthroid 88 MCG tablet TAKE ONE TABLET DAILY IN EARLY MORNING ON EMPTY STOMACH 90 tablet 0 "    No current facility-administered medications for this visit  Allergies on file:   Anaprox [naproxen sodium], Levofloxacin, Sulfamethoxazole-trimethoprim, Jardiance [empagliflozin], Penicillins, Aspirin, Metronidazole, and Sulfa antibiotics    Patient Active Problem List   Diagnosis   • Polymyositis ([facility] )   • Hyperlipidemia   • Type 2 diabetes mellitus with hyperglycemia, with long-term current use of insulin (HCC)   • Asthma   • Chronic pain   • Acquired hypothyroidism   • Steroid-induced hyperglycemia   • 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[facility])   • Essential hypertension        Past Medical History:   Diagnosis Date   • Acute diverticulitis 9/21/2018   • Anemia    • Asthma    • Chronic pain     bulging discs in back,polymyositis   • Diabetes mellitus ([place] [address] )    • Disease of thyroid gland    • Diverticulitis    • Hyperlipidemia    • Hypertension    • Polymyositis ([place] [address] )        Past Surgical History:   Procedure Laterality Date   • HYSTERECTOMY     • SINUS SURGERY     • THYROIDECTOMY     • TUBAL LIGATION         Family History   Problem Relation Age of Onset   • Hepatitis Sister         acute hepatits C virus       Social History     Tobacco Use   • Smoking status: Never   • Smokeless tobacco: Never   Vaping Use   • Vaping Use: Never used   Substance Use Topics   • Alcohol use: No   • Drug use: No       Objective:    Vitals:    06/30/23 1058   BP: 130/80   Pulse: 92   Resp: 20   Temp: (!) 97 4 °F (36 3 °C)   SpO2: 99%   Weight: 107 kg (235 lb)   Height: 5' 11\" (1 803 m)        Physical Exam  Constitutional:       General: She is not in acute distress  Appearance: Normal appearance  She is well-developed  She is not diaphoretic  HENT:      Head: Normocephalic and atraumatic  Right Ear: Tympanic membrane, ear canal and external ear normal  There is no impacted cerumen  Left Ear: Tympanic membrane, ear canal and external ear normal  There is no impacted cerumen  Eyes:      General: No scleral icterus          " Right eye: No discharge  Left eye: No discharge  Extraocular Movements: Extraocular movements intact  Conjunctiva/sclera: Conjunctivae normal       Pupils: Pupils are equal, round, and reactive to light  Cardiovascular:      Rate and Rhythm: Normal rate and regular rhythm  Pulses: no weak pulses     Heart sounds: Normal heart sounds  No murmur heard  No friction rub  No gallop  Pulmonary:      Effort: Pulmonary effort is normal  No respiratory distress  Breath sounds: Normal breath sounds  No wheezing or rales  Chest:      Chest wall: No tenderness  Abdominal:      General: Bowel sounds are normal  There is no distension  Palpations: Abdomen is soft  There is no mass  Tenderness: There is no abdominal tenderness  There is no guarding or rebound  Musculoskeletal:         General: No deformity  Normal range of motion  Cervical back: Normal range of motion and neck supple  Feet:      Right foot:      Skin integrity: No ulcer, skin breakdown, erythema, warmth, callus or dry skin  Left foot:      Skin integrity: No ulcer, skin breakdown, erythema, warmth, callus or dry skin  Skin:     General: Skin is warm and dry  Findings: No erythema or rash  Neurological:      Mental Status: She is alert and oriented to person, place, and time  Psychiatric:         Behavior: Behavior normal          Thought Content: Thought content normal          Judgment: Judgment normal        Patient's shoes and socks removed  Right Foot/Ankle   Right Foot Inspection  Skin Exam: skin normal and skin intact  No dry skin, no warmth, no callus, no erythema, no maceration, no abnormal color, no pre-ulcer, no ulcer and no callus  Toe Exam: ROM and strength within normal limits  Sensory   Monofilament testing: intact    Vascular  Capillary refills: < 3 seconds          Left Foot/Ankle  Left Foot Inspection  Skin Exam: skin normal and skin intact   No dry skin, no warmth, no erythema, no maceration, normal color, no pre-ulcer, no ulcer and no callus  Toe Exam: ROM and strength within normal limits  Sensory   Monofilament testing: intact    Vascular  Capillary refills: < 3 seconds        Assign Risk Category  No deformity present  No loss of protective sensation  No weak pulses  Risk: 0      Preop labs/testing available and reviewed: yes               EKG yes    Echo no    Stress test/cath no    PFT/Marco A no    Functional capacity: Walking , 4-5 MPH               4 Mets - only using a walker   Pick the highest level patient can comfortably perform   4 mets or greater for surgery    RCRI  High Risk surgery? 1 Point  CAD History:         1 Point   MI; Positive Stress Test; CP due to Mi;  Nitrate Usage to control Angina; Pathologic Q wave on EKG  CHF Active:         1 Point   Pulm Edema; Paroxysmal Nocturnal Dyspnea;  Bibasilar Rales (crackles);S3; CHF on CXR  Cerebrovascular Disease (TIA or CVA):     1 Point  DM on Insulin:        1 Point  Serum Creat >2 0 mg/dl:       1 Point          Total Points: 1     Scorin: Class I, Very Low Risk (0 4%)     1: Class II, Low risk (0 9%)     2: Class III Moderate (6 6%)     3: Class IV High (>11%)      GORDON Risk:  GFR:        For PCP:  If GFR>60, Hold ACE/ARB/Diuretic on the day of surgery, and NSAIDS 10 days before  If GFR<45, Consider PRE and POST op Nephrology Consult  If 46 <GFR> 59 :  Has Patient had GORDON in last 6 Months? no      Alexander Rodriguez MD

## 2023-07-03 ENCOUNTER — TELEPHONE (OUTPATIENT)
Dept: RHEUMATOLOGY | Facility: CLINIC | Age: 73
End: 2023-07-03

## 2023-07-03 ENCOUNTER — TELEPHONE (OUTPATIENT)
Dept: ADMINISTRATIVE | Facility: OTHER | Age: 73
End: 2023-07-03

## 2023-07-03 DIAGNOSIS — Z79.899 HIGH RISK MEDICATION USE: Primary | ICD-10-CM

## 2023-07-03 DIAGNOSIS — M33.20 POLYMYOSITIS (HCC): ICD-10-CM

## 2023-07-03 RX ORDER — AZATHIOPRINE 50 MG/1
TABLET ORAL
Qty: 180 TABLET | Refills: 1 | Status: SHIPPED | OUTPATIENT
Start: 2023-07-03

## 2023-07-03 NOTE — LETTER
Diabetic Eye Exam Form    Date Requested: 23  Patient: Andrea Mccrary  Patient : 1950   Referring Provider: Nova Haynes MD      DIABETIC Eye Exam Date _______________________________      Type of Exam MUST be documented for Diabetic Eye Exams. Please CHECK ONE. Retinal Exam       Dilated Retinal Exam       OCT       Optomap-Iris Exam      Fundus Photography       Left Eye - Please check Retinopathy or No Retinopathy        Exam did show retinopathy    Exam did not show retinopathy       Right Eye - Please check Retinopathy or No Retinopathy       Exam did show retinopathy    Exam did not show retinopathy       Comments __________________________________________________________    Practice Providing Exam ______________________________________________    Exam Performed By (print name) _______________________________________      Provider Signature ___________________________________________________      These reports are needed for  compliance. Please fax this completed form and a copy of the Diabetic Eye Exam report to our office located at [address] [place] [address] as soon as possible via Fax [phone] lissette Tran Small: Phone [phone]  We thank you for your assistance in treating our mutual patient.

## 2023-07-03 NOTE — TELEPHONE ENCOUNTER
----- Message from Riri Feliciano MD sent at 6/30/2023 11:18 AM EDT -----  06/30/23 11:18 AM    Hello, our patient Tobi Epley has had Diabetic Eye Exam completed/performed. Please assist in updating the patient chart by making an External outreach to [employer] and [address] [place] [address] located in [place], [place].  The date of service is within the past year    Thank you,  Riri Feliciano  [facility]

## 2023-07-03 NOTE — TELEPHONE ENCOUNTER
Upon review of the In Basket request and the patient's chart, initial outreach has been made via fax to facility. Please see Contacts section for details.      Thank you  Malvin Stephens

## 2023-07-03 NOTE — TELEPHONE ENCOUNTER
Spoke with patient and relayed information , she wanted to let you know she goes for cataract surgery on 7/17 and then 2 weeks after that for her other eye.

## 2023-07-03 NOTE — TELEPHONE ENCOUNTER
Please let her know I received the test results and will start her on azathioprine, will send script to pharmacy. She will need to do a CBC and CMP in 4 weeks after starting the medication, please mail lab orders to her.

## 2023-07-14 NOTE — TELEPHONE ENCOUNTER
Upon review of the In Basket request we were able to locate, review, and update the patient chart as requested for Diabetic Eye Exam.    Any additional questions or concerns should be emailed to the Practice Liaisons via the appropriate education email address, please do not reply via In Basket.     Thank you  Yue Henson

## 2023-07-31 DIAGNOSIS — E03.9 ACQUIRED HYPOTHYROIDISM: ICD-10-CM

## 2023-07-31 DIAGNOSIS — I10 ESSENTIAL HYPERTENSION: ICD-10-CM

## 2023-07-31 RX ORDER — BENAZEPRIL HYDROCHLORIDE 10 MG/1
TABLET ORAL
Qty: 90 TABLET | Refills: 0 | Status: SHIPPED | OUTPATIENT
Start: 2023-07-31

## 2023-08-01 RX ORDER — INSULIN GLARGINE 100 [IU]/ML
INJECTION, SOLUTION SUBCUTANEOUS
Qty: 15 ML | Refills: 1 | OUTPATIENT
Start: 2023-08-01

## 2023-08-01 NOTE — TELEPHONE ENCOUNTER
Pateint has not been seen since 3/2022 and pharmacy is requesting refill. Please advise    Will forward to clerical to schedule.

## 2023-08-31 ENCOUNTER — TELEPHONE (OUTPATIENT)
Dept: ENDOCRINOLOGY | Facility: CLINIC | Age: 73
End: 2023-08-31

## 2023-08-31 DIAGNOSIS — I10 PRIMARY HYPERTENSION: ICD-10-CM

## 2023-08-31 DIAGNOSIS — E11.65 TYPE 2 DIABETES MELLITUS WITH HYPERGLYCEMIA, WITH LONG-TERM CURRENT USE OF INSULIN (HCC): ICD-10-CM

## 2023-08-31 DIAGNOSIS — Z79.4 TYPE 2 DIABETES MELLITUS WITH HYPERGLYCEMIA, WITH LONG-TERM CURRENT USE OF INSULIN (HCC): ICD-10-CM

## 2023-08-31 RX ORDER — METFORMIN HYDROCHLORIDE 500 MG/1
500 TABLET, EXTENDED RELEASE ORAL 2 TIMES DAILY WITH MEALS
Qty: 180 TABLET | Refills: 0 | Status: SHIPPED | OUTPATIENT
Start: 2023-08-31

## 2023-08-31 NOTE — TELEPHONE ENCOUNTER
Refill provided as requested, 90 day supply. Noted she has not had appointment with us since March 2023, but has upcoming appointment with [facility] in a few weeks.

## 2023-09-08 DIAGNOSIS — Z79.4 TYPE 2 DIABETES MELLITUS WITH HYPERGLYCEMIA, WITH LONG-TERM CURRENT USE OF INSULIN (HCC): ICD-10-CM

## 2023-09-08 DIAGNOSIS — E11.65 TYPE 2 DIABETES MELLITUS WITH HYPERGLYCEMIA, WITH LONG-TERM CURRENT USE OF INSULIN (HCC): ICD-10-CM

## 2023-09-08 DIAGNOSIS — E03.9 ACQUIRED HYPOTHYROIDISM: ICD-10-CM

## 2023-09-08 DIAGNOSIS — I10 PRIMARY HYPERTENSION: ICD-10-CM

## 2023-09-08 RX ORDER — INSULIN GLARGINE 100 [IU]/ML
100 INJECTION, SOLUTION SUBCUTANEOUS DAILY
Qty: 15 ML | Refills: 1 | Status: SHIPPED | OUTPATIENT
Start: 2023-09-08 | End: 2023-10-08

## 2023-09-08 RX ORDER — INSULIN GLARGINE 100 [IU]/ML
INJECTION, SOLUTION SUBCUTANEOUS
Qty: 15 ML | Refills: 1 | Status: SHIPPED | OUTPATIENT
Start: 2023-09-08 | End: 2023-09-08 | Stop reason: SDUPTHER

## 2023-09-08 RX ORDER — BLOOD SUGAR DIAGNOSTIC
STRIP MISCELLANEOUS
Qty: 100 STRIP | Refills: 1 | Status: SHIPPED | OUTPATIENT
Start: 2023-09-08

## 2023-09-18 ENCOUNTER — TELEPHONE (OUTPATIENT)
Dept: ENDOCRINOLOGY | Facility: CLINIC | Age: 73
End: 2023-09-18

## 2023-09-18 DIAGNOSIS — Z79.4 TYPE 2 DIABETES MELLITUS WITH HYPERGLYCEMIA, WITH LONG-TERM CURRENT USE OF INSULIN (HCC): Primary | ICD-10-CM

## 2023-09-18 DIAGNOSIS — E11.65 TYPE 2 DIABETES MELLITUS WITH HYPERGLYCEMIA, WITH LONG-TERM CURRENT USE OF INSULIN (HCC): Primary | ICD-10-CM

## 2023-09-18 DIAGNOSIS — E78.5 HYPERLIPIDEMIA, UNSPECIFIED HYPERLIPIDEMIA TYPE: ICD-10-CM

## 2023-09-18 NOTE — TELEPHONE ENCOUNTER
Patient needs  Labs put in. Once  They are put in the system  I need to fax them and will schedule her appointment.

## 2023-10-02 ENCOUNTER — TELEPHONE (OUTPATIENT)
Dept: ENDOCRINOLOGY | Facility: CLINIC | Age: 73
End: 2023-10-02

## 2023-10-02 DIAGNOSIS — M33.20 POLYMYOSITIS (HCC): ICD-10-CM

## 2023-10-02 DIAGNOSIS — E11.65 TYPE 2 DIABETES MELLITUS WITH HYPERGLYCEMIA, WITH LONG-TERM CURRENT USE OF INSULIN (HCC): ICD-10-CM

## 2023-10-02 DIAGNOSIS — Z79.4 TYPE 2 DIABETES MELLITUS WITH HYPERGLYCEMIA, WITH LONG-TERM CURRENT USE OF INSULIN (HCC): ICD-10-CM

## 2023-10-02 RX ORDER — PREDNISONE 5 MG/1
TABLET ORAL
Qty: 45 TABLET | Refills: 0 | Status: SHIPPED | OUTPATIENT
Start: 2023-10-02

## 2023-10-02 NOTE — TELEPHONE ENCOUNTER
Reason for call:   [x] Refill   [] Prior Auth  [] Other:     Office:   [] PCP/Provider -   [x] Speciality/Provider - ortho    Medication: prednisone    Dose/Frequency: 5mg    Quantity: #45    Pharmacy: [facility]

## 2023-10-04 DIAGNOSIS — E03.9 ACQUIRED HYPOTHYROIDISM: ICD-10-CM

## 2023-10-04 RX ORDER — LEVOTHYROXINE SODIUM 88 MCG
TABLET ORAL
Qty: 90 TABLET | Refills: 0 | Status: SHIPPED | OUTPATIENT
Start: 2023-10-04

## 2023-10-04 NOTE — TELEPHONE ENCOUNTER
Reason for call:   [x] Refill   [] Prior Auth  [] Other:     Office:   [] PCP/Provider -   [x] Speciality/Provider - Lisa    Medication: Synthroid     Dose/Frequency: 88mcg - Daily on empty stomach    Quantity: 90    Pharmacy: [id number] [facility]    Does the patient have enough for 3 days?    [] Yes   [x] No - Send as HP to POD

## 2023-10-23 LAB
ALBUMIN SERPL-MCNC: 3.8 G/DL (ref 3.8–4.8)
ALBUMIN/GLOB SERPL: 1.6 {RATIO} (ref 1.2–2.2)
ALP SERPL-CCNC: 76 IU/L (ref 44–121)
ALT SERPL-CCNC: 10 IU/L (ref 0–32)
AST SERPL-CCNC: 13 IU/L (ref 0–40)
BILIRUB SERPL-MCNC: 0.2 MG/DL (ref 0–1.2)
BUN SERPL-MCNC: 21 MG/DL (ref 8–27)
BUN/CREAT SERPL: 36 (ref 12–28)
CALCIUM SERPL-MCNC: 9.1 MG/DL (ref 8.7–10.3)
CHLORIDE SERPL-SCNC: 103 MMOL/L (ref 96–106)
CHOLEST SERPL-MCNC: 202 MG/DL (ref 100–199)
CO2 SERPL-SCNC: 21 MMOL/L (ref 20–29)
CREAT SERPL-MCNC: 0.59 MG/DL (ref 0.57–1)
EGFR: 95 ML/MIN/1.73
GLOBULIN SER-MCNC: 2.4 G/DL (ref 1.5–4.5)
GLUCOSE SERPL-MCNC: 96 MG/DL (ref 70–99)
HBA1C MFR BLD: 6.8 % (ref 4.8–5.6)
HDLC SERPL-MCNC: 70 MG/DL
LDLC SERPL CALC-MCNC: 115 MG/DL (ref 0–99)
POTASSIUM SERPL-SCNC: 4.1 MMOL/L (ref 3.5–5.2)
PROT SERPL-MCNC: 6.2 G/DL (ref 6–8.5)
SL AMB VLDL CHOLESTEROL CALC: 17 MG/DL (ref 5–40)
SODIUM SERPL-SCNC: 141 MMOL/L (ref 134–144)
TRIGL SERPL-MCNC: 96 MG/DL (ref 0–149)

## 2023-10-24 ENCOUNTER — TELEPHONE (OUTPATIENT)
Dept: ENDOCRINOLOGY | Facility: CLINIC | Age: 73
End: 2023-10-24

## 2023-11-01 ENCOUNTER — TELEMEDICINE (OUTPATIENT)
Dept: ENDOCRINOLOGY | Facility: CLINIC | Age: 73
End: 2023-11-01
Payer: COMMERCIAL

## 2023-11-01 VITALS — SYSTOLIC BLOOD PRESSURE: 127 MMHG | DIASTOLIC BLOOD PRESSURE: 75 MMHG

## 2023-11-01 DIAGNOSIS — E03.9 ACQUIRED HYPOTHYROIDISM: ICD-10-CM

## 2023-11-01 DIAGNOSIS — I10 ESSENTIAL HYPERTENSION: Primary | ICD-10-CM

## 2023-11-01 DIAGNOSIS — E11.65 TYPE 2 DIABETES MELLITUS WITH HYPERGLYCEMIA, WITH LONG-TERM CURRENT USE OF INSULIN (HCC): ICD-10-CM

## 2023-11-01 DIAGNOSIS — Z79.4 TYPE 2 DIABETES MELLITUS WITH HYPERGLYCEMIA, WITH LONG-TERM CURRENT USE OF INSULIN (HCC): ICD-10-CM

## 2023-11-01 PROCEDURE — 99213 OFFICE O/P EST LOW 20 MIN: CPT | Performed by: NURSE PRACTITIONER

## 2023-11-01 RX ORDER — ZOSTER VACCINE RECOMBINANT, ADJUVANTED 50 MCG/0.5
KIT INTRAMUSCULAR
COMMUNITY

## 2023-11-01 RX ORDER — CIPROFLOXACIN 500 MG/1
TABLET, FILM COATED ORAL
COMMUNITY

## 2023-11-01 RX ORDER — AMLODIPINE BESYLATE AND BENAZEPRIL HYDROCHLORIDE 5; 10 MG/1; MG/1
CAPSULE ORAL EVERY 24 HOURS
COMMUNITY

## 2023-11-01 RX ORDER — METRONIDAZOLE 250 MG/1
TABLET ORAL
COMMUNITY

## 2023-11-01 RX ORDER — LEVOFLOXACIN 500 MG/1
TABLET, FILM COATED ORAL EVERY 24 HOURS
COMMUNITY

## 2023-11-01 RX ORDER — FERROUS SULFATE 325(65) MG
TABLET ORAL EVERY 12 HOURS
COMMUNITY

## 2023-11-01 RX ORDER — IBUPROFEN 200 MG
CAPSULE ORAL
COMMUNITY

## 2023-11-01 NOTE — PROGRESS NOTES
Virtual Brief Visit    This Visit is being completed by telephone. The Patient is located at Home and in the following state in which I hold an active license [place]    The patient was identified by name and date of birth. Rosalie Babb was informed that this is a telemedicine visit and that the visit is being conducted through the [employer]. She agrees to proceed. .  My office door was closed. No one else was in the room. She acknowledged consent and understanding of privacy and security of the video platform. The patient has agreed to participate and understands they can discontinue the visit at any time. Patient is aware this is a billable service. Assessment/Plan:    Problem List Items Addressed This Visit          Endocrine    Type 2 diabetes mellitus with hyperglycemia, with long-term current use of insulin (HCC)    Relevant Orders    Hemoglobin A1C    Comprehensive metabolic panel    Lipid panel- Lab Collect    Acquired hypothyroidism     Clinically euthyroid. Euthyroid labs from May 2023, will recheck with next lab work in 3 months          Relevant Orders    TSH, 3rd generation    T4, free- Lab Collect       Cardiovascular and Mediastinum    Essential hypertension - Primary     Unable to assess via telephone visit, continues on current regimen. Denies symptomatic hypo/hypertension. Currently on ACE-inhibitor         Relevant Medications    amLODIPine-benazepril (Lotrel) 5-10 MG per capsule       Recent Visits  Date Type Provider Dept   11/02/23 Telephone [facility] En Richey   11/01/23 Telemedicine [facility] Thanh Moncada recent visits within past 7 days and meeting all other requirements  Future Appointments  No visits were found meeting these conditions.   Showing future appointments within next 150 days and meeting all other requirements       Established Patient Progress Note    CC: Type 2 Diabetes Mellitus     Impression & Plan:    Problem List Items Addressed This Visit          Endocrine    Type 2 diabetes mellitus with hyperglycemia, with long-term current use of insulin ([address])    Relevant Orders    Hemoglobin A1C    Comprehensive metabolic panel    Lipid panel- Lab Collect    Acquired hypothyroidism     Clinically euthyroid. Euthyroid labs from May 2023, will recheck with next lab work in 3 months          Relevant Orders    TSH, 3rd generation    T4, free- Lab Collect       Cardiovascular and Mediastinum    Essential hypertension - Primary     Unable to assess via telephone visit, continues on current regimen. Denies symptomatic hypo/hypertension. Currently on ACE-inhibitor         Relevant Medications    amLODIPine-benazepril (Lotrel) 5-10 MG per capsule       Orders Placed This Encounter   Procedures    TSH, 3rd generation     This is a patient instruction: This test is non-fasting. Please drink two glasses of water morning of bloodwork. Standing Status:   Future     Standing Expiration Date:   5/3/2024    T4, free- Lab Collect     Standing Status:   Future     Standing Expiration Date:   11/3/2024    Hemoglobin A1C     Standing Status:   Future     Standing Expiration Date:   11/3/2024    Comprehensive metabolic panel     This is a patient instruction: Patient fasting for 8 hours or longer recommended. Standing Status:   Future     Standing Expiration Date:   11/3/2024    Lipid panel- Lab Collect     This is a patient instruction: This test requires patient fasting for 10-12 hours or longer. Drinking of black coffee or black tea is acceptable. Standing Status:   Future     Standing Expiration Date:   11/3/2024       History of Present Illness:   Aretha Alatorre is a 68 y.o. female with a history of type 2 diabetes with long term use of insulin with a component of steroid-induced hyperglycemia given daily prednisone, follows with rheumatology for polymyositis.  Mobility has severe declined per report and is experiencing pain and some noticed darkened spots on skin which she believes may be bruising. Unable to assess as patient was not able to connect through video visit, requested she follow up with PCP for this. Denies recent illness or hospitalizations. Denies recent severe hypoglycemic or severe hyperglycemic episodes. Denies any issues with her current regimen. Home glucose monitoring: are performed regularly three times per day. First morning always between  mg/dL. Before lunch and dinner rarely 240-260 mg/dL. Eye surgery 2 months ago b/l cataracts. Current regimen:   Januvia 25 mg daily  Metformin 500 mg twice daily  Lantus 30 units daily    On ACE-I, no statin  Has hypothyroidism: Continues taking Synthroid 88 mcg daily, regularly and properly. Denies symptoms consistent with hypo/hyperthyroidism.      Patient Active Problem List   Diagnosis    Polymyositis ([address])    Hyperlipidemia    Type 2 diabetes mellitus with hyperglycemia, with long-term current use of insulin (HCC)    Asthma    Chronic pain    Acquired hypothyroidism    Steroid-induced hyperglycemia    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([address])    Essential hypertension      Past Medical History:   Diagnosis Date    Acute diverticulitis 9/21/2018    Anemia     Asthma     Chronic pain     bulging discs in back,polymyositis    Diabetes mellitus ([address])     Disease of thyroid gland     Diverticulitis     Hyperlipidemia     Hypertension     Polymyositis ([address])       Past Surgical History:   Procedure Laterality Date    EYE SURGERY Bilateral     CATAREACT REMOVED FROM BOTH EYES 2 MTHS AGO    HYSTERECTOMY      SINUS SURGERY      THYROIDECTOMY      TUBAL LIGATION        Family History   Problem Relation Age of Onset    Hepatitis Sister         acute hepatits C virus    Hypertension Daughter     Kidney disease Daughter     Hypertension Son     Mental illness Son      Social History     Tobacco Use    Smoking status: Never    Smokeless tobacco: Never Substance Use Topics    Alcohol use: No     Allergies   Allergen Reactions    Anaprox [Naproxen Sodium] Shortness Of Breath    Levofloxacin Hives    Sulfamethoxazole-Trimethoprim Anaphylaxis    Jardiance [Empagliflozin] GI Intolerance    Penicillins Swelling and Hives    Aspirin Rash    Metronidazole Rash    Sulfa Antibiotics Rash         Current Outpatient Medications:     amLODIPine (NORVASC) 5 mg tablet, amlodipine 5 mg tablet  TAKE 1 TABLET BY MOUTH ONCE DAILY, Disp: , Rfl:     calcium carbonate (OS-YNES) 1250 (500 Ca) MG tablet, 1 tablet with meals Orally, Disp: , Rfl:     Cholecalciferol 25 MCG (1000 UT) tablet, Take 1 tablet by mouth daily, Disp: , Rfl:     ferrous sulfate 325 (65 Fe) mg tablet, Every 12 hours, Disp: , Rfl:     insulin glargine (LANTUS) 100 units/mL subcutaneous injection, Inject 30 Units under the skin daily at bedtime, Disp: 15 mL, Rfl: 3    Insulin Pen Needle (Pen Needles) 31G X 5 MM MISC, USE DAILY FOR INSULIN ADMINISTRATION, Disp: 100 each, Rfl: 1    Lancets (OneTouch Delica Plus [id number]) MISC, , Disp: , Rfl:     omeprazole (PriLOSEC) 40 MG capsule, omeprazole 40 mg capsule,delayed release, Disp: , Rfl:     OneTouch Verio test strip, CHECK BLOOD GLUCOSE TWICE DAILY, Disp: 100 strip, Rfl: 1    Synthroid 88 MCG tablet, Take one tablet by mouth every day in the early morning on an empty stomach., Disp: 90 tablet, Rfl: 0    albuterol (PROVENTIL HFA,VENTOLIN HFA) 90 mcg/act inhaler, INHALE 2 PUFFS EVERY 6 (SIX) HOURS AS NEEDED FOR WHEEZING, Disp: 8.5 g, Rfl: 1    amLODIPine-benazepril (Lotrel) 5-10 MG per capsule, every 24 hours (Patient not taking: Reported on 11/1/2023), Disp: , Rfl:     azaTHIOprine (IMURAN) 50 mg tablet, Take 1 tablet once daily x 2 weeks, then increase to 2 tabs once daily and continue.  (Patient not taking: Reported on 11/1/2023), Disp: 180 tablet, Rfl: 1    benazepril (LOTENSIN) 10 mg tablet, TAKE 1 TABLET DAILY, Disp: 90 tablet, Rfl: 0    benzonatate (TESSALON PERLES) 100 mg capsule, Take 1 capsule (100 mg total) by mouth 3 (three) times a day as needed for cough (Patient not taking: Reported on 11/1/2023), Disp: 20 capsule, Rfl: 0    ciprofloxacin (CIPRO) 500 mg tablet, Take 1 tablet twice a day by oral route for 7 days. (Patient not taking: Reported on 11/1/2023), Disp: , Rfl:     cyanocobalamin (VITAMIN B-12) 1000 MCG tablet, Vitamin B-12  1,000 mcg tablet (Patient not taking: Reported on 11/1/2023), Disp: , Rfl:     cyclobenzaprine (FLEXERIL) 5 mg tablet, Take 1 tablet (5 mg total) by mouth 3 (three) times a day as needed for muscle spasms, Disp: 20 tablet, Rfl: 0    Diclofenac Sodium (VOLTAREN) 1 %, APPLY 2 GRAM TO THE AFFECTED AREA(S)  OF THE RIGHT KNEE BY TOPICAL ROUTE 4 TIMES PER DAY, Disp: , Rfl:     gabapentin (NEURONTIN) 300 mg capsule, Take 2 capsules (600 mg total) by mouth every morning AND 1 capsule (300 mg total) daily after lunch AND 3 capsules (900 mg total) daily at bedtime. Take 1 tablet in the morning and 3 tablets at bedtime. . (Patient not taking: Reported on 11/1/2023), Disp: 180 capsule, Rfl: 1    Lantus SoloStar 100 units/mL SOPN, Inject 1 mL (100 Units total) as directed daily, Disp: 15 mL, Rfl: 1    levofloxacin (LEVAQUIN) 500 mg tablet, every 24 hours (Patient not taking: Reported on 11/1/2023), Disp: , Rfl:     metFORMIN (GLUCOPHAGE-XR) 500 mg 24 hr tablet, Take 1 tablet (500 mg total) by mouth 2 (two) times a day with meals, Disp: 180 tablet, Rfl: 0    metroNIDAZOLE (FLAGYL) 250 mg tablet, Take 1 tablet 3 times a day by oral route for 7 days. (Patient not taking: Reported on 11/1/2023), Disp: , Rfl:     predniSONE 5 mg tablet, Take 1.5 tabs daily to equal 7.5mg daily, Disp: 45 tablet, Rfl: 0    sitaGLIPtin (Januvia) 25 mg tablet, Take 1 tablet (25 mg total) by mouth daily, Disp: 90 tablet, Rfl: 0    Zoster Vac Recomb Adjuvanted (Shingrix) 50 MCG/0.5ML SUSR, , Disp: , Rfl:     Review of Systems  See HPI.    All other systems reviewed and are negative. Physical Exam:  There is no height or weight on file to calculate BMI. /75 Comment: PT. STATED SHE CHECKED YESTERDAY. Wt Readings from Last 3 Encounters:   06/30/23 107 kg (235 lb)   01/07/23 104 kg (230 lb)   03/18/22 104 kg (230 lb)       Labs:   Lab Results   Component Value Date    HGBA1C 6.8 (H) 10/20/2023    HGBA1C 6.4 (H) 05/18/2023    HGBA1C 6.8 (H) 12/08/2022     Lab Results   Component Value Date    CREATININE 0.59 10/20/2023    CREATININE 0.58 05/18/2023    CREATININE 0.52 (L) 12/08/2022    BUN 21 10/20/2023    K 4.1 10/20/2023     10/20/2023    CO2 21 10/20/2023     eGFR   Date Value Ref Range Status   10/20/2023 95 >59 mL/min/1.73 Final   05/05/2021 104 ml/min/1.73sq m Final     Lab Results   Component Value Date    HDL 70 10/20/2023    TRIG 96 10/20/2023     Lab Results   Component Value Date    ALT 10 10/20/2023    AST 13 10/20/2023    ALKPHOS 78 05/05/2021     Lab Results   Component Value Date    EJY8MYPTMCKD 2.432 05/05/2021    LRF8ONZLXYGI 1.600 09/22/2018     Lab Results   Component Value Date    FREET4 1.39 04/07/2022     Discussed with the patient and all questioned fully answered. She will call me if any problems arise. Follow-up appointment in 3 months.      Counseled patient on diagnostic results, prognosis, risk and benefit of treatment options, instruction for management, importance of treatment compliance, Risk  factor reduction and impressions      [facility] Putnam      Visit Time  Total Visit Duration: 20 min predniSONE 5 mg tablet, Take 1.5 tabs daily to equal 7.5mg daily, Disp: 45 tablet, Rfl: 0    sitaGLIPtin (Januvia) 25 mg tablet, Take 1 tablet (25 mg total) by mouth daily, Disp: 90 tablet, Rfl: 0    Zoster Vac Recomb Adjuvanted (Shingrix) 50 MCG/0.5ML SUSR, , Disp: , Rfl:     Review of Systems  See HPI. All other systems reviewed and are negative. Physical Exam:  There is no height or weight on file to calculate BMI. /75 Comment: PT. STATED SHE CHECKED YESTERDAY. Wt Readings from Last 3 Encounters:   06/30/23 107 kg (235 lb)   01/07/23 104 kg (230 lb)   03/18/22 104 kg (230 lb)       Labs:   Lab Results   Component Value Date    HGBA1C 6.8 (H) 10/20/2023    HGBA1C 6.4 (H) 05/18/2023    HGBA1C 6.8 (H) 12/08/2022     Lab Results   Component Value Date    CREATININE 0.59 10/20/2023    CREATININE 0.58 05/18/2023    CREATININE 0.52 (L) 12/08/2022    BUN 21 10/20/2023    K 4.1 10/20/2023     10/20/2023    CO2 21 10/20/2023     eGFR   Date Value Ref Range Status   10/20/2023 95 >59 mL/min/1.73 Final   05/05/2021 104 ml/min/1.73sq m Final     Lab Results   Component Value Date    HDL 70 10/20/2023    TRIG 96 10/20/2023     Lab Results   Component Value Date    ALT 10 10/20/2023    AST 13 10/20/2023    ALKPHOS 78 05/05/2021     Lab Results   Component Value Date    ZAB0CJUFKDSJ 2.432 05/05/2021    KWR2DMHODJZB 1.600 09/22/2018     Lab Results   Component Value Date    FREET4 1.39 04/07/2022     Discussed with the patient and all questioned fully answered. She will call me if any problems arise. Follow-up appointment in 3 months.      Counseled patient on diagnostic results, prognosis, risk and benefit of treatment options, instruction for management, importance of treatment compliance, Risk  factor reduction and impressions      CHICA Bender      Visit Time  Total Visit Duration: 20 min

## 2023-11-02 ENCOUNTER — TELEPHONE (OUTPATIENT)
Dept: ENDOCRINOLOGY | Facility: CLINIC | Age: 73
End: 2023-11-02

## 2023-11-02 DIAGNOSIS — Z79.4 TYPE 2 DIABETES MELLITUS WITH HYPERGLYCEMIA, WITH LONG-TERM CURRENT USE OF INSULIN (HCC): ICD-10-CM

## 2023-11-02 DIAGNOSIS — E11.65 TYPE 2 DIABETES MELLITUS WITH HYPERGLYCEMIA, WITH LONG-TERM CURRENT USE OF INSULIN (HCC): ICD-10-CM

## 2023-11-02 DIAGNOSIS — M33.20 POLYMYOSITIS (HCC): ICD-10-CM

## 2023-11-02 DIAGNOSIS — I10 PRIMARY HYPERTENSION: ICD-10-CM

## 2023-11-02 DIAGNOSIS — I10 ESSENTIAL HYPERTENSION: ICD-10-CM

## 2023-11-02 RX ORDER — SITAGLIPTIN 25 MG/1
25 TABLET, FILM COATED ORAL DAILY
Qty: 30 TABLET | Refills: 0 | Status: SHIPPED | OUTPATIENT
Start: 2023-11-02 | End: 2023-11-02 | Stop reason: SDUPTHER

## 2023-11-02 RX ORDER — METFORMIN HYDROCHLORIDE 500 MG/1
500 TABLET, EXTENDED RELEASE ORAL 2 TIMES DAILY WITH MEALS
Qty: 180 TABLET | Refills: 0 | Status: SHIPPED | OUTPATIENT
Start: 2023-11-02 | End: 2024-01-31

## 2023-11-02 RX ORDER — BENAZEPRIL HYDROCHLORIDE 10 MG/1
TABLET ORAL
Qty: 90 TABLET | Refills: 0 | Status: SHIPPED | OUTPATIENT
Start: 2023-11-02

## 2023-11-02 RX ORDER — PREDNISONE 5 MG/1
TABLET ORAL
Qty: 45 TABLET | Refills: 0 | Status: SHIPPED | OUTPATIENT
Start: 2023-11-02

## 2023-11-02 NOTE — TELEPHONE ENCOUNTER
Patient needs a refill for   Januvia , Metformin and  please send it to  [address]

## 2023-11-02 NOTE — TELEPHONE ENCOUNTER
Reason for call:   [x] Refill   [] Prior Auth  [] Other:     Office:   [] PCP/Provider -   [x] Specialty/Provider - Rheumo    Medication: Prednisone    Dose/Frequency: 5 mg     Quantity: #45    Pharmacy: [facility]    Does the patient have enough for 3 days?    [] Yes   [x] No - Send as HP to POD

## 2023-11-03 NOTE — ASSESSMENT & PLAN NOTE
Unable to assess via telephone visit, continues on current regimen. Denies symptomatic hypo/hypertension.      Currently on ACE-inhibitor

## 2023-11-07 NOTE — ASSESSMENT & PLAN NOTE
Lab Results   Component Value Date    HGBA1C 6.8 (H) 10/20/2023     HGA1C  at goal without history of hypoglycemia. Treatment regimen: Continue current regimen. Discussed risks/complications associated with uncontrolled diabetes including organ inv  olvement, heart attack, stroke, death. Recommended referral to diabetes education. Advised lifestyle modifications including attention to diet including the amount and types of carbohydrates consumed and regular activity. Call for blood sugars less than 70 mg/dl or patterns over mg/dl. Monitor blood glucose levels at least 2 times a day, if on insulin ideally before all insulin administration times. Discussed symptoms and treatment of hypoglycemia. Reviewed risks associated with hypoglycemia. Always carry rapid acting carbohydrates and a glucometer (a way to check your blood sugar). Recommendation for medical identification either bracelet, necklace. Routine follow up for diabetic eye and foot exams. Ordered blood work to complete prior to next visit. Send glucose logs/CGM download in 1-2 weeks for review    Follow up in 3 months.

## 2023-11-29 DIAGNOSIS — M33.20 POLYMYOSITIS (HCC): ICD-10-CM

## 2023-11-29 NOTE — TELEPHONE ENCOUNTER
Reason for call:   [x] Refill   [] Prior Auth  [] Other:     Office:   [] PCP/Provider -   [x] Specialty/Provider - Rheum/ Dr Tran Blackwood    Medication: prednisone    Dose/Frequency: 5 mg/ 1.5 tabs daily    Quantity: 30D w refills    Pharmacy: [address] [place] [address] on file    Does the patient have enough for 3 days?    [x] Yes   [] No - Send as HP to POD

## 2023-12-06 RX ORDER — PREDNISONE 5 MG/1
TABLET ORAL
Qty: 45 TABLET | Refills: 0 | OUTPATIENT
Start: 2023-12-06

## 2023-12-08 DIAGNOSIS — M33.20 POLYMYOSITIS (HCC): ICD-10-CM

## 2023-12-08 RX ORDER — PREDNISONE 5 MG/1
TABLET ORAL
Qty: 45 TABLET | Refills: 0 | OUTPATIENT
Start: 2023-12-08

## 2023-12-08 NOTE — TELEPHONE ENCOUNTER
Patient has a very difficult time getting and leaving the house- you need assistance and your children would need to take off work, please review and call patient back. Reason for call:   [x] Refill   [] Prior Auth  [] Other:     Office:   [] PCP/Provider -   [x] Specialty/Provider - Chloe    Medication: Predinose 5 mg    Dose/Frequency: Take 1.5 tabs daily to equal 7.5mg daily     Quantity: 45    Pharmacy: [id number] [facility]    Does the patient have enough for 3 days?    [] Yes   [x] No - Send as HP to POD

## 2023-12-11 ENCOUNTER — TELEPHONE (OUTPATIENT)
Age: 73
End: 2023-12-11

## 2023-12-11 DIAGNOSIS — M33.20 POLYMYOSITIS (HCC): ICD-10-CM

## 2023-12-11 RX ORDER — PREDNISONE 5 MG/1
TABLET ORAL
Qty: 45 TABLET | Refills: 1 | Status: SHIPPED | OUTPATIENT
Start: 2023-12-11

## 2023-12-11 NOTE — TELEPHONE ENCOUNTER
Patient called medication refill line in regards to her refill for predisone. She has called several times and she is unable to currently come in for an appt. I advised the patient I would send a message to the office and also advised her to call the clinical line as she stated she is currently sick. Please review and advise the patient what to do.

## 2023-12-11 NOTE — TELEPHONE ENCOUNTER
Caller: Patient     Doctor: Martha Luong     Reason for call: Patient needs prednisone refilled ASAP. She is trying to arrange a ride to get to a f/u appt but she has to count on her children to take time off of work to get her to an appt. She said she will speak with them today and try to get appt scheduled but she is out of medication and would like a refill until she can be seen.  Please call patient to advise     Call back#: [phone]

## 2023-12-11 NOTE — TELEPHONE ENCOUNTER
I will refill this for her, but if she has issues with coming to an appointment she can always request a virtual follow up, she no showed to her last appointment.

## 2023-12-21 ENCOUNTER — TELEMEDICINE (OUTPATIENT)
Dept: RHEUMATOLOGY | Facility: CLINIC | Age: 73
End: 2023-12-21
Payer: COMMERCIAL

## 2023-12-21 ENCOUNTER — TELEPHONE (OUTPATIENT)
Dept: RHEUMATOLOGY | Facility: CLINIC | Age: 73
End: 2023-12-21

## 2023-12-21 DIAGNOSIS — M15.0 PRIMARY GENERALIZED (OSTEO)ARTHRITIS: ICD-10-CM

## 2023-12-21 DIAGNOSIS — M33.20 POLYMYOSITIS (HCC): Primary | ICD-10-CM

## 2023-12-21 DIAGNOSIS — Z79.52 CURRENT CHRONIC USE OF SYSTEMIC STEROIDS: ICD-10-CM

## 2023-12-21 DIAGNOSIS — Z79.899 HIGH RISK MEDICATION USE: ICD-10-CM

## 2023-12-21 DIAGNOSIS — Z79.83 LONG TERM USE OF BISPHOSPHONATES: ICD-10-CM

## 2023-12-21 PROCEDURE — 99215 OFFICE O/P EST HI 40 MIN: CPT | Performed by: INTERNAL MEDICINE

## 2023-12-21 RX ORDER — ALENDRONATE SODIUM 35 MG/1
35 TABLET ORAL
Qty: 12 TABLET | Refills: 3 | Status: SHIPPED | OUTPATIENT
Start: 2023-12-21

## 2023-12-21 NOTE — PROGRESS NOTES
Virtual Regular Visit    Verification of patient location:    Patient is located at Home in the following state in which I hold an active license [place]      Assessment/Plan:    Problem List Items Addressed This Visit          Musculoskeletal and Integument    Polymyositis (HCC) - Primary    Relevant Orders    CK    C-reactive protein    Sedimentation rate, automated     Other Visit Diagnoses       High risk medication use        Relevant Orders    CBC and differential    Comprehensive metabolic panel    Current chronic use of systemic steroids        Relevant Medications    alendronate (FOSAMAX) 35 mg tablet    Long term use of bisphosphonates        Primary generalized (osteo)arthritis                     Reason for visit is follow up.      Chief Complaint   Patient presents with    Virtual Regular Visit          Encounter provider Tonia Corona MD    Provider located at [facility] [address], [facility] [address]  [facility] [id number] [phone]      Recent Visits  No visits were found meeting these conditions.  Showing recent visits within past 7 days and meeting all other requirements  Today's Visits  Date Type Provider Dept   12/21/23 Telephone Tonia Corona MD  Rheumatology [facility]   12/21/23 Telemedicine Tonia Corona MD  Rheumatology [facility]   Showing today's visits and meeting all other requirements  Future Appointments  No visits were found meeting these conditions.  Showing future appointments within next 150 days and meeting all other requirements       The patient was identified by name and date of birth. Jadyn Ac was informed that this is a telemedicine visit and that the visit is being conducted through Telephone.  My office door was closed. No one else was in the room.  She acknowledged consent and understanding of privacy and security of the video platform. The patient has agreed to participate and  understands they can discontinue the visit at any time.    Patient is aware this is a billable service.       Subjective    HPI     INITIAL VISIT NOTE (3/2022):  Ms. Ac is a 71-year-old female with history significant for polymyositis diagnosed in 2002 and primary generalized osteoarthritis who presents to [facility] with [facility].  She is currently on prednisone 5 mg once daily.  She is transferring care from Dr. Megan Jaquez at [facility].  She is self-referred today.     Patient reports in 2002 after she presented with proximal muscle weakness primarily affecting her bilateral lower extremities she underwent a muscle biopsy which was consistent with polymyositis.  She was also referred to [facility] for their opinion.  She reports since then she has been on prednisone constantly but at varying doses.  She was initially started on methotrexate which she was on for a few years but cannot recall if this specifically helped her.  It was discontinued eventually due to shortness of breath.  She was then started on mycophenolate which she was on for approximately 10 years and states that it was discontinued 1-2 years ago due to diarrhea.  The diarrhea resolved upon discontinuation of the medication.  She is unsure if this really helped with the polymyositis either.  She has also received about 3-4 doses of IVIG in 2009 and she feels like this would help her the most.  This has not been done any time recently.  She reports over the years she may experience 1-2 flare ups of leg weakness for which the prednisone dose is increased.  This most recently occurred about 6-9 months ago at which time her prednisone was increased to 50 mg once daily and gradually tapered to her current dose of 5 mg once daily.  She reports with this dose she is still feeling very weak and usually the lowest effective dose is about 15 mg once daily.  She does not want to increase the steroids due to side effects such as weight  gain.  She does not experience significant upper body weakness.  She reports with her lower extremities she is unable to get up from a seated position without assistance and unable to stand without assistance.  She has had home physical therapy in the past but none recently.     She denies fevers, unintentional weight loss, alopecia, dry eyes (report some dry mouth in the morning), inflammatory eye disease, skin rash, psoriasis, photosensitivity, skin thickening/tightening, recurrent sinus disease, epistaxis, mouth/nose ulcers, swollen glands, chest pain, persistent shortness of breath (does experience this sometimes due to asthma), cough, hemoptysis, dysphagia, persistent acid reflux, inflammatory bowel disease, blood clots, miscarriages, Raynaud's, concerning joint pain/swelling/stiffness or a family history of autoimmune disease.        6/24/2022:  Patient presents for a follow-up of polymyositis.  She is currently on prednisone 7.5 mg once daily.  I reviewed the workup done after the last office visit which showed an elevated CK of 478.  The C-reactive protein was elevated at 21.  An SILVANO screen, Sjogren's antibodies, rheumatoid factor, anti CCP antibody, ESR, hepatitis panel, CBC and vitamin-D levels were unremarkable.  An ultrasound of her bilateral thighs showed increased echotexture throughout the rectus femoris musculature bilaterally without evidence of hyperemia.  A DEXA scan was normal.  An x-ray of her right hand showed ulnar subluxation of the 2nd through 5th metacarpophalangeal joints with diffuse osteopenia.  There was no marginal erosions or juxta-articular osteopenia.     She does not report any new complaints from the last office visit.  She did start home physical therapy for the bilateral lower extremity muscle strengthening which helps to some degree and she is continuing with exercises on her own.        1/9/2023:  Patient presents for a follow-up of polymyositis.  She is currently on  prednisone 7.5 mg once daily.  After the last office visit we discussed a trial with IVIG to see if this improves her muscle strength but she reports after each infusion she actually felt like she was getting weaker.  In view of this she stopped the IVIG infusions in November 2022.     She has been receiving home physical therapy and feels like this does help but overall she is reporting significant weakness in her bilateral lower extremities.  No new complaints.        6/1/2023:  Patient presents for a follow-up of polymyositis.  She is currently on prednisone 7.5 mg once daily.  I reviewed her labs done in January which showed an elevated C-reactive protein of 19 and a CK of 543.  After the last visit we did try prednisone 20 mg once daily which she states she was on for 2 months.  This did not make any significant change in her symptoms as she did not notice any improvement in the weakness.  She is now on 7.5 mg once daily and does not think that this helps either.  She reports for short durations of time when she has been off steroids she actually does not feel any different from being on steroids versus off.  She is still feeling very fatigued and reports generalized weakness.  She is enrolled with home physical therapy.      12/21/2023:  Patient presents for a follow-up of polymyositis.  She is currently on prednisone 7.5 mg once daily.  She did not start the azathioprine as she was undergoing eye surgery and did not want to begin immunosuppression.    She continues to feel weakness in her legs but reports it is more prominent in her right leg.  She has not tried to taper down on the steroids.      Past Medical History:   Diagnosis Date    Acute diverticulitis 9/21/2018    Anemia     Asthma     Chronic pain     bulging discs in back,polymyositis    Diabetes mellitus (HCC)     Disease of thyroid gland     Diverticulitis     Hyperlipidemia     Hypertension     Polymyositis (HCC)        Past Surgical History:    Procedure Laterality Date    EYE SURGERY Bilateral     CATAREACT REMOVED FROM BOTH EYES 2 MTHS AGO    HYSTERECTOMY      SINUS SURGERY      THYROIDECTOMY      TUBAL LIGATION         Current Outpatient Medications   Medication Sig Dispense Refill    alendronate (FOSAMAX) 35 mg tablet Take 1 tablet (35 mg total) by mouth every 7 days 12 tablet 3    albuterol (PROVENTIL HFA,VENTOLIN HFA) 90 mcg/act inhaler INHALE 2 PUFFS EVERY 6 (SIX) HOURS AS NEEDED FOR WHEEZING 8.5 g 1    amLODIPine (NORVASC) 5 mg tablet amlodipine 5 mg tablet   TAKE 1 TABLET BY MOUTH ONCE DAILY      amLODIPine-benazepril (Lotrel) 5-10 MG per capsule every 24 hours (Patient not taking: Reported on 11/1/2023)      azaTHIOprine (IMURAN) 50 mg tablet Take 1 tablet once daily x 2 weeks, then increase to 2 tabs once daily and continue. (Patient not taking: Reported on 11/1/2023) 180 tablet 1    benazepril (LOTENSIN) 10 mg tablet TAKE 1 TABLET DAILY 90 tablet 0    benzonatate (TESSALON PERLES) 100 mg capsule Take 1 capsule (100 mg total) by mouth 3 (three) times a day as needed for cough (Patient not taking: Reported on 11/1/2023) 20 capsule 0    calcium carbonate (OS-YNES) 1250 (500 Ca) MG tablet 1 tablet with meals Orally      Cholecalciferol 25 MCG (1000 UT) tablet Take 1 tablet by mouth daily      ciprofloxacin (CIPRO) 500 mg tablet Take 1 tablet twice a day by oral route for 7 days. (Patient not taking: Reported on 11/1/2023)      cyanocobalamin (VITAMIN B-12) 1000 MCG tablet Vitamin B-12  1,000 mcg tablet (Patient not taking: Reported on 11/1/2023)      cyclobenzaprine (FLEXERIL) 5 mg tablet Take 1 tablet (5 mg total) by mouth 3 (three) times a day as needed for muscle spasms 20 tablet 0    Diclofenac Sodium (VOLTAREN) 1 % APPLY 2 GRAM TO THE AFFECTED AREA(S)  OF THE RIGHT KNEE BY TOPICAL ROUTE 4 TIMES PER DAY      ferrous sulfate 325 (65 Fe) mg tablet Every 12 hours      gabapentin (NEURONTIN) 300 mg capsule Take 2 capsules (600 mg total) by mouth  every morning AND 1 capsule (300 mg total) daily after lunch AND 3 capsules (900 mg total) daily at bedtime. Take 1 tablet in the morning and 3 tablets at bedtime.. (Patient not taking: Reported on 11/1/2023) 180 capsule 1    insulin glargine (LANTUS) 100 units/mL subcutaneous injection Inject 30 Units under the skin daily at bedtime 15 mL 3    Insulin Pen Needle (Pen Needles) 31G X 5 MM MISC USE DAILY FOR INSULIN ADMINISTRATION 100 each 1    Lancets (OneTouch Delica Plus Rapttr30K) MISC       Lantus SoloStar 100 units/mL SOPN Inject 1 mL (100 Units total) as directed daily 15 mL 1    levofloxacin (LEVAQUIN) 500 mg tablet every 24 hours (Patient not taking: Reported on 11/1/2023)      metFORMIN (GLUCOPHAGE-XR) 500 mg 24 hr tablet Take 1 tablet (500 mg total) by mouth 2 (two) times a day with meals 180 tablet 0    metroNIDAZOLE (FLAGYL) 250 mg tablet Take 1 tablet 3 times a day by oral route for 7 days. (Patient not taking: Reported on 11/1/2023)      omeprazole (PriLOSEC) 40 MG capsule omeprazole 40 mg capsule,delayed release      OneTouch Verio test strip CHECK BLOOD GLUCOSE TWICE DAILY 100 strip 1    predniSONE 5 mg tablet Take 1.5 tabs daily to equal 7.5mg daily 45 tablet 1    sitaGLIPtin (Januvia) 25 mg tablet Take 1 tablet (25 mg total) by mouth daily 90 tablet 0    Synthroid 88 MCG tablet Take one tablet by mouth every day in the early morning on an empty stomach. 90 tablet 0    Zoster Vac Recomb Adjuvanted (Shingrix) 50 MCG/0.5ML SUSR  (Patient not taking: Reported on 11/1/2023)       No current facility-administered medications for this visit.        Allergies   Allergen Reactions    Anaprox [Naproxen Sodium] Shortness Of Breath    Levofloxacin Hives    Sulfamethoxazole-Trimethoprim Anaphylaxis    Jardiance [Empagliflozin] GI Intolerance    Penicillins Swelling and Hives    Aspirin Rash    Metronidazole Rash    Sulfa Antibiotics Rash       Review of Systems  Constitutional: Negative for weight change,  fevers, chills, night sweats.  Positive for fatigue.  ENT/Mouth: Negative for hearing changes, ear pain, nasal congestion, sinus pain, hoarseness, sore throat, rhinorrhea, swallowing difficulty.   Eyes: Negative for pain, redness, discharge, vision changes.   Cardiovascular: Negative for chest pain, SOB, palpitations.   Respiratory: Negative for cough, sputum, wheezing, dyspnea.   Gastrointestinal: Negative for nausea, vomiting, diarrhea, constipation, pain, heartburn.  Genitourinary: Negative for dysuria, urinary frequency, hematuria.   Musculoskeletal: As per HPI.  Skin: Negative for skin rash, color changes.   Neuro: Negative for numbness, tingling, loss of consciousness.  Positive for weakness.  Psych: Negative for anxiety, depression.   Heme/Lymph: Negative for easy bruising, bleeding, lymphadenopathy.        Assessment and Plan:   Ms. Ac is a 73-year-old female with history significant for polymyositis diagnosed in 2002 and primary generalized osteoarthritis who presents for a follow-up.  She is currently on prednisone 7.5 mg once daily.       # Polymyositis  - Macieljarrod presents today for a follow-up of polymyositis which was diagnosed in 2002.  She primarily presents with bilateral lower extremity weakness which was considered by her prior rheumatologist to be secondary to residual irreversible weakness as a result of the myopathy as opposed to ongoing active inflammation.  In view of this she was only maintained on prednisone at 7.5 mg once daily for symptomatic relief but given the chronicity of use she is unsure how much it actually helps.  Prior medication options included methotrexate which was discontinued due to pulmonary issues, mycophenolate which was discontinued due to diarrhea and IVIG which she recalls as being beneficial but it was unclear why this was discontinued (retried from 6/22-11/22 without any benefit).       - To further evaluate if there may be concerns for active muscle disease I  did proceed with an ultrasound of her bilateral thighs which did not show hyperemia/evidence of active inflammation.  We again discussed that the ongoing weakness is likely a result of chronic myopathic changes which may be irreversible.  I primarily recommend continued physical therapy.  We discussed as a last resort option we could consider an increase in immunosuppression.  In view of this I will trial her on azathioprine 50 mg once daily for 2 weeks with a subsequent increase to 100 mg once daily until the follow-up visit.  She will update high risk medication lab monitoring in 1 month.  She is very hesitant to decrease the prednisone so we will continue 7.5 mg once daily.     # Chronic steroid use  - She is aware of the side effects of chronic steroid use including but not limited to risk for infections, hypertension, diabetes, cataracts/glaucoma, gastritis, osteoporosis and avascular necrosis.  Reassuringly a DEXA scan in 5/22 was normal.  She should continue with daily calcium and vitamin-D supplements and at this time I will also start her on osteoporosis prophylaxis with alendronate 35 mg once weekly.  I reviewed the method of administration with her including taking the medication on an empty stomach first thing in the morning and remaining upright for at least 30-45 minutes as well as potential side effects including but not limited to risk for arthralgias, electrolyte abnormalities, GI upset, osteonecrosis of the jaw and atypical fractures.      Visit Time  Total Visit Duration: 21 minutes.

## 2024-01-02 ENCOUNTER — TELEPHONE (OUTPATIENT)
Age: 74
End: 2024-01-02

## 2024-01-02 DIAGNOSIS — M33.20 POLYMYOSITIS (HCC): ICD-10-CM

## 2024-01-02 RX ORDER — PREDNISONE 5 MG/1
TABLET ORAL
Qty: 45 TABLET | Refills: 1 | Status: SHIPPED | OUTPATIENT
Start: 2024-01-02

## 2024-01-02 NOTE — TELEPHONE ENCOUNTER
Caller: PATIENT    Doctor: AMARILIS     Reason for call: Patient has developed a rash over their caro and it is itchy. Patient did take some benadryl last night and it helped, patient wants to know if it is from one of the new medications they were prescribed.    Their eating habits have not changed.     Call back#: [phone]

## 2024-01-02 NOTE — TELEPHONE ENCOUNTER
Caller: patient    Doctor: yani    Reason for call: patient is asking for the lab orders that were ordered to be faxed to [facility].  FAX # [phone]    Call back#: N/A

## 2024-01-02 NOTE — TELEPHONE ENCOUNTER
Pt contacted Call Center requested refill of their medication.        Medication Name: Prednisone      Dosage of Med: 5 mg      Frequency of Med: 1.5 tabs daily       Remaining Medication: 2      Pharmacy and Location: [facility]         Pt. Preferred Callback Phone Number: [phone]      Thank you.       PLEASE ADVISE PATIENTS:    REFILL REQUESTS WILL BE PROCESSED WITHIN 24-48 HOURS.

## 2024-01-05 ENCOUNTER — RA CDI HCC (OUTPATIENT)
Dept: OTHER | Facility: HOSPITAL | Age: 74
End: 2024-01-05

## 2024-01-08 ENCOUNTER — OFFICE VISIT (OUTPATIENT)
Dept: FAMILY MEDICINE CLINIC | Facility: CLINIC | Age: 74
End: 2024-01-08
Payer: COMMERCIAL

## 2024-01-08 VITALS
SYSTOLIC BLOOD PRESSURE: 112 MMHG | DIASTOLIC BLOOD PRESSURE: 70 MMHG | HEIGHT: 71 IN | RESPIRATION RATE: 16 BRPM | OXYGEN SATURATION: 96 % | TEMPERATURE: 97.2 F | BODY MASS INDEX: 32.78 KG/M2 | HEART RATE: 98 BPM

## 2024-01-08 DIAGNOSIS — D58.2 HEMOGLOBINOPATHY (HCC): ICD-10-CM

## 2024-01-08 DIAGNOSIS — E78.5 HYPERLIPIDEMIA, UNSPECIFIED HYPERLIPIDEMIA TYPE: ICD-10-CM

## 2024-01-08 DIAGNOSIS — E03.9 ACQUIRED HYPOTHYROIDISM: ICD-10-CM

## 2024-01-08 DIAGNOSIS — K64.9 HEMORRHOIDS, UNSPECIFIED HEMORRHOID TYPE: ICD-10-CM

## 2024-01-08 DIAGNOSIS — I10 ESSENTIAL HYPERTENSION: ICD-10-CM

## 2024-01-08 DIAGNOSIS — I10 PRIMARY HYPERTENSION: ICD-10-CM

## 2024-01-08 DIAGNOSIS — M33.20 POLYMYOSITIS (HCC): ICD-10-CM

## 2024-01-08 DIAGNOSIS — E11.69 TYPE 2 DIABETES MELLITUS WITH OTHER SPECIFIED COMPLICATION, WITHOUT LONG-TERM CURRENT USE OF INSULIN (HCC): ICD-10-CM

## 2024-01-08 DIAGNOSIS — J45.20 MILD INTERMITTENT ASTHMA WITHOUT COMPLICATION: Primary | ICD-10-CM

## 2024-01-08 PROCEDURE — 99214 OFFICE O/P EST MOD 30 MIN: CPT | Performed by: FAMILY MEDICINE

## 2024-01-08 RX ORDER — INSULIN GLARGINE 100 [IU]/ML
30 INJECTION, SOLUTION SUBCUTANEOUS
Qty: 15 ML | Refills: 3 | Status: SHIPPED | OUTPATIENT
Start: 2024-01-08

## 2024-01-08 RX ORDER — BENAZEPRIL HYDROCHLORIDE 5 MG/1
5 TABLET ORAL DAILY
COMMUNITY

## 2024-01-08 NOTE — PROGRESS NOTES
Name: Jadyn Ac      : 1950      MRN: [id number]  Encounter Provider: Herlinda Castorena MD  Encounter Date: 2024   Encounter department: [facility]    Assessment & Plan     1. Mild intermittent asthma without complication  Assessment & Plan:  Stable.       2. Polymyositis (HCC)  Assessment & Plan:  Follows rheumatology.       3. 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(HCC)    4. Type 2 diabetes mellitus with other specified complication, without long-term current use of insulin (HCC)  Assessment & Plan:  Controlled on lantus 30 units, januvia 25mg and metformin 500mg BID. She was previously seeing endocrinology, but she would like for me to take over prescribing medications moving forward because she cannot go to so many doctors.   Lab Results   Component Value Date    HGBA1C 6.8 (H) 10/20/2023         5. Acquired hypothyroidism  Assessment & Plan:  Continue synthroid.       6. Essential hypertension  Assessment & Plan:  Controlled on benazepril and amlodipine.       7. Hyperlipidemia, unspecified hyperlipidemia type  Assessment & Plan:  Lab Results   Component Value Date    CHOLESTEROL 202 (H) 10/20/2023    CHOLESTEROL 198 2023    CHOLESTEROL 201 (H) 2022     Lab Results   Component Value Date    HDL 70 10/20/2023    HDL 60 2023    HDL 56 2022     Lab Results   Component Value Date    TRIG 96 10/20/2023    TRIG 110 2023    TRIG 142 2022     Lab Results   Component Value Date    NONHDLC 86 2018    Stable.       8. Primary hypertension  -     insulin glargine (LANTUS) 100 units/mL subcutaneous injection; Inject 30 Units under the skin daily at bedtime    9. Hemorrhoids, unspecified hemorrhoid type  Comments:  Counseled on high fiber diet, hydration, OTC preparation H.           Subjective      HPI  Jadyn is here today for routine f/u visit.   She reports hemorrhoids which are intermittent. Some BRBPR when she wipes. She is constipated at times. Has not tried any OTC  hemorrhoid treatment.   She also reports easy bruising recently on her lower legs bilaterally. No injuries.   She follows rheumatologist for polymyositis and osteoporosis. She was started on azathioprine and alendronate recently, but one of them gave her an allergic rash and she is not sure which one. She states she called her rheumatologist and they never got back to her.   She would like for me to take over her thyroid and diabetes medications moving forward because both conditions have been stable and she has trouble going to too many doctor appointments due to her polymyositis.   Review of Systems   Constitutional: Negative.    HENT: Negative.     Eyes: Negative.    Respiratory: Negative.     Cardiovascular: Negative.    Gastrointestinal:  Positive for constipation.   Endocrine: Negative.    Genitourinary: Negative.    Musculoskeletal:  Positive for arthralgias.   Skin: Negative.    Allergic/Immunologic: Negative.    Neurological: Negative.    Hematological: Negative.    Psychiatric/Behavioral: Negative.         Current Outpatient Medications on File Prior to Visit   Medication Sig    albuterol (PROVENTIL HFA,VENTOLIN HFA) 90 mcg/act inhaler INHALE 2 PUFFS EVERY 6 (SIX) HOURS AS NEEDED FOR WHEEZING    amLODIPine (NORVASC) 5 mg tablet amlodipine 5 mg tablet   TAKE 1 TABLET BY MOUTH ONCE DAILY    benazepril (LOTENSIN) 10 mg tablet TAKE 1 TABLET DAILY    benazepril (LOTENSIN) 5 mg tablet Take 5 mg by mouth daily    calcium carbonate (OS-YNES) 1250 (500 Ca) MG tablet 1 tablet with meals Orally    Cholecalciferol 25 MCG (1000 UT) tablet Take 1 tablet by mouth daily    ferrous sulfate 325 (65 Fe) mg tablet Every 12 hours    Insulin Pen Needle (Pen Needles) 31G X 5 MM MISC USE DAILY FOR INSULIN ADMINISTRATION    Lancets (OneTouch Delica Plus [id number]) MISC     metFORMIN (GLUCOPHAGE-XR) 500 mg 24 hr tablet Take 1 tablet (500 mg total) by mouth 2 (two) times a day with meals    omeprazole (PriLOSEC) 40 MG capsule  omeprazole 40 mg capsule,delayed release    OneTouch Verio test strip CHECK BLOOD GLUCOSE TWICE DAILY    predniSONE 5 mg tablet Take 1.5 tabs daily to equal 7.5mg daily    sitaGLIPtin (Januvia) 25 mg tablet Take 1 tablet (25 mg total) by mouth daily    Synthroid 88 MCG tablet Take one tablet by mouth every day in the early morning on an empty stomach.    [DISCONTINUED] insulin glargine (LANTUS) 100 units/mL subcutaneous injection Inject 30 Units under the skin daily at bedtime    alendronate (FOSAMAX) 35 mg tablet Take 1 tablet (35 mg total) by mouth every 7 days (Patient not taking: Reported on 1/8/2024)    azaTHIOprine (IMURAN) 50 mg tablet Take 1 tablet once daily x 2 weeks, then increase to 2 tabs once daily and continue. (Patient not taking: Reported on 11/1/2023)    [DISCONTINUED] amLODIPine-benazepril (Lotrel) 5-10 MG per capsule every 24 hours (Patient not taking: Reported on 11/1/2023)    [DISCONTINUED] benzonatate (TESSALON PERLES) 100 mg capsule Take 1 capsule (100 mg total) by mouth 3 (three) times a day as needed for cough (Patient not taking: Reported on 11/1/2023)    [DISCONTINUED] ciprofloxacin (CIPRO) 500 mg tablet Take 1 tablet twice a day by oral route for 7 days. (Patient not taking: Reported on 11/1/2023)    [DISCONTINUED] cyanocobalamin (VITAMIN B-12) 1000 MCG tablet Vitamin B-12  1,000 mcg tablet (Patient not taking: Reported on 11/1/2023)    [DISCONTINUED] cyclobenzaprine (FLEXERIL) 5 mg tablet Take 1 tablet (5 mg total) by mouth 3 (three) times a day as needed for muscle spasms (Patient not taking: Reported on 1/8/2024)    [DISCONTINUED] Diclofenac Sodium (VOLTAREN) 1 % APPLY 2 GRAM TO THE AFFECTED AREA(S)  OF THE RIGHT KNEE BY TOPICAL ROUTE 4 TIMES PER DAY (Patient not taking: Reported on 1/8/2024)    [DISCONTINUED] gabapentin (NEURONTIN) 300 mg capsule Take 2 capsules (600 mg total) by mouth every morning AND 1 capsule (300 mg total) daily after lunch AND 3 capsules (900 mg total) daily at  "bedtime. Take 1 tablet in the morning and 3 tablets at bedtime.. (Patient not taking: Reported on 11/1/2023)    [DISCONTINUED] Lantus SoloStar 100 units/mL SOPN Inject 1 mL (100 Units total) as directed daily (Patient not taking: Reported on 1/8/2024)    [DISCONTINUED] levofloxacin (LEVAQUIN) 500 mg tablet every 24 hours (Patient not taking: Reported on 11/1/2023)    [DISCONTINUED] metroNIDAZOLE (FLAGYL) 250 mg tablet Take 1 tablet 3 times a day by oral route for 7 days. (Patient not taking: Reported on 11/1/2023)    [DISCONTINUED] Zoster Vac Recomb Adjuvanted (Shingrix) 50 MCG/0.5ML SUSR  (Patient not taking: Reported on 11/1/2023)       Objective     /70   Pulse 98   Temp (!) 97.2 °F (36.2 °C)   Resp 16   Ht 5' 11\" (1.803 m)   SpO2 96%   BMI 32.78 kg/m²     Physical Exam  Constitutional:       General: She is not in acute distress.     Appearance: She is well-developed. She is not diaphoretic.   HENT:      Head: Normocephalic and atraumatic.   Cardiovascular:      Rate and Rhythm: Normal rate and regular rhythm.      Heart sounds: Normal heart sounds. No murmur heard.     No friction rub. No gallop.   Pulmonary:      Effort: Pulmonary effort is normal. No respiratory distress.      Breath sounds: Normal breath sounds. No wheezing or rales.   Chest:      Chest wall: No tenderness.   Genitourinary:     Comments: Rectal exam deferred.   Musculoskeletal:         General: No deformity. Normal range of motion.      Cervical back: Normal range of motion and neck supple.   Skin:     General: Skin is warm and dry.   Neurological:      Mental Status: She is alert and oriented to person, place, and time.   Psychiatric:         Behavior: Behavior normal.         Thought Content: Thought content normal.         Judgment: Judgment normal.       Herlinda Castorena MD    "

## 2024-01-08 NOTE — ASSESSMENT & PLAN NOTE
Lab Results   Component Value Date    CHOLESTEROL 202 (H) 10/20/2023    CHOLESTEROL 198 05/18/2023    CHOLESTEROL 201 (H) 12/08/2022     Lab Results   Component Value Date    HDL 70 10/20/2023    HDL 60 05/18/2023    HDL 56 12/08/2022     Lab Results   Component Value Date    TRIG 96 10/20/2023    TRIG 110 05/18/2023    TRIG 142 12/08/2022     Lab Results   Component Value Date    NONHDLC 86 09/22/2018    Stable.

## 2024-01-08 NOTE — ASSESSMENT & PLAN NOTE
Controlled on lantus 30 units, januvia 25mg and metformin 500mg BID. She was previously seeing endocrinology, but she would like for me to take over prescribing medications moving forward because she cannot go to so many doctors.   Lab Results   Component Value Date    HGBA1C 6.8 (H) 10/20/2023

## 2024-01-26 DIAGNOSIS — Z79.4 TYPE 2 DIABETES MELLITUS WITH HYPERGLYCEMIA, WITH LONG-TERM CURRENT USE OF INSULIN (HCC): ICD-10-CM

## 2024-01-26 DIAGNOSIS — E11.65 TYPE 2 DIABETES MELLITUS WITH HYPERGLYCEMIA, WITH LONG-TERM CURRENT USE OF INSULIN (HCC): ICD-10-CM

## 2024-01-26 DIAGNOSIS — I10 ESSENTIAL HYPERTENSION: ICD-10-CM

## 2024-01-26 DIAGNOSIS — I10 PRIMARY HYPERTENSION: ICD-10-CM

## 2024-01-26 DIAGNOSIS — E03.9 ACQUIRED HYPOTHYROIDISM: ICD-10-CM

## 2024-01-26 RX ORDER — BENAZEPRIL HYDROCHLORIDE 10 MG/1
TABLET ORAL
Qty: 90 TABLET | Refills: 1 | Status: SHIPPED | OUTPATIENT
Start: 2024-01-26

## 2024-01-26 RX ORDER — BLOOD SUGAR DIAGNOSTIC
STRIP MISCELLANEOUS
Qty: 100 STRIP | Refills: 1 | Status: SHIPPED | OUTPATIENT
Start: 2024-01-26

## 2024-01-26 RX ORDER — SITAGLIPTIN 25 MG/1
25 TABLET, FILM COATED ORAL DAILY
Qty: 90 TABLET | Refills: 0 | Status: SHIPPED | OUTPATIENT
Start: 2024-01-26

## 2024-01-26 RX ORDER — LEVOTHYROXINE SODIUM 88 MCG
TABLET ORAL
Qty: 90 TABLET | Refills: 0 | Status: SHIPPED | OUTPATIENT
Start: 2024-01-26

## 2024-02-20 ENCOUNTER — TELEPHONE (OUTPATIENT)
Dept: FAMILY MEDICINE CLINIC | Facility: CLINIC | Age: 74
End: 2024-02-20

## 2024-02-20 DIAGNOSIS — E11.65 TYPE 2 DIABETES MELLITUS WITH HYPERGLYCEMIA, WITH LONG-TERM CURRENT USE OF INSULIN (HCC): ICD-10-CM

## 2024-02-20 DIAGNOSIS — Z79.4 TYPE 2 DIABETES MELLITUS WITH HYPERGLYCEMIA, WITH LONG-TERM CURRENT USE OF INSULIN (HCC): ICD-10-CM

## 2024-02-20 DIAGNOSIS — I10 PRIMARY HYPERTENSION: ICD-10-CM

## 2024-02-20 RX ORDER — PEN NEEDLE, DIABETIC 30 GX3/16"
NEEDLE, DISPOSABLE MISCELLANEOUS
Qty: 100 EACH | Refills: 1 | Status: SHIPPED | OUTPATIENT
Start: 2024-02-20

## 2024-02-20 NOTE — TELEPHONE ENCOUNTER
Pt called in regards to medication insulin glargine (LANTUS) 100 units/mL subcutaneous injection . Pt wanted to refill medication. Informed pt that she has 3 refills remaining on script and to contact pharmacy.

## 2024-02-23 NOTE — TELEPHONE ENCOUNTER
Paul from [facility] call to recommend to start the patient with LANTUS.     If you have any question please contact at   [phone]

## 2024-03-05 DIAGNOSIS — Z79.4 TYPE 2 DIABETES MELLITUS WITH HYPERGLYCEMIA, WITH LONG-TERM CURRENT USE OF INSULIN (HCC): ICD-10-CM

## 2024-03-05 DIAGNOSIS — M33.20 POLYMYOSITIS (HCC): ICD-10-CM

## 2024-03-05 DIAGNOSIS — I10 ESSENTIAL HYPERTENSION: Primary | ICD-10-CM

## 2024-03-05 DIAGNOSIS — I10 PRIMARY HYPERTENSION: ICD-10-CM

## 2024-03-05 DIAGNOSIS — E11.65 TYPE 2 DIABETES MELLITUS WITH HYPERGLYCEMIA, WITH LONG-TERM CURRENT USE OF INSULIN (HCC): ICD-10-CM

## 2024-03-05 RX ORDER — AMLODIPINE BESYLATE 5 MG/1
5 TABLET ORAL DAILY
Qty: 90 TABLET | Refills: 1 | Status: SHIPPED | OUTPATIENT
Start: 2024-03-05

## 2024-03-05 RX ORDER — METFORMIN HYDROCHLORIDE 500 MG/1
TABLET, EXTENDED RELEASE ORAL
Qty: 180 TABLET | Refills: 0 | Status: SHIPPED | OUTPATIENT
Start: 2024-03-05

## 2024-03-05 RX ORDER — PREDNISONE 5 MG/1
TABLET ORAL
Qty: 45 TABLET | Refills: 0 | Status: CANCELLED | OUTPATIENT
Start: 2024-03-05

## 2024-03-05 NOTE — TELEPHONE ENCOUNTER
Patient would like PCP to take over refilling all medications and states she agreed to.     Reason for call:   [x] Refill   [] Prior Auth  [] Other:     Office:   [x] PCP/Provider -   [] Specialty/Provider -     Medication:     Amlodipine 5 mg tablet taken by mouth once daily #90 tabs     Prednisone 5 mg tablet. Take 1.5 tabs daily to equal 7.5mg #45 tabs     Pharmacy: [address] [phone]     Does the patient have enough for 3 days?   [] Yes   [x] No - Send as HP to POD

## 2024-03-07 DIAGNOSIS — M33.20 POLYMYOSITIS (HCC): ICD-10-CM

## 2024-03-07 RX ORDER — PREDNISONE 5 MG/1
TABLET ORAL
Qty: 45 TABLET | Refills: 5 | Status: SHIPPED | OUTPATIENT
Start: 2024-03-07

## 2024-03-07 NOTE — TELEPHONE ENCOUNTER
Pt called requesting if we can resend her prednisone to the pharmacy since they dont have it, rx resent.

## 2024-03-19 ENCOUNTER — TELEPHONE (OUTPATIENT)
Dept: RHEUMATOLOGY | Facility: CLINIC | Age: 74
End: 2024-03-19

## 2024-03-19 NOTE — TELEPHONE ENCOUNTER
Called and left message to reschedule Friday appt. With Dr. Corona she has a meeting. Off the patient a virtual today with Dr. Corona or Thursday.

## 2024-03-21 ENCOUNTER — TELEPHONE (OUTPATIENT)
Age: 74
End: 2024-03-21

## 2024-03-21 NOTE — TELEPHONE ENCOUNTER
Patient called back and said that they were able to make the appt for 1230pm and I spoke with julian and she said she would move the appt to 1:30pm the patient agreed. And julian will move the appt to 1:30pm virtual

## 2024-03-22 ENCOUNTER — TELEMEDICINE (OUTPATIENT)
Dept: RHEUMATOLOGY | Facility: CLINIC | Age: 74
End: 2024-03-22
Payer: COMMERCIAL

## 2024-03-22 ENCOUNTER — TELEPHONE (OUTPATIENT)
Dept: RHEUMATOLOGY | Facility: CLINIC | Age: 74
End: 2024-03-22

## 2024-03-22 ENCOUNTER — TELEPHONE (OUTPATIENT)
Age: 74
End: 2024-03-22

## 2024-03-22 DIAGNOSIS — Z79.83 LONG TERM USE OF BISPHOSPHONATES: ICD-10-CM

## 2024-03-22 DIAGNOSIS — Z51.81 ENCOUNTER FOR MONITORING AZATHIOPRINE THERAPY: ICD-10-CM

## 2024-03-22 DIAGNOSIS — M81.0 AGE-RELATED OSTEOPOROSIS WITHOUT CURRENT PATHOLOGICAL FRACTURE: ICD-10-CM

## 2024-03-22 DIAGNOSIS — M33.20 POLYMYOSITIS (HCC): Primary | ICD-10-CM

## 2024-03-22 DIAGNOSIS — Z79.624 ENCOUNTER FOR MONITORING AZATHIOPRINE THERAPY: ICD-10-CM

## 2024-03-22 DIAGNOSIS — Z79.52 CURRENT CHRONIC USE OF SYSTEMIC STEROIDS: ICD-10-CM

## 2024-03-22 DIAGNOSIS — M15.0 PRIMARY GENERALIZED (OSTEO)ARTHRITIS: ICD-10-CM

## 2024-03-22 PROCEDURE — 99215 OFFICE O/P EST HI 40 MIN: CPT | Performed by: INTERNAL MEDICINE

## 2024-03-22 PROCEDURE — G2211 COMPLEX E/M VISIT ADD ON: HCPCS | Performed by: INTERNAL MEDICINE

## 2024-03-22 NOTE — TELEPHONE ENCOUNTER
Please remind patient she is also due to update a DEXA scan if possible in May.  I placed an order in her chart and she will need to call central scheduling to make an appointment.

## 2024-03-22 NOTE — PROGRESS NOTES
Virtual Regular Visit    Verification of patient location:    Patient is located at Home in the following state in which I hold an active license [place]      Assessment/Plan:    Problem List Items Addressed This Visit          Musculoskeletal and Integument    Polymyositis (HCC) - Primary    Relevant Orders    C-reactive protein    Sedimentation rate, automated    CK     Other Visit Diagnoses       Encounter for monitoring azathioprine therapy        Relevant Orders    CBC and differential    Comprehensive metabolic panel    Current chronic use of systemic steroids        Relevant Orders    CBC and differential    Comprehensive metabolic panel    Primary generalized (osteo)arthritis        Age-related osteoporosis without current pathological fracture        Relevant Orders    DXA bone density spine hip and pelvis    Long term use of bisphosphonates                     Reason for visit is follow up.      Chief Complaint   Patient presents with    Virtual Regular Visit          Encounter provider Tonia Corona MD    Provider located at [facility] [address], [facility] [address]  [facility] [id number] [phone]      Recent Visits  No visits were found meeting these conditions.  Showing recent visits within past 7 days and meeting all other requirements  Today's Visits  Date Type Provider Dept   03/22/24 Telephone Tonia Corona MD  Rheumatology [facility]   03/22/24 Telemedicine Tonia Corona MD  Rheumatology [facility]   Showing today's visits and meeting all other requirements  Future Appointments  No visits were found meeting these conditions.  Showing future appointments within next 150 days and meeting all other requirements       The patient was identified by name and date of birth. Jadyn Ac was informed that this is a telemedicine visit and that the visit is being conducted through the Epic Embedded platform. She  agrees to proceed..  My office door was closed. No one else was in the room.  She acknowledged consent and understanding of privacy and security of the video platform. The patient has agreed to participate and understands they can discontinue the visit at any time.    Patient is aware this is a billable service.       [facility]    HPI     INITIAL VISIT NOTE (3/2022):  Ms. Ac is a 71-year-old female with history significant for polymyositis diagnosed in 2002 and primary generalized osteoarthritis who presents to [facility] with [facility].  She is currently on prednisone 5 mg once daily.  She is transferring care from Dr. Megan Jaquez at [facility].  She is self-referred today.     Patient reports in 2002 after she presented with proximal muscle weakness primarily affecting her bilateral lower extremities she underwent a muscle biopsy which was consistent with polymyositis.  She was also referred to [facility] for their opinion.  She reports since then she has been on prednisone constantly but at varying doses.  She was initially started on methotrexate which she was on for a few years but cannot recall if this specifically helped her.  It was discontinued eventually due to shortness of breath.  She was then started on mycophenolate which she was on for approximately 10 years and states that it was discontinued 1-2 years ago due to diarrhea.  The diarrhea resolved upon discontinuation of the medication.  She is unsure if this really helped with the polymyositis either.  She has also received about 3-4 doses of IVIG in 2009 and she feels like this would help her the most.  This has not been done any time recently.  She reports over the years she may experience 1-2 flare ups of leg weakness for which the prednisone dose is increased.  This most recently occurred about 6-9 months ago at which time her prednisone was increased to 50 mg once daily and gradually tapered to her current dose of 5 mg once  daily.  She reports with this dose she is still feeling very weak and usually the lowest effective dose is about 15 mg once daily.  She does not want to increase the steroids due to side effects such as weight gain.  She does not experience significant upper body weakness.  She reports with her lower extremities she is unable to get up from a seated position without assistance and unable to stand without assistance.  She has had home physical therapy in the past but none recently.     She denies fevers, unintentional weight loss, alopecia, dry eyes (report some dry mouth in the morning), inflammatory eye disease, skin rash, psoriasis, photosensitivity, skin thickening/tightening, recurrent sinus disease, epistaxis, mouth/nose ulcers, swollen glands, chest pain, persistent shortness of breath (does experience this sometimes due to asthma), cough, hemoptysis, dysphagia, persistent acid reflux, inflammatory bowel disease, blood clots, miscarriages, Raynaud's, concerning joint pain/swelling/stiffness or a family history of autoimmune disease.        6/24/2022:  Patient presents for a follow-up of polymyositis.  She is currently on prednisone 7.5 mg once daily.  I reviewed the workup done after the last office visit which showed an elevated CK of 478.  The C-reactive protein was elevated at 21.  An SILVANO screen, Sjogren's antibodies, rheumatoid factor, anti CCP antibody, ESR, hepatitis panel, CBC and vitamin-D levels were unremarkable.  An ultrasound of her bilateral thighs showed increased echotexture throughout the rectus femoris musculature bilaterally without evidence of hyperemia.  A DEXA scan was normal.  An x-ray of her right hand showed ulnar subluxation of the 2nd through 5th metacarpophalangeal joints with diffuse osteopenia.  There was no marginal erosions or juxta-articular osteopenia.     She does not report any new complaints from the last office visit.  She did start home physical therapy for the bilateral  lower extremity muscle strengthening which helps to some degree and she is continuing with exercises on her own.        1/9/2023:  Patient presents for a follow-up of polymyositis.  She is currently on prednisone 7.5 mg once daily.  After the last office visit we discussed a trial with IVIG to see if this improves her muscle strength but she reports after each infusion she actually felt like she was getting weaker.  In view of this she stopped the IVIG infusions in November 2022.     She has been receiving home physical therapy and feels like this does help but overall she is reporting significant weakness in her bilateral lower extremities.  No new complaints.        6/1/2023:  Patient presents for a follow-up of polymyositis.  She is currently on prednisone 7.5 mg once daily.  I reviewed her labs done in January which showed an elevated C-reactive protein of 19 and a CK of 543.  After the last visit we did try prednisone 20 mg once daily which she states she was on for 2 months.  This did not make any significant change in her symptoms as she did not notice any improvement in the weakness.  She is now on 7.5 mg once daily and does not think that this helps either.  She reports for short durations of time when she has been off steroids she actually does not feel any different from being on steroids versus off.  She is still feeling very fatigued and reports generalized weakness.  She is enrolled with home physical therapy.        12/21/2023:  Patient presents for a follow-up of polymyositis.  She is currently on prednisone 7.5 mg once daily.  She did not start the azathioprine as she was undergoing eye surgery and did not want to begin immunosuppression.     She continues to feel weakness in her legs but reports it is more prominent in her right leg.  She has not tried to taper down on the steroids.      3/22/2024:  Patient presents for a follow-up of polymyositis.  She is currently on prednisone 7.5 mg once daily.   She cannot recall why she is not on the azathioprine but after reviewing her primary care physician's note there is a mention that she developed a skin rash and she was not sure if this was secondary to the azathioprine or alendronate so she discontinued both medications.  It is mentioned that she contacted my office but there is no telephone note noted.    She is continuing to report generalized weakness more prominent in her legs.  She has now developed progressive pain also affecting her hip and low back region and reports she is essentially immobile.  She is unable to go to the lab to get her blood work done and does not feel like she can attend in office visits with me.      Past Medical History:   Diagnosis Date    Acute diverticulitis 9/21/2018    Anemia     Asthma     Chronic pain     bulging discs in back,polymyositis    Diabetes mellitus (HCC)     Disease of thyroid gland     Diverticulitis     Hyperlipidemia     Hypertension     Polymyositis (HCC)        Past Surgical History:   Procedure Laterality Date    EYE SURGERY Bilateral     CATAREACT REMOVED FROM BOTH EYES 2 MTHS AGO    HYSTERECTOMY      SINUS SURGERY      THYROIDECTOMY      TUBAL LIGATION         Current Outpatient Medications   Medication Sig Dispense Refill    albuterol (PROVENTIL HFA,VENTOLIN HFA) 90 mcg/act inhaler INHALE 2 PUFFS EVERY 6 (SIX) HOURS AS NEEDED FOR WHEEZING 8.5 g 1    alendronate (FOSAMAX) 35 mg tablet Take 1 tablet (35 mg total) by mouth every 7 days (Patient not taking: Reported on 1/8/2024) 12 tablet 3    amLODIPine (NORVASC) 5 mg tablet Take 1 tablet (5 mg total) by mouth daily 90 tablet 1    azaTHIOprine (IMURAN) 50 mg tablet Take 1 tablet once daily x 2 weeks, then increase to 2 tabs once daily and continue. (Patient not taking: Reported on 11/1/2023) 180 tablet 1    benazepril (LOTENSIN) 10 mg tablet TAKE 1 TABLET DAILY 90 tablet 1    benazepril (LOTENSIN) 5 mg tablet Take 5 mg by mouth daily      calcium carbonate  (OS-YNES) 1250 (500 Ca) MG tablet 1 tablet with meals Orally      Cholecalciferol 25 MCG (1000 UT) tablet Take 1 tablet by mouth daily      ferrous sulfate 325 (65 Fe) mg tablet Every 12 hours      insulin glargine (LANTUS) 100 units/mL subcutaneous injection Inject 30 Units under the skin daily at bedtime 15 mL 3    Insulin Pen Needle (Pen Needles) 31G X 5 MM MISC USE DAILY FOR INSULIN ADMINISTRATION 100 each 1    Januvia 25 MG tablet TAKE 1 TABLET DAILY 90 tablet 0    Lancets (OneTouch Delica Plus [id number]) MISC       metFORMIN (GLUCOPHAGE-XR) 500 mg 24 hr tablet TAKE 1 TABLET 2 TIMES A DAY WITH MEALS 180 tablet 0    omeprazole (PriLOSEC) 40 MG capsule omeprazole 40 mg capsule,delayed release      OneTouch Verio test strip CHECK BLOOD GLUCOSE TWICE DAILY 100 strip 1    predniSONE 5 mg tablet Take 1.5 tabs daily to equal 7.5mg daily 45 tablet 5    Synthroid 88 MCG tablet TAKE ONE TABLET EVERY DAY IN THE EARLY MORNING ON AN EMPTY STOMACH. 90 tablet 0     No current facility-administered medications for this visit.        Allergies   Allergen Reactions    Anaprox [Naproxen Sodium] Shortness Of Breath    Levofloxacin Hives    Sulfamethoxazole-Trimethoprim Anaphylaxis    Jardiance [Empagliflozin] GI Intolerance    Penicillins Swelling and Hives    Alendronate Rash    Aspirin Rash    Azathioprine Rash    Metronidazole Rash    Sulfa Antibiotics Rash       Review of Systems  Constitutional: Negative for weight change, fevers, chills, night sweats.  Positive for fatigue.  ENT/Mouth: Negative for hearing changes, ear pain, nasal congestion, sinus pain, hoarseness, sore throat, rhinorrhea, swallowing difficulty.   Eyes: Negative for pain, redness, discharge, vision changes.   Cardiovascular: Negative for chest pain, SOB, palpitations.   Respiratory: Negative for cough, sputum, wheezing, dyspnea.   Gastrointestinal: Negative for nausea, vomiting, diarrhea, constipation, pain, heartburn.  Genitourinary: Negative for dysuria,  urinary frequency, hematuria.   Musculoskeletal: As per HPI.  Skin: Negative for skin rash, color changes.   Neuro: Negative for numbness, tingling, loss of consciousness.  Positive for weakness.  Psych: Negative for anxiety, depression.   Heme/Lymph: Negative for easy bruising, bleeding, lymphadenopathy.        Assessment and Plan:   Ms. Ac is a 73-year-old female with history significant for polymyositis diagnosed in 2002 and primary generalized osteoarthritis who presents for a follow-up.  She is currently on prednisone 7.5 mg once daily.       # Polymyositis  - Jadyn presents today for a follow-up of polymyositis which was diagnosed in 2002.  She primarily presents with bilateral lower extremity weakness which was considered by her prior rheumatologist to be secondary to residual irreversible weakness as a result of the myopathy as opposed to ongoing active inflammation.  In view of this she was only maintained on prednisone at 7.5 mg once daily for symptomatic relief but given the chronicity of use she is unsure how much it actually helps.  Prior medication options included methotrexate which was discontinued due to pulmonary issues, mycophenolate which was discontinued due to diarrhea and IVIG which she recalls as being beneficial but it was unclear why this was discontinued (retried from 6/22-11/22 without any benefit).       - To further evaluate if there may be concerns for active muscle disease I did proceed with an ultrasound of her bilateral thighs which did not show hyperemia/evidence of active inflammation.  We again discussed that the ongoing weakness is likely a result of chronic myopathic changes which may be irreversible.  I primarily recommend continued physical therapy.  We discussed as a last resort option we could consider an increase in immunosuppression.  In view of this at the last visit I prescribed her a trial with azathioprine but she self discontinued this without informing me  after she developed a skin rash.  Currently she is only on the prednisone at 7.5 mg once daily.  She is continuing to report progressive muscle weakness and joint pains primarily in her low back and hip region.  She mentions due to her symptoms she has a significant degree of immobility and it is not easy for her to attend in office visits or obtain blood work.  I had a discussion with her that this scenario also makes it challenging for me to accurately determine the etiology of her symptoms and there is only so much I can offer with continued virtual visits as she has only seen me in the office once in 3/2022.  At this time I am going to hold off on further immunosuppression and she will continue the prednisone at 7.5 mg once daily.  If she is able to I recommend updating labs including a muscle enzyme and inflammatory markers.  For an evaluation of the progressive low back and hip pain I recommend she schedule an appointment with orthopedics.     # Chronic steroid use  - She is aware of the side effects of chronic steroid use including but not limited to risk for infections, hypertension, diabetes, cataracts/glaucoma, gastritis, osteoporosis and avascular necrosis.  Reassuringly a DEXA scan in 5/22 was normal but she is due to update a scan at this time.  She should continue with daily calcium and vitamin-D supplements.  She self discontinued the alendronate per chart review as she was concerned that this may be contributing to a skin rash.  She is not inclined to start any new prophylactic medications at this time.      Visit Time  Total Visit Duration: 21 minutes.

## 2024-03-22 NOTE — TELEPHONE ENCOUNTER
Pt calling in requesting we fax her lab orders from Dr. Corona to the following number    Fax number: [phone]

## 2024-04-02 DIAGNOSIS — E03.9 ACQUIRED HYPOTHYROIDISM: ICD-10-CM

## 2024-04-02 DIAGNOSIS — Z79.4 TYPE 2 DIABETES MELLITUS WITH HYPERGLYCEMIA, WITH LONG-TERM CURRENT USE OF INSULIN (HCC): ICD-10-CM

## 2024-04-02 DIAGNOSIS — E11.65 TYPE 2 DIABETES MELLITUS WITH HYPERGLYCEMIA, WITH LONG-TERM CURRENT USE OF INSULIN (HCC): ICD-10-CM

## 2024-04-02 RX ORDER — LEVOTHYROXINE SODIUM 88 MCG
TABLET ORAL
Qty: 90 TABLET | Refills: 1 | Status: SHIPPED | OUTPATIENT
Start: 2024-04-02

## 2024-04-02 RX ORDER — SITAGLIPTIN 25 MG/1
25 TABLET, FILM COATED ORAL DAILY
Qty: 90 TABLET | Refills: 1 | Status: SHIPPED | OUTPATIENT
Start: 2024-04-02

## 2024-04-17 LAB
ALBUMIN SERPL-MCNC: 3.7 G/DL (ref 3.8–4.8)
ALBUMIN/GLOB SERPL: 1.5 {RATIO} (ref 1.2–2.2)
ALP SERPL-CCNC: 80 IU/L (ref 44–121)
ALT SERPL-CCNC: 14 IU/L (ref 0–32)
AST SERPL-CCNC: 11 IU/L (ref 0–40)
BASOPHILS # BLD AUTO: 0 X10E3/UL (ref 0–0.2)
BASOPHILS NFR BLD AUTO: 0 %
BILIRUB SERPL-MCNC: <0.2 MG/DL (ref 0–1.2)
BUN SERPL-MCNC: 17 MG/DL (ref 8–27)
BUN/CREAT SERPL: 34 (ref 12–28)
CALCIUM SERPL-MCNC: 8.4 MG/DL (ref 8.7–10.3)
CHLORIDE SERPL-SCNC: 105 MMOL/L (ref 96–106)
CK SERPL-CCNC: 254 U/L (ref 32–182)
CO2 SERPL-SCNC: 23 MMOL/L (ref 20–29)
CREAT SERPL-MCNC: 0.5 MG/DL (ref 0.57–1)
CRP SERPL-MCNC: 16 MG/L (ref 0–10)
EGFR: 99 ML/MIN/1.73
EOSINOPHIL # BLD AUTO: 0.2 X10E3/UL (ref 0–0.4)
EOSINOPHIL NFR BLD AUTO: 2 %
ERYTHROCYTE [DISTWIDTH] IN BLOOD BY AUTOMATED COUNT: 18.7 % (ref 11.7–15.4)
ERYTHROCYTE [SEDIMENTATION RATE] IN BLOOD BY WESTERGREN METHOD: 58 MM/HR (ref 0–40)
GLOBULIN SER-MCNC: 2.5 G/DL (ref 1.5–4.5)
GLUCOSE SERPL-MCNC: 113 MG/DL (ref 70–99)
HCT VFR BLD AUTO: 34.2 % (ref 34–46.6)
HGB BLD-MCNC: 10.5 G/DL (ref 11.1–15.9)
IMM GRANULOCYTES # BLD: 0.1 X10E3/UL (ref 0–0.1)
IMM GRANULOCYTES NFR BLD: 1 %
LYMPHOCYTES # BLD AUTO: 3.6 X10E3/UL (ref 0.7–3.1)
LYMPHOCYTES NFR BLD AUTO: 30 %
MCH RBC QN AUTO: 22.1 PG (ref 26.6–33)
MCHC RBC AUTO-ENTMCNC: 30.7 G/DL (ref 31.5–35.7)
MCV RBC AUTO: 72 FL (ref 79–97)
MONOCYTES # BLD AUTO: 0.9 X10E3/UL (ref 0.1–0.9)
MONOCYTES NFR BLD AUTO: 7 %
NEUTROPHILS # BLD AUTO: 7.3 X10E3/UL (ref 1.4–7)
NEUTROPHILS NFR BLD AUTO: 60 %
PLATELET # BLD AUTO: 352 X10E3/UL (ref 150–450)
POTASSIUM SERPL-SCNC: 4.1 MMOL/L (ref 3.5–5.2)
PROT SERPL-MCNC: 6.2 G/DL (ref 6–8.5)
RBC # BLD AUTO: 4.76 X10E6/UL (ref 3.77–5.28)
SODIUM SERPL-SCNC: 145 MMOL/L (ref 134–144)
WBC # BLD AUTO: 12 X10E3/UL (ref 3.4–10.8)

## 2024-04-22 ENCOUNTER — TELEPHONE (OUTPATIENT)
Age: 74
End: 2024-04-22

## 2024-05-03 DIAGNOSIS — I10 ESSENTIAL HYPERTENSION: ICD-10-CM

## 2024-05-05 RX ORDER — BENAZEPRIL HYDROCHLORIDE 10 MG/1
TABLET ORAL
Qty: 90 TABLET | Refills: 1 | Status: SHIPPED | OUTPATIENT
Start: 2024-05-05

## 2024-05-24 DIAGNOSIS — E03.9 ACQUIRED HYPOTHYROIDISM: ICD-10-CM

## 2024-05-28 RX ORDER — INSULIN GLARGINE 100 [IU]/ML
100 INJECTION, SOLUTION SUBCUTANEOUS DAILY
Qty: 15 ML | Refills: 1 | OUTPATIENT
Start: 2024-05-28 | End: 2024-06-27

## 2024-05-28 NOTE — TELEPHONE ENCOUNTER
Spoke to patient. She needs to call us back for her children's schedule to bring her. I explained the medication refill and that is has been  2023 since she has been seen. She understood and will call us to schedule.

## 2024-05-28 NOTE — TELEPHONE ENCOUNTER
Pls call pt.to sched visit in order for her to get refill on requested med. Last seen lat yr. 2023. Thanks

## 2024-06-03 ENCOUNTER — TELEPHONE (OUTPATIENT)
Age: 74
End: 2024-06-03

## 2024-06-03 DIAGNOSIS — M33.20 POLYMYOSITIS (HCC): Primary | ICD-10-CM

## 2024-06-03 NOTE — TELEPHONE ENCOUNTER
Pt called, asking for order for PT.  She stated she has not been moving that much and she is having pain in her arms and legs.  Making it difficult to get around.  Pt states she is wheelchair bound.  She is asking for PT to help build her strength with moving around, getting to bathroom, etc.

## 2024-06-06 DIAGNOSIS — I10 ESSENTIAL HYPERTENSION: ICD-10-CM

## 2024-06-06 DIAGNOSIS — E11.65 TYPE 2 DIABETES MELLITUS WITH HYPERGLYCEMIA, WITH LONG-TERM CURRENT USE OF INSULIN (HCC): ICD-10-CM

## 2024-06-06 DIAGNOSIS — Z79.4 TYPE 2 DIABETES MELLITUS WITH HYPERGLYCEMIA, WITH LONG-TERM CURRENT USE OF INSULIN (HCC): ICD-10-CM

## 2024-06-06 DIAGNOSIS — I10 PRIMARY HYPERTENSION: ICD-10-CM

## 2024-06-06 RX ORDER — METFORMIN HYDROCHLORIDE 500 MG/1
TABLET, EXTENDED RELEASE ORAL
Qty: 180 TABLET | Refills: 1 | Status: SHIPPED | OUTPATIENT
Start: 2024-06-06

## 2024-06-07 RX ORDER — AMLODIPINE BESYLATE 5 MG/1
5 TABLET ORAL DAILY
Qty: 90 TABLET | Refills: 1 | Status: SHIPPED | OUTPATIENT
Start: 2024-06-07

## 2024-06-17 ENCOUNTER — TELEPHONE (OUTPATIENT)
Age: 74
End: 2024-06-17

## 2024-06-17 NOTE — TELEPHONE ENCOUNTER
"Phone call from patient to schedule follow up appt - has difficulty with appts since her children have to take off work to bring her to appts. Patient requesting if at all possible an appt can be given this Friday - son is able to take off work.     In the meantime patient has been scheduled for October, and added to wait list. Patient requesting if at all possible \"can appt be done virtually\". Patient's labs are done at home.    Please contact patient, and inform.   "

## 2024-06-17 NOTE — TELEPHONE ENCOUNTER
Patient scheduled 7-3-2024 @ 3:00 with Cherry Honeycutt. Need to place labs again last seen . Patient requested Virtual due to transportation. Labs will need to be faxed to [facility] [phone].Phone [phone].

## 2024-07-02 DIAGNOSIS — I10 PRIMARY HYPERTENSION: ICD-10-CM

## 2024-07-02 DIAGNOSIS — Z79.4 TYPE 2 DIABETES MELLITUS WITH HYPERGLYCEMIA, WITH LONG-TERM CURRENT USE OF INSULIN (HCC): ICD-10-CM

## 2024-07-02 DIAGNOSIS — E11.65 TYPE 2 DIABETES MELLITUS WITH HYPERGLYCEMIA, WITH LONG-TERM CURRENT USE OF INSULIN (HCC): ICD-10-CM

## 2024-07-03 ENCOUNTER — TELEPHONE (OUTPATIENT)
Dept: ENDOCRINOLOGY | Facility: CLINIC | Age: 74
End: 2024-07-03

## 2024-07-03 DIAGNOSIS — E78.5 HYPERLIPIDEMIA, UNSPECIFIED HYPERLIPIDEMIA TYPE: ICD-10-CM

## 2024-07-03 DIAGNOSIS — E03.9 ACQUIRED HYPOTHYROIDISM: Primary | ICD-10-CM

## 2024-07-03 DIAGNOSIS — E11.69 TYPE 2 DIABETES MELLITUS WITH OTHER SPECIFIED COMPLICATION, WITHOUT LONG-TERM CURRENT USE OF INSULIN (HCC): ICD-10-CM

## 2024-07-03 RX ORDER — LANCETS 30 GAUGE
EACH MISCELLANEOUS
Qty: 100 EACH | Refills: 0 | Status: SHIPPED | OUTPATIENT
Start: 2024-07-03

## 2024-07-03 RX ORDER — PEN NEEDLE, DIABETIC 30 GX3/16"
NEEDLE, DISPOSABLE MISCELLANEOUS
Qty: 100 EACH | Refills: 0 | Status: SHIPPED | OUTPATIENT
Start: 2024-07-03

## 2024-07-03 NOTE — TELEPHONE ENCOUNTER
Called patient to explain labs and logs. She will call us to fax labs to [facility] and she is aware of the prescription and that we need labs and logs to review. We will then schedule appointment after everything is reviewed.

## 2024-07-05 RX ORDER — INSULIN GLARGINE 100 [IU]/ML
30 INJECTION, SOLUTION SUBCUTANEOUS
Qty: 15 ML | Refills: 0 | OUTPATIENT
Start: 2024-07-05

## 2024-07-05 NOTE — TELEPHONE ENCOUNTER
Called patient and she is scheduled for 10-7-2024 with Dr. Swartz wait listed and she will need to go thru her primary care until appointment, has not been seen since 2022.

## 2024-07-08 NOTE — TELEPHONE ENCOUNTER
Faxed to [facility] [phone] Scanned into media with confirmation. See Media

## 2024-07-12 DIAGNOSIS — E11.69 TYPE 2 DIABETES MELLITUS WITH OTHER SPECIFIED COMPLICATION, WITHOUT LONG-TERM CURRENT USE OF INSULIN (HCC): Primary | ICD-10-CM

## 2024-07-12 LAB
ALBUMIN SERPL-MCNC: 3.6 G/DL (ref 3.8–4.8)
ALP SERPL-CCNC: 77 IU/L (ref 44–121)
ALT SERPL-CCNC: 11 IU/L (ref 0–32)
AST SERPL-CCNC: 13 IU/L (ref 0–40)
BILIRUB SERPL-MCNC: <0.2 MG/DL (ref 0–1.2)
BUN SERPL-MCNC: 16 MG/DL (ref 8–27)
BUN/CREAT SERPL: 30 (ref 12–28)
CALCIUM SERPL-MCNC: 9.5 MG/DL (ref 8.7–10.3)
CHLORIDE SERPL-SCNC: 103 MMOL/L (ref 96–106)
CHOLEST SERPL-MCNC: 184 MG/DL (ref 100–199)
CO2 SERPL-SCNC: 24 MMOL/L (ref 20–29)
CREAT SERPL-MCNC: 0.53 MG/DL (ref 0.57–1)
EGFR: 98 ML/MIN/1.73
GLOBULIN SER-MCNC: 2.5 G/DL (ref 1.5–4.5)
GLUCOSE SERPL-MCNC: 99 MG/DL (ref 70–99)
HBA1C MFR BLD: 7.6 % (ref 4.8–5.6)
HDLC SERPL-MCNC: 72 MG/DL
LDLC SERPL CALC-MCNC: 96 MG/DL (ref 0–99)
POTASSIUM SERPL-SCNC: 4.2 MMOL/L (ref 3.5–5.2)
PROT SERPL-MCNC: 6.1 G/DL (ref 6–8.5)
SL AMB VLDL CHOLESTEROL CALC: 16 MG/DL (ref 5–40)
SODIUM SERPL-SCNC: 144 MMOL/L (ref 134–144)
T4 FREE SERPL-MCNC: 1.3 NG/DL (ref 0.82–1.77)
TRIGL SERPL-MCNC: 90 MG/DL (ref 0–149)

## 2024-07-12 RX ORDER — BLOOD-GLUCOSE METER
EACH MISCELLANEOUS 2 TIMES DAILY
Qty: 1 KIT | Refills: 0 | Status: SHIPPED | OUTPATIENT
Start: 2024-07-12

## 2024-07-15 ENCOUNTER — NURSE TRIAGE (OUTPATIENT)
Age: 74
End: 2024-07-15

## 2024-07-15 NOTE — TELEPHONE ENCOUNTER
----- Message from Esperanza BUCHANAN sent at 7/15/2024  4:51 PM EDT -----  patient called her blood sugar is going up since yesterday today was 258.   lightheadedness. Please call back thanks.

## 2024-07-15 NOTE — TELEPHONE ENCOUNTER
"Patient states she has been having high blood sugars around dinner time for the past 2 days. On 7/14 it was 240 and today was 258 before dinner. She takes metformin 500mg with breakfast and dinner, Lantus 35 units at bedtime and Januvia 25mg in AM. She checks BG with fingerstick. When she has the high blood sugar she does feel lightheaded. Should medications be adjusted?  Please advise                  Reason for Disposition   Blood glucose 240 - 300 mg/dL (13.3 - 16.7 mmol/L)    Answer Assessment - Initial Assessment Questions  1. BLOOD GLUCOSE: \"What is your blood glucose level?\"       258  2. ONSET: \"When did you check the blood glucose?\"      Before dinner at 5pm  3. USUAL RANGE: \"What is your glucose level usually?\" (e.g., usual fasting morning value, usual evening value)      Below 200  5. TYPE 1 or 2:  \"Do you know what type of diabetes you have?\"  (e.g., Type 1, Type 2, Gestational; doesn't know)       Type 2  6. INSULIN: \"Do you take insulin?\" \"What type of insulin(s) do you use? What is the mode of delivery? (syringe, pen; injection or pump)?\"       Lantus   7. DIABETES PILLS: \"Do you take any pills for your diabetes?\" If yes, ask: \"Have you missed taking any pills recently?\"      Metformin and Januvia  8. OTHER SYMPTOMS: \"Do you have any symptoms?\" (e.g., fever, frequent urination, difficulty breathing, dizziness, weakness, vomiting)      Lightheaded    Protocols used: Diabetes - High Blood Sugar-ADULT-[place]    "

## 2024-07-16 NOTE — TELEPHONE ENCOUNTER
I called and spoke with the patient she stated this morning her sugar was 130. She is not currently symptomatic she stated when her sugars are very high she feels weak, headache and lightheaded. She sated hen her daughter in law comes by she'll upload her blood sugar logs. Thank you

## 2024-07-30 DIAGNOSIS — E11.65 TYPE 2 DIABETES MELLITUS WITH HYPERGLYCEMIA, WITH LONG-TERM CURRENT USE OF INSULIN (HCC): ICD-10-CM

## 2024-07-30 DIAGNOSIS — Z79.4 TYPE 2 DIABETES MELLITUS WITH HYPERGLYCEMIA, WITH LONG-TERM CURRENT USE OF INSULIN (HCC): ICD-10-CM

## 2024-07-30 DIAGNOSIS — I10 ESSENTIAL HYPERTENSION: ICD-10-CM

## 2024-07-30 DIAGNOSIS — E03.9 ACQUIRED HYPOTHYROIDISM: ICD-10-CM

## 2024-07-30 DIAGNOSIS — M33.20 POLYMYOSITIS (HCC): ICD-10-CM

## 2024-07-31 RX ORDER — SITAGLIPTIN 25 MG/1
25 TABLET, FILM COATED ORAL DAILY
Qty: 90 TABLET | Refills: 0 | Status: ON HOLD | OUTPATIENT
Start: 2024-07-31

## 2024-07-31 NOTE — TELEPHONE ENCOUNTER
Requested medication(s) are due for refill today: Yes  Patient has already received a courtesy refill: No  Other reason request has been forwarded to provider: Please review the following for refill. Thank you

## 2024-08-01 ENCOUNTER — APPOINTMENT (EMERGENCY)
Dept: RADIOLOGY | Facility: HOSPITAL | Age: 74
DRG: 872 | End: 2024-08-01
Payer: COMMERCIAL

## 2024-08-01 ENCOUNTER — HOSPITAL ENCOUNTER (INPATIENT)
Facility: HOSPITAL | Age: 74
LOS: 4 days | Discharge: HOME HEALTH | DRG: 872 | End: 2024-08-05
Attending: EMERGENCY MEDICINE | Admitting: FAMILY MEDICINE
Payer: COMMERCIAL

## 2024-08-01 DIAGNOSIS — R10.9 ABDOMINAL PAIN: ICD-10-CM

## 2024-08-01 DIAGNOSIS — R19.7 DIARRHEA, UNSPECIFIED TYPE: ICD-10-CM

## 2024-08-01 DIAGNOSIS — E03.9 ACQUIRED HYPOTHYROIDISM: Primary | ICD-10-CM

## 2024-08-01 DIAGNOSIS — R53.1 GENERALIZED WEAKNESS: Primary | ICD-10-CM

## 2024-08-01 DIAGNOSIS — K57.92 DIVERTICULITIS: ICD-10-CM

## 2024-08-01 LAB
2HR DELTA HS TROPONIN: 0 NG/L
ALBUMIN SERPL BCG-MCNC: 3.8 G/DL (ref 3.5–5)
ALP SERPL-CCNC: 61 U/L (ref 34–104)
ALT SERPL W P-5'-P-CCNC: 11 U/L (ref 7–52)
ANION GAP SERPL CALCULATED.3IONS-SCNC: 12 MMOL/L (ref 4–13)
AST SERPL W P-5'-P-CCNC: 9 U/L (ref 13–39)
BASOPHILS # BLD AUTO: 0.03 THOUSANDS/ÂΜL (ref 0–0.1)
BASOPHILS NFR BLD AUTO: 0 % (ref 0–1)
BILIRUB SERPL-MCNC: 0.42 MG/DL (ref 0.2–1)
BILIRUB UR QL STRIP: NEGATIVE
BUN SERPL-MCNC: 14 MG/DL (ref 5–25)
CALCIUM SERPL-MCNC: 9 MG/DL (ref 8.4–10.2)
CARDIAC TROPONIN I PNL SERPL HS: 6 NG/L
CARDIAC TROPONIN I PNL SERPL HS: 6 NG/L
CHLORIDE SERPL-SCNC: 98 MMOL/L (ref 96–108)
CLARITY UR: CLEAR
CO2 SERPL-SCNC: 24 MMOL/L (ref 21–32)
COLOR UR: COLORLESS
CREAT SERPL-MCNC: 0.61 MG/DL (ref 0.6–1.3)
EOSINOPHIL # BLD AUTO: 0.04 THOUSAND/ÂΜL (ref 0–0.61)
EOSINOPHIL NFR BLD AUTO: 0 % (ref 0–6)
ERYTHROCYTE [DISTWIDTH] IN BLOOD BY AUTOMATED COUNT: 18.5 % (ref 11.6–15.1)
GFR SERPL CREATININE-BSD FRML MDRD: 89 ML/MIN/1.73SQ M
GLUCOSE SERPL-MCNC: 144 MG/DL (ref 65–140)
GLUCOSE SERPL-MCNC: 229 MG/DL (ref 65–140)
GLUCOSE UR STRIP-MCNC: NEGATIVE MG/DL
HCT VFR BLD AUTO: 34.4 % (ref 34.8–46.1)
HGB BLD-MCNC: 11.3 G/DL (ref 11.5–15.4)
HGB UR QL STRIP.AUTO: NEGATIVE
IMM GRANULOCYTES # BLD AUTO: 0.09 THOUSAND/UL (ref 0–0.2)
IMM GRANULOCYTES NFR BLD AUTO: 1 % (ref 0–2)
KETONES UR STRIP-MCNC: NEGATIVE MG/DL
LACTATE SERPL-SCNC: 0.9 MMOL/L (ref 0.5–2)
LEUKOCYTE ESTERASE UR QL STRIP: NEGATIVE
LYMPHOCYTES # BLD AUTO: 2.47 THOUSANDS/ÂΜL (ref 0.6–4.47)
LYMPHOCYTES NFR BLD AUTO: 14 % (ref 14–44)
MCH RBC QN AUTO: 22.2 PG (ref 26.8–34.3)
MCHC RBC AUTO-ENTMCNC: 32.8 G/DL (ref 31.4–37.4)
MCV RBC AUTO: 68 FL (ref 82–98)
MONOCYTES # BLD AUTO: 1 THOUSAND/ÂΜL (ref 0.17–1.22)
MONOCYTES NFR BLD AUTO: 6 % (ref 4–12)
NEUTROPHILS # BLD AUTO: 14.59 THOUSANDS/ÂΜL (ref 1.85–7.62)
NEUTS SEG NFR BLD AUTO: 79 % (ref 43–75)
NITRITE UR QL STRIP: NEGATIVE
NRBC BLD AUTO-RTO: 0 /100 WBCS
PH UR STRIP.AUTO: 6.5 [PH]
PLATELET # BLD AUTO: 349 THOUSANDS/UL (ref 149–390)
PMV BLD AUTO: 9.9 FL (ref 8.9–12.7)
POTASSIUM SERPL-SCNC: 4.5 MMOL/L (ref 3.5–5.3)
PROT SERPL-MCNC: 7.4 G/DL (ref 6.4–8.4)
PROT UR STRIP-MCNC: NEGATIVE MG/DL
RBC # BLD AUTO: 5.09 MILLION/UL (ref 3.81–5.12)
SODIUM SERPL-SCNC: 134 MMOL/L (ref 135–147)
SP GR UR STRIP.AUTO: <1.005 (ref 1–1.03)
TSH SERPL DL<=0.05 MIU/L-ACNC: 1.16 UIU/ML (ref 0.45–4.5)
UROBILINOGEN UR STRIP-ACNC: <2 MG/DL
WBC # BLD AUTO: 18.22 THOUSAND/UL (ref 4.31–10.16)

## 2024-08-01 PROCEDURE — 96360 HYDRATION IV INFUSION INIT: CPT

## 2024-08-01 PROCEDURE — 82948 REAGENT STRIP/BLOOD GLUCOSE: CPT

## 2024-08-01 PROCEDURE — 74177 CT ABD & PELVIS W/CONTRAST: CPT

## 2024-08-01 PROCEDURE — 99285 EMERGENCY DEPT VISIT HI MDM: CPT | Performed by: EMERGENCY MEDICINE

## 2024-08-01 PROCEDURE — 84443 ASSAY THYROID STIM HORMONE: CPT | Performed by: EMERGENCY MEDICINE

## 2024-08-01 PROCEDURE — 96361 HYDRATE IV INFUSION ADD-ON: CPT

## 2024-08-01 PROCEDURE — 99223 1ST HOSP IP/OBS HIGH 75: CPT | Performed by: FAMILY MEDICINE

## 2024-08-01 PROCEDURE — 85025 COMPLETE CBC W/AUTO DIFF WBC: CPT | Performed by: EMERGENCY MEDICINE

## 2024-08-01 PROCEDURE — 83605 ASSAY OF LACTIC ACID: CPT | Performed by: EMERGENCY MEDICINE

## 2024-08-01 PROCEDURE — 84484 ASSAY OF TROPONIN QUANT: CPT | Performed by: EMERGENCY MEDICINE

## 2024-08-01 PROCEDURE — 87040 BLOOD CULTURE FOR BACTERIA: CPT | Performed by: EMERGENCY MEDICINE

## 2024-08-01 PROCEDURE — 80053 COMPREHEN METABOLIC PANEL: CPT | Performed by: EMERGENCY MEDICINE

## 2024-08-01 PROCEDURE — 36415 COLL VENOUS BLD VENIPUNCTURE: CPT | Performed by: EMERGENCY MEDICINE

## 2024-08-01 PROCEDURE — 81003 URINALYSIS AUTO W/O SCOPE: CPT | Performed by: EMERGENCY MEDICINE

## 2024-08-01 PROCEDURE — 93005 ELECTROCARDIOGRAM TRACING: CPT

## 2024-08-01 PROCEDURE — 99285 EMERGENCY DEPT VISIT HI MDM: CPT

## 2024-08-01 RX ORDER — ACETAMINOPHEN 325 MG/1
650 TABLET ORAL EVERY 6 HOURS PRN
Status: DISCONTINUED | OUTPATIENT
Start: 2024-08-01 | End: 2024-08-05 | Stop reason: HOSPADM

## 2024-08-01 RX ORDER — LISINOPRIL 10 MG/1
10 TABLET ORAL DAILY
Status: DISCONTINUED | OUTPATIENT
Start: 2024-08-02 | End: 2024-08-05 | Stop reason: HOSPADM

## 2024-08-01 RX ORDER — SODIUM CHLORIDE, SODIUM GLUCONATE, SODIUM ACETATE, POTASSIUM CHLORIDE, MAGNESIUM CHLORIDE, SODIUM PHOSPHATE, DIBASIC, AND POTASSIUM PHOSPHATE .53; .5; .37; .037; .03; .012; .00082 G/100ML; G/100ML; G/100ML; G/100ML; G/100ML; G/100ML; G/100ML
100 INJECTION, SOLUTION INTRAVENOUS CONTINUOUS
Status: DISCONTINUED | OUTPATIENT
Start: 2024-08-01 | End: 2024-08-03

## 2024-08-01 RX ORDER — PANTOPRAZOLE SODIUM 40 MG/1
40 TABLET, DELAYED RELEASE ORAL
Status: DISCONTINUED | OUTPATIENT
Start: 2024-08-02 | End: 2024-08-05 | Stop reason: HOSPADM

## 2024-08-01 RX ORDER — LEVOTHYROXINE SODIUM 88 MCG
TABLET ORAL
Qty: 90 TABLET | Refills: 0 | Status: ON HOLD | OUTPATIENT
Start: 2024-08-01

## 2024-08-01 RX ORDER — LEVOTHYROXINE SODIUM 88 UG/1
88 TABLET ORAL
Status: DISCONTINUED | OUTPATIENT
Start: 2024-08-02 | End: 2024-08-05 | Stop reason: HOSPADM

## 2024-08-01 RX ORDER — INSULIN LISPRO 100 [IU]/ML
1-5 INJECTION, SOLUTION INTRAVENOUS; SUBCUTANEOUS EVERY 6 HOURS SCHEDULED
Status: DISCONTINUED | OUTPATIENT
Start: 2024-08-02 | End: 2024-08-03

## 2024-08-01 RX ORDER — INSULIN GLARGINE 100 [IU]/ML
15 INJECTION, SOLUTION SUBCUTANEOUS
Status: DISCONTINUED | OUTPATIENT
Start: 2024-08-01 | End: 2024-08-02

## 2024-08-01 RX ORDER — AMLODIPINE BESYLATE 5 MG/1
5 TABLET ORAL DAILY
Status: DISCONTINUED | OUTPATIENT
Start: 2024-08-02 | End: 2024-08-05 | Stop reason: HOSPADM

## 2024-08-01 RX ORDER — ENOXAPARIN SODIUM 100 MG/ML
40 INJECTION SUBCUTANEOUS DAILY
Status: DISCONTINUED | OUTPATIENT
Start: 2024-08-02 | End: 2024-08-02

## 2024-08-01 RX ADMIN — SODIUM CHLORIDE, SODIUM GLUCONATE, SODIUM ACETATE, POTASSIUM CHLORIDE, MAGNESIUM CHLORIDE, SODIUM PHOSPHATE, DIBASIC, AND POTASSIUM PHOSPHATE 100 ML/HR: .53; .5; .37; .037; .03; .012; .00082 INJECTION, SOLUTION INTRAVENOUS at 23:50

## 2024-08-01 RX ADMIN — MEROPENEM 1000 MG: 1 INJECTION, POWDER, FOR SOLUTION INTRAVENOUS at 21:41

## 2024-08-01 RX ADMIN — IOHEXOL 100 ML: 350 INJECTION, SOLUTION INTRAVENOUS at 19:21

## 2024-08-01 RX ADMIN — SODIUM CHLORIDE 1000 ML: 0.9 INJECTION, SOLUTION INTRAVENOUS at 18:56

## 2024-08-01 NOTE — ED PROVIDER NOTES
History  Chief Complaint   Patient presents with    Weakness - Generalized     States having leg pain, diarrhea, stomach pains and high BG for weeks     75 yo female with multiple complaints that have been worsening symptoms for about  one month per family.  She c/o diarrhea, going about 4-5 times per day.  She has constant diffuse abdominal pain, crampy.  No vomiting.  She has noted feeling of palpitations and heart pounding.  No chest pain but has noted heartburn. She also complains of generalized weakness and  back and leg pain. She does have history of chronic leg and back pain.  No fever, cough.  Her sugars have been running high at home.      History provided by:  Patient and relative   used: No        Prior to Admission Medications   Prescriptions Last Dose Informant Patient Reported? Taking?   Blood Glucose Monitoring Suppl (OneTouch Verio) w/Device KIT   No No   Sig: Use 2 (two) times a day   Cholecalciferol 25 MCG (1000 UT) tablet   Yes No   Sig: Take 1 tablet by mouth daily   Insulin Pen Needle (Pen Needles) 31G X 5 MM MISC   No No   Sig: USE DAILY FOR INSULIN ADMINISTRATION   Lancets (OneTouch Delica Plus [id number]) MISC   No No   Sig: Use daily as directed.   OneTouch Verio test strip   No No   Sig: CHECK BLOOD GLUCOSE TWICE DAILY   Synthroid 88 MCG tablet   No No   Sig: TAKE ONE TABLET EVERY DAY IN THE EARLY MORNING ON AN EMPTY STOMACH.   albuterol (PROVENTIL HFA,VENTOLIN HFA) 90 mcg/act inhaler   No No   Sig: INHALE 2 PUFFS EVERY 6 (SIX) HOURS AS NEEDED FOR WHEEZING   amLODIPine (NORVASC) 5 mg tablet   No No   Sig: TAKE 1 TABLET BY MOUTH DAILY   benazepril (LOTENSIN) 10 mg tablet   No No   Sig: TAKE 1 TABLET DAILY   benazepril (LOTENSIN) 5 mg tablet   Yes No   Sig: Take 5 mg by mouth daily   calcium carbonate (OS-YNES) 1250 (500 Ca) MG tablet   Yes No   Si tablet with meals Orally   ferrous sulfate 325 (65 Fe) mg tablet   Yes No   Sig: Every 12 hours   insulin glargine (LANTUS)  100 units/mL subcutaneous injection   No No   Sig: Inject 30 Units under the skin daily at bedtime   metFORMIN (GLUCOPHAGE-XR) 500 mg 24 hr tablet   No No   Sig: TAKE 1 TABLET 2 TIMES A DAY WITH MEALS   omeprazole (PriLOSEC) 40 MG capsule   Yes No   Sig: omeprazole 40 mg capsule,delayed release   predniSONE 5 mg tablet   No No   Sig: Take 1.5 tabs daily to equal 7.5mg daily   sitaGLIPtin (Januvia) 25 mg tablet   No No   Sig: TAKE 1 TABLET DAILY      Facility-Administered Medications: None       Past Medical History:   Diagnosis Date    Acute diverticulitis 9/21/2018    Anemia     Asthma     Chronic pain     bulging discs in back,polymyositis    Diabetes mellitus (HCC)     Disease of thyroid gland     Diverticulitis     Hyperlipidemia     Hypertension     Polymyositis (HCC)        Past Surgical History:   Procedure Laterality Date    EYE SURGERY Bilateral     CATAREACT REMOVED FROM BOTH EYES 2 MTHS AGO    HYSTERECTOMY      SINUS SURGERY      THYROIDECTOMY      TUBAL LIGATION         Family History   Problem Relation Age of Onset    Hepatitis Sister         acute hepatits C virus    Hypertension Daughter     Kidney disease Daughter     Hypertension Son     Mental illness Son      I have reviewed and agree with the history as documented.    E-Cigarette/Vaping    E-Cigarette Use Never User      E-Cigarette/Vaping Substances    Nicotine No     THC No     CBD No     Flavoring No     Other No     Unknown No      Social History     Tobacco Use    Smoking status: Never    Smokeless tobacco: Never   Vaping Use    Vaping status: Never Used   Substance Use Topics    Alcohol use: No    Drug use: No       Review of Systems   Constitutional: Negative.  Negative for fever.   HENT: Negative.     Eyes: Negative.    Respiratory: Negative.  Negative for cough and shortness of breath.    Cardiovascular:  Positive for palpitations. Negative for chest pain.   Gastrointestinal:  Positive for abdominal pain and diarrhea. Negative for nausea  and vomiting.   Genitourinary: Negative.  Negative for dysuria and flank pain.   Musculoskeletal:  Positive for back pain. Negative for myalgias.        Leg pain   Skin: Negative.  Negative for rash.   Neurological:  Positive for weakness. Negative for dizziness and headaches.   Hematological:  Does not bruise/bleed easily.   Psychiatric/Behavioral: Negative.     All other systems reviewed and are negative.      Physical Exam  Physical Exam  Vitals and nursing note reviewed.   Constitutional:       General: She is not in acute distress.     Appearance: She is well-developed. She is ill-appearing. She is not diaphoretic.   HENT:      Head: Normocephalic and atraumatic.      Mouth/Throat:      Mouth: Mucous membranes are dry.   Eyes:      Conjunctiva/sclera: Conjunctivae normal.   Cardiovascular:      Rate and Rhythm: Regular rhythm. Tachycardia present.      Heart sounds: Normal heart sounds. No murmur heard.  Pulmonary:      Effort: Pulmonary effort is normal. No respiratory distress.      Breath sounds: Normal breath sounds.   Abdominal:      General: Bowel sounds are normal. There is no distension.      Palpations: Abdomen is soft.      Tenderness: There is abdominal tenderness in the epigastric area, left upper quadrant and left lower quadrant. There is no guarding.   Musculoskeletal:         General: No deformity. Normal range of motion.      Cervical back: Normal range of motion and neck supple.      Right lower leg: No edema.      Left lower leg: No edema.   Skin:     General: Skin is warm and dry.      Coloration: Skin is not pale.      Findings: No rash.      Comments: Chronic stasis changes lower ext   Neurological:      General: No focal deficit present.      Mental Status: She is alert and oriented to person, place, and time.      Cranial Nerves: No cranial nerve deficit.   Psychiatric:         Mood and Affect: Mood normal.         Behavior: Behavior normal.         Vital Signs  ED Triage Vitals    Temperature Pulse Respirations Blood Pressure SpO2   08/01/24 1817 08/01/24 1817 08/01/24 1817 08/01/24 1817 08/01/24 1817   97.8 °F (36.6 °C) (!) 116 18 126/69 95 %      Temp src Heart Rate Source Patient Position - Orthostatic VS BP Location FiO2 (%)   -- 08/01/24 1930 08/01/24 2030 08/01/24 2030 --    Monitor Lying Left arm       Pain Score       08/01/24 1817       10 - Worst Possible Pain           Vitals:    08/01/24 1930 08/01/24 2000 08/01/24 2030 08/01/24 2100   BP: 154/69 149/75 133/78 125/74   Pulse: 96 98 98 100   Patient Position - Orthostatic VS:   Lying Lying         Visual Acuity      ED Medications  Medications   meropenem (MERREM) 1,000 mg in sodium chloride 0.9 % 100 mL IVPB (has no administration in time range)   sodium chloride 0.9 % bolus 1,000 mL (0 mL Intravenous Stopped 8/1/24 2106)   iohexol (OMNIPAQUE) 350 MG/ML injection (MULTI-DOSE) 100 mL (100 mL Intravenous Given 8/1/24 1921)       Diagnostic Studies  Results Reviewed       Procedure Component Value Units Date/Time    HS Troponin I 2hr [[id number]]  (Normal) Collected: 08/01/24 2057    Lab Status: Final result Specimen: Blood from Arm, Right Updated: 08/01/24 2126     hs TnI 2hr 6 ng/L      Delta 2hr hsTnI 0 ng/L     Blood culture [[id number]] Collected: 08/01/24 2117    Lab Status: In process Specimen: Blood from Arm, Right Updated: 08/01/24 2121    Lactic acid, plasma (w/reflex if result > 2.0) [[id number]] Collected: 08/01/24 2117    Lab Status: In process Specimen: Blood from Arm, Right Updated: 08/01/24 2120    UA (URINE) with reflex to Scope [[id number]]  (Abnormal) Collected: 08/01/24 2057    Lab Status: Final result Specimen: Urine, Clean Catch Updated: 08/01/24 2105     Color, UA Colorless     Clarity, UA Clear     Specific Gravity, UA <1.005     pH, UA 6.5     Leukocytes, UA Negative     Nitrite, UA Negative     Protein, UA Negative mg/dl      Glucose, UA Negative mg/dl      Ketones, UA Negative mg/dl      Urobilinogen, UA <2.0  mg/dl      Bilirubin, UA Negative     Occult Blood, UA Negative    HS Troponin I 4hr [[id number]]     Lab Status: No result Specimen: Blood     TSH, 3rd generation with Free T4 reflex [[id number]]  (Normal) Collected: 08/01/24 1858    Lab Status: Final result Specimen: Blood from Arm, Right Updated: 08/01/24 1935     TSH 3RD GENERATON 1.155 uIU/mL     HS Troponin 0hr (reflex protocol) [[id number]]  (Normal) Collected: 08/01/24 1858    Lab Status: Final result Specimen: Blood from Arm, Left Updated: 08/01/24 1927     hs TnI 0hr 6 ng/L     Comprehensive metabolic panel [[id number]]  (Abnormal) Collected: 08/01/24 1858    Lab Status: Final result Specimen: Blood from Arm, Right Updated: 08/01/24 1918     Sodium 134 mmol/L      Potassium 4.5 mmol/L      Chloride 98 mmol/L      CO2 24 mmol/L      ANION GAP 12 mmol/L      BUN 14 mg/dL      Creatinine 0.61 mg/dL      Glucose 229 mg/dL      Calcium 9.0 mg/dL      AST 9 U/L      ALT 11 U/L      Alkaline Phosphatase 61 U/L      Total Protein 7.4 g/dL      Albumin 3.8 g/dL      Total Bilirubin 0.42 mg/dL      eGFR 89 ml/min/1.73sq m     Narrative:      National Kidney Disease Foundation guidelines for Chronic Kidney Disease (CKD):     Stage 1 with normal or high GFR (GFR > 90 mL/min/1.73 square meters)    Stage 2 Mild CKD (GFR = 60-89 mL/min/1.73 square meters)    Stage 3A Moderate CKD (GFR = 45-59 mL/min/1.73 square meters)    Stage 3B Moderate CKD (GFR = 30-44 mL/min/1.73 square meters)    Stage 4 Severe CKD (GFR = 15-29 mL/min/1.73 square meters)    Stage 5 End Stage CKD (GFR <15 mL/min/1.73 square meters)  Note: GFR calculation is accurate only with a steady state creatinine    CBC and differential [[id number]]  (Abnormal) Collected: 08/01/24 1858    Lab Status: Final result Specimen: Blood from Arm, Left Updated: 08/01/24 1903     WBC 18.22 Thousand/uL      RBC 5.09 Million/uL      Hemoglobin 11.3 g/dL      Hematocrit 34.4 %      MCV 68 fL      MCH 22.2 pg      MCHC 32.8 g/dL       RDW 18.5 %      MPV 9.9 fL      Platelets 349 Thousands/uL      nRBC 0 /100 WBCs      Segmented % 79 %      Immature Grans % 1 %      Lymphocytes % 14 %      Monocytes % 6 %      Eosinophils Relative 0 %      Basophils Relative 0 %      Absolute Neutrophils 14.59 Thousands/µL      Absolute Immature Grans 0.09 Thousand/uL      Absolute Lymphocytes 2.47 Thousands/µL      Absolute Monocytes 1.00 Thousand/µL      Eosinophils Absolute 0.04 Thousand/µL      Basophils Absolute 0.03 Thousands/µL                    CT abdomen pelvis with contrast   Final Result by Luma Ribera MD (08/01 2030)      Sigmoid colon diverticulitis with no organized abscess or extraluminal air.      No other acute abdominal or pelvic pathology.      Additional findings as above.                  Workstation performed: [id number]                    Procedures  Procedures         ED Course                                 SBIRT 20yo+      Flowsheet Row Most Recent Value   Initial Alcohol Screen: US AUDIT-C     1. How often do you have a drink containing alcohol? 0 Filed at: 08/01/2024 1817   2. How many drinks containing alcohol do you have on a typical day you are drinking?  0 Filed at: 08/01/2024 1817   3a. Male UNDER 65: How often do you have five or more drinks on one occasion? 0 Filed at: 08/01/2024 1817   3b. FEMALE Any Age, or MALE 65+: How often do you have 4 or more drinks on one occassion? 0 Filed at: 08/01/2024 1817   Audit-C Score 0 Filed at: 08/01/2024 1817   CHARLEY: How many times in the past year have you...    Used an illegal drug or used a prescription medication for non-medical reasons? Never Filed at: 08/01/2024 1817                      Medical Decision Making  Differential diagnosis includes but not limited to colitis, diverticulitis, UTI, pneumonia, viral infection.  2100- WBC 18, CT shows diverticulitis.  Will add Lactic acid.  Pt. With multiple allergies - she says she gets rash and trouble breathing with these allergies.  She said she rash, SOB and tongue swelling with PCN.  She can't take Levaquin or metronidazole either.  Will discuss with [facility] for admission and IV Ertapenem.  2120 - [facility] will admit and advised Meropenem.  Hospitalist says she had it before and did ok with it.    Amount and/or Complexity of Data Reviewed  Labs: ordered.  Radiology: ordered.    Risk  Prescription drug management.  Decision regarding hospitalization.                 Disposition  Final diagnoses:   Generalized weakness   Abdominal pain   Diverticulitis     Time reflects when diagnosis was documented in both MDM as applicable and the Disposition within this note       Time User Action Codes Description Comment    8/1/2024  9:16 PM Calista Chisholm Add [R53.1] Generalized weakness     8/1/2024  9:16 PM Calista Chisholm Add [R10.9] Abdominal pain     8/1/2024  9:17 PM Calista Chisholm Add [K57.92] Diverticulitis           ED Disposition       ED Disposition   Admit    Condition   Stable    Date/Time   Thu Aug 1, 2024 2117    Comment   Case was discussed with JOSEFINA and the patient's admission status was agreed to be Admission Status: inpatient status                Follow-up Information    None         Patient's Medications   Discharge Prescriptions    No medications on file       No discharge procedures on file.    PDMP Review       None            ED Provider  Electronically Signed by             Calista Chisholm MD  08/01/24 9428

## 2024-08-02 PROBLEM — Z53.09 CONTRAINDICATION TO ANTICOAGULATION THERAPY: Status: ACTIVE | Noted: 2024-08-02

## 2024-08-02 PROBLEM — R19.7 DIARRHEA: Status: ACTIVE | Noted: 2024-08-02

## 2024-08-02 LAB
4HR DELTA HS TROPONIN: 3 NG/L
ANION GAP SERPL CALCULATED.3IONS-SCNC: 8 MMOL/L (ref 4–13)
ATRIAL RATE: 104 BPM
BUN SERPL-MCNC: 9 MG/DL (ref 5–25)
CALCIUM SERPL-MCNC: 8.3 MG/DL (ref 8.4–10.2)
CARDIAC TROPONIN I PNL SERPL HS: 9 NG/L
CHLORIDE SERPL-SCNC: 104 MMOL/L (ref 96–108)
CO2 SERPL-SCNC: 27 MMOL/L (ref 21–32)
CREAT SERPL-MCNC: 0.42 MG/DL (ref 0.6–1.3)
ERYTHROCYTE [DISTWIDTH] IN BLOOD BY AUTOMATED COUNT: 18.2 % (ref 11.6–15.1)
GFR SERPL CREATININE-BSD FRML MDRD: 101 ML/MIN/1.73SQ M
GLUCOSE SERPL-MCNC: 128 MG/DL (ref 65–140)
GLUCOSE SERPL-MCNC: 136 MG/DL (ref 65–140)
GLUCOSE SERPL-MCNC: 148 MG/DL (ref 65–140)
GLUCOSE SERPL-MCNC: 151 MG/DL (ref 65–140)
GLUCOSE SERPL-MCNC: 162 MG/DL (ref 65–140)
HCT VFR BLD AUTO: 30.6 % (ref 34.8–46.1)
HGB BLD-MCNC: 10.1 G/DL (ref 11.5–15.4)
MAGNESIUM SERPL-MCNC: 1.5 MG/DL (ref 1.9–2.7)
MCH RBC QN AUTO: 22.5 PG (ref 26.8–34.3)
MCHC RBC AUTO-ENTMCNC: 33 G/DL (ref 31.4–37.4)
MCV RBC AUTO: 68 FL (ref 82–98)
P AXIS: 33 DEGREES
PLATELET # BLD AUTO: 302 THOUSANDS/UL (ref 149–390)
PMV BLD AUTO: 10 FL (ref 8.9–12.7)
POTASSIUM SERPL-SCNC: 3.8 MMOL/L (ref 3.5–5.3)
PR INTERVAL: 148 MS
QRS AXIS: 5 DEGREES
QRSD INTERVAL: 72 MS
QT INTERVAL: 318 MS
QTC INTERVAL: 418 MS
RBC # BLD AUTO: 4.49 MILLION/UL (ref 3.81–5.12)
SODIUM SERPL-SCNC: 139 MMOL/L (ref 135–147)
T WAVE AXIS: 16 DEGREES
VENTRICULAR RATE: 104 BPM
WBC # BLD AUTO: 15.09 THOUSAND/UL (ref 4.31–10.16)

## 2024-08-02 PROCEDURE — 82948 REAGENT STRIP/BLOOD GLUCOSE: CPT

## 2024-08-02 PROCEDURE — 80048 BASIC METABOLIC PNL TOTAL CA: CPT | Performed by: FAMILY MEDICINE

## 2024-08-02 PROCEDURE — 83735 ASSAY OF MAGNESIUM: CPT | Performed by: FAMILY MEDICINE

## 2024-08-02 PROCEDURE — 93010 ELECTROCARDIOGRAM REPORT: CPT | Performed by: INTERNAL MEDICINE

## 2024-08-02 PROCEDURE — 99232 SBSQ HOSP IP/OBS MODERATE 35: CPT | Performed by: STUDENT IN AN ORGANIZED HEALTH CARE EDUCATION/TRAINING PROGRAM

## 2024-08-02 PROCEDURE — 97167 OT EVAL HIGH COMPLEX 60 MIN: CPT

## 2024-08-02 PROCEDURE — 97530 THERAPEUTIC ACTIVITIES: CPT

## 2024-08-02 PROCEDURE — 97163 PT EVAL HIGH COMPLEX 45 MIN: CPT

## 2024-08-02 PROCEDURE — 84484 ASSAY OF TROPONIN QUANT: CPT | Performed by: FAMILY MEDICINE

## 2024-08-02 PROCEDURE — 93005 ELECTROCARDIOGRAM TRACING: CPT

## 2024-08-02 PROCEDURE — 85027 COMPLETE CBC AUTOMATED: CPT | Performed by: FAMILY MEDICINE

## 2024-08-02 RX ORDER — INSULIN GLARGINE 100 [IU]/ML
10 INJECTION, SOLUTION SUBCUTANEOUS
Status: DISCONTINUED | OUTPATIENT
Start: 2024-08-02 | End: 2024-08-02

## 2024-08-02 RX ORDER — SACCHAROMYCES BOULARDII 250 MG
250 CAPSULE ORAL 2 TIMES DAILY
Status: DISCONTINUED | OUTPATIENT
Start: 2024-08-02 | End: 2024-08-05 | Stop reason: HOSPADM

## 2024-08-02 RX ORDER — FAMOTIDINE 20 MG/1
20 TABLET, FILM COATED ORAL 2 TIMES DAILY
Status: DISCONTINUED | OUTPATIENT
Start: 2024-08-02 | End: 2024-08-05 | Stop reason: HOSPADM

## 2024-08-02 RX ORDER — INSULIN GLARGINE 100 [IU]/ML
10 INJECTION, SOLUTION SUBCUTANEOUS
Status: DISCONTINUED | OUTPATIENT
Start: 2024-08-02 | End: 2024-08-05 | Stop reason: HOSPADM

## 2024-08-02 RX ADMIN — Medication 250 MG: at 11:33

## 2024-08-02 RX ADMIN — INSULIN GLARGINE 10 UNITS: 100 INJECTION, SOLUTION SUBCUTANEOUS at 21:38

## 2024-08-02 RX ADMIN — FAMOTIDINE 20 MG: 20 TABLET, FILM COATED ORAL at 14:54

## 2024-08-02 RX ADMIN — INSULIN LISPRO 1 UNITS: 100 INJECTION, SOLUTION INTRAVENOUS; SUBCUTANEOUS at 11:35

## 2024-08-02 RX ADMIN — AMLODIPINE BESYLATE 5 MG: 5 TABLET ORAL at 09:21

## 2024-08-02 RX ADMIN — INSULIN GLARGINE 10 UNITS: 100 INJECTION, SOLUTION SUBCUTANEOUS at 00:16

## 2024-08-02 RX ADMIN — LISINOPRIL 10 MG: 10 TABLET ORAL at 09:21

## 2024-08-02 RX ADMIN — MEROPENEM 1000 MG: 1 INJECTION, POWDER, FOR SOLUTION INTRAVENOUS at 21:00

## 2024-08-02 RX ADMIN — FAMOTIDINE 20 MG: 20 TABLET, FILM COATED ORAL at 20:56

## 2024-08-02 RX ADMIN — MEROPENEM 1000 MG: 1 INJECTION, POWDER, FOR SOLUTION INTRAVENOUS at 14:54

## 2024-08-02 RX ADMIN — MEROPENEM 1000 MG: 1 INJECTION, POWDER, FOR SOLUTION INTRAVENOUS at 06:28

## 2024-08-02 RX ADMIN — INSULIN LISPRO 1 UNITS: 100 INJECTION, SOLUTION INTRAVENOUS; SUBCUTANEOUS at 17:12

## 2024-08-02 RX ADMIN — Medication 250 MG: at 17:08

## 2024-08-02 RX ADMIN — PREDNISONE 7.5 MG: 5 TABLET ORAL at 09:15

## 2024-08-02 RX ADMIN — SODIUM CHLORIDE, SODIUM GLUCONATE, SODIUM ACETATE, POTASSIUM CHLORIDE, MAGNESIUM CHLORIDE, SODIUM PHOSPHATE, DIBASIC, AND POTASSIUM PHOSPHATE 100 ML/HR: .53; .5; .37; .037; .03; .012; .00082 INJECTION, SOLUTION INTRAVENOUS at 11:33

## 2024-08-02 NOTE — CASE MANAGEMENT
Case Management Assessment & Discharge Planning Note    Patient name Jadyn Ac  Location [address] [place] [address]/[address] [place] [address] MRN [id number]  : 1950 Date 2024       Current Admission Date: 2024  Current Admission Diagnosis:Sepsis (HCC)   Patient Active Problem List    Diagnosis Date Noted Date Diagnosed    Contraindication to anticoagulation therapy 2024     Essential hypertension 2022     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(HCC) 2022     Acquired hypothyroidism 2022     Steroid-induced hyperglycemia 2022     Acute diverticulitis 2018     Sepsis (HCC) 2018     Polymyositis (HCC)      Hyperlipidemia      Type 2 diabetes mellitus with other specified complication, without long-term current use of insulin (HCC)      Asthma      Chronic pain        LOS (days): 1  Geometric Mean LOS (GMLOS) (days): 3.6  Days to GMLOS:2.8     OBJECTIVE:    Risk of Unplanned Readmission Score: 12.42         Current admission status: Inpatient     Preferred Pharmacy:   [address] [address]  Phone: [phone] Fax: [phone]    Primary Care Provider: Herlinda Castorena MD    Primary Insurance: A[employer] [facility] REP  Secondary Insurance:     ASSESSMENT:  Active Health Care Proxies    There are no active Health Care Proxies on file.       Readmission Root Cause  30 Day Readmission: No    Patient Information  Admitted from:: Home  Mental Status: Alert  During Assessment patient was accompanied by: Not accompanied during assessment  Assessment information provided by:: Patient  Primary Caregiver: Family  Caregiver's Name:: Jennifer & Samuel Boo (children)  Caregiver's Relationship to Patient:: Family Member  Caregiver's Telephone Number:: [phone], [phone]  Support Systems: Son, Daughter  County of Residence: [place]  What city do you live in?: [facility]  Home entry access options. Select all that apply.:  Stairs  Number of steps to enter home.: 3  Do the steps have railings?: Yes  Type of Current Residence: 2 story home  Upon entering residence, is there a bedroom on the main floor (no further steps)?: Yes  Upon entering residence, is there a bathroom on the main floor (no further steps)?: Yes  Living Arrangements: Lives w/ Daughter    Activities of Daily Living Prior to Admission  Functional Status: Assistance  Completes ADLs independently?: Yes  Ambulates independently?: Yes  Does patient use assisted devices?: Yes  Assisted Devices (DME) used: Wheelchair, Walker, Shower Chair, Other (Comment) (Raised toilet seat and lift)  Does patient currently own DME?: Yes  What DME does the patient currently own?: Walker, Wheelchair, Shower Chair, Other (Comment) (raised toilet seat and lift)  Does patient have a history of Outpatient Therapy (PT/OT)?: Yes  Does the patient have a history of Short-Term Rehab?: Yes (Was as [facility] for [facility] night)  Does patient have a history of HHC?: Yes  Does patient currently have HHC?: No     Patient Information Continued  Income Source: Pension/longterm  Does patient have prescription coverage?: Yes  Does patient receive dialysis treatments?: No     Means of Transportation  Means of Transport to Appts:: Family transport      Social Determinants of Health (SDOH)      Flowsheet Row Most Recent Value   Housing Stability    In the last 12 months, was there a time when you were not able to pay the mortgage or rent on time? N   In the past 12 months, how many times have you moved where you were living? 0   At any time in the past 12 months, were you homeless or living in a shelter (including now)? N   Transportation Needs    In the past 12 months, has lack of transportation kept you from medical appointments or from getting medications? no   Food Insecurity    Within the past 12 months, you worried that your food would run out before you got the money to buy more. Never true   Within the  past 12 months, the food you bought just didn't last and you didn't have money to get more. Never true   Utilities    In the past 12 months has the electric, gas, oil, or water company threatened to shut off services in your home? No            DISCHARGE DETAILS:    Discharge planning discussed with:: Patient  Freedom of Choice: Yes  Comments - Freedom of Choice: D/C home with home health. Per patient physician has already placed orders for home health prior hospitalization. None noted     Requested Home Health Care         Is the patient interested in HHC at discharge?: Yes  Home Health Discipline requested:: Nursing, Occupational Therapy, Physical Therapy  Home Health Agency Name:: Other  HHA External Referral Reason (only applicable if external HHA name selected): Services not provided in network or near patient location  Home Health Follow-Up Provider:: PCP  Home Health Services Needed:: Diabetes Management, Evaluate Functional Status and Safety, Gait/ADL Training, Strengthening/Theraputic Exercises to Improve Function  Homebound Criteria Met:: Uses an Assist Device (i.e. cane, walker, etc), Requires the Assistance of Another Person for Safe Ambulation or to Leave the Home  Supporting Clincal Findings:: Limited Endurance    DME Referral Provided  Referral made for DME?: No    Other Referral/Resources/Interventions Provided:  Interventions: HHC  Referral Comments: RN CM spoke with patient at bedside to introduce self and role, conduct assessment and discuss discharge planning. Patient stated she lives with her dtr in a 2 level house with her BA/BR on 1st level, ambulates with walker and has a rasied toilet seat and a lift that assists her to stand up. Discussed discharge planning and home rehab per therapy recs and patient stated that her physician has already placed order for home rehab, but could not remember name of physician ordering and name of hhc. RN CM sent referrals in aidin for SN, PT, OT,pending  acceptance.     Treatment Team Recommendation: Home with Home Health Care  Discharge Destination Plan:: Home with Home Health Care  Transport at Discharge : Family

## 2024-08-02 NOTE — ASSESSMENT & PLAN NOTE
Patient presented to the ER with abdominal pain, on and off diarrhea over the last month  Imaging shows diverticulitis of the sigmoid colon with no abscess or extraluminal air  Patient with allergies to multiple antibiotics  Per review of prior records, patient did receive meropenem in the past which she tolerated.  Ordered meropenem in the ER which will be continued  N.p.o. for today with IV fluids  Pain meds as needed  Will need outpatient GI follow-up but can consider inpatient based on clinical course  As patient reports ongoing diarrhea, will check stool for bacterial enteric panel and although less likely will check for C. difficile as well

## 2024-08-02 NOTE — PLAN OF CARE
Problem: PAIN - ADULT  Goal: Verbalizes/displays adequate comfort level or baseline comfort level  Description: Interventions:  - Encourage patient to monitor pain and request assistance  - Assess pain using appropriate pain scale  - Administer analgesics based on type and severity of pain and evaluate response  - Implement non-pharmacological measures as appropriate and evaluate response  - Consider cultural and social influences on pain and pain management  - Notify physician/advanced practitioner if interventions unsuccessful or patient reports new pain  Outcome: Progressing     Problem: INFECTION - ADULT  Goal: Absence or prevention of progression during hospitalization  Description: INTERVENTIONS:  - Assess and monitor for signs and symptoms of infection  - Monitor lab/diagnostic results  - Monitor all insertion sites, i.e. indwelling lines, tubes, and drains  - Monitor endotracheal if appropriate and nasal secretions for changes in amount and color  - [place] appropriate cooling/warming therapies per order  - Administer medications as ordered  - Instruct and encourage patient and family to use good hand hygiene technique  - Identify and instruct in appropriate isolation precautions for identified infection/condition  Outcome: Progressing  Goal: Absence of fever/infection during neutropenic period  Description: INTERVENTIONS:  - Monitor WBC    Outcome: Progressing     Problem: SAFETY ADULT  Goal: Patient will remain free of falls  Description: INTERVENTIONS:  - Educate patient/family on patient safety including physical limitations  - Instruct patient to call for assistance with activity   - Consult OT/PT to assist with strengthening/mobility   - Keep Call bell within reach  - Keep bed low and locked with side rails adjusted as appropriate  - Keep care items and personal belongings within reach  - Initiate and maintain comfort rounds  - Make Fall Risk Sign visible to staff  - Offer Toileting every 2  Hours,  in advance of need  - Initiate/Maintain bed  alarm    - Apply yellow socks and bracelet for high fall risk patients  - Consider moving patient to room near nurses station  Outcome: Progressing  Goal: Maintain or return to baseline ADL function  Description: INTERVENTIONS:  -  Assess patient's ability to carry out ADLs; assess patient's baseline for ADL function and identify physical deficits which impact ability to perform ADLs (bathing, care of mouth/teeth, toileting, grooming, dressing, etc.)  - Assess/evaluate cause of self-care deficits   - Assess range of motion  - Assess patient's mobility; develop plan if impaired  - Assess patient's need for assistive devices and provide as appropriate  - Encourage maximum independence but intervene and supervise when necessary  - Involve family in performance of ADLs  - Assess for home care needs following discharge   - Consider OT consult to assist with ADL evaluation and planning for discharge  - Provide patient education as appropriate  Outcome: Progressing  Goal: Maintains/Returns to pre admission functional level  Description: INTERVENTIONS:  - Perform AM-PAC 6 Click Basic Mobility/ Daily Activity assessment daily.  - Set and communicate daily mobility goal to care team and patient/family/caregiver.   - Collaborate with rehabilitation services on mobility goals if consulted  - Perform Range of Motion 3 times a day.  - Reposition patient every 2 hours.    - Out of bed for toileting  - Record patient progress and toleration of activity level   Outcome: Progressing

## 2024-08-02 NOTE — ASSESSMENT & PLAN NOTE
As noted by tachycardia, leukocytosis due to acute diverticulitis  POA evidenced by tachycardia, tachypnea and leukocytosis      Origin likely diverticulitis  CT:Sigmoid colon diverticulitis with no organized abscess or extraluminal air.   CXR-not obtained on admission, no signs of respiratory distress  BCx- pending  UA-WNL  LA-WNL/Pro-Aleksander-pending  ABX: Multiple allergy-meropenem empiric  treated with serial lab, vitals, IVF  Monitor for signs of infection

## 2024-08-02 NOTE — PHYSICAL THERAPY NOTE
"       PHYSICAL THERAPY EVALUATION/TREATMENT     08/02/24 0901   PT Last Visit   PT Visit Date 08/02/24   Note Type   Note type Evaluation   Pain Assessment   Pain Assessment Tool 0-10   Pain Score No Pain   Restrictions/Precautions   Other Precautions Fall Risk;Contact/isolation   Home Living   Type of Home House   Home Layout Two level;Able to live on main level with bedroom/bathroom  (ramped entrance)   Bathroom Shower/Tub Tub/shower unit   Bathroom Toilet Raised  (Pt has a lift on her toilet)   Bathroom Equipment Grab bars in shower   Bathroom Accessibility Accessible   Home Equipment Walker;Wheelchair-manual  (adjustable bed, lift chair)   Prior Function   Level of [place]   (Pt requires assist with ambulation with a walker short distances and needs assist to transfer, pt reports she can toilet on her own but needs assist for all other ADLs)   Lives With Daughter   Receives Help From Family   Falls in the last 6 months 0   Vocational Retired   General   Additional Pertinent History Pt admitted with progressive weakness and abdominal pain with diagnosis of diverticulitis and sepsis.   Family/Caregiver Present Yes  (daughter)   Cognition   Overall Cognitive Status WFL   Arousal/Participation Alert   Attention Within functional limits   Orientation Level Oriented to person   Following Commands Follows all commands and directions without difficulty   Subjective   Subjective \"I need help to do just about everything\"   RLE Assessment   RLE Assessment   (PROM WFL, MMT hip 3+/5, knee 3-/5, ankle 2+/5)   LLE Assessment   LLE Assessment   (PROM WFL, MMT hip 3+/5, knee 3-/5, ankle 2+/5)   Bed Mobility   Supine to Sit 3  Moderate assistance   Additional items LE management;Verbal cues;Increased time required;HOB elevated   Transfers   Sit to Stand 3  Moderate assistance   Additional items Assist x 2;Other  (from raised bed)   Stand to Sit 3  Moderate assistance   Additional items Assist x 2   Stand pivot   (Pt " requires mod assist x 2 to stand but once standing pt is able to take a few steps with the walker to the chair with min/mod assist.  Pt's knees are in recurvatum in standing.)   Balance   Static Sitting Fair +   Static Standing Fair -   Activity Tolerance   Activity Tolerance Patient limited by fatigue   Nurse Made Aware Leslie ZAMORA aware pt requires at least mod assist x 2 to stand and transfer with a walker.   Assessment   Prognosis Good   Problem List Decreased strength;Impaired balance;Decreased mobility   Assessment Pt is 74 y.o. female seen for PT evaluation s/p admit to [facility] on 8/1/2024 w/ Sepsis (HCC). PT consulted to assess pt's functional mobility and d/c needs. Order placed for PT eval and tx, w/ up and OOB as tolerated order.  Co-morbidities affecting patient's physical performance include: polymyositis, diverticulitis, DM, htn, chronic pain.  Personal factors affecting patient at time of initial evaluation include: ambulating with assistive device, inability to ambulate household distances, and inability to perform ADLS. Prior to admission, patient was ambulating with a walker short distances but needing help to rise from a chair.  Upon evaluation: Pt required mod assist x 2 to rise from a bed and transfer to a chair with a walker.   Please find objective findings from Physical Therapy assessment regarding body systems outlined above with impairments and limitations including weakness, impaired balance, gait deviations, decreased activity tolerance, decreased functional mobility tolerance, and fall risk.The Barthel Index was used as a functional outcome tool presenting with a score of Barthel Index Score: 45 today indicating marked limitations of functional mobility and ADLS.  Patient's clinical presentation is currently unstable/unpredictable as seen in patient's presentation of increased fall risk, new onset of impairment of functional mobility, and new onset of weakness. Pt would benefit  "from continued Physical Therapy treatment to address deficits as defined above and maximize level of functional mobility.     As demonstrated by objective findings, the assigned level of complexity for this evaluation is high.The patient's AM-[facility] Basic Mobility Inpatient Short Form Raw Score is 8. A Raw score of less than or equal to 16 suggests the patient may benefit from discharge to post-acute rehabilitation services. Please also refer to the recommendation of the Physical Therapist for safe discharge planning.   Goals   Patient Goals \"get stronger\"   STG Expiration Date 08/09/24   Short Term Goal #1 1. improve bed mobility to min assist,   2. pt will tranfer bed to chair with a walker with mod assist x 1   LTG Expiration Date 08/16/24   Long Term Goal #1 1. pt will transfer from bed to chair with min assist x 1 and walker,   2. improve LE strength by at least 1/2 MMT to improve pt's ability to participate in functional mobility   Plan   Treatment/Interventions LE strengthening/ROM;Functional transfer training;ADL retraining;Therapeutic exercise;Gait training;Bed mobility;Equipment eval/education;Patient/family training;Spoke to MD;Spoke to nursing;Spoke to case management   PT Frequency Other (Comment)  (5x/week)   Discharge Recommendation   Rehab Resource Intensity Level, PT III (Minimum Resource Intensity)   Equipment Recommended   (family is inquiring about a mechanical lift for home;  care manager is aware.)   -[facility] Basic Mobility Inpatient   Turning in Flat Bed Without Bedrails 2   Lying on Back to Sitting on Edge of Flat Bed Without Bedrails 2   Moving Bed to Chair 1   Standing Up From Chair Using Arms 1   Walk in Room 1   Climb 3-5 Stairs With Railing 1   Basic Mobility Inpatient Raw Score 8   Turning Head Towards Sound 4   Follow Simple Instructions 4   Low Function Basic Mobility Raw Score  16   Low Function Basic Mobility Standardized Score  25.72   [facility] Level Of Mobility   [facility] " "Goal 3: Sit at edge of bed   -[facility] Achieved 4: Move to chair/commode   Barthel Index   Feeding 10   Bathing 0   Grooming Score 5   Dressing Score 5   Bladder Score 5   Bowels Score 10   Toilet Use Score 5   Transfers (Bed/Chair) Score 5   Mobility (Level Surface) Score 0   Stairs Score 0   Barthel Index Score 45   Additional Treatment Session   Start Time 0850   End Time 0900   Treatment Assessment S:  \"Both of my legs are weak\"  O:  x 10 each ankle pumps, LAQ, hip flexion seated AAROM.  A:  Pt reports her R LE is weaker than her L at baseline however now pt's B LEs are weak.  P:  Encourage OOB with therapy/nursing, attempt ambulation next session if able.   End of Consult   Patient Position at End of Consult All needs within reach;Bedside chair   Licensure   [place] License Number  Camilla Tripathi PT [id number]     "

## 2024-08-02 NOTE — ASSESSMENT & PLAN NOTE
Patient presented to the ER with abdominal pain, on and off diarrhea over the last month    CT: Imaging shows diverticulitis of the sigmoid colon with no abscess or extraluminal air  Allergies to multiple antibiotics  ER: Meropenem previously tolerated.  N.p.o. for today with IV fluids, ADAT  Pain meds as needed  Consider GI consult if needed  Stool enteric panel, C. difficile, fecal elastase, Pro-calprotectin

## 2024-08-02 NOTE — PLAN OF CARE
Problem: PHYSICAL THERAPY ADULT  Goal: Performs mobility at highest level of function for planned discharge setting.  See evaluation for individualized goals.  Description: Treatment/Interventions: LE strengthening/ROM, Functional transfer training, ADL retraining, Therapeutic exercise, Gait training, Bed mobility, Equipment eval/education, Patient/family training, Spoke to MD, Spoke to nursing, Spoke to case management  Equipment Recommended:  (family is inquiring about a mechanical lift for home;  care manager is aware.)       See flowsheet documentation for full assessment, interventions and recommendations.  Note: Prognosis: Good  Problem List: Decreased strength, Impaired balance, Decreased mobility  Assessment: Pt is 74 y.o. female seen for PT evaluation s/p admit to [facility] on 8/1/2024 w/ Sepsis (HCC). PT consulted to assess pt's functional mobility and d/c needs. Order placed for PT eval and tx, w/ up and OOB as tolerated order.  Co-morbidities affecting patient's physical performance include: polymyositis, diverticulitis, DM, htn, chronic pain.  Personal factors affecting patient at time of initial evaluation include: ambulating with assistive device, inability to ambulate household distances, and inability to perform ADLS. Prior to admission, patient was ambulating with a walker short distances but needing help to rise from a chair.  Upon evaluation: Pt required mod assist x 2 to rise from a bed and transfer to a chair with a walker.   Please find objective findings from Physical Therapy assessment regarding body systems outlined above with impairments and limitations including weakness, impaired balance, gait deviations, decreased activity tolerance, decreased functional mobility tolerance, and fall risk.The Barthel Index was used as a functional outcome tool presenting with a score of Barthel Index Score: 45 today indicating marked limitations of functional mobility and ADLS.  Patient's  clinical presentation is currently unstable/unpredictable as seen in patient's presentation of increased fall risk, new onset of impairment of functional mobility, and new onset of weakness. Pt would benefit from continued Physical Therapy treatment to address deficits as defined above and maximize level of functional mobility.     As demonstrated by objective findings, the assigned level of complexity for this evaluation is high.The patient's AM-[facility] Basic Mobility Inpatient Short Form Raw Score is 8. A Raw score of less than or equal to 16 suggests the patient may benefit from discharge to post-acute rehabilitation services. Please also refer to the recommendation of the Physical Therapist for safe discharge planning.        Rehab Resource Intensity Level, PT: III (Minimum Resource Intensity)    See flowsheet documentation for full assessment.

## 2024-08-02 NOTE — PROGRESS NOTES
[facility]  Progress Note  Name: Jadyn Ac I  MRN: [id number]  Unit/Bed#: 2 [address] Date of Admission: 8/1/2024   Date of Service: 8/2/2024 I Hospital Day: 1    Assessment & Plan   Acute diverticulitis  Assessment & Plan  Patient presented to the ER with abdominal pain, on and off diarrhea over the last month    CT: Imaging shows diverticulitis of the sigmoid colon with no abscess or extraluminal air  Allergies to multiple antibiotics  ER: Meropenem previously tolerated.  N.p.o. for today with IV fluids, ADAT  Pain meds as needed  Consider GI consult if needed  Stool enteric panel, C. difficile, fecal elastase, Pro-calprotectin    * Sepsis (HCC)  Assessment & Plan  As noted by tachycardia, leukocytosis due to acute diverticulitis  POA evidenced by tachycardia, tachypnea and leukocytosis      Origin likely diverticulitis  CT:Sigmoid colon diverticulitis with no organized abscess or extraluminal air.   CXR-not obtained on admission, no signs of respiratory distress  BCx- pending  UA-WNL  LA-WNL/Pro-Aleksander-pending  ABX: Multiple allergy-meropenem empiric  treated with serial lab, vitals, IVF  Monitor for signs of infection      Contraindication to anticoagulation therapy  Assessment & Plan  Shinto exemption  Jehovah witness  No pork derivatives  Refusal of Lovenox and heparin  OOB  VTE-8    Diarrhea  Assessment & Plan  See diverticulitis    Essential hypertension  Assessment & Plan  Continue amlodipine.  Benazepril substituted with lisinopril    Acquired hypothyroidism  Assessment & Plan  Continue levothyroxine    Type 2 diabetes mellitus with other specified complication, without long-term current use of insulin (HCC)  Assessment & Plan  Lab Results   Component Value Date    HGBA1C 7.6 (H) 07/11/2024     Hold p.o. diabetic medication.    While n.p.o. will decrease Lantus to 15 units at bedtime  Accu-Cheks every 6 hours with SSI  Carb controlled diet        Polymyositis  ([facility])  Assessment & Plan  Continue prednisone               VTE Pharmacologic Prophylaxis: VTE Score: 8  Lutheran exemption support derivatives    Mobility:   Basic Mobility Inpatient Raw Score: 8  JH-HLM Goal: 3: Sit at edge of bed  JH-HLM Achieved: 4: Move to chair/commode  JH-HLM Goal NOT achieved. Continue with multidisciplinary rounding and encourage appropriate mobility to improve upon JH-HLM goals.    Patient Centered Rounds: I performed bedside rounds with nursing staff today.   Discussions with Specialists or Other Care Team Provider: N/A    Education and Discussions with Family / Patient: Updated  (daughter) at bedside.    Total Time Spent on Date of Encounter in care of patient: 45 mins. This time was spent on one or more of the following: performing physical exam; counseling and coordination of care; obtaining or reviewing history; documenting in the medical record; reviewing/ordering tests, medications or procedures; communicating with other healthcare professionals and discussing with patient's family/caregivers.    Current Length of Stay: 1 day(s)  Current Patient Status: Inpatient   Certification Statement: The patient will continue to require additional inpatient hospital stay due to clinical course specialist recs  Discharge Plan: Anticipate discharge in 24-48 hrs to discharge location to be determined pending rehab evaluations.    Code Status: Level 1 - Full Code    Subjective:   Patient seen and examined at bedside, reported weakness, states she cannot walk, reported left abdomen pain is stabbing in nature, previous diverticulitis episode wants, reported no bleeding, no headache, blurry vision, chest pain, shortness of breath.  Patient reported intermittent palpitations for the last 3 days, denied history of atrial fibrillation.  Patient's daughter at bedside    Objective:     Vitals:   Temp (24hrs), Av.7 °F (36.5 °C), Min:97.4 °F (36.3 °C), Max:98.3 °F (36.8 °C)    Temp:   [97.4 °F (36.3 °C)-98.3 °F (36.8 °C)] 97.4 °F (36.3 °C)  HR:  [] 87  Resp:  [16-20] 19  BP: (108-154)/(66-84) 121/80  SpO2:  [91 %-96 %] 96 %  Body mass index is 31.98 kg/m².     Input and Output Summary (last 24 hours):     Intake/Output Summary (Last 24 hours) at 8/2/2024 1656  Last data filed at 8/1/2024 2106  Gross per 24 hour   Intake 1000 ml   Output --   Net 1000 ml       Physical Exam:   Physical Exam  Vitals and nursing note reviewed.   Constitutional:       General: She is not in acute distress.     Appearance: She is well-developed. She is obese.   HENT:      Head: Normocephalic and atraumatic.   Eyes:      Conjunctiva/sclera: Conjunctivae normal.   Cardiovascular:      Rate and Rhythm: Normal rate and regular rhythm.      Heart sounds: No murmur heard.     No friction rub. No gallop.   Pulmonary:      Effort: Pulmonary effort is normal. No respiratory distress.      Breath sounds: Normal breath sounds. No stridor. No wheezing, rhonchi or rales.   Abdominal:      Palpations: Abdomen is soft.      Tenderness: There is abdominal tenderness (LLQ). There is no guarding or rebound.   Musculoskeletal:         General: No swelling or tenderness.      Cervical back: Neck supple.      Right lower leg: No edema.      Left lower leg: No edema.   Skin:     General: Skin is warm and dry.      Capillary Refill: Capillary refill takes less than 2 seconds.      Findings: No bruising.      Comments: Bilateral medial supra malleoli are hyperpigmentation   Neurological:      Mental Status: She is alert and oriented to person, place, and time.      Motor: No weakness.   Psychiatric:         Mood and Affect: Mood normal.         Behavior: Behavior normal.          Additional Data:     Labs:  Results from last 7 days   Lab Units 08/02/24  0524 08/01/24  1858   WBC Thousand/uL 15.09* 18.22*   HEMOGLOBIN g/dL 10.1* 11.3*   HEMATOCRIT % 30.6* 34.4*   PLATELETS Thousands/uL 302 349   SEGS PCT %  --  79*   LYMPHO PCT %  --   14   MONO PCT %  --  6   EOS PCT %  --  0     Results from last 7 days   Lab Units 08/02/24  0524 08/01/24  1858   SODIUM mmol/L 139 134*   POTASSIUM mmol/L 3.8 4.5   CHLORIDE mmol/L 104 98   CO2 mmol/L 27 24   BUN mg/dL 9 14   CREATININE mg/dL 0.42* 0.61   ANION GAP mmol/L 8 12   CALCIUM mg/dL 8.3* 9.0   ALBUMIN g/dL  --  3.8   TOTAL BILIRUBIN mg/dL  --  0.42   ALK PHOS U/L  --  61   ALT U/L  --  11   AST U/L  --  9*   GLUCOSE RANDOM mg/dL 128 229*         Results from last 7 days   Lab Units 08/02/24  1132 08/02/24  0614 08/01/24  2313   POC GLUCOSE mg/dl 151* 136 144*         Results from last 7 days   Lab Units 08/01/24  2117   LACTIC ACID mmol/L 0.9       Lines/Drains:  Invasive Devices       Peripheral Intravenous Line  Duration             Peripheral IV 08/01/24 Right Antecubital <1 day                          Imaging: Reviewed radiology reports from this admission including: CTA    Recent Cultures (last 7 days):   Results from last 7 days   Lab Units 08/01/24  2117   BLOOD CULTURE  Received in Microbiology Lab. Culture in Progress.       Last 24 Hours Medication List:   Current Facility-Administered Medications   Medication Dose Route Frequency Provider Last Rate    acetaminophen  650 mg Oral Q6H PRN Laura Revankar, DO      amLODIPine  5 mg Oral Daily Lauar Revankar, DO      famotidine  20 mg Oral BID Ashley Duarte MD      insulin glargine  10 Units Subcutaneous HS Laura Revankar, DO      insulin lispro  1-5 Units Subcutaneous Q6H [facility] Laura Revankar, DO      levothyroxine  88 mcg Oral Early Morning Laura Revankar, DO      lisinopril  10 mg Oral Daily Laura Revankar, DO      meropenem  1,000 mg Intravenous Q8H Laura Revankar, DO 1,000 mg (08/02/24 1454)    morphine injection  2 mg Intravenous Q4H PRN Laura Revankar, DO      multi-electrolyte  100 mL/hr Intravenous Continuous Laura Revankar,  mL/hr (08/02/24 1133)    pantoprazole  40 mg Oral Early Morning Laura Revankar, DO      predniSONE   7.5 mg Oral Daily DO yon Alfonsoulardii  250 mg Oral BID Ashley Duarte MD          Today, Patient Was Seen By: Ashley Duarte MD    **Please Note: This note may have been constructed using a voice recognition system.**

## 2024-08-02 NOTE — PLAN OF CARE
Problem: OCCUPATIONAL THERAPY ADULT  Goal: Performs self-care activities at highest level of function for planned discharge setting.  See evaluation for individualized goals.  Description: Treatment Interventions: ADL retraining, Functional transfer training, Endurance training, UE strengthening/ROM, Equipment evaluation/education, Patient/family training, Compensatory technique education, Continued evaluation, Energy conservation, Activityengagement          See flowsheet documentation for full assessment, interventions and recommendations.   Note: Limitation: Decreased ADL status, Decreased endurance, Decreased self-care trans, Decreased high-level ADLs (impaired activity tolerance, forward functional reach, standing tolerance, balance, LE strength)     Assessment: Pt is a 73 yo female admitted to [facility] on 8/1/24 w/ abdominal pain, on-off diarrhea. Diagnosed w/ sepsis. Pt reports living w/ her daughter in a 2 SH w/ first floor set- up and 3 AUGUSTUS. Pt needs assistance w/ ADL/ IADL and reports A x2 to stand. Pt reports use of RW for short distance functional mobility. Significant PMH impacting her occupational performance includes chronic pain, HTN, anemia, polymyositis, DM, Shingles (pt reported). Upon eval, pt alert and oriented. Pt demonstrated B UE AROM / strength WFL to participate in ADLs seated. Pt declined sit to stand trial from recliner chair reporting she requires assist X 2 at baseline to stand. Pt engaged in grooming w/ mod I seated, UBD w/ S. Pt is completing ADLs below baseline level of I and would benefit from OT in acute care to max I w/ ADLs. Recommend level III rehab resource intensity when medically stable for discharge from acute care. Will continue to follow     Rehab Resource Intensity Level, OT: III (Minimum Resource Intensity)

## 2024-08-02 NOTE — ASSESSMENT & PLAN NOTE
As noted by tachycardia, leukocytosis due to acute diverticulitis  Follow-up pending blood culture  Repeat lab work in a.m.

## 2024-08-02 NOTE — OCCUPATIONAL THERAPY NOTE
Occupational Therapy Evaluation     Patient Name: Jadyn Ac  Today's Date: 8/2/2024  Problem List  Principal Problem:    Sepsis (HCC)  Active Problems:    Acute diverticulitis    Polymyositis (HCC)    Type 2 diabetes mellitus with other specified complication, without long-term current use of insulin (HCC)    Acquired hypothyroidism    Essential hypertension    Contraindication to anticoagulation therapy    Past Medical History  Past Medical History:   Diagnosis Date    Acute diverticulitis 9/21/2018    Anemia     Asthma     Chronic pain     bulging discs in back,polymyositis    Diabetes mellitus (HCC)     Disease of thyroid gland     Diverticulitis     Hyperlipidemia     Hypertension     Polymyositis (HCC)      Past Surgical History  Past Surgical History:   Procedure Laterality Date    EYE SURGERY Bilateral     CATAREACT REMOVED FROM BOTH EYES 2 MTHS AGO    HYSTERECTOMY      SINUS SURGERY      THYROIDECTOMY      TUBAL LIGATION          08/02/24 1050   OT Last Visit   OT Visit Date 08/02/24  (Friday)   Note Type   Note type Evaluation  (Eval [id number])   Additional Comments Identified pt by full name and birthdate   Pain Assessment   Pain Assessment Tool 0-10   Pain Score No Pain   Restrictions/Precautions   Weight Bearing Precautions Per Order No   Other Precautions Contact/isolation  (rule out C.Diff; [place], room air)   Home Living   Type of Home House;Other (Comment)  (3 AUGUSTUS)   Home Layout Two level;Performs ADLs on one level;Able to live on main level with bedroom/bathroom  (3 AUGUSTUS; stair glide to 2nd floor. Pt reports first floor set- up)   Bathroom Shower/Tub Tub/shower unit   Bathroom Toilet Raised   Bathroom Equipment Commode   Home Equipment Walker;Wheelchair-manual;Grab bars;Stair glide  (adjustable bed; recliner lift chair)   Additional Comments Pt reports living w/ her daughter in 2 SH w/ first floor set- up   Prior Function   Level of [place] Needs assistance with IADLS;Needs  "assistance with ADLs;Needs assistance with functional mobility  (pt reports A x2 to stand)   Lives With Daughter   Receives Help From Family   IADLs Family/Friend/Other provides transportation;Family/Friend/Other provides meals;Family/Friend/Other provides medication management   Falls in the last 6 months 0   Vocational Retired   Comments Pt reports supportive daughter / family assist w/ ADL/ IADL. 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 [employer]   Intrinsic Gratification Pt reports enjoying reading and watching tv   General   Additional Pertinent History Pt admitted from home to [facility] on 8/1/24 w/   Family/Caregiver Present No   Additional General Comments Personal and environmental factors supporting performance includes first floor set-up, access to AD/ DME, supportive family; barriers include inability to complete IADLs, difficulty completing ADLs   Subjective   Subjective \"I need 2 people to help me get up\"   ADL   Where Assessed Chair   Eating Assistance 7  Independent   Eating Deficit Setup;NPO  (presently NPO; demo UE strength / coordination WFL to feed self)   Grooming Assistance 6  Modified Independent  (seated OOB In bedside recliner chair)   Grooming Deficit Setup   UB Bathing Assistance Unable to assess  (anticipate mod I seated based on fxal obs skills, clinical judgement. Pt reports washing up w/ her daughter prior to therapist arrival)   LB Bathing Assistance Unable to assess  (anticipate max A based on fxal obs skills, clinical judgement)   UB Dressing Assistance 5  Supervision/Setup   UB Dressing Deficit Setup;Supervision/safety;Increased time to complete;Pull down in back   LB Dressing Assistance Unable to " assess  (anticipate max A)   Toileting Assistance  Unable to assess  (denied need to void. Pt reports voiding using commode prior to therapist arrival)   Bed Mobility   Supine to Sit Unable to assess   Sit to Supine Unable to assess   Additional Comments Pt seated OOB In bedside recliner chair upon therapist arrival   Transfers   Sit to Stand Unable to assess   Stand to Sit Unable to assess   Stand pivot Unable to assess   Additional Comments Pt declined sit <> stand trial from recliner chair reporting she is unable to stand w/ out assist of 2. Pt reports she is comfortable in the chair   Functional Mobility   Additional Comments NT. Will continue to assess using RW   Balance   Static Sitting Fair +   Static Standing   (NT. Will continue to assess)   Ambulatory   (NT. Will continue to assess)   Activity Tolerance   Activity Tolerance Patient limited by fatigue   Medical Staff Made Aware spoke w/ PT, Maria Dolores   Nurse Made Aware spoke w/ RN   RUE Assessment   RUE Assessment WFL   RUE Strength   RUE Overall Strength Within Functional Limits - able to perform ADL tasks with strength   LUE Assessment   LUE Assessment WFL   LUE Strength   LUE Overall Strength Within Functional Limits - able to perform ADL tasks with strength   Hand Function   Gross Motor Coordination Functional   Fine Motor Coordination Functional   Sensation   Light Touch No apparent deficits  (B UE to light touch)   Cognition   Overall Cognitive Status WFL   Arousal/Participation Alert;Cooperative   Attention Within functional limits   Orientation Level Oriented X4   Memory Within functional limits   Following Commands Follows all commands and directions without difficulty   Comments Alert, oriented and able to follow directions / communicate wants / needs   Assessment   Limitation Decreased ADL status;Decreased endurance;Decreased self-care trans;Decreased high-level ADLs  (impaired activity tolerance, forward functional reach, standing tolerance, balance,  LE strength)   Assessment Pt is a 73 yo female admitted to [facility] on 8/1/24 w/ abdominal pain, on-off diarrhea. Diagnosed w/ sepsis. Pt reports living w/ her daughter in a 2 SH w/ first floor set- up and 3 AUGUSTUS. Pt needs assistance w/ ADL/ IADL and reports A x2 to stand. Pt reports use of RW for short distance functional mobility. Significant PMH impacting her occupational performance includes chronic pain, HTN, anemia, polymyositis, DM, Shingles (pt reported). Upon eval, pt alert and oriented. Pt demonstrated B UE AROM / strength WFL to participate in ADLs seated. Pt declined sit to stand trial from recliner chair reporting she requires assist X 2 at baseline to stand. Pt engaged in grooming w/ mod I seated, UBD w/ S. Pt is completing ADLs below baseline level of I and would benefit from OT in acute care to max I w/ ADLs. Recommend level III rehab resource intensity when medically stable for discharge from acute care. Will continue to follow   Goals   Patient Goals Pt stated that she would like to return home and be able to travel   Plan   Treatment Interventions ADL retraining;Functional transfer training;Endurance training;UE strengthening/ROM;Equipment evaluation/education;Patient/family training;Compensatory technique education;Continued evaluation;Energy conservation;Activityengagement   Goal Expiration Date 08/12/24   OT Treatment Day 0  (Friday 8/2/24)   OT Frequency 2-3x/wk   Discharge Recommendation   Rehab Resource Intensity Level, OT III (Minimum Resource Intensity)   AM-PAC Daily Activity Inpatient   Lower Body Dressing 2   Bathing 2   Toileting 2   Upper Body Dressing 3   Grooming 4   Eating 4   Daily Activity Raw Score 17   Daily Activity Standardized Score (Calc for Raw Score >=11) 37.26   AM-PAC Applied Cognition Inpatient   Following a Speech/Presentation 4   Understanding Ordinary Conversation 4   Taking Medications 3   Remembering Where Things Are Placed or Put Away 4   Remembering List of 4-5  Errands 4   Taking Care of Complicated Tasks 3   Applied Cognition Raw Score 22   Applied Cognition Standardized Score 47.83   Barthel Index   Feeding 10   Bathing 0   Grooming Score 5   Dressing Score 5   Bladder Score 10   Bowels Score 5   Toilet Use Score 5   Transfers (Bed/Chair) Score 5   Mobility (Level Surface) Score 0   Stairs Score 0   Barthel Index Score 45   End of Consult   Education Provided Yes   Patient Position at End of Consult Bedside chair;All needs within reach   Nurse Communication Nurse aware of consult   Licensure   [place] License Number  Venice Linares, OTR/L [id number]        The patient's raw score on the AM-PAC Daily Activity Inpatient Short Form is 17. A raw score of less than 19 suggests the patient may benefit from discharge to post-acute rehabilitation services. Please refer to the recommendation of the Occupational Therapist for safe discharge planning.    Pt goals to be met by 8/12/24 to max I w/ ADLs, improve engagement to return home w/ family includes:    -Pt will complete bed mobility supine <> sit w/ mod I in preparation for ADLs using DME as needed    -Pt will demonstrate improved activity and sitting tolerance unsupported at EOB for at least 20 minutes w/ fair +balance    -Pt will complete UBD independently    -Pt will complete LBD w/ min A using LHAE as needed    -Pt will demonstrate good attention and participation in ongoing eval of functional mobility to assist in DC planning    -Pt will demonstrate good attention and understanding EC tech to max I w/ ADLs and improve engagement    -Pt will demonstrate improved functional standing tolerance for at least 5 minutes using LRAD w/ fair - balance to max I w/ toileting / LBD.    -Pt will demonstrate improved UE strength to at least 4/5 to max I w/ ADLs and improve I w/ functional sit to stand to minimize burden of care    -Pt will demonstrate improved activity and sitting tolerance OOB in chair for all meals    Venice QUINTEROS  EDGARDO Linares/KIKA  [id number]  [id number]

## 2024-08-02 NOTE — ASSESSMENT & PLAN NOTE
Church exemption  Jehovah witness  No pork derivatives  Refusal of Lovenox and heparin  OOB  [id number]

## 2024-08-02 NOTE — PLAN OF CARE
Problem: GASTROINTESTINAL - ADULT  Goal: Maintains or returns to baseline bowel function  Description: INTERVENTIONS:  - Assess bowel function  - Encourage oral fluids to ensure adequate hydration  - Administer IV fluids if ordered to ensure adequate hydration  - Administer ordered medications as needed  - Encourage mobilization and activity  - Consider nutritional services referral to assist patient with adequate nutrition and appropriate food choices  Outcome: Progressing     Problem: GASTROINTESTINAL - ADULT  Goal: Minimal or absence of nausea and/or vomiting  Description: INTERVENTIONS:  - Administer IV fluids if ordered to ensure adequate hydration  - Maintain NPO status until nausea and vomiting are resolved  - Nasogastric tube if ordered  - Administer ordered antiemetic medications as needed  - Provide nonpharmacologic comfort measures as appropriate  - Advance diet as tolerated, if ordered  - Consider nutrition services referral to assist patient with adequate nutrition and appropriate food choices  Outcome: Progressing     Problem: SAFETY ADULT  Goal: Maintain or return to baseline ADL function  Description: INTERVENTIONS:  -  Assess patient's ability to carry out ADLs; assess patient's baseline for ADL function and identify physical deficits which impact ability to perform ADLs (bathing, care of mouth/teeth, toileting, grooming, dressing, etc.)  - Assess/evaluate cause of self-care deficits   - Assess range of motion  - Assess patient's mobility; develop plan if impaired  - Assess patient's need for assistive devices and provide as appropriate  - Encourage maximum independence but intervene and supervise when necessary  - Involve family in performance of ADLs  - Assess for home care needs following discharge   - Consider OT consult to assist with ADL evaluation and planning for discharge  - Provide patient education as appropriate  Outcome: Progressing     Problem: SAFETY ADULT  Goal: Patient will remain  free of falls  Description: INTERVENTIONS:  - Educate patient/family on patient safety including physical limitations  - Instruct patient to call for assistance with activity   - Consult OT/PT to assist with strengthening/mobility   - Keep Call bell within reach  - Keep bed low and locked with side rails adjusted as appropriate  - Keep care items and personal belongings within reach  - Initiate and maintain comfort rounds  - Make Fall Risk Sign visible to staff  - Offer Toileting every 2 Hours, in advance of need  - Initiate/Maintain bed/chair alarm  - Obtain necessary fall risk management equipment: fall risk sign  - Apply yellow socks and bracelet for high fall risk patients  - Consider moving patient to room near nurses station  Outcome: Progressing

## 2024-08-02 NOTE — ASSESSMENT & PLAN NOTE
"Lab Results   Component Value Date    HGBA1C 7.6 (H) 07/11/2024       No results for input(s): \"POCGLU\" in the last 72 hours.    Hold p.o. diabetic medication.  As patient will be n.p.o. will decrease Lantus to 15 units at bedtime  Accu-Cheks every 6 hours with SSI      "

## 2024-08-02 NOTE — UTILIZATION REVIEW
Initial Clinical Review    Admission: Date/Time/Statement:   Admission Orders (From admission, onward)       Ordered        08/01/24 2132  INPATIENT ADMISSION  Once                          Orders Placed This Encounter   Procedures    INPATIENT ADMISSION     Standing Status:   Standing     Number of Occurrences:   1     Order Specific Question:   Level of Care     Answer:   Med Surg [16]     Order Specific Question:   Estimated length of stay     Answer:   More than 2 Midnights     Order Specific Question:   Certification     Answer:   I certify that inpatient services are medically necessary for this patient for a duration of greater than two midnights. See H&P and MD Progress Notes for additional information about the patient's course of treatment.     ED Arrival Information       Expected   -    Arrival   8/1/2024 18:13    Acuity   Urgent              Means of arrival   Wheelchair    Escorted by   Family Member    Service   Hospitalist    Admission type   Emergency              Arrival complaint   abdominal pain, vomiting             Chief Complaint   Patient presents with    Weakness - Generalized     States having leg pain, diarrhea, stomach pains and high BG for weeks       Initial Presentation:   74 yof to ER from home for diarrhea 4-5x/day for month with constant diffuse crampy abd pain. Also reports heart pounding, palpitations, heartburn, generalized weakness, back & leg pain, sugars running high. HX diverticulitis, anemia, asthma, DM, HLD, HTN, polymyositis. Presents tachycardic, dry mucous membranes, abdominal tenderness in the epigastric area, left upper quadrant and left lower quadrant. Admission CT: Sigmoid colon diverticulitis with no organized abscess or extraluminal air. Labs: WBC 18.22, Hgb 11.3, Na 134, ast 9, gluc 229.  Admitted to inpatient status for sepsis 2nd acute diverticulitis. Started on IVABT, cultures pending.    Anticipated Length of Stay/Certification Statement:   Patient will be  admitted on an inpatient basis with an anticipated length of stay of greater than 2 midnights secondary to sepsis due to acute diverticulitis.     Date: 8/2/24   Day 2:   IVABT continued for acute diverticulitis.  Persistent heartburn, diarrhea. Started on Florastor. Remains NPO with IVF maintained. Continue serial abd exams, pain mgt, monitor VS, follow labs.      ED Triage Vitals [08/01/24 1817]   Temperature Pulse Respirations Blood Pressure SpO2 Pain Score   97.8 °F (36.6 °C) (!) 116 18 126/69 95 % 10 - Worst Possible Pain     Weight (last 2 days)       Date/Time Weight    08/01/24 2347 104 (229.28)    08/01/24 1817 104 (230)            Vital Signs (last 3 days)       Date/Time Temp Pulse Resp BP MAP (mmHg) SpO2 O2 Device Patient Position - Orthostatic VS Pain    08/02/24 1050 -- -- -- -- -- -- -- -- No Pain    08/02/24 09:21:39 -- 85 -- 117/84 95 94 % -- Sitting --    08/02/24 0901 -- -- -- -- -- -- -- -- No Pain    08/02/24 0741 97.6 °F (36.4 °C) 85 18 109/66 -- 93 % None (Room air) Lying --    08/02/24 03:33:59 97.5 °F (36.4 °C) 86 19 108/69 82 91 % -- -- --    08/01/24 2347 -- -- -- -- -- -- -- -- No Pain    08/01/24 23:19:32 98.3 °F (36.8 °C) 104 16 138/77 97 93 % -- -- --    08/01/24 2100 -- 100 18 125/74 95 94 % None (Room air) Lying --    08/01/24 2030 -- 98 18 133/78 99 94 % None (Room air) Lying --    08/01/24 2000 -- 98 20 149/75 106 96 % None (Room air) -- --    08/01/24 1930 -- 96 18 154/69 99 93 % None (Room air) -- --    08/01/24 1817 97.8 °F (36.6 °C) 116 18 126/69 -- 95 % -- -- 10 - Worst Possible Pain              Pertinent Labs/Diagnostic Test Results:   Radiology:  CT abdomen pelvis with contrast   Final Interpretation by Luma Ribera MD (08/01 2030)      Sigmoid colon diverticulitis with no organized abscess or extraluminal air.      No other acute abdominal or pelvic pathology.      Additional findings as above.                  Workstation performed: [id number]             Results from  last 7 days   Lab Units 08/02/24  0524 08/01/24  1858   WBC Thousand/uL 15.09* 18.22*   HEMOGLOBIN g/dL 10.1* 11.3*   HEMATOCRIT % 30.6* 34.4*   PLATELETS Thousands/uL 302 349   TOTAL NEUT ABS Thousands/µL  --  14.59*       Results from last 7 days   Lab Units 08/02/24  0524 08/01/24  1858   SODIUM mmol/L 139 134*   POTASSIUM mmol/L 3.8 4.5   CHLORIDE mmol/L 104 98   CO2 mmol/L 27 24   ANION GAP mmol/L 8 12   BUN mg/dL 9 14   CREATININE mg/dL 0.42* 0.61   EGFR ml/min/1.73sq m 101 89   CALCIUM mg/dL 8.3* 9.0   MAGNESIUM mg/dL 1.5*  --      Results from last 7 days   Lab Units 08/01/24  1858   AST U/L 9*   ALT U/L 11   ALK PHOS U/L 61   TOTAL PROTEIN g/dL 7.4   ALBUMIN g/dL 3.8   TOTAL BILIRUBIN mg/dL 0.42     Results from last 7 days   Lab Units 08/02/24  1132 08/02/24  0614 08/01/24  2313   POC GLUCOSE mg/dl 151* 136 144*     Results from last 7 days   Lab Units 08/02/24  0524 08/01/24  1858   GLUCOSE RANDOM mg/dL 128 229*     Results from last 7 days   Lab Units 08/02/24  0524 08/01/24 2057 08/01/24  1858   HS TNI 0HR ng/L  --   --  6   HS TNI 2HR ng/L  --  6  --    HSTNI D2 ng/L  --  0  --    HS TNI 4HR ng/L 9  --   --    HSTNI D4 ng/L 3  --   --      Results from last 7 days   Lab Units 08/01/24  1858   TSH 3RD GENERATON uIU/mL 1.155       Results from last 7 days   Lab Units 08/01/24  2117   LACTIC ACID mmol/L 0.9     Results from last 7 days   Lab Units 08/01/24 2057   CLARITY UA  Clear   COLOR UA  Colorless   SPEC GRAV UA  <1.005*   PH UA  6.5   GLUCOSE UA mg/dl Negative   KETONES UA mg/dl Negative   BLOOD UA  Negative   PROTEIN UA mg/dl Negative   NITRITE UA  Negative   BILIRUBIN UA  Negative   UROBILINOGEN UA (BE) mg/dl <2.0   LEUKOCYTES UA  Negative     Results from last 7 days   Lab Units 08/01/24 2117   BLOOD CULTURE  Received in Microbiology Lab. Culture in Progress.       ED Treatment-Medication Administration from 08/01/2024 1813 to 08/01/2024 2302         Date/Time Order Dose Route Action      08/01/2024 1856 sodium chloride 0.9 % bolus 1,000 mL 1,000 mL Intravenous New Bag     08/01/2024 1921 iohexol (OMNIPAQUE) 350 MG/ML injection (MULTI-DOSE) 100 mL 100 mL Intravenous Given     08/01/2024 2141 meropenem (MERREM) 1,000 mg in sodium chloride 0.9 % 100 mL IVPB 1,000 mg Intravenous New Bag            Past Medical History:   Diagnosis Date    Acute diverticulitis 9/21/2018    Anemia     Asthma     Chronic pain     bulging discs in back,polymyositis    Diabetes mellitus (HCC)     Disease of thyroid gland     Diverticulitis     Hyperlipidemia     Hypertension     Polymyositis (HCC)      Present on Admission:   Sepsis (HCC)   Acute diverticulitis   Polymyositis (HCC)   Type 2 diabetes mellitus with other specified complication, without long-term current use of insulin (HCC)   Acquired hypothyroidism   Essential hypertension      Admitting Diagnosis: Diverticulitis [K57.92]  Weakness generalized [R53.1]  Abdominal pain [R10.9]  Generalized weakness [R53.1]  Age/Sex: 74 y.o. female  Admission Orders:  Accuchecks  Scd/foot pumps  Pt/ot eval & tx      Scheduled Medications:  amLODIPine, 5 mg, Oral, Daily  enoxaparin, 40 mg, Subcutaneous, Daily  insulin glargine, 10 Units, Subcutaneous, HS  insulin lispro, 1-5 Units, Subcutaneous, Q6H ANTONETTE  levothyroxine, 88 mcg, Oral, Early Morning  lisinopril, 10 mg, Oral, Daily  meropenem, 1,000 mg, Intravenous, Q8H  pantoprazole, 40 mg, Oral, Early Morning  predniSONE, 7.5 mg, Oral, Daily    Continuous IV Infusions:  multi-electrolyte, 100 mL/hr, Intravenous, Continuous    PRN Meds:  acetaminophen, 650 mg, Oral, Q6H PRN  morphine injection, 2 mg, Intravenous, Q4H PRN    Network Utilization Review Department  ATTENTION: Please call with any questions or concerns to [phone] and carefully listen to the prompts so that you are directed to the right person. All voicemails are confidential.   For Discharge needs, contact Care Management DC Support Team at [phone] opt.  2  Send all requests for admission clinical reviews, approved or denied determinations and any other requests to dedicated fax number below belonging to the campus where the patient is receiving treatment. List of dedicated fax numbers for the Facilities:  FACILITY NAME UR FAX NUMBER   ADMISSION DENIALS (Administrative/Medical Necessity) [phone]   DISCHARGE SUPPORT TEAM (NETWORK) [phone]   PARENT CHILD HEALTH (Maternity/NICU/Pediatrics) [phone]   [facility] [phone]   [facility] [phone]   [facility] [phone]   [facility] [phone]   [facility] [phone]   [facility] [phone]   [facility] [phone]   [facility] [phone]   [facility] [phone]   [facility] [phone]   [facility] [phone]   [facility] [phone]

## 2024-08-02 NOTE — H&P
"[facility]  H&P  Name: Jadyn Ac 74 y.o. female I MRN: [id number]  Unit/Bed#: ED 04 I Date of Admission: 8/1/2024   Date of Service: 8/1/2024 I Hospital Day: 0      Assessment & Plan   * Sepsis (HCC)  Assessment & Plan  As noted by tachycardia, leukocytosis due to acute diverticulitis  Follow-up pending blood culture  Repeat lab work in a.m.    Acute diverticulitis  Assessment & Plan  Patient presented to the ER with abdominal pain, on and off diarrhea over the last month  Imaging shows diverticulitis of the sigmoid colon with no abscess or extraluminal air  Patient with allergies to multiple antibiotics  Per review of prior records, patient did receive meropenem in the past which she tolerated.  Ordered meropenem in the ER which will be continued  N.p.o. for today with IV fluids  Pain meds as needed  Will need outpatient GI follow-up but can consider inpatient based on clinical course  As patient reports ongoing diarrhea, will check stool for bacterial enteric panel and although less likely will check for C. difficile as well    Essential hypertension  Assessment & Plan  Continue amlodipine.  Benazepril substituted with lisinopril    Acquired hypothyroidism  Assessment & Plan  Continue levothyroxine    Type 2 diabetes mellitus with other specified complication, without long-term current use of insulin (HCC)  Assessment & Plan  Lab Results   Component Value Date    HGBA1C 7.6 (H) 07/11/2024       No results for input(s): \"POCGLU\" in the last 72 hours.    Hold p.o. diabetic medication.  As patient will be n.p.o. will decrease Lantus to 15 units at bedtime  Accu-Cheks every 6 hours with SSI        Polymyositis (HCC)  Assessment & Plan  Continue prednisone       VTE Pharmacologic Prophylaxis:    lovenox  Code Status: full code  Discussion with family: Updated  (son and daughter) at bedside.    Anticipated Length of Stay: Patient will be admitted on an inpatient basis with " an anticipated length of stay of greater than 2 midnights secondary to sepsis due to acute diverticulitis.    Total Time Spent on Date of Encounter in care of patient: This time was spent on one or more of the following: performing physical exam; counseling and coordination of care; obtaining or reviewing history; documenting in the medical record; reviewing/ordering tests, medications or procedures; communicating with other healthcare professionals and discussing with patient's family/caregivers.    Chief Complaint: abdominal pain, diarrhea, weakness    History of Present Illness:  Jadyn Ac is a 74 y.o. female with a PMH of polymyositis, diabetes who presents with abdominal pain, on and off diarrhea which has been ongoing over the last month.  States abdominal pain significantly worsened over the last 2 days, 10/10 in intensity and constant.  States the pain is worse in the lower abdomen bilaterally.  Patient does have baseline generalized weakness but over the last week she has had worsening weakness and needing more assistance from family.  Patient reports decreased p.o. intake.  Reports occasional shortness of breath from her history of asthma and occasional chest pain.  Denies any chest pain currently.  In the ER she met SIRS criteria and imaging showed diverticulitis    Review of Systems:  Review of Systems   Constitutional:  Positive for appetite change. Negative for chills and fever.   HENT:  Negative for congestion.    Eyes:  Negative for photophobia.   Respiratory:  Positive for shortness of breath (intermittent from asthma).    Cardiovascular:  Positive for chest pain (occasional). Negative for leg swelling.   Gastrointestinal:  Positive for abdominal pain and diarrhea. Negative for blood in stool, nausea and vomiting.   Genitourinary:  Negative for dysuria, frequency and hematuria.   Musculoskeletal:  Positive for back pain (chronic).   Skin:  Negative for wound.   Neurological:  Positive for  weakness. Negative for dizziness and light-headedness.   Hematological:  Does not bruise/bleed easily.   Psychiatric/Behavioral:  Negative for agitation.        Past Medical and Surgical History:   Past Medical History:   Diagnosis Date    Acute diverticulitis 9/21/2018    Anemia     Asthma     Chronic pain     bulging discs in back,polymyositis    Diabetes mellitus (HCC)     Disease of thyroid gland     Diverticulitis     Hyperlipidemia     Hypertension     Polymyositis (HCC)        Past Surgical History:   Procedure Laterality Date    EYE SURGERY Bilateral     CATAREACT REMOVED FROM BOTH EYES 2 MTHS AGO    HYSTERECTOMY      SINUS SURGERY      THYROIDECTOMY      TUBAL LIGATION         Meds/Allergies:  Prior to Admission medications    Medication Sig Start Date End Date Taking? Authorizing Provider   albuterol (PROVENTIL HFA,VENTOLIN HFA) 90 mcg/act inhaler INHALE 2 PUFFS EVERY 6 (SIX) HOURS AS NEEDED FOR WHEEZING 6/9/23  Yes Herlinda Castorena MD   amLODIPine (NORVASC) 5 mg tablet TAKE 1 TABLET BY MOUTH DAILY 6/7/24  Yes Herlinda Castorena MD   benazepril (LOTENSIN) 10 mg tablet TAKE 1 TABLET DAILY 5/5/24  Yes Herlinda Castorena MD   Blood Glucose Monitoring Suppl (OneTouch Verio) w/Device KIT Use 2 (two) times a day 7/12/24  Yes CHICA Mario   insulin glargine (LANTUS) 100 units/mL subcutaneous injection Inject 30 Units under the skin daily at bedtime 1/8/24  Yes Herlinda Castorena MD   Insulin Pen Needle (Pen Needles) 31G X 5 MM MISC USE DAILY FOR INSULIN ADMINISTRATION 7/3/24  Yes CHICA Mario   Lancets (OneTouch Delica Plus [id number]) MISC Use daily as directed. 7/3/24  Yes CHICA Mario   metFORMIN (GLUCOPHAGE-XR) 500 mg 24 hr tablet TAKE 1 TABLET 2 TIMES A DAY WITH MEALS 6/6/24  Yes CHICA Mairo   OneTouch Verio test strip CHECK BLOOD GLUCOSE TWICE DAILY 1/26/24  Yes Crystal Swartz MD   predniSONE 5 mg tablet Take 1.5 tabs daily to equal 7.5mg daily 3/7/24  Yes Herlinda Castorena MD   sitaGLIPtin (Januvia) 25 mg  tablet TAKE 1 TABLET DAILY 7/31/24  Yes CHICA Mario   Synthroid 88 MCG tablet TAKE ONE TABLET EVERY DAY IN THE EARLY MORNING ON AN EMPTY STOMACH. 8/1/24  Yes CHICA Rosales   benazepril (LOTENSIN) 5 mg tablet Take 5 mg by mouth daily    Historical Provider, MD   calcium carbonate (OS-YNES) 1250 (500 Ca) MG tablet 1 tablet with meals Orally    Historical Provider, MD   Cholecalciferol 25 MCG (1000 UT) tablet Take 1 tablet by mouth daily    Historical Provider, MD   ferrous sulfate 325 (65 Fe) mg tablet Every 12 hours    Historical Provider, MD   omeprazole (PriLOSEC) 40 MG capsule omeprazole 40 mg capsule,delayed release    Historical Provider, MD   Synthroid 88 MCG tablet TAKE ONE TABLET EVERY DAY IN THE EARLY MORNING ON AN EMPTY STOMACH. 4/2/24 8/1/24  CHICA Mario     I have reviewed home medications with patient personally.    Allergies:   Allergies   Allergen Reactions    Anaprox [Naproxen Sodium] Shortness Of Breath    Levofloxacin Hives    Sulfamethoxazole-Trimethoprim Anaphylaxis    Jardiance [Empagliflozin] GI Intolerance    Penicillins Swelling and Hives    Alendronate Rash    Aspirin Rash    Azathioprine Rash    Metronidazole Rash    Sulfa Antibiotics Rash       Social History:  Marital Status:    Occupation: none  Patient Pre-hospital Living Situation: With other family member: daughter  Patient Pre-hospital Level of Mobility:  uses walker  Patient Pre-hospital Diet Restrictions: none  Substance Use History:   Social History     Substance and Sexual Activity   Alcohol Use No     Social History     Tobacco Use   Smoking Status Never   Smokeless Tobacco Never     Social History     Substance and Sexual Activity   Drug Use No       Family History:  Family History   Problem Relation Age of Onset    Hepatitis Sister         acute hepatits C virus    Hypertension Daughter     Kidney disease Daughter     Hypertension Son     Mental illness Son        Physical Exam:     Vitals:  "  Blood Pressure: 125/74 (08/01/24 2100)  Pulse: 100 (08/01/24 2100)  Temperature: 97.8 °F (36.6 °C) (08/01/24 1817)  Respirations: 18 (08/01/24 2100)  Height: 5' 11\" (180.3 cm) (08/01/24 1817)  Weight - Scale: 104 kg (230 lb) (08/01/24 1817)  SpO2: 94 % (08/01/24 2100)    Physical Exam  Constitutional:       General: She is not in acute distress.     Appearance: She is well-developed. She is not toxic-appearing.   HENT:      Head: Normocephalic and atraumatic.   Eyes:      General: No scleral icterus.  Cardiovascular:      Rate and Rhythm: Regular rhythm. Tachycardia present.   Pulmonary:      Effort: Pulmonary effort is normal. No respiratory distress.      Breath sounds: Normal breath sounds. No wheezing or rales.   Abdominal:      General: Bowel sounds are normal. There is no distension.      Palpations: Abdomen is soft.      Tenderness: There is abdominal tenderness (LLQ). There is no guarding or rebound.   Musculoskeletal:      Right lower leg: No edema.      Left lower leg: No edema.   Neurological:      Mental Status: She is alert and oriented to person, place, and time.          Additional Data:     Lab Results:  Results from last 7 days   Lab Units 08/01/24  1858   WBC Thousand/uL 18.22*   HEMOGLOBIN g/dL 11.3*   HEMATOCRIT % 34.4*   PLATELETS Thousands/uL 349   SEGS PCT % 79*   LYMPHO PCT % 14   MONO PCT % 6   EOS PCT % 0     Results from last 7 days   Lab Units 08/01/24  1858   SODIUM mmol/L 134*   POTASSIUM mmol/L 4.5   CHLORIDE mmol/L 98   CO2 mmol/L 24   BUN mg/dL 14   CREATININE mg/dL 0.61   ANION GAP mmol/L 12   CALCIUM mg/dL 9.0   ALBUMIN g/dL 3.8   TOTAL BILIRUBIN mg/dL 0.42   ALK PHOS U/L 61   ALT U/L 11   AST U/L 9*   GLUCOSE RANDOM mg/dL 229*             Lab Results   Component Value Date    HGBA1C 7.6 (H) 07/11/2024    HGBA1C 6.8 (H) 10/20/2023    HGBA1C 6.4 (H) 05/18/2023     Results from last 7 days   Lab Units 08/01/24 2117   LACTIC ACID mmol/L 0.9       Lines/Drains:  Invasive Devices  "      Peripheral Intravenous Line  Duration             Peripheral IV 08/01/24 Right Antecubital <1 day                        Imaging: Reviewed radiology reports from this admission including: abdominal/pelvic CT  CT abdomen pelvis with contrast   Final Result by Luma Ribera MD (08/01 2030)      Sigmoid colon diverticulitis with no organized abscess or extraluminal air.      No other acute abdominal or pelvic pathology.      Additional findings as above.                  Workstation performed: [id number]             EKG and Other Studies Reviewed on Admission:   EKG:  no EKG noted.    ** Please Note: This note has been constructed using a voice recognition system. **

## 2024-08-02 NOTE — ASSESSMENT & PLAN NOTE
Lab Results   Component Value Date    HGBA1C 7.6 (H) 07/11/2024     Hold p.o. diabetic medication.    While n.p.o. will decrease Lantus to 15 units at bedtime  Accu-Cheks every 6 hours with SSI  Carb controlled diet

## 2024-08-03 PROBLEM — D72.829 LEUKOCYTOSIS: Status: ACTIVE | Noted: 2024-08-03

## 2024-08-03 PROBLEM — A41.9 SEPSIS (HCC): Status: RESOLVED | Noted: 2018-09-21 | Resolved: 2024-08-03

## 2024-08-03 PROBLEM — R53.1 GENERALIZED WEAKNESS: Status: ACTIVE | Noted: 2024-08-03

## 2024-08-03 LAB
ALBUMIN SERPL BCG-MCNC: 3.2 G/DL (ref 3.5–5)
ALP SERPL-CCNC: 47 U/L (ref 34–104)
ALT SERPL W P-5'-P-CCNC: 12 U/L (ref 7–52)
ANION GAP SERPL CALCULATED.3IONS-SCNC: 10 MMOL/L (ref 4–13)
AST SERPL W P-5'-P-CCNC: 23 U/L (ref 13–39)
BASOPHILS # BLD AUTO: 0.03 THOUSANDS/ÂΜL (ref 0–0.1)
BASOPHILS NFR BLD AUTO: 0 % (ref 0–1)
BILIRUB SERPL-MCNC: 0.38 MG/DL (ref 0.2–1)
BUN SERPL-MCNC: 6 MG/DL (ref 5–25)
CALCIUM ALBUM COR SERPL-MCNC: 8.4 MG/DL (ref 8.3–10.1)
CALCIUM SERPL-MCNC: 7.8 MG/DL (ref 8.4–10.2)
CHLORIDE SERPL-SCNC: 104 MMOL/L (ref 96–108)
CO2 SERPL-SCNC: 26 MMOL/L (ref 21–32)
CREAT SERPL-MCNC: 0.4 MG/DL (ref 0.6–1.3)
EOSINOPHIL # BLD AUTO: 0.35 THOUSAND/ÂΜL (ref 0–0.61)
EOSINOPHIL NFR BLD AUTO: 3 % (ref 0–6)
ERYTHROCYTE [DISTWIDTH] IN BLOOD BY AUTOMATED COUNT: 19 % (ref 11.6–15.1)
FERRITIN SERPL-MCNC: 67 NG/ML (ref 11–307)
GFR SERPL CREATININE-BSD FRML MDRD: 102 ML/MIN/1.73SQ M
GLUCOSE SERPL-MCNC: 118 MG/DL (ref 65–140)
GLUCOSE SERPL-MCNC: 119 MG/DL (ref 65–140)
GLUCOSE SERPL-MCNC: 128 MG/DL (ref 65–140)
GLUCOSE SERPL-MCNC: 191 MG/DL (ref 65–140)
GLUCOSE SERPL-MCNC: 192 MG/DL (ref 65–140)
GLUCOSE SERPL-MCNC: 221 MG/DL (ref 65–140)
HCT VFR BLD AUTO: 31 % (ref 34.8–46.1)
HGB BLD-MCNC: 10.1 G/DL (ref 11.5–15.4)
IMM GRANULOCYTES # BLD AUTO: 0.05 THOUSAND/UL (ref 0–0.2)
IMM GRANULOCYTES NFR BLD AUTO: 0 % (ref 0–2)
IRON SATN MFR SERPL: 11 % (ref 15–50)
IRON SERPL-MCNC: 20 UG/DL (ref 50–212)
LYMPHOCYTES # BLD AUTO: 3 THOUSANDS/ÂΜL (ref 0.6–4.47)
LYMPHOCYTES NFR BLD AUTO: 26 % (ref 14–44)
MAGNESIUM SERPL-MCNC: 1.8 MG/DL (ref 1.9–2.7)
MCH RBC QN AUTO: 22.5 PG (ref 26.8–34.3)
MCHC RBC AUTO-ENTMCNC: 32.6 G/DL (ref 31.4–37.4)
MCV RBC AUTO: 69 FL (ref 82–98)
MONOCYTES # BLD AUTO: 0.82 THOUSAND/ÂΜL (ref 0.17–1.22)
MONOCYTES NFR BLD AUTO: 7 % (ref 4–12)
NEUTROPHILS # BLD AUTO: 7.44 THOUSANDS/ÂΜL (ref 1.85–7.62)
NEUTS SEG NFR BLD AUTO: 64 % (ref 43–75)
NRBC BLD AUTO-RTO: 0 /100 WBCS
PLATELET # BLD AUTO: 230 THOUSANDS/UL (ref 149–390)
PMV BLD AUTO: 9.9 FL (ref 8.9–12.7)
POTASSIUM SERPL-SCNC: 5.1 MMOL/L (ref 3.5–5.3)
PROT SERPL-MCNC: 6.6 G/DL (ref 6.4–8.4)
RBC # BLD AUTO: 4.49 MILLION/UL (ref 3.81–5.12)
SODIUM SERPL-SCNC: 140 MMOL/L (ref 135–147)
TIBC SERPL-MCNC: 187 UG/DL (ref 250–450)
UIBC SERPL-MCNC: 167 UG/DL (ref 155–355)
WBC # BLD AUTO: 11.69 THOUSAND/UL (ref 4.31–10.16)

## 2024-08-03 PROCEDURE — 83735 ASSAY OF MAGNESIUM: CPT | Performed by: STUDENT IN AN ORGANIZED HEALTH CARE EDUCATION/TRAINING PROGRAM

## 2024-08-03 PROCEDURE — 82728 ASSAY OF FERRITIN: CPT | Performed by: NURSE PRACTITIONER

## 2024-08-03 PROCEDURE — 80053 COMPREHEN METABOLIC PANEL: CPT | Performed by: STUDENT IN AN ORGANIZED HEALTH CARE EDUCATION/TRAINING PROGRAM

## 2024-08-03 PROCEDURE — 83540 ASSAY OF IRON: CPT | Performed by: NURSE PRACTITIONER

## 2024-08-03 PROCEDURE — 85025 COMPLETE CBC W/AUTO DIFF WBC: CPT | Performed by: STUDENT IN AN ORGANIZED HEALTH CARE EDUCATION/TRAINING PROGRAM

## 2024-08-03 PROCEDURE — 99232 SBSQ HOSP IP/OBS MODERATE 35: CPT | Performed by: NURSE PRACTITIONER

## 2024-08-03 PROCEDURE — 97535 SELF CARE MNGMENT TRAINING: CPT

## 2024-08-03 PROCEDURE — 83550 IRON BINDING TEST: CPT | Performed by: NURSE PRACTITIONER

## 2024-08-03 PROCEDURE — 82948 REAGENT STRIP/BLOOD GLUCOSE: CPT

## 2024-08-03 RX ORDER — INSULIN LISPRO 100 [IU]/ML
1-5 INJECTION, SOLUTION INTRAVENOUS; SUBCUTANEOUS
Status: DISCONTINUED | OUTPATIENT
Start: 2024-08-03 | End: 2024-08-05 | Stop reason: HOSPADM

## 2024-08-03 RX ORDER — LANOLIN ALCOHOL/MO/W.PET/CERES
400 CREAM (GRAM) TOPICAL DAILY
Status: DISCONTINUED | OUTPATIENT
Start: 2024-08-03 | End: 2024-08-05 | Stop reason: HOSPADM

## 2024-08-03 RX ORDER — SODIUM CHLORIDE, SODIUM GLUCONATE, SODIUM ACETATE, POTASSIUM CHLORIDE, MAGNESIUM CHLORIDE, SODIUM PHOSPHATE, DIBASIC, AND POTASSIUM PHOSPHATE .53; .5; .37; .037; .03; .012; .00082 G/100ML; G/100ML; G/100ML; G/100ML; G/100ML; G/100ML; G/100ML
50 INJECTION, SOLUTION INTRAVENOUS CONTINUOUS
Status: DISCONTINUED | OUTPATIENT
Start: 2024-08-03 | End: 2024-08-05 | Stop reason: HOSPADM

## 2024-08-03 RX ORDER — HYDROCORTISONE 25 MG/G
CREAM TOPICAL 4 TIMES DAILY PRN
Status: DISCONTINUED | OUTPATIENT
Start: 2024-08-03 | End: 2024-08-05 | Stop reason: HOSPADM

## 2024-08-03 RX ADMIN — PREDNISONE 7.5 MG: 5 TABLET ORAL at 09:14

## 2024-08-03 RX ADMIN — MEROPENEM 1000 MG: 1 INJECTION, POWDER, FOR SOLUTION INTRAVENOUS at 05:39

## 2024-08-03 RX ADMIN — PANTOPRAZOLE SODIUM 40 MG: 40 TABLET, DELAYED RELEASE ORAL at 05:40

## 2024-08-03 RX ADMIN — Medication 400 MG: at 12:24

## 2024-08-03 RX ADMIN — Medication 250 MG: at 09:14

## 2024-08-03 RX ADMIN — FAMOTIDINE 20 MG: 20 TABLET, FILM COATED ORAL at 09:14

## 2024-08-03 RX ADMIN — INSULIN GLARGINE 10 UNITS: 100 INJECTION, SOLUTION SUBCUTANEOUS at 22:24

## 2024-08-03 RX ADMIN — LISINOPRIL 10 MG: 10 TABLET ORAL at 09:14

## 2024-08-03 RX ADMIN — FAMOTIDINE 20 MG: 20 TABLET, FILM COATED ORAL at 17:11

## 2024-08-03 RX ADMIN — SODIUM CHLORIDE, SODIUM GLUCONATE, SODIUM ACETATE, POTASSIUM CHLORIDE, MAGNESIUM CHLORIDE, SODIUM PHOSPHATE, DIBASIC, AND POTASSIUM PHOSPHATE 100 ML/HR: .53; .5; .37; .037; .03; .012; .00082 INJECTION, SOLUTION INTRAVENOUS at 05:41

## 2024-08-03 RX ADMIN — Medication 250 MG: at 17:11

## 2024-08-03 RX ADMIN — INSULIN LISPRO 1 UNITS: 100 INJECTION, SOLUTION INTRAVENOUS; SUBCUTANEOUS at 12:24

## 2024-08-03 RX ADMIN — INSULIN LISPRO 1 UNITS: 100 INJECTION, SOLUTION INTRAVENOUS; SUBCUTANEOUS at 22:24

## 2024-08-03 RX ADMIN — MEROPENEM 1000 MG: 1 INJECTION, POWDER, FOR SOLUTION INTRAVENOUS at 23:37

## 2024-08-03 RX ADMIN — LEVOTHYROXINE SODIUM 88 MCG: 88 TABLET ORAL at 05:39

## 2024-08-03 RX ADMIN — MEROPENEM 1000 MG: 1 INJECTION, POWDER, FOR SOLUTION INTRAVENOUS at 16:39

## 2024-08-03 RX ADMIN — INSULIN LISPRO 1 UNITS: 100 INJECTION, SOLUTION INTRAVENOUS; SUBCUTANEOUS at 16:39

## 2024-08-03 RX ADMIN — AMLODIPINE BESYLATE 5 MG: 5 TABLET ORAL at 09:14

## 2024-08-03 NOTE — ASSESSMENT & PLAN NOTE
Patient presented to the ER with abdominal pain, on and off diarrhea over the last month    CT: Imaging shows diverticulitis of the sigmoid colon with no abscess or extraluminal air  Allergies to multiple antibiotics  ER: Meropenem previously tolerated.  Advance to clear liquid diet   Pain meds as needed  Consider GI consult if needed  Stool enteric panel, C. difficile, fecal elastase, Pro-calprotectin - not collected, no further diarrhea since admission

## 2024-08-03 NOTE — ASSESSMENT & PLAN NOTE
Yazidism exemption  Jehovah witness  No pork derivatives  Refusal of Lovenox and heparin  OOB  VTE-8

## 2024-08-03 NOTE — ASSESSMENT & PLAN NOTE
Lab Results   Component Value Date    HGBA1C 7.6 (H) 07/11/2024     Hold p.o. diabetic medication.    Decrease Lantus to 15 units at bedtime  ACCU checks AC + HS with lispro insulin sliding scale   Carb controlled diet

## 2024-08-03 NOTE — UTILIZATION REVIEW
*PLEASE SEE INITIAL REVIEW COMPLETED 8/2/24 BY SHELBI RODRIGUEZ RN    Continued Stay Review:  Date: 8/3/24                       Current Patient Class: Inpatient    Current Level of Care: Med Surg    HPI:  75 y/o female with PMHx HTN, HLD, Asthma, DM, Diverticulitis, Polymyositis, Anemia - initially presented to [facility] ED 2nd diffuse crampy abdominal pain and diarrhea + admitted Inpatient on 8//1/24 2nd Acute Diverticulitis with Sepsis - started on IV Abx    8/3/24 - DAY 3:  Has surpassed a 2nd midnight with active treatments and services.    Reports significant bilateral lower extremity weakness stating she feels like her legs are going to give out. She was unable to get out of bed with assistance and required a [place]on lift. She states this is not normal for her. She denies any further diarrhea since admission.   Denies no nausea or vomiting. She is looking forward to starting oral liquids.     Sepsis (HCC)-resolved as of 8/3/2024  As noted by tachycardia, leukocytosis due to acute diverticulitis  POA evidenced by tachycardia, tachypnea and leukocytosis     Origin likely diverticulitis  CT: Sigmoid colon diverticulitis with no organized abscess or extraluminal air.   CXR-not obtained on admission, no signs of respiratory distress  BCx- pending  UA-WNL  LA-WNL/Pro-Aleksander-pending  ABX: Multiple allergy-meropenem empiric  Improving. Monitor fever curve and CBC/D     Acute diverticulitis  Patient presented to the ER with abdominal pain, on and off diarrhea over the last month  CT: Imaging shows diverticulitis of the sigmoid colon with no abscess or extraluminal air  Allergies to multiple antibiotics  ER: Meropenem previously tolerated.  Advance to clear liquid diet   Pain meds as needed  Consider GI consult if needed  Stool enteric panel, C. difficile, fecal elastase, Pro-calprotectin - not collected, no further diarrhea since admission      MD Certification Statement: The patient will continue to require additional  inpatient hospital stay due to diverticulitis with sepsis      Vital Signs (last 3 days)       Date/Time Temp Pulse Resp BP MAP (mmHg) SpO2 O2 Device Patient Position - Orthostatic VS Pain    08/03/24 1025 -- -- -- -- -- -- -- -- No Pain    08/03/24 08:55:21 97.6 °F (36.4 °C) 79 18 124/72 89 95 % None (Room air) Lying No Pain    08/03/24 06:03:28 -- 82 16 127/70 89 94 % -- -- --    08/02/24 22:44:27 97.8 °F (36.6 °C) 95 20 123/65 84 95 % -- -- --    08/02/24 20:57:07 97.5 °F (36.4 °C) 88 16 124/80 95 96 % -- -- --    08/02/24 2005 -- -- -- -- -- -- -- -- No Pain    08/02/24 17:09:22 97.6 °F (36.4 °C) 86 -- -- -- 95 % -- -- --    08/02/24 14:58:50 97.4 °F (36.3 °C) 87 19 121/80 94 96 % -- -- --    08/02/24 1050 -- -- -- -- -- -- -- -- No Pain    08/02/24 09:21:39 -- 85 -- 117/84 95 94 % -- Sitting --    08/02/24 0901 -- -- -- -- -- -- -- -- No Pain    08/02/24 0900 -- -- -- -- -- -- -- -- No Pain    08/02/24 0741 97.6 °F (36.4 °C) 85 18 109/66 -- 93 % None (Room air) Lying --    08/02/24 03:33:59 97.5 °F (36.4 °C) 86 19 108/69 82 91 % -- -- --    08/01/24 2347 -- -- -- -- -- -- -- -- No Pain    08/01/24 23:19:32 98.3 °F (36.8 °C) 104 16 138/77 97 93 % -- -- --    08/01/24 2100 -- 100 18 125/74 95 94 % None (Room air) Lying --    08/01/24 2030 -- 98 18 133/78 99 94 % None (Room air) Lying --    08/01/24 2000 -- 98 20 149/75 106 96 % None (Room air) -- --    08/01/24 1930 -- 96 18 154/69 99 93 % None (Room air) -- --    08/01/24 1817 97.8 °F (36.6 °C) 116 18 126/69 -- 95 % -- -- 10 - Worst Possible Pain     Weight (last 2 days)       Date/Time Weight    08/01/24 2347 104 (229.28)    08/01/24 1817 104 (230)      08/01 0701  08/02 0700   IV Piggyback 1000   Total Intake(mL/kg) 1000 (9.6)   Net +1000         Pertinent Labs/Diagnostic Results:   Radiology:  CT abdomen pelvis with contrast   Sigmoid colon diverticulitis with no organized abscess or extraluminal air.      No other acute abdominal or pelvic pathology.      Cardiology:  ECG 12 lead   Sinus tachycardia   Otherwise normal ECG   When compared with ECG of 05-MAY-2021 08:18,   No significant change was found       Results from last 7 days   Lab Units 08/03/24  0601 08/02/24 0524 08/01/24  1858   WBC Thousand/uL 11.69* 15.09* 18.22*   HEMOGLOBIN g/dL 10.1* 10.1* 11.3*   HEMATOCRIT % 31.0* 30.6* 34.4*   PLATELETS Thousands/uL 230 302 349   TOTAL NEUT ABS Thousands/µL 7.44  --  14.59*       Results from last 7 days   Lab Units 08/03/24  0601 08/02/24 0524 08/01/24  1858   SODIUM mmol/L 140 139 134*   POTASSIUM mmol/L 5.1 3.8 4.5   CHLORIDE mmol/L 104 104 98   CO2 mmol/L 26 27 24   ANION GAP mmol/L 10 8 12   BUN mg/dL 6 9 14   CREATININE mg/dL 0.40* 0.42* 0.61   EGFR ml/min/1.73sq m 102 101 89   CALCIUM mg/dL 7.8* 8.3* 9.0   MAGNESIUM mg/dL 1.8* 1.5*  --      Results from last 7 days   Lab Units 08/03/24  0601 08/01/24  1858   AST U/L 23 9*   ALT U/L 12 11   ALK PHOS U/L 47 61   TOTAL PROTEIN g/dL 6.6 7.4   ALBUMIN g/dL 3.2* 3.8   TOTAL BILIRUBIN mg/dL 0.38 0.42     Results from last 7 days   Lab Units 08/03/24  1221 08/03/24  0548 08/03/24  0018 08/02/24  2130 08/02/24  1711 08/02/24  1132 08/02/24  0614 08/01/24  2313   POC GLUCOSE mg/dl 221* 119 128 148* 162* 151* 136 144*     Results from last 7 days   Lab Units 08/03/24  0601 08/02/24  0524 08/01/24  1858   GLUCOSE RANDOM mg/dL 118 128 229*       Results from last 7 days   Lab Units 08/02/24  0524 08/01/24 2057 08/01/24  1858   HS TNI 0HR ng/L  --   --  6   HS TNI 2HR ng/L  --  6  --    HSTNI D2 ng/L  --  0  --    HS TNI 4HR ng/L 9  --   --    HSTNI D4 ng/L 3  --   --      Results from last 7 days   Lab Units 08/01/24 2057   CLARITY UA  Clear   COLOR UA  Colorless   SPEC GRAV UA  <1.005*   PH UA  6.5   GLUCOSE UA mg/dl Negative   KETONES UA mg/dl Negative   BLOOD UA  Negative   PROTEIN UA mg/dl Negative   NITRITE UA  Negative   BILIRUBIN UA  Negative   UROBILINOGEN UA (BE) mg/dl <2.0   LEUKOCYTES UA  Negative        Results from last 7 days   Lab Units 08/01/24 2117   BLOOD CULTURE  No Growth at 24 hrs.     Diet Surgical; Full Liquids    Scheduled Medications:  amLODIPine, 5 mg, Oral, Daily  famotidine, 20 mg, Oral, BID  insulin glargine, 10 Units, Subcutaneous, HS  insulin lispro, 1-5 Units, Subcutaneous, TID AC  insulin lispro, 1-5 Units, Subcutaneous, HS  levothyroxine, 88 mcg, Oral, Early Morning  lisinopril, 10 mg, Oral, Daily  magnesium Oxide, 400 mg, Oral, Daily  meropenem, 1,000 mg, Intravenous, Q8H  pantoprazole, 40 mg, Oral, Early Morning  predniSONE, 7.5 mg, Oral, Daily  saccharomyces boulardii, 250 mg, Oral, BID    Continuous IV Infusions:  multi-electrolyte, 50 mL/hr, Intravenous, Continuous    PRN Meds:  acetaminophen, 650 mg, Oral, Q6H PRN  hydrocortisone, , Topical, 4x Daily PRN  morphine injection, 2 mg, Intravenous, Q4H PRN    Discharge Plan:   To be determined   Inpatient Case Management following for all discharge needs    Network Utilization Review Department  ATTENTION: Please call with any questions or concerns to [phone] and carefully listen to the prompts so that you are directed to the right person. All voicemails are confidential.   For Discharge needs, contact Care Management DC Support Team at [phone] opt. 2  Send all requests for admission clinical reviews, approved or denied determinations and any other requests to dedicated fax number below belonging to the campus where the patient is receiving treatment. List of dedicated fax numbers for the Facilities:  FACILITY NAME UR FAX NUMBER   ADMISSION DENIALS (Administrative/Medical Necessity) [phone]   DISCHARGE SUPPORT TEAM (NETWORK) [phone]   PARENT CHILD HEALTH (Maternity/NICU/Pediatrics) [phone]   [facility] [phone]   [facility] [phone]   [facility] [phone]   [facility] [phone]   [facility]  [facility] [phone]   [facility] [phone]   [facility] [phone]   [facility] [phone]   [facility] [phone]   Critical access hospital [phone]   [facility] [phone]   [facility] [phone]

## 2024-08-03 NOTE — PROGRESS NOTES
[facility]  Progress Note  Name: Jadyn Ac I  MRN: [id number]  Unit/Bed#: 2 [address] Date of Admission: 8/1/2024   Date of Service: 8/3/2024 I Hospital Day: 2    Assessment & Plan   * Sepsis (HCC)-resolved as of 8/3/2024  Assessment & Plan  As noted by tachycardia, leukocytosis due to acute diverticulitis  POA evidenced by tachycardia, tachypnea and leukocytosis      Origin likely diverticulitis  CT: Sigmoid colon diverticulitis with no organized abscess or extraluminal air.   CXR-not obtained on admission, no signs of respiratory distress  BCx- pending  UA-WNL  LA-WNL/Pro-Aleksander-pending  ABX: Multiple allergy-meropenem empiric  Improving. Monitor fever curve and CBC/D    Acute diverticulitis  Assessment & Plan  Patient presented to the ER with abdominal pain, on and off diarrhea over the last month    CT: Imaging shows diverticulitis of the sigmoid colon with no abscess or extraluminal air  Allergies to multiple antibiotics  ER: Meropenem previously tolerated.  Advance to clear liquid diet   Pain meds as needed  Consider GI consult if needed  Stool enteric panel, C. difficile, fecal elastase, Pro-calprotectin - not collected, no further diarrhea since admission     Leukocytosis  Assessment & Plan  Improved, ongoing elevation likely 2/2 steroid use  Trend CBC and fever curve     Generalized weakness  Assessment & Plan  Bilateral legs weak  Required leatha lift to get oob to commode on Saturday   PT/OT eval and treat   Supportive care     Diarrhea  Assessment & Plan  See diverticulitis    Contraindication to anticoagulation therapy  Assessment & Plan  Restoration exemption  Jehovah witness  No pork derivatives  Refusal of Lovenox and heparin  OOB  VTE-8    Essential hypertension  Assessment & Plan  Continue amlodipine.  Benazepril substituted with lisinopril    Acquired hypothyroidism  Assessment & Plan  Continue levothyroxine    Type 2 diabetes mellitus with other specified complication,  without long-term current use of insulin ([facility])  Assessment & Plan  Lab Results   Component Value Date    HGBA1C 7.6 (H) 07/11/2024     Hold p.o. diabetic medication.    Decrease Lantus to 15 units at bedtime  ACCU checks AC + HS with lispro insulin sliding scale   Carb controlled diet    Polymyositis (HCC)  Assessment & Plan  Continue prednisone             VTE Pharmacologic Prophylaxis: VTE Score: 8 Moderate Risk (Score 3-4) - Pharmacological DVT Prophylaxis Contraindicated. Sequential Compression Devices Ordered.    Mobility:   Basic Mobility Inpatient Raw Score: 8  -HLM Goal: 3: Sit at edge of bed  JH-HLM Achieved: 4: Move to chair/commode  JH-HLM Goal achieved. Continue to encourage appropriate mobility.    Patient Centered Rounds: I performed bedside rounds with nursing staff today.   Discussions with Specialists or Other Care Team Provider: nursing    Education and Discussions with Family / Patient: Updated  (granddaughter) at bedside.    Total Time Spent on Date of Encounter in care of patient:  mins. This time was spent on one or more of the following: performing physical exam; counseling and coordination of care; obtaining or reviewing history; documenting in the medical record; reviewing/ordering tests, medications or procedures; communicating with other healthcare professionals and discussing with patient's family/caregivers.    Current Length of Stay: 2 day(s)  Current Patient Status: Inpatient   Certification Statement: The patient will continue to require additional inpatient hospital stay due to sepsis workup  Discharge Plan: Anticipate discharge in 24-48 hrs to home.    Code Status: Level 1 - Full Code    Subjective:   Patient seen and examined at bedside.  She reports significant bilateral lower extremity weakness stating she feels like her legs are going to give out.  She was unable to get out of bed with assistance and required a [place]on lift.  She states this is not normal for her.   She denies any further diarrhea since admission.  She denies headache, dizziness, chest pain, shortness of breath.  She denies no nausea or vomiting.  She is looking forward to starting clear liquids.    Objective:     Vitals:   Temp (24hrs), Av.6 °F (36.4 °C), Min:97.4 °F (36.3 °C), Max:97.8 °F (36.6 °C)    Temp:  [97.4 °F (36.3 °C)-97.8 °F (36.6 °C)] 97.6 °F (36.4 °C)  HR:  [79-95] 79  Resp:  [16-20] 18  BP: (121-127)/(65-80) 124/72  SpO2:  [94 %-96 %] 95 %  Body mass index is 31.98 kg/m².     Input and Output Summary (last 24 hours):   No intake or output data in the 24 hours ending 24 1127    Physical Exam:   Physical Exam  Vitals and nursing note reviewed.   Constitutional:       General: She is not in acute distress.     Appearance: She is obese. She is ill-appearing (appears deconditioned). She is not toxic-appearing or diaphoretic.      Comments: Pleasant female resting oob    HENT:      Head: Normocephalic.      Mouth/Throat:      Mouth: Mucous membranes are moist.   Eyes:      Conjunctiva/sclera: Conjunctivae normal.   Cardiovascular:      Rate and Rhythm: Normal rate.   Pulmonary:      Effort: Pulmonary effort is normal.      Breath sounds: Normal breath sounds.   Abdominal:      General: Bowel sounds are normal. There is no distension.      Palpations: Abdomen is soft.      Tenderness: There is no abdominal tenderness.   Musculoskeletal:         General: Normal range of motion.      Cervical back: Normal range of motion.      Right lower leg: No edema.      Left lower leg: No edema.   Skin:     General: Skin is warm and dry.      Capillary Refill: Capillary refill takes less than 2 seconds.   Neurological:      Mental Status: She is alert and oriented to person, place, and time. Mental status is at baseline.   Psychiatric:         Mood and Affect: Mood normal.         Speech: Speech normal.         Behavior: Behavior is cooperative.          Additional Data:     Labs:  Results from last 7 days    Lab Units 08/03/24  0601   WBC Thousand/uL 11.69*   HEMOGLOBIN g/dL 10.1*   HEMATOCRIT % 31.0*   PLATELETS Thousands/uL 230   SEGS PCT % 64   LYMPHO PCT % 26   MONO PCT % 7   EOS PCT % 3     Results from last 7 days   Lab Units 08/03/24  0601   SODIUM mmol/L 140   POTASSIUM mmol/L 5.1   CHLORIDE mmol/L 104   CO2 mmol/L 26   BUN mg/dL 6   CREATININE mg/dL 0.40*   ANION GAP mmol/L 10   CALCIUM mg/dL 7.8*   ALBUMIN g/dL 3.2*   TOTAL BILIRUBIN mg/dL 0.38   ALK PHOS U/L 47   ALT U/L 12   AST U/L 23   GLUCOSE RANDOM mg/dL 118         Results from last 7 days   Lab Units 08/03/24  0548 08/03/24  0018 08/02/24  2130 08/02/24  1711 08/02/24  1132 08/02/24  0614 08/01/24  2313   POC GLUCOSE mg/dl 119 128 148* 162* 151* 136 144*         Results from last 7 days   Lab Units 08/01/24  2117   LACTIC ACID mmol/L 0.9       Lines/Drains:  Invasive Devices       Peripheral Intravenous Line  Duration             Peripheral IV 08/01/24 Right Antecubital 1 day                          Imaging: Reviewed radiology reports from this admission including: abdominal/pelvic CT    Recent Cultures (last 7 days):   Results from last 7 days   Lab Units 08/01/24  2117   BLOOD CULTURE  No Growth at 24 hrs.       Last 24 Hours Medication List:   Current Facility-Administered Medications   Medication Dose Route Frequency Provider Last Rate    acetaminophen  650 mg Oral Q6H PRN Laura Núñez, DO      amLODIPine  5 mg Oral Daily Laura Marsar, DO      famotidine  20 mg Oral BID Ashley Duarte MD      hydrocortisone   Topical 4x Daily PRN CHICA Wellington      insulin glargine  10 Units Subcutaneous HS Laura Marsar, DO      insulin lispro  1-5 Units Subcutaneous Q6H [facility] Laura Núñez, DO      levothyroxine  88 mcg Oral Early Morning Laura Núñez, DO      lisinopril  10 mg Oral Daily Laura Núñez, DO      magnesium Oxide  400 mg Oral Daily CHICA Wellington      meropenem  1,000 mg Intravenous Q8H Laura Núñez,  DO 1,000 mg (08/03/24 0539)    morphine injection  2 mg Intravenous Q4H PRN Laura Revankar, DO      multi-electrolyte  50 mL/hr Intravenous Continuous CHICA Wellington 50 mL/hr (08/03/24 1014)    pantoprazole  40 mg Oral Early Morning Laura Revankar, DO      predniSONE  7.5 mg Oral Daily Laura Revankar, DO      saccharomyces boulardii  250 mg Oral BID Ashley Duarte MD          Today, Patient Was Seen By: CHICA Wellington    **Please Note: This note may have been constructed using a voice recognition system.**

## 2024-08-03 NOTE — PLAN OF CARE
Problem: PAIN - ADULT  Goal: Verbalizes/displays adequate comfort level or baseline comfort level  Description: Interventions:  - Encourage patient to monitor pain and request assistance  - Assess pain using appropriate pain scale  - Administer analgesics based on type and severity of pain and evaluate response  - Implement non-pharmacological measures as appropriate and evaluate response  - Consider cultural and social influences on pain and pain management  - Notify physician/advanced practitioner if interventions unsuccessful or patient reports new pain  Outcome: Progressing     Problem: INFECTION - ADULT  Goal: Absence or prevention of progression during hospitalization  Description: INTERVENTIONS:  - Assess and monitor for signs and symptoms of infection  - Monitor lab/diagnostic results  - Monitor all insertion sites, i.e. indwelling lines, tubes, and drains  - Monitor endotracheal if appropriate and nasal secretions for changes in amount and color  - [place] appropriate cooling/warming therapies per order  - Administer medications as ordered  - Instruct and encourage patient and family to use good hand hygiene technique  - Identify and instruct in appropriate isolation precautions for identified infection/condition  Outcome: Progressing

## 2024-08-03 NOTE — ASSESSMENT & PLAN NOTE
As noted by tachycardia, leukocytosis due to acute diverticulitis  POA evidenced by tachycardia, tachypnea and leukocytosis      Origin likely diverticulitis  CT: Sigmoid colon diverticulitis with no organized abscess or extraluminal air.   CXR-not obtained on admission, no signs of respiratory distress  BCx- pending  UA-WNL  LA-WNL/Pro-Aleksander-pending  ABX: Multiple allergy-meropenem empiric  Improving. Monitor fever curve and CBC/D

## 2024-08-03 NOTE — OCCUPATIONAL THERAPY NOTE
OT TREATMENT       08/03/24 1025   OT Last Visit   OT Visit Date 08/03/24   Note Type   Note Type Treatment   Pain Assessment   Pain Assessment Tool 0-10   Pain Score No Pain   Restrictions/Precautions   Other Precautions Contact/isolation;Fall Risk   Transfers   Sit to Stand 2  Maximal assistance  (4 attempts to stand with max assist of 2, unable to stand fully upright.)   Additional items Assist x 2   Stand to Sit 2  Maximal assistance   Additional items Assist x 2   Mechanical lift 1  Dependent   Additional items   (viking lift, recliner chair to commode)   Cognition   Overall Cognitive Status WFL   Arousal/Participation Cooperative   Attention Within functional limits   Following Commands Follows multistep commands with increased time or repetition   Activity Tolerance   Medical Staff Made Aware nurse present for session, nurse request assist from OT to help get patient onto commode   Assessment   Assessment Patient seen for OT treatment.  Patient is generally weak and deconditioned and unable to stand fully upright to transfer to commode today with multiple attempts.  Patient now required mechanical lift for safety.  Patient will benefit from continued OT services to maximize functional performance with ADLS.   The patient's raw score on the AM-PAC Daily Activity Inpatient Short Form is 15. A raw score of less than 19 suggests the patient may benefit from discharge to post-acute rehabilitation services. Please refer to the recommendation of the Occupational Therapist for safe discharge planning.   Plan   Treatment Interventions Functional transfer training   OT Frequency 2-3x/wk   Discharge Recommendation   Rehab Resource Intensity Level, OT II (Moderate Resource Intensity)   AM-PAC Daily Activity Inpatient   Lower Body Dressing 1   Bathing 2   Toileting 1   Upper Body Dressing 3   Grooming 4   Eating 4   Daily Activity Raw Score 15   Daily Activity Standardized Score (Calc for Raw Score >=11) 34.69   AM-PAC  Applied Cognition Inpatient   Following a Speech/Presentation 4   Understanding Ordinary Conversation 4   Taking Medications 3   Remembering Where Things Are Placed or Put Away 4   Remembering List of 4-5 Errands 4   Taking Care of Complicated Tasks 3   Applied Cognition Raw Score 22   Applied Cognition Standardized Score 47.83   End of Consult   Nurse Communication Nurse aware of consult   Licensure   [place] License Number  Dora Rico MS OTR/L [id number]

## 2024-08-03 NOTE — PLAN OF CARE
Problem: PAIN - ADULT  Goal: Verbalizes/displays adequate comfort level or baseline comfort level  Description: Interventions:  - Encourage patient to monitor pain and request assistance  - Assess pain using appropriate pain scale  - Administer analgesics based on type and severity of pain and evaluate response  - Implement non-pharmacological measures as appropriate and evaluate response  - Consider cultural and social influences on pain and pain management  - Notify physician/advanced practitioner if interventions unsuccessful or patient reports new pain  Outcome: Progressing     Problem: INFECTION - ADULT  Goal: Absence or prevention of progression during hospitalization  Description: INTERVENTIONS:  - Assess and monitor for signs and symptoms of infection  - Monitor lab/diagnostic results  - Monitor all insertion sites, i.e. indwelling lines, tubes, and drains  - Monitor endotracheal if appropriate and nasal secretions for changes in amount and color  - [place] appropriate cooling/warming therapies per order  - Administer medications as ordered  - Instruct and encourage patient and family to use good hand hygiene technique  - Identify and instruct in appropriate isolation precautions for identified infection/condition  Outcome: Progressing  Goal: Absence of fever/infection during neutropenic period  Description: INTERVENTIONS:  - Monitor WBC    Outcome: Progressing     Problem: SAFETY ADULT  Goal: Patient will remain free of falls  Description: INTERVENTIONS:  - Educate patient/family on patient safety including physical limitations  - Instruct patient to call for assistance with activity   - Consult OT/PT to assist with strengthening/mobility   - Keep Call bell within reach  - Keep bed low and locked with side rails adjusted as appropriate  - Keep care items and personal belongings within reach  - Initiate and maintain comfort rounds  - Make Fall Risk Sign visible to staff  - Offer Toileting every 2 Hours,  in advance of need  - Initiate/Maintain bed alarm  -  - Apply yellow socks and bracelet for high fall risk patients  - Consider moving patient to room near nurses station  Outcome: Progressing  Goal: Maintain or return to baseline ADL function  Description: INTERVENTIONS:  -  Assess patient's ability to carry out ADLs; assess patient's baseline for ADL function and identify physical deficits which impact ability to perform ADLs (bathing, care of mouth/teeth, toileting, grooming, dressing, etc.)  - Assess/evaluate cause of self-care deficits   - Assess range of motion  - Assess patient's mobility; develop plan if impaired  - Assess patient's need for assistive devices and provide as appropriate  - Encourage maximum independence but intervene and supervise when necessary  - Involve family in performance of ADLs  - Assess for home care needs following discharge   - Consider OT consult to assist with ADL evaluation and planning for discharge  - Provide patient education as appropriate  Outcome: Progressing  Goal: Maintains/Returns to pre admission functional level  Description: INTERVENTIONS:  - Perform AM-PAC 6 Click Basic Mobility/ Daily Activity assessment daily.  - Set and communicate daily mobility goal to care team and patient/family/caregiver.   - Collaborate with rehabilitation services on mobility goals if consulted  - Perform Range of Motion 2 times a day.  - Reposition patient every 2 hours.    - Out of bed for toileting  - Record patient progress and toleration of activity level   Outcome: Progressing

## 2024-08-03 NOTE — ASSESSMENT & PLAN NOTE
Bilateral legs weak  Required leatha lift to get oob to commode on Saturday   PT/OT eval and treat   Supportive care

## 2024-08-04 PROBLEM — R19.7 DIARRHEA: Status: RESOLVED | Noted: 2024-08-02 | Resolved: 2024-08-04

## 2024-08-04 LAB
ALBUMIN SERPL BCG-MCNC: 3.3 G/DL (ref 3.5–5)
ALP SERPL-CCNC: 47 U/L (ref 34–104)
ALT SERPL W P-5'-P-CCNC: 12 U/L (ref 7–52)
ANION GAP SERPL CALCULATED.3IONS-SCNC: 10 MMOL/L (ref 4–13)
AST SERPL W P-5'-P-CCNC: 12 U/L (ref 13–39)
BASOPHILS # BLD AUTO: 0.02 THOUSANDS/ÂΜL (ref 0–0.1)
BASOPHILS NFR BLD AUTO: 0 % (ref 0–1)
BILIRUB SERPL-MCNC: 0.37 MG/DL (ref 0.2–1)
BUN SERPL-MCNC: 5 MG/DL (ref 5–25)
CALCIUM ALBUM COR SERPL-MCNC: 8.6 MG/DL (ref 8.3–10.1)
CALCIUM SERPL-MCNC: 8 MG/DL (ref 8.4–10.2)
CHLORIDE SERPL-SCNC: 104 MMOL/L (ref 96–108)
CO2 SERPL-SCNC: 26 MMOL/L (ref 21–32)
CREAT SERPL-MCNC: 0.4 MG/DL (ref 0.6–1.3)
EOSINOPHIL # BLD AUTO: 0.35 THOUSAND/ÂΜL (ref 0–0.61)
EOSINOPHIL NFR BLD AUTO: 3 % (ref 0–6)
ERYTHROCYTE [DISTWIDTH] IN BLOOD BY AUTOMATED COUNT: 17.9 % (ref 11.6–15.1)
GFR SERPL CREATININE-BSD FRML MDRD: 102 ML/MIN/1.73SQ M
GLUCOSE SERPL-MCNC: 138 MG/DL (ref 65–140)
GLUCOSE SERPL-MCNC: 140 MG/DL (ref 65–140)
GLUCOSE SERPL-MCNC: 241 MG/DL (ref 65–140)
GLUCOSE SERPL-MCNC: 261 MG/DL (ref 65–140)
GLUCOSE SERPL-MCNC: 262 MG/DL (ref 65–140)
HCT VFR BLD AUTO: 30.1 % (ref 34.8–46.1)
HGB BLD-MCNC: 9.7 G/DL (ref 11.5–15.4)
IMM GRANULOCYTES # BLD AUTO: 0.05 THOUSAND/UL (ref 0–0.2)
IMM GRANULOCYTES NFR BLD AUTO: 1 % (ref 0–2)
LYMPHOCYTES # BLD AUTO: 2.78 THOUSANDS/ÂΜL (ref 0.6–4.47)
LYMPHOCYTES NFR BLD AUTO: 26 % (ref 14–44)
MAGNESIUM SERPL-MCNC: 1.6 MG/DL (ref 1.9–2.7)
MCH RBC QN AUTO: 21.9 PG (ref 26.8–34.3)
MCHC RBC AUTO-ENTMCNC: 32.2 G/DL (ref 31.4–37.4)
MCV RBC AUTO: 68 FL (ref 82–98)
MONOCYTES # BLD AUTO: 0.79 THOUSAND/ÂΜL (ref 0.17–1.22)
MONOCYTES NFR BLD AUTO: 7 % (ref 4–12)
NEUTROPHILS # BLD AUTO: 6.86 THOUSANDS/ÂΜL (ref 1.85–7.62)
NEUTS SEG NFR BLD AUTO: 63 % (ref 43–75)
NRBC BLD AUTO-RTO: 0 /100 WBCS
PHOSPHATE SERPL-MCNC: 2.5 MG/DL (ref 2.3–4.1)
PLATELET # BLD AUTO: 324 THOUSANDS/UL (ref 149–390)
PMV BLD AUTO: 10.1 FL (ref 8.9–12.7)
POTASSIUM SERPL-SCNC: 3.3 MMOL/L (ref 3.5–5.3)
PROT SERPL-MCNC: 6.3 G/DL (ref 6.4–8.4)
RBC # BLD AUTO: 4.42 MILLION/UL (ref 3.81–5.12)
SODIUM SERPL-SCNC: 140 MMOL/L (ref 135–147)
WBC # BLD AUTO: 10.85 THOUSAND/UL (ref 4.31–10.16)

## 2024-08-04 PROCEDURE — 80053 COMPREHEN METABOLIC PANEL: CPT | Performed by: STUDENT IN AN ORGANIZED HEALTH CARE EDUCATION/TRAINING PROGRAM

## 2024-08-04 PROCEDURE — 84100 ASSAY OF PHOSPHORUS: CPT | Performed by: NURSE PRACTITIONER

## 2024-08-04 PROCEDURE — 87177 OVA AND PARASITES SMEARS: CPT | Performed by: STUDENT IN AN ORGANIZED HEALTH CARE EDUCATION/TRAINING PROGRAM

## 2024-08-04 PROCEDURE — 83735 ASSAY OF MAGNESIUM: CPT | Performed by: NURSE PRACTITIONER

## 2024-08-04 PROCEDURE — 82948 REAGENT STRIP/BLOOD GLUCOSE: CPT

## 2024-08-04 PROCEDURE — 87209 SMEAR COMPLEX STAIN: CPT | Performed by: STUDENT IN AN ORGANIZED HEALTH CARE EDUCATION/TRAINING PROGRAM

## 2024-08-04 PROCEDURE — 99232 SBSQ HOSP IP/OBS MODERATE 35: CPT | Performed by: NURSE PRACTITIONER

## 2024-08-04 PROCEDURE — 83993 ASSAY FOR CALPROTECTIN FECAL: CPT | Performed by: STUDENT IN AN ORGANIZED HEALTH CARE EDUCATION/TRAINING PROGRAM

## 2024-08-04 PROCEDURE — 85025 COMPLETE CBC W/AUTO DIFF WBC: CPT | Performed by: STUDENT IN AN ORGANIZED HEALTH CARE EDUCATION/TRAINING PROGRAM

## 2024-08-04 PROCEDURE — 87505 NFCT AGENT DETECTION GI: CPT | Performed by: FAMILY MEDICINE

## 2024-08-04 RX ORDER — POTASSIUM CHLORIDE 20 MEQ/1
40 TABLET, EXTENDED RELEASE ORAL ONCE
Status: COMPLETED | OUTPATIENT
Start: 2024-08-04 | End: 2024-08-04

## 2024-08-04 RX ORDER — MAGNESIUM SULFATE HEPTAHYDRATE 40 MG/ML
2 INJECTION, SOLUTION INTRAVENOUS ONCE
Status: COMPLETED | OUTPATIENT
Start: 2024-08-04 | End: 2024-08-04

## 2024-08-04 RX ADMIN — FAMOTIDINE 20 MG: 20 TABLET, FILM COATED ORAL at 09:59

## 2024-08-04 RX ADMIN — Medication 250 MG: at 09:58

## 2024-08-04 RX ADMIN — MEROPENEM 1000 MG: 1 INJECTION, POWDER, FOR SOLUTION INTRAVENOUS at 17:19

## 2024-08-04 RX ADMIN — POTASSIUM CHLORIDE 40 MEQ: 1500 TABLET, EXTENDED RELEASE ORAL at 09:58

## 2024-08-04 RX ADMIN — PANTOPRAZOLE SODIUM 40 MG: 40 TABLET, DELAYED RELEASE ORAL at 05:36

## 2024-08-04 RX ADMIN — AMLODIPINE BESYLATE 5 MG: 5 TABLET ORAL at 09:58

## 2024-08-04 RX ADMIN — INSULIN GLARGINE 10 UNITS: 100 INJECTION, SOLUTION SUBCUTANEOUS at 22:33

## 2024-08-04 RX ADMIN — LEVOTHYROXINE SODIUM 88 MCG: 88 TABLET ORAL at 05:36

## 2024-08-04 RX ADMIN — LISINOPRIL 10 MG: 10 TABLET ORAL at 09:56

## 2024-08-04 RX ADMIN — INSULIN LISPRO 2 UNITS: 100 INJECTION, SOLUTION INTRAVENOUS; SUBCUTANEOUS at 11:57

## 2024-08-04 RX ADMIN — Medication 250 MG: at 17:20

## 2024-08-04 RX ADMIN — FAMOTIDINE 20 MG: 20 TABLET, FILM COATED ORAL at 17:20

## 2024-08-04 RX ADMIN — MEROPENEM 1000 MG: 1 INJECTION, POWDER, FOR SOLUTION INTRAVENOUS at 11:57

## 2024-08-04 RX ADMIN — INSULIN LISPRO 2 UNITS: 100 INJECTION, SOLUTION INTRAVENOUS; SUBCUTANEOUS at 16:50

## 2024-08-04 RX ADMIN — Medication 400 MG: at 09:58

## 2024-08-04 RX ADMIN — PREDNISONE 7.5 MG: 5 TABLET ORAL at 09:56

## 2024-08-04 RX ADMIN — INSULIN LISPRO 2 UNITS: 100 INJECTION, SOLUTION INTRAVENOUS; SUBCUTANEOUS at 22:33

## 2024-08-04 RX ADMIN — MAGNESIUM SULFATE HEPTAHYDRATE 2 G: 40 INJECTION, SOLUTION INTRAVENOUS at 11:56

## 2024-08-04 RX ADMIN — SODIUM CHLORIDE, SODIUM GLUCONATE, SODIUM ACETATE, POTASSIUM CHLORIDE, MAGNESIUM CHLORIDE, SODIUM PHOSPHATE, DIBASIC, AND POTASSIUM PHOSPHATE 50 ML/HR: .53; .5; .37; .037; .03; .012; .00082 INJECTION, SOLUTION INTRAVENOUS at 04:09

## 2024-08-04 NOTE — ASSESSMENT & PLAN NOTE
Christian exemption  Jehovah witness  No pork derivatives  Refusal of Lovenox and heparin  OOB  VTE-8

## 2024-08-04 NOTE — ASSESSMENT & PLAN NOTE
As noted by tachycardia, leukocytosis due to acute diverticulitis  POA evidenced by tachycardia, tachypnea and leukocytosis      Origin likely diverticulitis  CT: Sigmoid colon diverticulitis with no organized abscess or extraluminal air.   CXR-not obtained on admission, no signs of respiratory distress  BCx- no growth at 24 hours   UA-WNL  LA-WNL/Pro-Aleksander-pending  ABX: Multiple allergy-meropenem empiric  Improving. Monitor fever curve and CBC/D  Persistent leukocytosis likely due to steroids

## 2024-08-04 NOTE — PLAN OF CARE
Problem: PAIN - ADULT  Goal: Verbalizes/displays adequate comfort level or baseline comfort level  Description: Interventions:  - Encourage patient to monitor pain and request assistance  - Assess pain using appropriate pain scale  - Administer analgesics based on type and severity of pain and evaluate response  - Implement non-pharmacological measures as appropriate and evaluate response  - Consider cultural and social influences on pain and pain management  - Notify physician/advanced practitioner if interventions unsuccessful or patient reports new pain  Outcome: Progressing     Problem: INFECTION - ADULT  Goal: Absence or prevention of progression during hospitalization  Description: INTERVENTIONS:  - Assess and monitor for signs and symptoms of infection  - Monitor lab/diagnostic results  - Monitor all insertion sites, i.e. indwelling lines, tubes, and drains  - Monitor endotracheal if appropriate and nasal secretions for changes in amount and color  - [place] appropriate cooling/warming therapies per order  - Administer medications as ordered  - Instruct and encourage patient and family to use good hand hygiene technique  - Identify and instruct in appropriate isolation precautions for identified infection/condition  Outcome: Progressing  Goal: Absence of fever/infection during neutropenic period  Description: INTERVENTIONS:  - Monitor WBC    Outcome: Progressing     Problem: SAFETY ADULT  Goal: Patient will remain free of falls  Description: INTERVENTIONS:  - Educate patient/family on patient safety including physical limitations  - Instruct patient to call for assistance with activity   - Consult OT/PT to assist with strengthening/mobility   - Keep Call bell within reach  - Keep bed low and locked with side rails adjusted as appropriate  - Keep care items and personal belongings within reach  - Initiate and maintain comfort rounds  - Make Fall Risk Sign visible to staff  - Offer Toileting every 2 Hours,  in advance of need  - Initiate/Maintain bed alarm  - Obtain necessary fall risk management equipment: call bell   - Apply yellow socks and bracelet for high fall risk patients  - Consider moving patient to room near nurses station  Outcome: Progressing  Goal: Maintain or return to baseline ADL function  Description: INTERVENTIONS:  -  Assess patient's ability to carry out ADLs; assess patient's baseline for ADL function and identify physical deficits which impact ability to perform ADLs (bathing, care of mouth/teeth, toileting, grooming, dressing, etc.)  - Assess/evaluate cause of self-care deficits   - Assess range of motion  - Assess patient's mobility; develop plan if impaired  - Assess patient's need for assistive devices and provide as appropriate  - Encourage maximum independence but intervene and supervise when necessary  - Involve family in performance of ADLs  - Assess for home care needs following discharge   - Consider OT consult to assist with ADL evaluation and planning for discharge  - Provide patient education as appropriate  Outcome: Progressing  Goal: Maintains/Returns to pre admission functional level  Description: INTERVENTIONS:  - Perform AM-PAC 6 Click Basic Mobility/ Daily Activity assessment daily.  - Set and communicate daily mobility goal to care team and patient/family/caregiver.   - Collaborate with rehabilitation services on mobility goals if consulted  - Perform Range of Motion 3 times a day.  - Reposition patient every 3 hours.  - Dangle patient 3 times a day  - Stand patient 3 times a day  - Ambulate patient 3 times a day  - Out of bed to chair 3 times a day   - Out of bed for meals 3 times a day  - Out of bed for toileting  - Record patient progress and toleration of activity level   Outcome: Progressing     Problem: DISCHARGE PLANNING  Goal: Discharge to home or other facility with appropriate resources  Description: INTERVENTIONS:  - Identify barriers to discharge w/patient and  caregiver  - Arrange for needed discharge resources and transportation as appropriate  - Identify discharge learning needs (meds, wound care, etc.)  - Arrange for interpretive services to assist at discharge as needed  - Refer to Case Management Department for coordinating discharge planning if the patient needs post-hospital services based on physician/advanced practitioner order or complex needs related to functional status, cognitive ability, or social support system  Outcome: Progressing     Problem: Knowledge Deficit  Goal: Patient/family/caregiver demonstrates understanding of disease process, treatment plan, medications, and discharge instructions  Description: Complete learning assessment and assess knowledge base.  Interventions:  - Provide teaching at level of understanding  - Provide teaching via preferred learning methods  Outcome: Progressing     Problem: Prexisting or High Potential for Compromised Skin Integrity  Goal: Skin integrity is maintained or improved  Description: INTERVENTIONS:  - Identify patients at risk for skin breakdown  - Assess and monitor skin integrity  - Assess and monitor nutrition and hydration status  - Monitor labs   - Assess for incontinence   - Turn and reposition patient  - Assist with mobility/ambulation  - Relieve pressure over bony prominences  - Avoid friction and shearing  - Provide appropriate hygiene as needed including keeping skin clean and dry  - Evaluate need for skin moisturizer/barrier cream  - Collaborate with interdisciplinary team   - Patient/family teaching  - Consider wound care consult   Outcome: Progressing     Problem: GASTROINTESTINAL - ADULT  Goal: Minimal or absence of nausea and/or vomiting  Description: INTERVENTIONS:  - Administer IV fluids if ordered to ensure adequate hydration  - Maintain NPO status until nausea and vomiting are resolved  - Nasogastric tube if ordered  - Administer ordered antiemetic medications as needed  - Provide  nonpharmacologic comfort measures as appropriate  - Advance diet as tolerated, if ordered  - Consider nutrition services referral to assist patient with adequate nutrition and appropriate food choices  Outcome: Progressing  Goal: Maintains or returns to baseline bowel function  Description: INTERVENTIONS:  - Assess bowel function  - Encourage oral fluids to ensure adequate hydration  - Administer IV fluids if ordered to ensure adequate hydration  - Administer ordered medications as needed  - Encourage mobilization and activity  - Consider nutritional services referral to assist patient with adequate nutrition and appropriate food choices  Outcome: Progressing

## 2024-08-04 NOTE — PLAN OF CARE
Problem: PAIN - ADULT  Goal: Verbalizes/displays adequate comfort level or baseline comfort level  Description: Interventions:  - Encourage patient to monitor pain and request assistance  - Assess pain using appropriate pain scale  - Administer analgesics based on type and severity of pain and evaluate response  - Implement non-pharmacological measures as appropriate and evaluate response  - Consider cultural and social influences on pain and pain management  - Notify physician/advanced practitioner if interventions unsuccessful or patient reports new pain  Outcome: Progressing     Problem: INFECTION - ADULT  Goal: Absence or prevention of progression during hospitalization  Description: INTERVENTIONS:  - Assess and monitor for signs and symptoms of infection  - Monitor lab/diagnostic results  - Monitor all insertion sites, i.e. indwelling lines, tubes, and drains  - Monitor endotracheal if appropriate and nasal secretions for changes in amount and color  - [place] appropriate cooling/warming therapies per order  - Administer medications as ordered  - Instruct and encourage patient and family to use good hand hygiene technique  - Identify and instruct in appropriate isolation precautions for identified infection/condition  Outcome: Progressing     Problem: SAFETY ADULT  Goal: Patient will remain free of falls  Description: INTERVENTIONS:  - Educate patient/family on patient safety including physical limitations  - Instruct patient to call for assistance with activity   - Consult OT/PT to assist with strengthening/mobility   - Keep Call bell within reach  - Keep bed low and locked with side rails adjusted as appropriate  - Keep care items and personal belongings within reach  - Initiate and maintain comfort rounds  - Make Fall Risk Sign visible to staff  - Offer Toileting every 2 Hours, in advance of need  - Initiate/Maintain bed alarm  - Obtain necessary fall risk management equipment: non-slip socks  - Apply  yellow socks and bracelet for high fall risk patients  - Consider moving patient to room near nurses station  Outcome: Progressing     Problem: Prexisting or High Potential for Compromised Skin Integrity  Goal: Skin integrity is maintained or improved  Description: INTERVENTIONS:  - Identify patients at risk for skin breakdown  - Assess and monitor skin integrity  - Assess and monitor nutrition and hydration status  - Monitor labs   - Assess for incontinence   - Turn and reposition patient  - Assist with mobility/ambulation  - Relieve pressure over bony prominences  - Avoid friction and shearing  - Provide appropriate hygiene as needed including keeping skin clean and dry  - Evaluate need for skin moisturizer/barrier cream  - Collaborate with interdisciplinary team   - Patient/family teaching  - Consider wound care consult   Outcome: Progressing     Problem: GASTROINTESTINAL - ADULT  Goal: Minimal or absence of nausea and/or vomiting  Description: INTERVENTIONS:  - Administer IV fluids if ordered to ensure adequate hydration  - Maintain NPO status until nausea and vomiting are resolved  - Nasogastric tube if ordered  - Administer ordered antiemetic medications as needed  - Provide nonpharmacologic comfort measures as appropriate  - Advance diet as tolerated, if ordered  - Consider nutrition services referral to assist patient with adequate nutrition and appropriate food choices  Outcome: Progressing

## 2024-08-04 NOTE — ASSESSMENT & PLAN NOTE
Bilateral legs weak  Required leatha lift to get oob to commode on Saturday   PT/OT eval and treat   Supportive care   Patient desires home with home health services and not rehab

## 2024-08-04 NOTE — ASSESSMENT & PLAN NOTE
Patient presented to the ER with abdominal pain, on and off diarrhea over the last month    CT: Imaging shows diverticulitis of the sigmoid colon with no abscess or extraluminal air  Allergies to multiple antibiotics  ER: Meropenem previously tolerated  Continue Meropenem 1 gm IV q8h, day #4  Advanced to low residue diet for dinner   Stop IVF  Pain meds as needed  Follow-up with GI in 6-8 weeks for colonoscopy   Follow-up stool studies

## 2024-08-04 NOTE — PROGRESS NOTES
[facility]  Progress Note  Name: Jadyn Ac I  MRN: [id number]  Unit/Bed#: 2 [address] Date of Admission: 8/1/2024   Date of Service: 8/4/2024 I Hospital Day: 3    Assessment & Plan   Acute diverticulitis  Assessment & Plan  Patient presented to the ER with abdominal pain, on and off diarrhea over the last month    CT: Imaging shows diverticulitis of the sigmoid colon with no abscess or extraluminal air  Allergies to multiple antibiotics  ER: Meropenem previously tolerated  Continue Meropenem 1 gm IV q8h, day #3  Advanced to low residue diet for dinner   Stop IVF  Pain meds as needed  Follow-up with GI in 6-8 weeks for colonoscopy   Follow-up stool studies     Leukocytosis  Assessment & Plan  Improved, ongoing elevation likely 2/2 steroid use  Trend CBC and fever curve     Generalized weakness  Assessment & Plan  Bilateral legs weak  Required leatha lift to get oob to commode on Saturday   PT/OT eval and treat   Supportive care   Patient desires home with home health services and not rehab     Contraindication to anticoagulation therapy  Assessment & Plan  Yarsanism exemption  Jehovah witness  No pork derivatives  Refusal of Lovenox and heparin  OOB  VTE-8    Essential hypertension  Assessment & Plan  Continue amlodipine.  Benazepril substituted with lisinopril    Acquired hypothyroidism  Assessment & Plan  Continue levothyroxine    Type 2 diabetes mellitus with other specified complication, without long-term current use of insulin (HCC)  Assessment & Plan  Lab Results   Component Value Date    HGBA1C 7.6 (H) 07/11/2024     Hold p.o. diabetic medication.    Decrease Lantus to 15 units at bedtime  ACCU checks AC + HS with lispro insulin sliding scale   Carb controlled diet    Polymyositis (HCC)  Assessment & Plan  Continue prednisone    Diarrhea-resolved as of 8/4/2024  Assessment & Plan  See diverticulitis    * Sepsis (HCC)-resolved as of 8/3/2024  Assessment & Plan  As noted by  tachycardia, leukocytosis due to acute diverticulitis  POA evidenced by tachycardia, tachypnea and leukocytosis      Origin likely diverticulitis  CT: Sigmoid colon diverticulitis with no organized abscess or extraluminal air.   CXR-not obtained on admission, no signs of respiratory distress  BCx- no growth at 24 hours   UA-WNL  LA-WNL/Pro-Aleksander-pending  ABX: Multiple allergy-meropenem empiric  Improving. Monitor fever curve and CBC/D  Persistent leukocytosis likely due to steroids              VTE Pharmacologic Prophylaxis: VTE Score: 8 Moderate Risk (Score 3-4) - Pharmacological DVT Prophylaxis Contraindicated. Sequential Compression Devices Ordered.    Mobility:   Basic Mobility Inpatient Raw Score: 7  -HL Goal: 2: Bed activities/Dependent transfer  JH-HLM Achieved: 1: Laying in bed  -HLM Goal achieved. Continue to encourage appropriate mobility.    Patient Centered Rounds: I performed bedside rounds with nursing staff today.   Discussions with Specialists or Other Care Team Provider: nursing, GI AP    Education and Discussions with Family / Patient:  Patient states she will update family. Granddaughter was in earlier.     Total Time Spent on Date of Encounter in care of patient:  mins. This time was spent on one or more of the following: performing physical exam; counseling and coordination of care; obtaining or reviewing history; documenting in the medical record; reviewing/ordering tests, medications or procedures; communicating with other healthcare professionals and discussing with patient's family/caregivers.    Current Length of Stay: 3 day(s)  Current Patient Status: Inpatient   Certification Statement: The patient will continue to require additional inpatient hospital stay due to sepsis workup  Discharge Plan: Anticipate discharge in 24-48 hrs to home.    Code Status: Level 1 - Full Code    Subjective:   Patient seen and examined at bedside. She is sitting oob in chair. States she feels stronger  today. She was able to sit and stand. She is tolerating full liquids. Had a small formed BM this morning. Denies CP, SOB, N/V. Wants to go home with HHS. Does not want rehab.     Objective:     Vitals:   Temp (24hrs), Av.4 °F (36.3 °C), Min:97.3 °F (36.3 °C), Max:97.5 °F (36.4 °C)    Temp:  [97.3 °F (36.3 °C)-97.5 °F (36.4 °C)] 97.4 °F (36.3 °C)  HR:  [74-88] 88  Resp:  [18] 18  BP: (117-143)/(67-79) 143/79  SpO2:  [93 %-97 %] 97 %  Body mass index is 31.98 kg/m².     Input and Output Summary (last 24 hours):     Intake/Output Summary (Last 24 hours) at 2024 1127  Last data filed at 2024 0409  Gross per 24 hour   Intake 895.83 ml   Output 800 ml   Net 95.83 ml       Physical Exam:   Physical Exam  Vitals and nursing note reviewed.   Constitutional:       General: She is not in acute distress.     Appearance: She is obese. She is ill-appearing (generalized weakness). She is not toxic-appearing or diaphoretic.   HENT:      Head: Normocephalic.      Mouth/Throat:      Mouth: Mucous membranes are moist.   Eyes:      Conjunctiva/sclera: Conjunctivae normal.   Cardiovascular:      Rate and Rhythm: Normal rate.   Pulmonary:      Effort: Pulmonary effort is normal.      Breath sounds: Normal breath sounds.   Abdominal:      General: Bowel sounds are normal. There is no distension.      Palpations: Abdomen is soft.      Tenderness: There is no abdominal tenderness.   Musculoskeletal:         General: Normal range of motion.      Cervical back: Normal range of motion.      Right lower leg: No edema.      Left lower leg: No edema.   Skin:     General: Skin is warm and dry.      Capillary Refill: Capillary refill takes less than 2 seconds.   Neurological:      Mental Status: She is alert and oriented to person, place, and time. Mental status is at baseline.   Psychiatric:         Mood and Affect: Mood normal.         Speech: Speech normal.         Behavior: Behavior is cooperative.          Additional Data:      Labs:  Results from last 7 days   Lab Units 08/04/24  0459   WBC Thousand/uL 10.85*   HEMOGLOBIN g/dL 9.7*   HEMATOCRIT % 30.1*   PLATELETS Thousands/uL 324   SEGS PCT % 63   LYMPHO PCT % 26   MONO PCT % 7   EOS PCT % 3     Results from last 7 days   Lab Units 08/04/24  0459   SODIUM mmol/L 140   POTASSIUM mmol/L 3.3*   CHLORIDE mmol/L 104   CO2 mmol/L 26   BUN mg/dL 5   CREATININE mg/dL 0.40*   ANION GAP mmol/L 10   CALCIUM mg/dL 8.0*   ALBUMIN g/dL 3.3*   TOTAL BILIRUBIN mg/dL 0.37   ALK PHOS U/L 47   ALT U/L 12   AST U/L 12*   GLUCOSE RANDOM mg/dL 140         Results from last 7 days   Lab Units 08/04/24  0750 08/03/24  2033 08/03/24  1643 08/03/24  1221 08/03/24  0548 08/03/24  0018 08/02/24  2130 08/02/24  1711 08/02/24  1132 08/02/24  0614 08/01/24  2313   POC GLUCOSE mg/dl 138 191* 192* 221* 119 128 148* 162* 151* 136 144*         Results from last 7 days   Lab Units 08/01/24  2117   LACTIC ACID mmol/L 0.9       Lines/Drains:  Invasive Devices       Peripheral Intravenous Line  Duration             Peripheral IV 08/03/24 Left;Upper;Ventral (anterior) Arm <1 day                          Imaging: Reviewed radiology reports from this admission including: abdominal/pelvic CT    Recent Cultures (last 7 days):   Results from last 7 days   Lab Units 08/01/24  2117   BLOOD CULTURE  No Growth at 48 hrs.       Last 24 Hours Medication List:   Current Facility-Administered Medications   Medication Dose Route Frequency Provider Last Rate    acetaminophen  650 mg Oral Q6H PRN Laura Revankar, DO      amLODIPine  5 mg Oral Daily Lauraarmando Marsar, DO      famotidine  20 mg Oral BID Ashley Duarte MD      hydrocortisone   Topical 4x Daily PRN CHICA Wellington      insulin glargine  10 Units Subcutaneous HS Lauraarmando Núñez, DO      insulin lispro  1-5 Units Subcutaneous TID AC CHICA Wellington      insulin lispro  1-5 Units Subcutaneous HS CHICA Wellington      levothyroxine  88 mcg  Oral Early Morning Laura Revankar, DO      lisinopril  10 mg Oral Daily Laura Revankar, DO      magnesium Oxide  400 mg Oral Daily CHICA Wellington      magnesium sulfate  2 g Intravenous Once CHICA Wellington      meropenem  1,000 mg Intravenous Q8H Laura Revankar, DO Stopped (08/04/24 0045)    morphine injection  2 mg Intravenous Q4H PRN Laura Revankar, DO      multi-electrolyte  50 mL/hr Intravenous Continuous CHICA Wellington 50 mL/hr (08/04/24 0409)    pantoprazole  40 mg Oral Early Morning Laura Revankar, DO      predniSONE  7.5 mg Oral Daily Laura Revankar, DO      saccharomyces boulardii  250 mg Oral BID Ashley Duarte MD          Today, Patient Was Seen By: CHICA Wellington    **Please Note: This note may have been constructed using a voice recognition system.**

## 2024-08-05 ENCOUNTER — TELEPHONE (OUTPATIENT)
Age: 74
End: 2024-08-05

## 2024-08-05 VITALS
WEIGHT: 229.28 LBS | OXYGEN SATURATION: 95 % | HEIGHT: 71 IN | RESPIRATION RATE: 17 BRPM | SYSTOLIC BLOOD PRESSURE: 116 MMHG | HEART RATE: 76 BPM | DIASTOLIC BLOOD PRESSURE: 69 MMHG | TEMPERATURE: 97.4 F | BODY MASS INDEX: 32.1 KG/M2

## 2024-08-05 LAB
ALBUMIN SERPL BCG-MCNC: 3.5 G/DL (ref 3.5–5)
ALP SERPL-CCNC: 59 U/L (ref 34–104)
ALT SERPL W P-5'-P-CCNC: 15 U/L (ref 7–52)
ANION GAP SERPL CALCULATED.3IONS-SCNC: 8 MMOL/L (ref 4–13)
AST SERPL W P-5'-P-CCNC: 15 U/L (ref 13–39)
ATRIAL RATE: 91 BPM
BASOPHILS # BLD AUTO: 0.02 THOUSANDS/ÂΜL (ref 0–0.1)
BASOPHILS NFR BLD AUTO: 0 % (ref 0–1)
BILIRUB SERPL-MCNC: 0.24 MG/DL (ref 0.2–1)
BUN SERPL-MCNC: 5 MG/DL (ref 5–25)
C COLI+JEJUNI TUF STL QL NAA+PROBE: NEGATIVE
CALCIUM SERPL-MCNC: 8.3 MG/DL (ref 8.4–10.2)
CALPROTECTIN STL-MCNC: 138 ÂΜG/G
CHLORIDE SERPL-SCNC: 105 MMOL/L (ref 96–108)
CO2 SERPL-SCNC: 27 MMOL/L (ref 21–32)
CREAT SERPL-MCNC: 0.47 MG/DL (ref 0.6–1.3)
DME PARACHUTE DELIVERY DATE ACTUAL: NORMAL
DME PARACHUTE DELIVERY DATE REQUESTED: NORMAL
DME PARACHUTE ITEM DESCRIPTION: NORMAL
DME PARACHUTE ORDER STATUS: NORMAL
DME PARACHUTE SUPPLIER NAME: NORMAL
DME PARACHUTE SUPPLIER PHONE: NORMAL
EC STX1+STX2 GENES STL QL NAA+PROBE: NEGATIVE
EOSINOPHIL # BLD AUTO: 0.33 THOUSAND/ÂΜL (ref 0–0.61)
EOSINOPHIL NFR BLD AUTO: 4 % (ref 0–6)
ERYTHROCYTE [DISTWIDTH] IN BLOOD BY AUTOMATED COUNT: 18.6 % (ref 11.6–15.1)
GFR SERPL CREATININE-BSD FRML MDRD: 97 ML/MIN/1.73SQ M
GLUCOSE SERPL-MCNC: 150 MG/DL (ref 65–140)
GLUCOSE SERPL-MCNC: 156 MG/DL (ref 65–140)
GLUCOSE SERPL-MCNC: 220 MG/DL (ref 65–140)
GLUCOSE SERPL-MCNC: 307 MG/DL (ref 65–140)
HCT VFR BLD AUTO: 32.1 % (ref 34.8–46.1)
HGB BLD-MCNC: 10.4 G/DL (ref 11.5–15.4)
IMM GRANULOCYTES # BLD AUTO: 0.06 THOUSAND/UL (ref 0–0.2)
IMM GRANULOCYTES NFR BLD AUTO: 1 % (ref 0–2)
LYMPHOCYTES # BLD AUTO: 3.31 THOUSANDS/ÂΜL (ref 0.6–4.47)
LYMPHOCYTES NFR BLD AUTO: 35 % (ref 14–44)
MAGNESIUM SERPL-MCNC: 1.8 MG/DL (ref 1.9–2.7)
MCH RBC QN AUTO: 22.3 PG (ref 26.8–34.3)
MCHC RBC AUTO-ENTMCNC: 32.4 G/DL (ref 31.4–37.4)
MCV RBC AUTO: 69 FL (ref 82–98)
MONOCYTES # BLD AUTO: 0.74 THOUSAND/ÂΜL (ref 0.17–1.22)
MONOCYTES NFR BLD AUTO: 8 % (ref 4–12)
NEUTROPHILS # BLD AUTO: 5.07 THOUSANDS/ÂΜL (ref 1.85–7.62)
NEUTS SEG NFR BLD AUTO: 52 % (ref 43–75)
NRBC BLD AUTO-RTO: 0 /100 WBCS
P AXIS: 44 DEGREES
PHOSPHATE SERPL-MCNC: 2.5 MG/DL (ref 2.3–4.1)
PLATELET # BLD AUTO: 365 THOUSANDS/UL (ref 149–390)
PMV BLD AUTO: 10 FL (ref 8.9–12.7)
POTASSIUM SERPL-SCNC: 4.3 MMOL/L (ref 3.5–5.3)
PR INTERVAL: 146 MS
PROT SERPL-MCNC: 6.6 G/DL (ref 6.4–8.4)
QRS AXIS: 13 DEGREES
QRSD INTERVAL: 74 MS
QT INTERVAL: 358 MS
QTC INTERVAL: 440 MS
RBC # BLD AUTO: 4.67 MILLION/UL (ref 3.81–5.12)
SALMONELLA SP SPAO STL QL NAA+PROBE: NEGATIVE
SHIGELLA SP+EIEC IPAH STL QL NAA+PROBE: NEGATIVE
SODIUM SERPL-SCNC: 140 MMOL/L (ref 135–147)
T WAVE AXIS: 27 DEGREES
VENTRICULAR RATE: 91 BPM
WBC # BLD AUTO: 9.53 THOUSAND/UL (ref 4.31–10.16)

## 2024-08-05 PROCEDURE — 93010 ELECTROCARDIOGRAM REPORT: CPT | Performed by: INTERNAL MEDICINE

## 2024-08-05 PROCEDURE — 85025 COMPLETE CBC W/AUTO DIFF WBC: CPT | Performed by: STUDENT IN AN ORGANIZED HEALTH CARE EDUCATION/TRAINING PROGRAM

## 2024-08-05 PROCEDURE — 99239 HOSP IP/OBS DSCHRG MGMT >30: CPT

## 2024-08-05 PROCEDURE — 80053 COMPREHEN METABOLIC PANEL: CPT | Performed by: STUDENT IN AN ORGANIZED HEALTH CARE EDUCATION/TRAINING PROGRAM

## 2024-08-05 PROCEDURE — 82948 REAGENT STRIP/BLOOD GLUCOSE: CPT

## 2024-08-05 PROCEDURE — 84100 ASSAY OF PHOSPHORUS: CPT | Performed by: NURSE PRACTITIONER

## 2024-08-05 PROCEDURE — 83735 ASSAY OF MAGNESIUM: CPT | Performed by: NURSE PRACTITIONER

## 2024-08-05 RX ORDER — SACCHAROMYCES BOULARDII 250 MG
250 CAPSULE ORAL 2 TIMES DAILY
Qty: 28 CAPSULE | Refills: 0 | Status: SHIPPED | OUTPATIENT
Start: 2024-08-05 | End: 2024-08-19

## 2024-08-05 RX ORDER — FAMOTIDINE 20 MG/1
20 TABLET, FILM COATED ORAL 2 TIMES DAILY
Qty: 60 TABLET | Refills: 0 | Status: SHIPPED | OUTPATIENT
Start: 2024-08-05 | End: 2024-09-04

## 2024-08-05 RX ADMIN — PREDNISONE 7.5 MG: 5 TABLET ORAL at 08:36

## 2024-08-05 RX ADMIN — INSULIN LISPRO 3 UNITS: 100 INJECTION, SOLUTION INTRAVENOUS; SUBCUTANEOUS at 12:00

## 2024-08-05 RX ADMIN — MEROPENEM 1000 MG: 1 INJECTION, POWDER, FOR SOLUTION INTRAVENOUS at 00:07

## 2024-08-05 RX ADMIN — INSULIN LISPRO 1 UNITS: 100 INJECTION, SOLUTION INTRAVENOUS; SUBCUTANEOUS at 08:35

## 2024-08-05 RX ADMIN — MEROPENEM 1000 MG: 1 INJECTION, POWDER, FOR SOLUTION INTRAVENOUS at 08:35

## 2024-08-05 RX ADMIN — LEVOTHYROXINE SODIUM 88 MCG: 88 TABLET ORAL at 06:12

## 2024-08-05 RX ADMIN — INSULIN LISPRO 1 UNITS: 100 INJECTION, SOLUTION INTRAVENOUS; SUBCUTANEOUS at 16:49

## 2024-08-05 RX ADMIN — Medication 250 MG: at 08:36

## 2024-08-05 RX ADMIN — AMLODIPINE BESYLATE 5 MG: 5 TABLET ORAL at 08:36

## 2024-08-05 RX ADMIN — LISINOPRIL 10 MG: 10 TABLET ORAL at 08:45

## 2024-08-05 RX ADMIN — Medication 400 MG: at 08:36

## 2024-08-05 RX ADMIN — Medication 250 MG: at 16:50

## 2024-08-05 RX ADMIN — FAMOTIDINE 20 MG: 20 TABLET, FILM COATED ORAL at 08:36

## 2024-08-05 RX ADMIN — FAMOTIDINE 20 MG: 20 TABLET, FILM COATED ORAL at 16:50

## 2024-08-05 RX ADMIN — PANTOPRAZOLE SODIUM 40 MG: 40 TABLET, DELAYED RELEASE ORAL at 06:12

## 2024-08-05 RX ADMIN — SODIUM CHLORIDE, SODIUM GLUCONATE, SODIUM ACETATE, POTASSIUM CHLORIDE, MAGNESIUM CHLORIDE, SODIUM PHOSPHATE, DIBASIC, AND POTASSIUM PHOSPHATE 50 ML/HR: .53; .5; .37; .037; .03; .012; .00082 INJECTION, SOLUTION INTRAVENOUS at 00:07

## 2024-08-05 NOTE — CASE MANAGEMENT
Case Management Discharge Planning Note    Patient name Jadyn Ac  Location [address] [place] [address]/[address] [place] [address] MRN [id number]  : 1950 Date 2024       Current Admission Date: 2024  Current Admission Diagnosis:Acute diverticulitis   Patient Active Problem List    Diagnosis Date Noted Date Diagnosed    Generalized weakness 2024     Leukocytosis 2024     Contraindication to anticoagulation therapy 2024     Essential hypertension 2022     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(HCC) 2022     Acquired hypothyroidism 2022     Steroid-induced hyperglycemia 2022     Acute diverticulitis 2018     Polymyositis (HCC)      Hyperlipidemia      Type 2 diabetes mellitus with other specified complication, without long-term current use of insulin (HCC)      Asthma      Chronic pain        LOS (days): 4  Geometric Mean LOS (GMLOS) (days): 3.6  Days to GMLOS:0.1     OBJECTIVE:  Risk of Unplanned Readmission Score: 14.71         Current admission status: Inpatient   Preferred Pharmacy:   [facility] [address] [address]  Phone: [phone] Fax: [phone]    Primary Care Provider: Herlinda Castorena MD    Primary Insurance: A[employer] REP  Secondary Insurance:     DISCHARGE DETAILS:    Requested Home Health Care         Home Health Agency Name:: [facility]    DME Referral Provided  Referral made for DME?: Yes  DME referral completed for the following items:: Laron lift  DME Supplier Name:: [employer]    Other Referral/Resources/Interventions Provided:  Interventions: DME  Referral Comments: NOAH Rao made NOAH MAN aware that patient wants a laron lift for home. RN CM placed order via [place] to [employer]. Patient made aware that [facility] will contact her for OOP cost.     Treatment Team Recommendation: Home with Home Health Care  Discharge Destination Plan:: Home with Home Health Care ([facility])  Transport at Discharge : Family         IMM Given (Date):: 08/05/24 (IMM # 2 reviewed with patient. Patient verbalized understanding and signed the IMM. A copy provided to patient and a copy placed in scan bin for chart.)  IMM Given to:: Patient

## 2024-08-05 NOTE — DISCHARGE SUMMARY
[facility]  Progress Note  Name: Jadyn Ac I  MRN: [id number]  Unit/Bed#: 2 [address] Date of Admission: 8/1/2024   Date of Service: 8/5/2024 I Hospital Day: 4    Assessment & Plan   Acute diverticulitis  Assessment & Plan  Patient presented to the ER with abdominal pain, on and off diarrhea over the last month    CT: Imaging shows diverticulitis of the sigmoid colon with no abscess or extraluminal air  Allergies to multiple antibiotics  ER: Meropenem previously tolerated  Continue Meropenem 1 gm IV q8h, day #4  Advanced to low residue diet for dinner   Stop IVF  Pain meds as needed  Follow-up with GI in 6-8 weeks for colonoscopy   Follow-up stool studies     Leukocytosis  Assessment & Plan  Improved    Generalized weakness  Assessment & Plan  Bilateral legs weak  Required leatha lift to get oob to commode on Saturday   PT/OT eval and treat   Supportive care   Patient desires home with home health services and not rehab     Contraindication to anticoagulation therapy  Assessment & Plan  Gnosticist exemption  Jehovah witness  No pork derivatives  Refusal of Lovenox and heparin  OOB  VTE-8    Essential hypertension  Assessment & Plan  Continue amlodipine.  Benazepril substituted with lisinopril    Acquired hypothyroidism  Assessment & Plan  Continue levothyroxine    Type 2 diabetes mellitus with other specified complication, without long-term current use of insulin (HCC)  Assessment & Plan  Lab Results   Component Value Date    HGBA1C 7.6 (H) 07/11/2024     Continue home regimen    Polymyositis (HCC)  Assessment & Plan  Continue prednisone    Diarrhea-resolved as of 8/4/2024  Assessment & Plan  See diverticulitis    * Sepsis (HCC)-resolved as of 8/3/2024  Assessment & Plan  As noted by tachycardia, leukocytosis due to acute diverticulitis  POA evidenced by tachycardia, tachypnea and leukocytosis      Origin likely diverticulitis  CT: Sigmoid colon diverticulitis with no organized  abscess or extraluminal air.   CXR-not obtained on admission, no signs of respiratory distress  BCx- no growth at 24 hours   UA-WNL  LA-WNL/Pro-Aleksander-pending  ABX: Multiple allergy-meropenem empiric  Improving. Monitor fever curve and CBC/D  Persistent leukocytosis likely due to steroids              Medical Problems       Resolved Problems  Date Reviewed: 8/5/2024            Resolved    * (Principal) Sepsis (HCC) 8/3/2024     Resolved by  CHICA Wellington    Diarrhea 8/4/2024     Resolved by  CHICA Wellington        Discharging Physician / Practitioner: CHICA Nye  PCP: Herlinda Castorena MD  Admission Date:   Admission Orders (From admission, onward)       Ordered        08/01/24 2132  INPATIENT ADMISSION  Once                          Discharge Date: 08/05/24    Consultations During Hospital Stay:  None    Procedures Performed:   CT abdomen/pelvis:diverticulitis of the sigmoid colon with no abscess or extraluminal air     Significant Findings / Test Results:   As noted above        Test Results Pending at Discharge (will require follow up):   Stool studies     Outpatient Tests Requested:  None    Complications: None    Reason for Admission: Abdominal pain/diarrhea/weakness    Hospital Course:   Jadyn Ac is a 74 y.o. female patient who originally presented to the hospital on 8/1/2024 due to above.  CT A/P with findings of acute diverticulitis.  Leukocytosis on admission now resolved.  Patient was started on meropenem and received 4 days of antibiotic in hospital.  Was started on n.p.o. status and advanced as tolerated and patient is tolerating diet.  Patient will follow-up with GI in 6 to 8 weeks for colonoscopy.            Please see above list of diagnoses and related plan for additional information.     Condition at Discharge: stable    Discharge Day Visit / Exam:   Subjective: Seen and examined at bedside.  Reports overall improvement.  No nausea or vomiting.  She is  "really happy to be discharging.    Vitals: Blood Pressure: 161/99 (08/05/24 0738)  Pulse: 92 (08/05/24 0738)  Temperature: (!) 97.4 °F (36.3 °C) (08/05/24 0738)  Temp Source: Oral (08/04/24 1947)  Respirations: 17 (08/05/24 0738)  Height: 5' 11\" (180.3 cm) (08/01/24 2347)  Weight - Scale: 104 kg (229 lb 4.5 oz) (08/01/24 2347)  SpO2: 97 % (08/05/24 0738)  Exam:   Physical Exam  Vitals and nursing note reviewed.   Constitutional:       General: She is not in acute distress.     Appearance: She is well-developed.   HENT:      Head: Normocephalic and atraumatic.   Eyes:      Conjunctiva/sclera: Conjunctivae normal.   Cardiovascular:      Rate and Rhythm: Normal rate and regular rhythm.      Heart sounds: No murmur heard.  Pulmonary:      Effort: Pulmonary effort is normal. No respiratory distress.      Breath sounds: Normal breath sounds.   Abdominal:      Palpations: Abdomen is soft.      Tenderness: There is no abdominal tenderness.   Musculoskeletal:         General: No swelling.      Cervical back: Neck supple.   Skin:     General: Skin is warm and dry.      Capillary Refill: Capillary refill takes less than 2 seconds.   Neurological:      Mental Status: She is alert and oriented to person, place, and time.   Psychiatric:         Mood and Affect: Mood normal.          Discussion with Family: Yes    Discharge instructions/Information to patient and family:   See after visit summary for information provided to patient and family.      Provisions for Follow-Up Care:  See after visit summary for information related to follow-up care and any pertinent home health orders.      Mobility at time of Discharge:   Basic Mobility Inpatient Raw Score: 7  -HLM Goal: 2: Bed activities/Dependent transfer  -HLM Achieved: 4: Move to chair/commode       Disposition:   Home    Planned Readmission: Yes     Discharge Statement:  I spent  minutes discharging the patient. This time was spent on the day of discharge. I had direct " contact with the patient on the day of discharge. Greater than 50% of the total time was spent examining patient, answering all patient questions, arranging and discussing plan of care with patient as well as directly providing post-discharge instructions.  Additional time then spent on discharge activities.    Discharge Medications:  See after visit summary for reconciled discharge medications provided to patient and/or family.      **Please Note: This note may have been constructed using a voice recognition system**

## 2024-08-05 NOTE — PLAN OF CARE
Problem: PAIN - ADULT  Goal: Verbalizes/displays adequate comfort level or baseline comfort level  Description: Interventions:  - Encourage patient to monitor pain and request assistance  - Assess pain using appropriate pain scale  - Administer analgesics based on type and severity of pain and evaluate response  - Implement non-pharmacological measures as appropriate and evaluate response  - Consider cultural and social influences on pain and pain management  - Notify physician/advanced practitioner if interventions unsuccessful or patient reports new pain  Outcome: Progressing     Problem: INFECTION - ADULT  Goal: Absence or prevention of progression during hospitalization  Description: INTERVENTIONS:  - Assess and monitor for signs and symptoms of infection  - Monitor lab/diagnostic results  - Monitor all insertion sites, i.e. indwelling lines, tubes, and drains  - Monitor endotracheal if appropriate and nasal secretions for changes in amount and color  - [place] appropriate cooling/warming therapies per order  - Administer medications as ordered  - Instruct and encourage patient and family to use good hand hygiene technique  - Identify and instruct in appropriate isolation precautions for identified infection/condition  Outcome: Progressing     Problem: GASTROINTESTINAL - ADULT  Goal: Minimal or absence of nausea and/or vomiting  Description: INTERVENTIONS:  - Administer IV fluids if ordered to ensure adequate hydration  - Maintain NPO status until nausea and vomiting are resolved  - Nasogastric tube if ordered  - Administer ordered antiemetic medications as needed  - Provide nonpharmacologic comfort measures as appropriate  - Advance diet as tolerated, if ordered  - Consider nutrition services referral to assist patient with adequate nutrition and appropriate food choices  Outcome: Progressing

## 2024-08-05 NOTE — TELEPHONE ENCOUNTER
Joanne with [facility] called to inform patient is being discharged from hospital with home care orders and will be going out to evaluate and will send orders for signature. If any questions Joanne can be reached at [phone]

## 2024-08-05 NOTE — CASE MANAGEMENT
Case Management Discharge Planning Note    Patient name Jadyn Ac  Location 2 [facility] [address] [place] [address] MRN [id number]  : 1950 Date 2024       Current Admission Date: 2024  Current Admission Diagnosis:Acute diverticulitis   Patient Active Problem List    Diagnosis Date Noted Date Diagnosed    Generalized weakness 2024     Leukocytosis 2024     Contraindication to anticoagulation therapy 2024     Essential hypertension 2022     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(HCC) 2022     Acquired hypothyroidism 2022     Steroid-induced hyperglycemia 2022     Acute diverticulitis 2018     Polymyositis (HCC)      Hyperlipidemia      Type 2 diabetes mellitus with other specified complication, without long-term current use of insulin (HCC)      Asthma      Chronic pain        LOS (days): 4  Geometric Mean LOS (GMLOS) (days): 3.6  Days to GMLOS:-0.2     OBJECTIVE:  Risk of Unplanned Readmission Score: 14.59         Current admission status: Inpatient   Preferred Pharmacy:   [facility] [place] [address]  Phone: [phone] Fax: [phone]    Primary Care Provider: Herlinda Castorena MD    Primary Insurance: [employer] REP  Secondary Insurance:     DISCHARGE DETAILS:    DME Referral Provided  DME referral completed for the following items:: Walker  DME Supplier Name:: [employer]    Other Referral/Resources/Interventions Provided:  Interventions: DME  Referral Comments: RN CM made patient wants a walker. Order for walker placed via [employer] to [employer] and delivered to patient at bedside and patient made aware of copay of $6.94. Patient signed the delivery receipt, a copy provide to patient and original placed in dme folder and scanned and uploaded via [employer].     Treatment Team Recommendation: Home with Home Health Care  Discharge Destination Plan:: Home with Home Health Care  Transport at Discharge  : Family

## 2024-08-05 NOTE — PLAN OF CARE
Problem: PAIN - ADULT  Goal: Verbalizes/displays adequate comfort level or baseline comfort level  Description: Interventions:  - Encourage patient to monitor pain and request assistance  - Assess pain using appropriate pain scale  - Administer analgesics based on type and severity of pain and evaluate response  - Implement non-pharmacological measures as appropriate and evaluate response  - Consider cultural and social influences on pain and pain management  - Notify physician/advanced practitioner if interventions unsuccessful or patient reports new pain  8/5/2024 1309 by Jennifer Elkins RN  Outcome: Adequate for Discharge  8/5/2024 1308 by Jennifer Elkins RN  Outcome: Progressing     Problem: INFECTION - ADULT  Goal: Absence or prevention of progression during hospitalization  Description: INTERVENTIONS:  - Assess and monitor for signs and symptoms of infection  - Monitor lab/diagnostic results  - Monitor all insertion sites, i.e. indwelling lines, tubes, and drains  - Monitor endotracheal if appropriate and nasal secretions for changes in amount and color  - [place] appropriate cooling/warming therapies per order  - Administer medications as ordered  - Instruct and encourage patient and family to use good hand hygiene technique  - Identify and instruct in appropriate isolation precautions for identified infection/condition  8/5/2024 1309 by Jennifer Elkins RN  Outcome: Adequate for Discharge  8/5/2024 1308 by Jennifer Elkins RN  Outcome: Progressing  Goal: Absence of fever/infection during neutropenic period  Description: INTERVENTIONS:  - Monitor WBC    8/5/2024 1309 by Jennifer Elkins RN  Outcome: Adequate for Discharge  8/5/2024 1308 by Jennifer Elkins RN  Outcome: Progressing     Problem: DISCHARGE PLANNING  Goal: Discharge to home or other facility with appropriate resources  Description: INTERVENTIONS:  - Identify barriers to discharge w/patient and caregiver  - Arrange for  needed discharge resources and transportation as appropriate  - Identify discharge learning needs (meds, wound care, etc.)  - Arrange for interpretive services to assist at discharge as needed  - Refer to Case Management Department for coordinating discharge planning if the patient needs post-hospital services based on physician/advanced practitioner order or complex needs related to functional status, cognitive ability, or social support system  8/5/2024 1309 by Jennifer Elkins RN  Outcome: Adequate for Discharge  8/5/2024 1308 by Jennifer Elkins RN  Outcome: Progressing     Problem: Knowledge Deficit  Goal: Patient/family/caregiver demonstrates understanding of disease process, treatment plan, medications, and discharge instructions  Description: Complete learning assessment and assess knowledge base.  Interventions:  - Provide teaching at level of understanding  - Provide teaching via preferred learning methods  8/5/2024 1309 by Jennifer Elkins RN  Outcome: Adequate for Discharge  8/5/2024 1308 by Jennifer Elkins RN  Outcome: Progressing     Problem: GASTROINTESTINAL - ADULT  Goal: Minimal or absence of nausea and/or vomiting  Description: INTERVENTIONS:  - Administer IV fluids if ordered to ensure adequate hydration  - Maintain NPO status until nausea and vomiting are resolved  - Nasogastric tube if ordered  - Administer ordered antiemetic medications as needed  - Provide nonpharmacologic comfort measures as appropriate  - Advance diet as tolerated, if ordered  - Consider nutrition services referral to assist patient with adequate nutrition and appropriate food choices  8/5/2024 1309 by Jennifer Elkins RN  Outcome: Adequate for Discharge  8/5/2024 1308 by Jennifer Elkins RN  Outcome: Progressing  Goal: Maintains or returns to baseline bowel function  Description: INTERVENTIONS:  - Assess bowel function  - Encourage oral fluids to ensure adequate hydration  - Administer IV fluids if  ordered to ensure adequate hydration  - Administer ordered medications as needed  - Encourage mobilization and activity  - Consider nutritional services referral to assist patient with adequate nutrition and appropriate food choices  8/5/2024 1309 by Jennifer Elkins, RN  Outcome: Adequate for Discharge  8/5/2024 1308 by Jennifer Elkins, RN  Outcome: Progressing

## 2024-08-05 NOTE — PLAN OF CARE
Problem: PAIN - ADULT  Goal: Verbalizes/displays adequate comfort level or baseline comfort level  Description: Interventions:  - Encourage patient to monitor pain and request assistance  - Assess pain using appropriate pain scale  - Administer analgesics based on type and severity of pain and evaluate response  - Implement non-pharmacological measures as appropriate and evaluate response  - Consider cultural and social influences on pain and pain management  - Notify physician/advanced practitioner if interventions unsuccessful or patient reports new pain  Outcome: Progressing     Problem: INFECTION - ADULT  Goal: Absence or prevention of progression during hospitalization  Description: INTERVENTIONS:  - Assess and monitor for signs and symptoms of infection  - Monitor lab/diagnostic results  - Monitor all insertion sites, i.e. indwelling lines, tubes, and drains  - Monitor endotracheal if appropriate and nasal secretions for changes in amount and color  - [place] appropriate cooling/warming therapies per order  - Administer medications as ordered  - Instruct and encourage patient and family to use good hand hygiene technique  - Identify and instruct in appropriate isolation precautions for identified infection/condition  Outcome: Progressing  Goal: Absence of fever/infection during neutropenic period  Description: INTERVENTIONS:  - Monitor WBC    Outcome: Progressing     Problem: DISCHARGE PLANNING  Goal: Discharge to home or other facility with appropriate resources  Description: INTERVENTIONS:  - Identify barriers to discharge w/patient and caregiver  - Arrange for needed discharge resources and transportation as appropriate  - Identify discharge learning needs (meds, wound care, etc.)  - Arrange for interpretive services to assist at discharge as needed  - Refer to Case Management Department for coordinating discharge planning if the patient needs post-hospital services based on physician/advanced  practitioner order or complex needs related to functional status, cognitive ability, or social support system  Outcome: Progressing     Problem: Knowledge Deficit  Goal: Patient/family/caregiver demonstrates understanding of disease process, treatment plan, medications, and discharge instructions  Description: Complete learning assessment and assess knowledge base.  Interventions:  - Provide teaching at level of understanding  - Provide teaching via preferred learning methods  Outcome: Progressing     Problem: Prexisting or High Potential for Compromised Skin Integrity  Goal: Skin integrity is maintained or improved  Description: INTERVENTIONS:  - Identify patients at risk for skin breakdown  - Assess and monitor skin integrity  - Assess and monitor nutrition and hydration status  - Monitor labs   - Assess for incontinence   - Turn and reposition patient  - Assist with mobility/ambulation  - Relieve pressure over bony prominences  - Avoid friction and shearing  - Provide appropriate hygiene as needed including keeping skin clean and dry  - Evaluate need for skin moisturizer/barrier cream  - Collaborate with interdisciplinary team   - Patient/family teaching  - Consider wound care consult   Outcome: Progressing     Problem: GASTROINTESTINAL - ADULT  Goal: Minimal or absence of nausea and/or vomiting  Description: INTERVENTIONS:  - Administer IV fluids if ordered to ensure adequate hydration  - Maintain NPO status until nausea and vomiting are resolved  - Nasogastric tube if ordered  - Administer ordered antiemetic medications as needed  - Provide nonpharmacologic comfort measures as appropriate  - Advance diet as tolerated, if ordered  - Consider nutrition services referral to assist patient with adequate nutrition and appropriate food choices  Outcome: Progressing  Goal: Maintains or returns to baseline bowel function  Description: INTERVENTIONS:  - Assess bowel function  - Encourage oral fluids to ensure adequate  hydration  - Administer IV fluids if ordered to ensure adequate hydration  - Administer ordered medications as needed  - Encourage mobilization and activity  - Consider nutritional services referral to assist patient with adequate nutrition and appropriate food choices  Outcome: Progressing

## 2024-08-06 ENCOUNTER — TRANSITIONAL CARE MANAGEMENT (OUTPATIENT)
Dept: FAMILY MEDICINE CLINIC | Facility: CLINIC | Age: 74
End: 2024-08-06

## 2024-08-06 ENCOUNTER — TELEPHONE (OUTPATIENT)
Dept: FAMILY MEDICINE CLINIC | Facility: CLINIC | Age: 74
End: 2024-08-06

## 2024-08-06 RX ORDER — PREDNISONE 5 MG/1
TABLET ORAL
Qty: 45 TABLET | Refills: 5 | Status: SHIPPED | OUTPATIENT
Start: 2024-08-06

## 2024-08-06 RX ORDER — AMLODIPINE BESYLATE 5 MG/1
5 TABLET ORAL DAILY
Qty: 90 TABLET | Refills: 1 | Status: SHIPPED | OUTPATIENT
Start: 2024-08-06

## 2024-08-06 NOTE — UTILIZATION REVIEW
NOTIFICATION OF ADMISSION DISCHARGE   This is a Notification of Discharge from [facility]. Please be advised that this patient has been discharge from our facility. Below you will find the admission and discharge date and time including the patient’s disposition.   UTILIZATION REVIEW CONTACT:  Diane Servin  Utilization   Network Utilization Review Department  Phone: [phone] x carefully listen to the prompts. All voicemails are confidential.  Email: [email or web address]     ADMISSION INFORMATION  PRESENTATION DATE: 8/1/2024  6:32 PM  OBERVATION ADMISSION DATE: N/A  INPATIENT ADMISSION DATE: 8/1/24  9:32 PM   DISCHARGE DATE: 8/5/2024  6:41 PM   DISPOSITION:Home/Self Care    Network Utilization Review Department  ATTENTION: Please call with any questions or concerns to [phone] and carefully listen to the prompts so that you are directed to the right person. All voicemails are confidential.   For Discharge needs, contact Care Management DC Support Team at [phone] opt. 2  Send all requests for admission clinical reviews, approved or denied determinations and any other requests to dedicated fax number below belonging to the campus where the patient is receiving treatment. List of dedicated fax numbers for the Facilities:  FACILITY NAME UR FAX NUMBER   ADMISSION DENIALS (Administrative/Medical Necessity) [phone]   DISCHARGE SUPPORT TEAM ([facility]) [phone]   PARENT CHILD HEALTH (Maternity/NICU/Pediatrics) [phone]   [facility] [phone]   [facility] [phone]   [facility] [phone]   [facility] [phone]   [facility] [phone]   [facility] [phone]   [facility] [phone]   [facility] [phone]    [facility] [phone]   [facility] [phone]   [facility] [phone]   [facility] [phone]

## 2024-08-06 NOTE — TELEPHONE ENCOUNTER
First attempt to schedule Jadyn on 8/6/24. She would like to talk to her children first and call me back later today. She wants to discuss when the best day is for them to bring her. DEBRAoylMalgorzata

## 2024-08-07 LAB — BACTERIA BLD CULT: NORMAL

## 2024-08-08 ENCOUNTER — APPOINTMENT (EMERGENCY)
Dept: CT IMAGING | Facility: HOSPITAL | Age: 74
DRG: 445 | End: 2024-08-08
Payer: COMMERCIAL

## 2024-08-08 ENCOUNTER — TELEPHONE (OUTPATIENT)
Age: 74
End: 2024-08-08

## 2024-08-08 ENCOUNTER — APPOINTMENT (EMERGENCY)
Dept: ULTRASOUND IMAGING | Facility: HOSPITAL | Age: 74
DRG: 445 | End: 2024-08-08
Payer: COMMERCIAL

## 2024-08-08 ENCOUNTER — HOSPITAL ENCOUNTER (INPATIENT)
Facility: HOSPITAL | Age: 74
LOS: 3 days | Discharge: HOME HEALTH | DRG: 445 | End: 2024-08-11
Attending: EMERGENCY MEDICINE | Admitting: INTERNAL MEDICINE
Payer: COMMERCIAL

## 2024-08-08 DIAGNOSIS — R79.89 ELEVATED LFTS: Primary | ICD-10-CM

## 2024-08-08 DIAGNOSIS — R19.7 VOMITING AND DIARRHEA: ICD-10-CM

## 2024-08-08 DIAGNOSIS — T38.0X5A STEROID-INDUCED HYPERGLYCEMIA: ICD-10-CM

## 2024-08-08 DIAGNOSIS — K80.50 CHOLEDOCHOLITHIASIS: ICD-10-CM

## 2024-08-08 DIAGNOSIS — K80.20 CHOLELITHIASIS: ICD-10-CM

## 2024-08-08 DIAGNOSIS — D72.829 LEUKOCYTOSIS: ICD-10-CM

## 2024-08-08 DIAGNOSIS — R11.10 VOMITING AND DIARRHEA: ICD-10-CM

## 2024-08-08 DIAGNOSIS — R73.9 STEROID-INDUCED HYPERGLYCEMIA: ICD-10-CM

## 2024-08-08 DIAGNOSIS — E03.9 ACQUIRED HYPOTHYROIDISM: ICD-10-CM

## 2024-08-08 DIAGNOSIS — I48.91 NEW ONSET A-FIB (HCC): ICD-10-CM

## 2024-08-08 DIAGNOSIS — I48.91 ATRIAL FIBRILLATION WITH RAPID VENTRICULAR RESPONSE (HCC): ICD-10-CM

## 2024-08-08 DIAGNOSIS — E11.69 TYPE 2 DIABETES MELLITUS WITH OTHER SPECIFIED COMPLICATION, WITHOUT LONG-TERM CURRENT USE OF INSULIN (HCC): ICD-10-CM

## 2024-08-08 PROBLEM — Z53.09 CONTRAINDICATION TO ANTICOAGULATION THERAPY: Status: RESOLVED | Noted: 2024-08-02 | Resolved: 2024-08-08

## 2024-08-08 LAB
ALBUMIN SERPL BCG-MCNC: 3.6 G/DL (ref 3.5–5)
ALP SERPL-CCNC: 138 U/L (ref 34–104)
ALT SERPL W P-5'-P-CCNC: 215 U/L (ref 7–52)
ANION GAP SERPL CALCULATED.3IONS-SCNC: 10 MMOL/L (ref 4–13)
ANISOCYTOSIS BLD QL SMEAR: PRESENT
AST SERPL W P-5'-P-CCNC: 674 U/L (ref 13–39)
ATRIAL RATE: 100 BPM
BACTERIA UR QL AUTO: NORMAL /HPF
BASOPHILS # BLD MANUAL: 0 THOUSAND/UL (ref 0–0.1)
BASOPHILS NFR MAR MANUAL: 0 % (ref 0–1)
BILIRUB SERPL-MCNC: 1.09 MG/DL (ref 0.2–1)
BILIRUB UR QL STRIP: NEGATIVE
BUN SERPL-MCNC: 18 MG/DL (ref 5–25)
CALCIUM SERPL-MCNC: 8.4 MG/DL (ref 8.4–10.2)
CHLORIDE SERPL-SCNC: 98 MMOL/L (ref 96–108)
CLARITY UR: CLEAR
CO2 SERPL-SCNC: 24 MMOL/L (ref 21–32)
COLOR UR: YELLOW
CREAT SERPL-MCNC: 0.65 MG/DL (ref 0.6–1.3)
EOSINOPHIL # BLD MANUAL: 0 THOUSAND/UL (ref 0–0.4)
EOSINOPHIL NFR BLD MANUAL: 0 % (ref 0–6)
ERYTHROCYTE [DISTWIDTH] IN BLOOD BY AUTOMATED COUNT: 19.3 % (ref 11.6–15.1)
GFR SERPL CREATININE-BSD FRML MDRD: 87 ML/MIN/1.73SQ M
GLUCOSE SERPL-MCNC: 238 MG/DL (ref 65–140)
GLUCOSE SERPL-MCNC: 312 MG/DL (ref 65–140)
GLUCOSE UR STRIP-MCNC: ABNORMAL MG/DL
HCT VFR BLD AUTO: 34.7 % (ref 34.8–46.1)
HGB BLD-MCNC: 11.3 G/DL (ref 11.5–15.4)
HGB UR QL STRIP.AUTO: ABNORMAL
KETONES UR STRIP-MCNC: ABNORMAL MG/DL
LACTATE SERPL-SCNC: 1.5 MMOL/L (ref 0.5–2)
LACTATE SERPL-SCNC: 2.2 MMOL/L (ref 0.5–2)
LEUKOCYTE ESTERASE UR QL STRIP: ABNORMAL
LIPASE SERPL-CCNC: 8 U/L (ref 11–82)
LYMPHOCYTES # BLD AUTO: 0.73 THOUSAND/UL (ref 0.6–4.47)
LYMPHOCYTES # BLD AUTO: 3 % (ref 14–44)
MCH RBC QN AUTO: 22.5 PG (ref 26.8–34.3)
MCHC RBC AUTO-ENTMCNC: 32.6 G/DL (ref 31.4–37.4)
MCV RBC AUTO: 69 FL (ref 82–98)
MONOCYTES # BLD AUTO: 0.73 THOUSAND/UL (ref 0–1.22)
MONOCYTES NFR BLD: 4 % (ref 4–12)
NEUTROPHILS # BLD MANUAL: 16.8 THOUSAND/UL (ref 1.85–7.62)
NEUTS BAND NFR BLD MANUAL: 9 % (ref 0–8)
NEUTS SEG NFR BLD AUTO: 83 % (ref 43–75)
NITRITE UR QL STRIP: NEGATIVE
NON-SQ EPI CELLS URNS QL MICRO: NORMAL /HPF
P AXIS: 50 DEGREES
PH UR STRIP.AUTO: 5.5 [PH]
PLATELET # BLD AUTO: 344 THOUSANDS/UL (ref 149–390)
PLATELET BLD QL SMEAR: ADEQUATE
PMV BLD AUTO: 9.7 FL (ref 8.9–12.7)
POTASSIUM SERPL-SCNC: 4.4 MMOL/L (ref 3.5–5.3)
PR INTERVAL: 162 MS
PROCALCITONIN SERPL-MCNC: 2.96 NG/ML
PROT SERPL-MCNC: 6.9 G/DL (ref 6.4–8.4)
PROT UR STRIP-MCNC: ABNORMAL MG/DL
QRS AXIS: 24 DEGREES
QRSD INTERVAL: 80 MS
QT INTERVAL: 340 MS
QTC INTERVAL: 438 MS
RBC # BLD AUTO: 5.02 MILLION/UL (ref 3.81–5.12)
RBC #/AREA URNS AUTO: NORMAL /HPF
RBC MORPH BLD: PRESENT
SODIUM SERPL-SCNC: 132 MMOL/L (ref 135–147)
SP GR UR STRIP.AUTO: 1.01 (ref 1–1.03)
T WAVE AXIS: 18 DEGREES
UROBILINOGEN UR STRIP-ACNC: <2 MG/DL
VARIANT LYMPHS # BLD AUTO: 1 %
VENTRICULAR RATE: 100 BPM
WBC # BLD AUTO: 18.26 THOUSAND/UL (ref 4.31–10.16)
WBC #/AREA URNS AUTO: NORMAL /HPF

## 2024-08-08 PROCEDURE — 82948 REAGENT STRIP/BLOOD GLUCOSE: CPT

## 2024-08-08 PROCEDURE — 99222 1ST HOSP IP/OBS MODERATE 55: CPT | Performed by: INTERNAL MEDICINE

## 2024-08-08 PROCEDURE — 84145 PROCALCITONIN (PCT): CPT

## 2024-08-08 PROCEDURE — 93010 ELECTROCARDIOGRAM REPORT: CPT | Performed by: INTERNAL MEDICINE

## 2024-08-08 PROCEDURE — 99284 EMERGENCY DEPT VISIT MOD MDM: CPT

## 2024-08-08 PROCEDURE — 93005 ELECTROCARDIOGRAM TRACING: CPT

## 2024-08-08 PROCEDURE — 85007 BL SMEAR W/DIFF WBC COUNT: CPT

## 2024-08-08 PROCEDURE — 96361 HYDRATE IV INFUSION ADD-ON: CPT

## 2024-08-08 PROCEDURE — 74177 CT ABD & PELVIS W/CONTRAST: CPT

## 2024-08-08 PROCEDURE — 83690 ASSAY OF LIPASE: CPT

## 2024-08-08 PROCEDURE — 83605 ASSAY OF LACTIC ACID: CPT | Performed by: EMERGENCY MEDICINE

## 2024-08-08 PROCEDURE — 80053 COMPREHEN METABOLIC PANEL: CPT

## 2024-08-08 PROCEDURE — 96365 THER/PROPH/DIAG IV INF INIT: CPT

## 2024-08-08 PROCEDURE — 99285 EMERGENCY DEPT VISIT HI MDM: CPT | Performed by: EMERGENCY MEDICINE

## 2024-08-08 PROCEDURE — 81001 URINALYSIS AUTO W/SCOPE: CPT

## 2024-08-08 PROCEDURE — 85027 COMPLETE CBC AUTOMATED: CPT

## 2024-08-08 PROCEDURE — 96375 TX/PRO/DX INJ NEW DRUG ADDON: CPT

## 2024-08-08 PROCEDURE — 36415 COLL VENOUS BLD VENIPUNCTURE: CPT

## 2024-08-08 PROCEDURE — 76705 ECHO EXAM OF ABDOMEN: CPT

## 2024-08-08 PROCEDURE — 96372 THER/PROPH/DIAG INJ SC/IM: CPT

## 2024-08-08 RX ORDER — INSULIN GLARGINE 100 [IU]/ML
30 INJECTION, SOLUTION SUBCUTANEOUS ONCE
Status: COMPLETED | OUTPATIENT
Start: 2024-08-08 | End: 2024-08-08

## 2024-08-08 RX ORDER — SODIUM CHLORIDE, SODIUM LACTATE, POTASSIUM CHLORIDE, CALCIUM CHLORIDE 600; 310; 30; 20 MG/100ML; MG/100ML; MG/100ML; MG/100ML
150 INJECTION, SOLUTION INTRAVENOUS CONTINUOUS
Status: DISCONTINUED | OUTPATIENT
Start: 2024-08-08 | End: 2024-08-09

## 2024-08-08 RX ORDER — ACETAMINOPHEN 10 MG/ML
1000 INJECTION, SOLUTION INTRAVENOUS ONCE
Status: COMPLETED | OUTPATIENT
Start: 2024-08-08 | End: 2024-08-08

## 2024-08-08 RX ORDER — ONDANSETRON 2 MG/ML
4 INJECTION INTRAMUSCULAR; INTRAVENOUS ONCE
Status: COMPLETED | OUTPATIENT
Start: 2024-08-08 | End: 2024-08-08

## 2024-08-08 RX ADMIN — IOHEXOL 85 ML: 350 INJECTION, SOLUTION INTRAVENOUS at 20:28

## 2024-08-08 RX ADMIN — SODIUM CHLORIDE 1000 ML: 0.9 INJECTION, SOLUTION INTRAVENOUS at 14:26

## 2024-08-08 RX ADMIN — ACETAMINOPHEN 1000 MG: 10 INJECTION INTRAVENOUS at 14:51

## 2024-08-08 RX ADMIN — SODIUM CHLORIDE, SODIUM LACTATE, POTASSIUM CHLORIDE, AND CALCIUM CHLORIDE 150 ML/HR: .6; .31; .03; .02 INJECTION, SOLUTION INTRAVENOUS at 21:00

## 2024-08-08 RX ADMIN — SODIUM CHLORIDE 1000 ML: 0.9 INJECTION, SOLUTION INTRAVENOUS at 15:59

## 2024-08-08 RX ADMIN — INSULIN GLARGINE 30 UNITS: 100 INJECTION, SOLUTION SUBCUTANEOUS at 20:59

## 2024-08-08 RX ADMIN — ONDANSETRON 4 MG: 2 INJECTION INTRAMUSCULAR; INTRAVENOUS at 14:47

## 2024-08-08 NOTE — TELEPHONE ENCOUNTER
Andrey piper OT called in to inform PCP that the initial evaluation was canceled today due to daughter taking patient to hospital. Chart reflects patient current at [facility].

## 2024-08-08 NOTE — ED NOTES
Per Attending Rosa, patient only requiring 500 ml of NSS.     Brianne Black, NOAH  08/08/24 1905

## 2024-08-08 NOTE — Clinical Note
Case was discussed with Dr. Cali and the patient's admission status was agreed to be Admission Status: inpatient status to the service of Dr Mccartney

## 2024-08-08 NOTE — ED PROVIDER NOTES
History  Chief Complaint   Patient presents with    Abdominal Pain     Per ems, pt comes from home c/o severe abd pain. Got dx with diverticulitis and discharged a couple days ago. Pt still having pain. +vomit this am.     74-year-old female with history of type 2 diabetes and recent admission for diverticulitis presenting for abdominal pain. Discharged 3 days ago.  Patient received meropenem while in the hospital but has not taken antibiotics since.  Has not been taking pain medications.  Patient reports feeling better but had return of abdominal pain starting for the past 1 to 2 days.  Reports episodes of watery diarrhea particularly after meals and 2 episodes of vomiting earlier today.  No blood in vomit or diarrhea.  Reports chills and chest discomfort when sitting upright.  No fevers, shortness of breath, urinary changes.        Abdominal Pain  Associated symptoms: no chest pain, no chills, no cough, no dysuria, no fever, no hematuria, no shortness of breath, no sore throat and no vomiting        Prior to Admission Medications   Prescriptions Last Dose Informant Patient Reported? Taking?   Blood Glucose Monitoring Suppl (OneTouch Verio) w/Device KIT   No No   Sig: Use 2 (two) times a day   Cholecalciferol 25 MCG (1000 UT) tablet   Yes No   Sig: Take 1 tablet by mouth daily   Insulin Pen Needle (Pen Needles) 31G X 5 MM MISC   No No   Sig: USE DAILY FOR INSULIN ADMINISTRATION   Lancets (OneTouch Delica Plus [id number]) MISC   No No   Sig: Use daily as directed.   OneTouch Verio test strip   No No   Sig: CHECK BLOOD GLUCOSE TWICE DAILY   Synthroid 88 MCG tablet   No No   Sig: TAKE ONE TABLET EVERY DAY IN THE EARLY MORNING ON AN EMPTY STOMACH.   albuterol (PROVENTIL HFA,VENTOLIN HFA) 90 mcg/act inhaler   No No   Sig: INHALE 2 PUFFS EVERY 6 (SIX) HOURS AS NEEDED FOR WHEEZING   amLODIPine (NORVASC) 5 mg tablet   No No   Sig: TAKE 1 TABLET BY MOUTH DAILY   benazepril (LOTENSIN) 10 mg tablet   No No   Sig: TAKE 1 TABLET  DAILY   benazepril (LOTENSIN) 5 mg tablet   Yes No   Sig: Take 5 mg by mouth daily   calcium carbonate (OS-YNES) 1250 (500 Ca) MG tablet   Yes No   Si tablet with meals Orally   famotidine (PEPCID) 20 mg tablet   No No   Sig: Take 1 tablet (20 mg total) by mouth 2 (two) times a day   ferrous sulfate 325 (65 Fe) mg tablet   Yes No   Sig: Every 12 hours   insulin glargine (LANTUS) 100 units/mL subcutaneous injection   No No   Sig: Inject 30 Units under the skin daily at bedtime   metFORMIN (GLUCOPHAGE-XR) 500 mg 24 hr tablet   No No   Sig: TAKE 1 TABLET 2 TIMES A DAY WITH MEALS   omeprazole (PriLOSEC) 40 MG capsule   Yes No   Sig: omeprazole 40 mg capsule,delayed release   predniSONE 5 mg tablet   No No   Sig: TAKE 1-1/2 TABS DAILY   saccharomyces boulardii (FLORASTOR) 250 mg capsule   No No   Sig: Take 1 capsule (250 mg total) by mouth 2 (two) times a day for 14 days   sitaGLIPtin (Januvia) 25 mg tablet   No No   Sig: TAKE 1 TABLET DAILY      Facility-Administered Medications: None       Past Medical History:   Diagnosis Date    Acute diverticulitis 2018    Anemia     Asthma     Chronic pain     bulging discs in back,polymyositis    Diabetes mellitus (HCC)     Disease of thyroid gland     Diverticulitis     Hyperlipidemia     Hypertension     Polymyositis (HCC)        Past Surgical History:   Procedure Laterality Date    EYE SURGERY Bilateral     CATAREACT REMOVED FROM BOTH EYES 2 MTHS AGO    HYSTERECTOMY      SINUS SURGERY      THYROIDECTOMY      TUBAL LIGATION         Family History   Problem Relation Age of Onset    Hepatitis Sister         acute hepatits C virus    Hypertension Daughter     Kidney disease Daughter     Hypertension Son     Mental illness Son      I have reviewed and agree with the history as documented.    E-Cigarette/Vaping    E-Cigarette Use Never User      E-Cigarette/Vaping Substances    Nicotine No     THC No     CBD No     Flavoring No     Other No     Unknown No      Social History      Tobacco Use    Smoking status: Never    Smokeless tobacco: Never   Vaping Use    Vaping status: Never Used   Substance Use Topics    Alcohol use: Not Currently    Drug use: No        Review of Systems   Constitutional:  Negative for chills and fever.   HENT:  Positive for tinnitus. Negative for ear pain and sore throat.    Respiratory:  Negative for cough and shortness of breath.    Cardiovascular:  Negative for chest pain and palpitations.   Gastrointestinal:  Positive for abdominal pain. Negative for vomiting.   Genitourinary:  Negative for dysuria and hematuria.   Musculoskeletal:  Negative for arthralgias and back pain.   Neurological:  Negative for seizures and syncope.   All other systems reviewed and are negative.      Physical Exam  ED Triage Vitals   Temperature Pulse Respirations Blood Pressure SpO2   08/08/24 1345 08/08/24 1342 08/08/24 1342 08/08/24 1342 08/08/24 1342   98.1 °F (36.7 °C) 102 18 134/58 94 %      Temp Source Heart Rate Source Patient Position - Orthostatic VS BP Location FiO2 (%)   08/08/24 1345 08/08/24 1342 08/08/24 1342 08/08/24 1342 --   Oral Monitor Sitting Right arm       Pain Score       08/08/24 1342       5             Orthostatic Vital Signs  Vitals:    08/08/24 1830 08/08/24 1900 08/08/24 1930 08/08/24 2030   BP: 120/58 132/64 128/63 135/66   Pulse: 96 93 93 96   Patient Position - Orthostatic VS:   Sitting        Physical Exam  Vitals and nursing note reviewed.   Constitutional:       General: She is not in acute distress.     Appearance: She is well-developed.   HENT:      Head: Normocephalic and atraumatic.   Eyes:      Conjunctiva/sclera: Conjunctivae normal.   Cardiovascular:      Rate and Rhythm: Normal rate and regular rhythm.      Heart sounds: No murmur heard.  Pulmonary:      Effort: Pulmonary effort is normal. No respiratory distress.      Breath sounds: Normal breath sounds.   Abdominal:      Palpations: Abdomen is soft.      Tenderness: There is abdominal  tenderness in the suprapubic area, left upper quadrant and left lower quadrant. There is no guarding or rebound.   Musculoskeletal:         General: No swelling.      Cervical back: Neck supple.   Skin:     General: Skin is warm and dry.      Capillary Refill: Capillary refill takes less than 2 seconds.   Neurological:      Mental Status: She is alert.   Psychiatric:         Mood and Affect: Mood normal.         ED Medications  Medications   lactated ringers infusion (150 mL/hr Intravenous New Bag 8/8/24 2100)   sodium chloride 0.9 % bolus 1,000 mL (0 mL Intravenous Stopped 8/8/24 1637)   ondansetron (ZOFRAN) injection 4 mg (4 mg Intravenous Given 8/8/24 1447)   acetaminophen (Ofirmev) injection 1,000 mg (0 mg Intravenous Stopped 8/8/24 1547)   sodium chloride 0.9 % bolus 1,000 mL (0 mL Intravenous Stopped 8/8/24 1637)   insulin glargine (LANTUS) subcutaneous injection 30 Units 0.3 mL (30 Units Subcutaneous Given 8/8/24 2059)   iohexol (OMNIPAQUE) 350 MG/ML injection (MULTI-DOSE) 85 mL (85 mL Intravenous Given 8/8/24 2028)       Diagnostic Studies  Results Reviewed       Procedure Component Value Units Date/Time    Fingerstick Glucose (POCT) [[id number]]  (Abnormal) Collected: 08/08/24 2052    Lab Status: Final result Specimen: Blood Updated: 08/08/24 2052     POC Glucose 238 mg/dl     Lactic acid 2 Hours [[id number]]  (Normal) Collected: 08/08/24 1719    Lab Status: Final result Specimen: Blood from Arm, Right Updated: 08/08/24 1748     LACTIC ACID 1.5 mmol/L     Narrative:      Result may be elevated if tourniquet was used during collection.    RBC Morphology Reflex Test [[id number]] Collected: 08/08/24 1414    Lab Status: Final result Specimen: Blood from Arm, Left Updated: 08/08/24 1601    CBC and differential [[id number]]  (Abnormal) Collected: 08/08/24 1414    Lab Status: Final result Specimen: Blood from Arm, Left Updated: 08/08/24 1537     WBC 18.26 Thousand/uL      RBC 5.02 Million/uL      Hemoglobin 11.3 g/dL       Hematocrit 34.7 %      MCV 69 fL      MCH 22.5 pg      MCHC 32.6 g/dL      RDW 19.3 %      MPV 9.7 fL      Platelets 344 Thousands/uL     Narrative:      This is an appended report.  These results have been appended to a previously verified report.    Manual Differential(PHLEBS Do Not Order) [[id number]]  (Abnormal) Collected: 08/08/24 1414    Lab Status: Final result Specimen: Blood from Arm, Left Updated: 08/08/24 1537     Segmented % 83 %      Bands % 9 %      Lymphocytes % 3 %      Monocytes % 4 %      Eosinophils % 0 %      Basophils % 0 %      Atypical Lymphocytes % 1 %      Absolute Neutrophils 16.80 Thousand/uL      Absolute Lymphocytes 0.73 Thousand/uL      Absolute Monocytes 0.73 Thousand/uL      Absolute Eosinophils 0.00 Thousand/uL      Absolute Basophils 0.00 Thousand/uL      Total Counted --     RBC Morphology Present     Platelet Estimate Adequate     Anisocytosis Present    Urine Microscopic [[id number]]  (Normal) Collected: 08/08/24 1458    Lab Status: Final result Specimen: Urine, Other Updated: 08/08/24 1524     RBC, UA None Seen /hpf      WBC, UA 1-2 /hpf      Epithelial Cells Occasional /hpf      Bacteria, UA Occasional /hpf     Lactic acid, plasma (w/reflex if result > 2.0) [[id number]]  (Abnormal) Collected: 08/08/24 1457    Lab Status: Final result Specimen: Blood from Arm, Right Updated: 08/08/24 1520     LACTIC ACID 2.2 mmol/L     Narrative:      Result may be elevated if tourniquet was used during collection.    UA w Reflex to Microscopic w Reflex to Culture [[id number]]  (Abnormal) Collected: 08/08/24 1458    Lab Status: Final result Specimen: Urine, Other Updated: 08/08/24 1507     Color, UA Yellow     Clarity, UA Clear     Specific Gravity, UA 1.015     pH, UA 5.5     Leukocytes, UA Elevated glucose may cause decreased leukocyte values. See urine microscopic for UWBC result     Nitrite, UA Negative     Protein, UA Trace mg/dl      Glucose, UA >=1000 (1%) mg/dl      Ketones, UA Trace  mg/dl      Urobilinogen, UA <2.0 mg/dl      Bilirubin, UA Negative     Occult Blood, UA Small    Lipase [[id number]]  (Abnormal) Collected: 08/08/24 1414    Lab Status: Final result Specimen: Blood from Arm, Left Updated: 08/08/24 1444     Lipase 8 u/L     Comprehensive metabolic panel [[id number]]  (Abnormal) Collected: 08/08/24 1414    Lab Status: Final result Specimen: Blood from Arm, Left Updated: 08/08/24 1444     Sodium 132 mmol/L      Potassium 4.4 mmol/L      Chloride 98 mmol/L      CO2 24 mmol/L      ANION GAP 10 mmol/L      BUN 18 mg/dL      Creatinine 0.65 mg/dL      Glucose 312 mg/dL      Calcium 8.4 mg/dL       U/L       U/L      Alkaline Phosphatase 138 U/L      Total Protein 6.9 g/dL      Albumin 3.6 g/dL      Total Bilirubin 1.09 mg/dL      eGFR 87 ml/min/1.73sq m     Narrative:      [employer] guidelines for Chronic Kidney Disease (CKD):     Stage 1 with normal or high GFR (GFR > 90 mL/min/1.73 square meters)    Stage 2 Mild CKD (GFR = 60-89 mL/min/1.73 square meters)    Stage 3A Moderate CKD (GFR = 45-59 mL/min/1.73 square meters)    Stage 3B Moderate CKD (GFR = 30-44 mL/min/1.73 square meters)    Stage 4 Severe CKD (GFR = 15-29 mL/min/1.73 square meters)    Stage 5 End Stage CKD (GFR <15 mL/min/1.73 square meters)  Note: GFR calculation is accurate only with a steady state creatinine                   US right upper quadrant   Final Result by Suresh Melo MD (08/08 1921)      Cholelithiasis without acute cholecystitis.      Workstation performed: [id number]         CT abdomen pelvis with contrast    (Results Pending)         Procedures  ECG 12 Lead Documentation Only    Date/Time: 8/8/2024 3:42 PM    Performed by: Torsten Lake MD  Authorized by: Torsten Lake MD    Patient location:  ED  Previous ECG:     Previous ECG:  Compared to current    Similarity:  No change  Interpretation:     Interpretation: normal    Rate:     ECG rate assessment: normal    Rhythm:      Rhythm: sinus rhythm    Ectopy:     Ectopy: none    QRS:     QRS axis:  Normal  Conduction:     Conduction: normal    ST segments:     ST segments:  Normal  T waves:     T waves: normal          ED Course  ED Course as of 08/08/24 2101   u Aug 08, 2024   1343 74-year-old female with history of type 2 diabetes and recent admission for diverticulitis presenting for abdominal pain. Discharged 3 days ago.  Patient received meropenem while in the hospital but has not taken antibiotics since.  Has not been taking pain medications.  Patient reports feeling better but had return of abdominal pain starting for the past 1 to 2 days.  Reports episodes of watery diarrhea particularly after meals and 2 episodes of vomiting earlier today.  No blood in vomit or diarrhea.  Reports chills and chest discomfort when sitting upright.  No fevers, shortness of breath, urinary changes.   1407 Differential at this time includes diverticulitis related pain, infectious colitis, inflammatory colitis, gastroenteritis.  Low suspicion for toxic megacolon, appendicitis, SBO, perforation given benign exam at this time.   1641 CBC notable for uptrending white blood cells compared to 1 week prior.  CMP notable for elevated LFTs, bilirubin concerning for hepatobiliary pathology.  Right upper quadrant ultrasound demonstrates acute cholecystitis without choledocholithiasis.   1642 Lactic acid 2.2, 0.7 when admitted for diverticulitis 1 week ago.  Repeat lactate 1.5.  CT abdomen pelvis read pending.  Plan to admit for further workup.                                       Medical Decision Making  See ED course    Amount and/or Complexity of Data Reviewed  Labs: ordered.  Radiology: ordered.    Risk  Prescription drug management.          Disposition  Final diagnoses:   Elevated LFTs   Vomiting and diarrhea     Time reflects when diagnosis was documented in both MDM as applicable and the Disposition within this note       Time User Action Codes  Description Comment    8/8/2024  8:20 PM Hunter Lee [R79.89] Elevated LFTs     8/8/2024  8:38 PM Hunter Lee [R11.10,  R19.7] Vomiting and diarrhea           ED Disposition       None          Follow-up Information    None         Patient's Medications   Discharge Prescriptions    No medications on file     No discharge procedures on file.    PDMP Review         Value Time User    PDMP Reviewed  Yes 8/5/2024 12:43 PM CHICA Nye             ED Provider  Attending physically available and evaluated Jadyn Ac. I managed the patient along with the ED Attending.    Electronically Signed by           Torsten Lake MD  08/08/24 7070

## 2024-08-08 NOTE — TELEPHONE ENCOUNTER
Called and spoke to the patients daughter and stated that she must be seen in the office for this visit. Also stated to the daughter that we are at a smaller office so the parking is close to the front door and we have a wheelchair available for assistance. She is ok with that and will be here Monday

## 2024-08-08 NOTE — TELEPHONE ENCOUNTER
Patients GI provider:  NEW PT    Number to return call: [phone] Jennifer    Reason for call: Pt's daughter Jennifer is asking if there is any way her appointment can be virtual. She was recently discharged and has mobility issues. I did inform her that new patients have to come into the office but she asked me to check anyway, please advise    Scheduled procedure/appointment date if applicable: Appt 8/12/24

## 2024-08-09 ENCOUNTER — APPOINTMENT (INPATIENT)
Dept: RADIOLOGY | Facility: HOSPITAL | Age: 74
DRG: 445 | End: 2024-08-09
Payer: COMMERCIAL

## 2024-08-09 ENCOUNTER — ANESTHESIA EVENT (INPATIENT)
Dept: GASTROENTEROLOGY | Facility: HOSPITAL | Age: 74
DRG: 445 | End: 2024-08-09
Payer: COMMERCIAL

## 2024-08-09 ENCOUNTER — ANESTHESIA (INPATIENT)
Dept: GASTROENTEROLOGY | Facility: HOSPITAL | Age: 74
DRG: 445 | End: 2024-08-09
Payer: COMMERCIAL

## 2024-08-09 ENCOUNTER — APPOINTMENT (INPATIENT)
Dept: GASTROENTEROLOGY | Facility: HOSPITAL | Age: 74
DRG: 445 | End: 2024-08-09
Payer: COMMERCIAL

## 2024-08-09 PROBLEM — R79.89 ELEVATED SERUM CREATININE: Status: ACTIVE | Noted: 2024-08-09

## 2024-08-09 PROBLEM — K80.50 CHOLEDOCHOLITHIASIS: Status: ACTIVE | Noted: 2024-08-09

## 2024-08-09 PROBLEM — R22.2 PLEURAL NODULE: Status: ACTIVE | Noted: 2024-08-09

## 2024-08-09 LAB
ALBUMIN SERPL BCG-MCNC: 3.5 G/DL (ref 3.5–5)
ALP SERPL-CCNC: 173 U/L (ref 34–104)
ALT SERPL W P-5'-P-CCNC: 269 U/L (ref 7–52)
ANION GAP SERPL CALCULATED.3IONS-SCNC: 10 MMOL/L (ref 4–13)
AST SERPL W P-5'-P-CCNC: 265 U/L (ref 13–39)
BASOPHILS # BLD AUTO: 0.03 THOUSANDS/ÂΜL (ref 0–0.1)
BASOPHILS NFR BLD AUTO: 0 % (ref 0–1)
BILIRUB DIRECT SERPL-MCNC: 1.94 MG/DL (ref 0–0.2)
BILIRUB DIRECT SERPL-MCNC: 2.08 MG/DL (ref 0–0.2)
BILIRUB SERPL-MCNC: 2.94 MG/DL (ref 0.2–1)
BUN SERPL-MCNC: 10 MG/DL (ref 5–25)
CALCIUM SERPL-MCNC: 8.2 MG/DL (ref 8.4–10.2)
CHLORIDE SERPL-SCNC: 104 MMOL/L (ref 96–108)
CO2 SERPL-SCNC: 24 MMOL/L (ref 21–32)
CREAT SERPL-MCNC: 0.49 MG/DL (ref 0.6–1.3)
EOSINOPHIL # BLD AUTO: 0.45 THOUSAND/ÂΜL (ref 0–0.61)
EOSINOPHIL NFR BLD AUTO: 3 % (ref 0–6)
ERYTHROCYTE [DISTWIDTH] IN BLOOD BY AUTOMATED COUNT: 18.6 % (ref 11.6–15.1)
GFR SERPL CREATININE-BSD FRML MDRD: 96 ML/MIN/1.73SQ M
GGT SERPL-CCNC: 511 U/L (ref 9–64)
GLUCOSE SERPL-MCNC: 110 MG/DL (ref 65–140)
GLUCOSE SERPL-MCNC: 120 MG/DL (ref 65–140)
GLUCOSE SERPL-MCNC: 124 MG/DL (ref 65–140)
GLUCOSE SERPL-MCNC: 146 MG/DL (ref 65–140)
GLUCOSE SERPL-MCNC: 146 MG/DL (ref 65–140)
GLUCOSE SERPL-MCNC: 201 MG/DL (ref 65–140)
HCT VFR BLD AUTO: 32.3 % (ref 34.8–46.1)
HGB BLD-MCNC: 10.5 G/DL (ref 11.5–15.4)
IMM GRANULOCYTES # BLD AUTO: 0.12 THOUSAND/UL (ref 0–0.2)
IMM GRANULOCYTES NFR BLD AUTO: 1 % (ref 0–2)
INR PPP: 1.11 (ref 0.85–1.19)
LYMPHOCYTES # BLD AUTO: 0.79 THOUSANDS/ÂΜL (ref 0.6–4.47)
LYMPHOCYTES NFR BLD AUTO: 5 % (ref 14–44)
MCH RBC QN AUTO: 22.2 PG (ref 26.8–34.3)
MCHC RBC AUTO-ENTMCNC: 32.5 G/DL (ref 31.4–37.4)
MCV RBC AUTO: 68 FL (ref 82–98)
MONOCYTES # BLD AUTO: 0.85 THOUSAND/ÂΜL (ref 0.17–1.22)
MONOCYTES NFR BLD AUTO: 6 % (ref 4–12)
NEUTROPHILS # BLD AUTO: 12.39 THOUSANDS/ÂΜL (ref 1.85–7.62)
NEUTS SEG NFR BLD AUTO: 85 % (ref 43–75)
NRBC BLD AUTO-RTO: 0 /100 WBCS
PLATELET # BLD AUTO: 349 THOUSANDS/UL (ref 149–390)
PLATELET # BLD AUTO: 359 THOUSANDS/UL (ref 149–390)
PMV BLD AUTO: 9.4 FL (ref 8.9–12.7)
PMV BLD AUTO: 9.7 FL (ref 8.9–12.7)
POTASSIUM SERPL-SCNC: 3.7 MMOL/L (ref 3.5–5.3)
PROT SERPL-MCNC: 6.4 G/DL (ref 6.4–8.4)
PROTHROMBIN TIME: 15 SECONDS (ref 12.3–15)
RBC # BLD AUTO: 4.74 MILLION/UL (ref 3.81–5.12)
SODIUM SERPL-SCNC: 138 MMOL/L (ref 135–147)
TSH SERPL DL<=0.05 MIU/L-ACNC: 0.54 UIU/ML (ref 0.45–4.5)
WBC # BLD AUTO: 14.63 THOUSAND/UL (ref 4.31–10.16)

## 2024-08-09 PROCEDURE — 36415 COLL VENOUS BLD VENIPUNCTURE: CPT

## 2024-08-09 PROCEDURE — 0FC98ZZ EXTIRPATION OF MATTER FROM COMMON BILE DUCT, VIA NATURAL OR ARTIFICIAL OPENING ENDOSCOPIC: ICD-10-PCS | Performed by: INTERNAL MEDICINE

## 2024-08-09 PROCEDURE — 93005 ELECTROCARDIOGRAM TRACING: CPT

## 2024-08-09 PROCEDURE — 82948 REAGENT STRIP/BLOOD GLUCOSE: CPT

## 2024-08-09 PROCEDURE — 87040 BLOOD CULTURE FOR BACTERIA: CPT

## 2024-08-09 PROCEDURE — 85610 PROTHROMBIN TIME: CPT

## 2024-08-09 PROCEDURE — 74330 X-RAY BILE/PANC ENDOSCOPY: CPT

## 2024-08-09 PROCEDURE — 99223 1ST HOSP IP/OBS HIGH 75: CPT | Performed by: INTERNAL MEDICINE

## 2024-08-09 PROCEDURE — C1769 GUIDE WIRE: HCPCS

## 2024-08-09 PROCEDURE — BF131ZZ FLUOROSCOPY OF GALLBLADDER AND BILE DUCTS USING LOW OSMOLAR CONTRAST: ICD-10-PCS | Performed by: INTERNAL MEDICINE

## 2024-08-09 PROCEDURE — 85025 COMPLETE CBC W/AUTO DIFF WBC: CPT

## 2024-08-09 PROCEDURE — 82248 BILIRUBIN DIRECT: CPT | Performed by: SURGERY

## 2024-08-09 PROCEDURE — 85049 AUTOMATED PLATELET COUNT: CPT

## 2024-08-09 PROCEDURE — 80053 COMPREHEN METABOLIC PANEL: CPT

## 2024-08-09 PROCEDURE — 82248 BILIRUBIN DIRECT: CPT

## 2024-08-09 PROCEDURE — 43262 ENDO CHOLANGIOPANCREATOGRAPH: CPT | Performed by: INTERNAL MEDICINE

## 2024-08-09 PROCEDURE — 82977 ASSAY OF GGT: CPT

## 2024-08-09 PROCEDURE — 84443 ASSAY THYROID STIM HORMONE: CPT

## 2024-08-09 PROCEDURE — 43264 ERCP REMOVE DUCT CALCULI: CPT | Performed by: INTERNAL MEDICINE

## 2024-08-09 PROCEDURE — 99222 1ST HOSP IP/OBS MODERATE 55: CPT | Performed by: SURGERY

## 2024-08-09 RX ORDER — INSULIN LISPRO 100 [IU]/ML
1-6 INJECTION, SOLUTION INTRAVENOUS; SUBCUTANEOUS
Status: DISCONTINUED | OUTPATIENT
Start: 2024-08-09 | End: 2024-08-10

## 2024-08-09 RX ORDER — CALCIUM CARBONATE 500 MG/1
1000 TABLET, CHEWABLE ORAL DAILY
Status: DISCONTINUED | OUTPATIENT
Start: 2024-08-09 | End: 2024-08-11 | Stop reason: HOSPADM

## 2024-08-09 RX ORDER — FERROUS SULFATE 325(65) MG
325 TABLET ORAL EVERY OTHER DAY
Status: DISCONTINUED | OUTPATIENT
Start: 2024-08-09 | End: 2024-08-11 | Stop reason: HOSPADM

## 2024-08-09 RX ORDER — PROPOFOL 10 MG/ML
INJECTION, EMULSION INTRAVENOUS AS NEEDED
Status: DISCONTINUED | OUTPATIENT
Start: 2024-08-09 | End: 2024-08-09

## 2024-08-09 RX ORDER — ONDANSETRON 2 MG/ML
4 INJECTION INTRAMUSCULAR; INTRAVENOUS ONCE AS NEEDED
Status: DISCONTINUED | OUTPATIENT
Start: 2024-08-09 | End: 2024-08-11 | Stop reason: HOSPADM

## 2024-08-09 RX ORDER — SACCHAROMYCES BOULARDII 250 MG
250 CAPSULE ORAL 2 TIMES DAILY
Status: DISCONTINUED | OUTPATIENT
Start: 2024-08-09 | End: 2024-08-11 | Stop reason: HOSPADM

## 2024-08-09 RX ORDER — FONDAPARINUX SODIUM 2.5 MG/.5ML
2.5 INJECTION SUBCUTANEOUS EVERY 24 HOURS
Status: DISCONTINUED | OUTPATIENT
Start: 2024-08-10 | End: 2024-08-09

## 2024-08-09 RX ORDER — FONDAPARINUX SODIUM 2.5 MG/.5ML
2.5 INJECTION SUBCUTANEOUS EVERY 24 HOURS
Status: DISCONTINUED | OUTPATIENT
Start: 2024-08-10 | End: 2024-08-11 | Stop reason: HOSPADM

## 2024-08-09 RX ORDER — SUCCINYLCHOLINE/SOD CL,ISO/PF 100 MG/5ML
SYRINGE (ML) INTRAVENOUS AS NEEDED
Status: DISCONTINUED | OUTPATIENT
Start: 2024-08-09 | End: 2024-08-09

## 2024-08-09 RX ORDER — SODIUM CHLORIDE, SODIUM GLUCONATE, SODIUM ACETATE, POTASSIUM CHLORIDE, MAGNESIUM CHLORIDE, SODIUM PHOSPHATE, DIBASIC, AND POTASSIUM PHOSPHATE .53; .5; .37; .037; .03; .012; .00082 G/100ML; G/100ML; G/100ML; G/100ML; G/100ML; G/100ML; G/100ML
75 INJECTION, SOLUTION INTRAVENOUS CONTINUOUS
Status: DISCONTINUED | OUTPATIENT
Start: 2024-08-09 | End: 2024-08-10

## 2024-08-09 RX ORDER — LEVOTHYROXINE SODIUM 88 UG/1
88 TABLET ORAL
Status: DISCONTINUED | OUTPATIENT
Start: 2024-08-09 | End: 2024-08-11 | Stop reason: HOSPADM

## 2024-08-09 RX ORDER — DILTIAZEM HYDROCHLORIDE 5 MG/ML
20 INJECTION INTRAVENOUS ONCE
Status: COMPLETED | OUTPATIENT
Start: 2024-08-09 | End: 2024-08-09

## 2024-08-09 RX ORDER — ACETAMINOPHEN 325 MG/1
650 TABLET ORAL EVERY 6 HOURS PRN
Status: DISCONTINUED | OUTPATIENT
Start: 2024-08-09 | End: 2024-08-11 | Stop reason: HOSPADM

## 2024-08-09 RX ORDER — ROCURONIUM BROMIDE 10 MG/ML
INJECTION, SOLUTION INTRAVENOUS AS NEEDED
Status: DISCONTINUED | OUTPATIENT
Start: 2024-08-09 | End: 2024-08-09

## 2024-08-09 RX ORDER — INSULIN GLARGINE 100 [IU]/ML
30 INJECTION, SOLUTION SUBCUTANEOUS
Status: DISCONTINUED | OUTPATIENT
Start: 2024-08-09 | End: 2024-08-10

## 2024-08-09 RX ORDER — PANTOPRAZOLE SODIUM 40 MG/1
40 TABLET, DELAYED RELEASE ORAL
Status: DISCONTINUED | OUTPATIENT
Start: 2024-08-09 | End: 2024-08-11 | Stop reason: HOSPADM

## 2024-08-09 RX ORDER — SODIUM CHLORIDE, SODIUM LACTATE, POTASSIUM CHLORIDE, CALCIUM CHLORIDE 600; 310; 30; 20 MG/100ML; MG/100ML; MG/100ML; MG/100ML
INJECTION, SOLUTION INTRAVENOUS CONTINUOUS PRN
Status: DISCONTINUED | OUTPATIENT
Start: 2024-08-09 | End: 2024-08-09

## 2024-08-09 RX ORDER — AMLODIPINE BESYLATE 5 MG/1
5 TABLET ORAL DAILY
Status: DISCONTINUED | OUTPATIENT
Start: 2024-08-09 | End: 2024-08-11 | Stop reason: HOSPADM

## 2024-08-09 RX ORDER — ENOXAPARIN SODIUM 100 MG/ML
40 INJECTION SUBCUTANEOUS DAILY
Status: DISCONTINUED | OUTPATIENT
Start: 2024-08-09 | End: 2024-08-09

## 2024-08-09 RX ORDER — FENTANYL CITRATE 50 UG/ML
INJECTION, SOLUTION INTRAMUSCULAR; INTRAVENOUS AS NEEDED
Status: DISCONTINUED | OUTPATIENT
Start: 2024-08-09 | End: 2024-08-09

## 2024-08-09 RX ORDER — METOPROLOL TARTRATE 1 MG/ML
5 INJECTION, SOLUTION INTRAVENOUS ONCE
Status: COMPLETED | OUTPATIENT
Start: 2024-08-09 | End: 2024-08-09

## 2024-08-09 RX ORDER — ALBUTEROL SULFATE 90 UG/1
2 AEROSOL, METERED RESPIRATORY (INHALATION) EVERY 6 HOURS PRN
Status: DISCONTINUED | OUTPATIENT
Start: 2024-08-09 | End: 2024-08-11 | Stop reason: HOSPADM

## 2024-08-09 RX ORDER — SODIUM CHLORIDE, SODIUM LACTATE, POTASSIUM CHLORIDE, CALCIUM CHLORIDE 600; 310; 30; 20 MG/100ML; MG/100ML; MG/100ML; MG/100ML
20 INJECTION, SOLUTION INTRAVENOUS CONTINUOUS
Status: DISCONTINUED | OUTPATIENT
Start: 2024-08-09 | End: 2024-08-09

## 2024-08-09 RX ORDER — METRONIDAZOLE 500 MG/1
500 TABLET ORAL EVERY 8 HOURS SCHEDULED
Status: DISCONTINUED | OUTPATIENT
Start: 2024-08-09 | End: 2024-08-11 | Stop reason: HOSPADM

## 2024-08-09 RX ORDER — FAMOTIDINE 20 MG/1
20 TABLET, FILM COATED ORAL 2 TIMES DAILY
Status: DISCONTINUED | OUTPATIENT
Start: 2024-08-09 | End: 2024-08-11 | Stop reason: HOSPADM

## 2024-08-09 RX ORDER — ONDANSETRON 2 MG/ML
INJECTION INTRAMUSCULAR; INTRAVENOUS AS NEEDED
Status: DISCONTINUED | OUTPATIENT
Start: 2024-08-09 | End: 2024-08-09

## 2024-08-09 RX ADMIN — CEFTRIAXONE SODIUM 2000 MG: 10 INJECTION, POWDER, FOR SOLUTION INTRAVENOUS at 19:56

## 2024-08-09 RX ADMIN — MEROPENEM 1000 MG: 1 INJECTION, POWDER, FOR SOLUTION INTRAVENOUS at 09:44

## 2024-08-09 RX ADMIN — METOROPROLOL TARTRATE 5 MG: 5 INJECTION, SOLUTION INTRAVENOUS at 15:47

## 2024-08-09 RX ADMIN — Medication 250 MG: at 09:37

## 2024-08-09 RX ADMIN — MEROPENEM 1000 MG: 1 INJECTION, POWDER, FOR SOLUTION INTRAVENOUS at 02:20

## 2024-08-09 RX ADMIN — Medication 250 MG: at 19:58

## 2024-08-09 RX ADMIN — IOHEXOL 8 ML: 240 INJECTION, SOLUTION INTRATHECAL; INTRAVASCULAR; INTRAVENOUS; ORAL at 15:16

## 2024-08-09 RX ADMIN — SODIUM CHLORIDE, SODIUM GLUCONATE, SODIUM ACETATE, POTASSIUM CHLORIDE, MAGNESIUM CHLORIDE, SODIUM PHOSPHATE, DIBASIC, AND POTASSIUM PHOSPHATE 75 ML/HR: .53; .5; .37; .037; .03; .012; .00082 INJECTION, SOLUTION INTRAVENOUS at 21:09

## 2024-08-09 RX ADMIN — ROCURONIUM BROMIDE 20 MG: 10 INJECTION INTRAVENOUS at 14:52

## 2024-08-09 RX ADMIN — SUGAMMADEX 200 MG: 100 INJECTION, SOLUTION INTRAVENOUS at 15:15

## 2024-08-09 RX ADMIN — Medication 1000 UNITS: at 09:37

## 2024-08-09 RX ADMIN — PANTOPRAZOLE SODIUM 40 MG: 20 TABLET, DELAYED RELEASE ORAL at 05:25

## 2024-08-09 RX ADMIN — CALCIUM CARBONATE 1000 MG: 500 TABLET, CHEWABLE ORAL at 09:36

## 2024-08-09 RX ADMIN — SODIUM CHLORIDE, SODIUM LACTATE, POTASSIUM CHLORIDE, AND CALCIUM CHLORIDE 150 ML/HR: .6; .31; .03; .02 INJECTION, SOLUTION INTRAVENOUS at 09:44

## 2024-08-09 RX ADMIN — Medication 100 MG: at 14:40

## 2024-08-09 RX ADMIN — ONDANSETRON 4 MG: 2 INJECTION INTRAMUSCULAR; INTRAVENOUS at 14:40

## 2024-08-09 RX ADMIN — INSULIN GLARGINE 15 UNITS: 100 INJECTION, SOLUTION SUBCUTANEOUS at 23:38

## 2024-08-09 RX ADMIN — LEVOTHYROXINE SODIUM 88 MCG: 88 TABLET ORAL at 05:25

## 2024-08-09 RX ADMIN — DILTIAZEM HYDROCHLORIDE 20 MG: 5 INJECTION, SOLUTION INTRAVENOUS at 16:51

## 2024-08-09 RX ADMIN — DILTIAZEM HYDROCHLORIDE 10 MG/HR: 5 INJECTION INTRAVENOUS at 16:58

## 2024-08-09 RX ADMIN — METRONIDAZOLE 500 MG: 500 TABLET ORAL at 19:58

## 2024-08-09 RX ADMIN — AMLODIPINE BESYLATE 5 MG: 5 TABLET ORAL at 09:36

## 2024-08-09 RX ADMIN — SODIUM CHLORIDE, SODIUM LACTATE, POTASSIUM CHLORIDE, AND CALCIUM CHLORIDE 20 ML/HR: .6; .31; .03; .02 INJECTION, SOLUTION INTRAVENOUS at 16:29

## 2024-08-09 RX ADMIN — PREDNISONE 7.5 MG: 1 TABLET ORAL at 10:31

## 2024-08-09 RX ADMIN — PROPOFOL 100 MG: 10 INJECTION, EMULSION INTRAVENOUS at 14:40

## 2024-08-09 RX ADMIN — FAMOTIDINE 20 MG: 20 TABLET ORAL at 09:37

## 2024-08-09 RX ADMIN — FAMOTIDINE 20 MG: 20 TABLET ORAL at 19:58

## 2024-08-09 RX ADMIN — FENTANYL CITRATE 50 MCG: 50 INJECTION INTRAMUSCULAR; INTRAVENOUS at 14:40

## 2024-08-09 RX ADMIN — FERROUS SULFATE TAB 325 MG (65 MG ELEMENTAL FE) 325 MG: 325 (65 FE) TAB at 09:37

## 2024-08-09 RX ADMIN — SODIUM CHLORIDE, SODIUM LACTATE, POTASSIUM CHLORIDE, AND CALCIUM CHLORIDE: .6; .31; .03; .02 INJECTION, SOLUTION INTRAVENOUS at 14:35

## 2024-08-09 NOTE — ASSESSMENT & PLAN NOTE
Lab Results   Component Value Date    HGBA1C 7.6 (H) 07/11/2024       Recent Labs     08/08/24 2052 08/09/24  0120 08/09/24  0759 08/09/24  1055   POCGLU 238* 146* 124 120         Blood Sugar Average: Last 72 hrs:  (P) 157  Home regimen metformin 500 twice daily, Januvia 25, Lantus 30 units at bedtime    Plan  Patient already got her 30 units of Lantus at bedtime  Monitor glucose closely  Will add sliding scale insulin  Hypoglycemia protocol

## 2024-08-09 NOTE — H&P
[facility]  H&P  Name: Jadyn Ac 74 y.o. female I MRN: [id number]  Unit/Bed#: -01 I Date of Admission: 8/8/2024   Date of Service: 8/9/2024 I Hospital Day: 1      Assessment & Plan   * Cholelithiasis  Assessment & Plan  Patient denies right upper quadrant abdominal pain however expresses left lower flank pain, associated chills nausea vomiting and diarrhea.  Leukocytosis, elevated lactic acid on blood work  Perez sign positive at bedside    Right upper quadrant ultrasound-Cholelithiasis without acute cholecystitis.,  CBD 7 mm  CT abdomen pelvis-Borderline steatosis (HU on portal venous phase 85).  No intrahepatic/extrahepatic biliary dilation. No radiopaque biliary stones.  CBD measures 8 mm, 7 mm previously.  No concerning solid masses.     Plan  Trend liver enzymes, bilirubin  Patient meets SIRS, although no radiographic evidence of acute cholecystitis, patient could have development of cholangitis  Will treat with meropenem 1 g every 8 hours  MRCP  IV fluids  Pain control  Surgical consult  GI consult    Elevated serum creatinine  Assessment & Plan  Recent Labs     08/08/24  1414   CREATININE 0.65   EGFR 87     Estimated Creatinine Clearance: 100.8 mL/min (by C-G formula based on SCr of 0.65 mg/dL).  Can continue home ACE after adequate hydration     Leukocytosis  Assessment & Plan  Recent Labs     08/08/24  1414   WBC 18.26*   Patient recently admitted for acute cholecystitis, improvement in white count to 9K on discharge  Presents today later with left lower quadrant abdominal pain, associated chills  Patient meets SIRS with tachycardia and leukocytosis  Blood shows leukocytosis, elevated lactic acid, elevated liver enzymes with obstructive pattern  Procalcitonin 2.9  Right upper quadrant ultrasound with cholelithiasis, mild CBD dilation  CT abdomen shows almost resolution of acute c diverticulitis no evidence of acute cholecystitis  Cannot rule out mild  cholangitis      Plan  Will empirically start meropenem 1 g every 8  Will obtain blood cultures  Continue IV fluids  MRCP to decide on ERCP  Surgical consult, appreciate recs  GI consult, appreciate recs  N.p.o. 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  Patient is agreeable for blood transfusion of blood products    Patient has exemptions to food notably no seafood products, no pork and pork products     Essential hypertension  Assessment & Plan  Continue amlodipine 5, continue benazepril 10 mg    Acquired hypothyroidism  Assessment & Plan  Continue levothyroxine 88 mcg    Asthma  Assessment & Plan  Not in acute exacerbation  Continue albuterol inhaler as needed    Type 2 diabetes mellitus with other specified complication, without long-term current use of insulin (HCC)  Assessment & Plan  Lab Results   Component Value Date    HGBA1C 7.6 (H) 07/11/2024       Recent Labs     08/08/24 2052   POCGLU 238*       Blood Sugar Average: Last 72 hrs:  (P) 238  Home regimen metformin 500 twice daily, Januvia 25, Lantus 30 units at bedtime    Plan  Patient already got her 30 units of Lantus at bedtime  Monitor glucose closely  Will add sliding scale insulin  Hypoglycemia protocol    Polymyositis (HCC)  Assessment & Plan  Stable without acute flare  Follows up with Dr Corona    Acute diverticulitis  Assessment & Plan  Recently admitted for acute diverticulitis, there were meropenem due to multiple antibiotic allergies  Patient discharged 8/5.  Patient still complains of loose stools and 1 episode of vomiting.  She has chills  8/8 CT abdomen pelvis   Minimal residual inflammatory change in the region of recent sigmoid  diverticulitis.   Continue fluid hydration  Continue antibiotics for now           VTE Pharmacologic Prophylaxis: VTE Score: 6 High Risk (Score >/= 5) - Pharmacological DVT Prophylaxis Ordered: enoxaparin (Lovenox). Sequential Compression Devices Ordered.  Code Status: Level 1 - Full Code   Discussion with family: Attempted to update  (daughter) via phone. Left voicemail.     Anticipated Length of Stay: Patient will be admitted on an inpatient basis with an anticipated length of stay of greater than 2 midnights secondary to cholecystitis,.    Chief Complaint: Abdominal pain and diarrhea    History of Present Illness:  Jadyn Ac is a 74 y.o. female with a PMH of 2 diabetes, polymyositis, asthma, hypertension, hyperlipidemia who presents with abdominal pain and diarrhea.  Patient recently admitted 8/1-8/5 for acute cholecystitis.  She was treated with antibiotics meropenem, improvement of her symptoms.  No surgical intervention given uncomplicated nature.  Patient was discharged on 8/5  Patient states while at home started having left lower quadrant abdominal pain, dull, 3/10, mostly constant, no specific patterns ,no aggravating or relieving factors.  Associated 2 episodes of nonbloody watery diarrhea.  Patient has also been decreased appetite, and vomited once today.  No hematemesis  She admits to chills but no objective fever.  She denies increase frequency dysuria or hematuria.  She has no cough no chest pain.  Some shortness of breath on exertion which is chronic.  Upon arrival at the ED patient is hemodynamically stable, meeting SIRS criteria with tachycardia and leukocytosis.  Work returned with leukocytosis 18K elevated liver enzymes, T. bili static pattern, elevated lactic acid, procalcitonin.  Right upper quadrant ultrasound with cholelithiasis, no acute cholecystitis.  CT abdomen pelvis shows resolution of the acute diverticulitis, no abscesses.  Patient was given IV fluids and admitted  for further management        Review of Systems:  Review of Systems   Constitutional:  Positive for chills. Negative for fever.   HENT:  Negative for ear pain and sore throat.    Eyes:  Negative for pain and visual disturbance.   Respiratory:  Negative for cough and shortness of breath.    Cardiovascular:  Negative for chest pain and palpitations.   Gastrointestinal:  Positive for diarrhea and vomiting. Negative for abdominal pain.   Genitourinary:  Negative for dysuria and hematuria.   Musculoskeletal:  Negative for arthralgias and back pain.   Skin:  Negative for color change and rash.   Neurological:  Negative for seizures and syncope.   All other systems reviewed and are negative.      Past Medical and Surgical History:   Past Medical History:   Diagnosis Date    Acute diverticulitis 9/21/2018    Anemia     Asthma     Chronic pain     bulging discs in back,polymyositis    Diabetes mellitus (HCC)     Disease of thyroid gland     Diverticulitis     Hyperlipidemia     Hypertension     Polymyositis (HCC)        Past Surgical History:   Procedure Laterality Date    EYE SURGERY Bilateral     CATAREACT REMOVED FROM BOTH EYES 2 MTHS AGO    HYSTERECTOMY      SINUS SURGERY      THYROIDECTOMY      TUBAL LIGATION         Meds/Allergies:  Prior to Admission medications    Medication Sig Start Date End Date Taking? Authorizing Provider   albuterol (PROVENTIL HFA,VENTOLIN HFA) 90 mcg/act inhaler INHALE 2 PUFFS EVERY 6 (SIX) HOURS AS NEEDED FOR WHEEZING 6/9/23   Herlinda Castorena MD   amLODIPine (NORVASC) 5 mg tablet TAKE 1 TABLET BY MOUTH DAILY 8/6/24   Herlinda Castorena MD   benazepril (LOTENSIN) 10 mg tablet TAKE 1 TABLET DAILY 5/5/24   Herlinda Castorena MD   benazepril (LOTENSIN) 5 mg tablet Take 5 mg by mouth daily    Historical Provider, MD   Blood Glucose Monitoring Suppl (OneTouch Verio) w/Device KIT Use 2 (two) times a day 7/12/24   CHICA Mario   calcium carbonate (OS-YNES) 1250 (500 Ca) MG tablet 1 tablet with meals Orally     Historical Provider, MD   Cholecalciferol 25 MCG (1000 UT) tablet Take 1 tablet by mouth daily    Historical Provider, MD   famotidine (PEPCID) 20 mg tablet Take 1 tablet (20 mg total) by mouth 2 (two) times a day 8/5/24 9/4/24  CHICA Nye   ferrous sulfate 325 (65 Fe) mg tablet Every 12 hours    Historical Provider, MD   insulin glargine (LANTUS) 100 units/mL subcutaneous injection Inject 30 Units under the skin daily at bedtime 1/8/24   Herlinda Castorena MD   Insulin Pen Needle (Pen Needles) 31G X 5 MM MISC USE DAILY FOR INSULIN ADMINISTRATION 7/3/24   CHICA Mario   Lancets (OneTouch Delica Plus [id number]) MISC Use daily as directed. 7/3/24   CHICA Mario   metFORMIN (GLUCOPHAGE-XR) 500 mg 24 hr tablet TAKE 1 TABLET 2 TIMES A DAY WITH MEALS 6/6/24   CHICA Mario   omeprazole (PriLOSEC) 40 MG capsule omeprazole 40 mg capsule,delayed release    Historical Provider, MD   OneTouch Verio test strip CHECK BLOOD GLUCOSE TWICE DAILY 1/26/24   Crystal Swartz MD   predniSONE 5 mg tablet TAKE 1-1/2 TABS DAILY 8/6/24   Herlinda Castorena MD   saccharomyces boulardii (FLORASTOR) 250 mg capsule Take 1 capsule (250 mg total) by mouth 2 (two) times a day for 14 days 8/5/24 8/19/24  CHICA Nye   sitaGLIPtin (Januvia) 25 mg tablet TAKE 1 TABLET DAILY 7/31/24   CHICA Mario   Synthroid 88 MCG tablet TAKE ONE TABLET EVERY DAY IN THE EARLY MORNING ON AN EMPTY STOMACH. 8/1/24   CHICA Rosales     I have reviewed home medications with patient personally.    Allergies:   Allergies   Allergen Reactions    Anaprox [Naproxen Sodium] Shortness Of Breath    Levofloxacin Hives    Sulfamethoxazole-Trimethoprim Anaphylaxis    Jardiance [Empagliflozin] GI Intolerance    Penicillins Swelling and Hives    Alendronate Rash    Aspirin Rash    Azathioprine Rash    Metronidazole Rash    Sulfa Antibiotics Rash       Social History:  Marital Status:    Occupation: retired  Patient  Pre-hospital Living Situation: Home  Patient Pre-hospital Level of Mobility: walks with walker  Patient Pre-hospital Diet Restrictions: carb consistent  Substance Use History:   Social History     Substance and Sexual Activity   Alcohol Use Not Currently     Social History     Tobacco Use   Smoking Status Never   Smokeless Tobacco Never     Social History     Substance and Sexual Activity   Drug Use No       Family History:  Family History   Problem Relation Age of Onset    Hepatitis Sister         acute hepatits C virus    Hypertension Daughter     Kidney disease Daughter     Hypertension Son     Mental illness Son        Physical Exam:     Vitals:   Blood Pressure: 141/65 (08/09/24 0329)  Pulse: 95 (08/09/24 0307)  Temperature: 98.7 °F (37.1 °C) (08/09/24 0307)  Temp Source: Oral (08/09/24 0307)  Respirations: 18 (08/09/24 0307)  SpO2: 94 % (08/09/24 0307)    Physical Exam  Vitals and nursing note reviewed.   Constitutional:       General: She is not in acute distress.     Appearance: She is well-developed. She is obese. She is not ill-appearing or toxic-appearing.   HENT:      Head: Normocephalic and atraumatic.   Eyes:      Conjunctiva/sclera: Conjunctivae normal.   Cardiovascular:      Rate and Rhythm: Normal rate and regular rhythm.      Heart sounds: No murmur heard.  Pulmonary:      Effort: Pulmonary effort is normal. No respiratory distress.      Breath sounds: Normal breath sounds. No wheezing or rales.   Abdominal:      General: Bowel sounds are normal.      Palpations: Abdomen is soft. There is no mass.      Tenderness: There is abdominal tenderness. There is no right CVA tenderness, left CVA tenderness or guarding.      Comments: RUQ tenderness with tinajero's sign   Mild discomfort in LL quadrant   Musculoskeletal:         General: No swelling.      Cervical back: Neck supple.   Skin:     General: Skin is warm and dry.      Capillary Refill: Capillary refill takes less than 2 seconds.   Neurological:       Mental Status: She is alert.   Psychiatric:         Mood and Affect: Mood normal.          Additional Data:     Lab Results:  Results from last 7 days   Lab Units 08/09/24  0117 08/08/24  1414 08/05/24  0631   WBC Thousand/uL  --  18.26* 9.53   HEMOGLOBIN g/dL  --  11.3* 10.4*   HEMATOCRIT %  --  34.7* 32.1*   PLATELETS Thousands/uL 359 344 365   BANDS PCT %  --  9*  --    SEGS PCT %  --   --  52   LYMPHO PCT %  --  3* 35   MONO PCT %  --  4 8   EOS PCT %  --  0 4     Results from last 7 days   Lab Units 08/08/24  1414   SODIUM mmol/L 132*   POTASSIUM mmol/L 4.4   CHLORIDE mmol/L 98   CO2 mmol/L 24   BUN mg/dL 18   CREATININE mg/dL 0.65   ANION GAP mmol/L 10   CALCIUM mg/dL 8.4   ALBUMIN g/dL 3.6   TOTAL BILIRUBIN mg/dL 1.09*   ALK PHOS U/L 138*   ALT U/L 215*   AST U/L 674*   GLUCOSE RANDOM mg/dL 312*     Results from last 7 days   Lab Units 08/09/24  0117   INR  1.11     Results from last 7 days   Lab Units 08/09/24  0120 08/08/24  2052 08/05/24  1556 08/05/24  1058 08/05/24  0727 08/04/24  2105 08/04/24  1620 08/04/24  1128 08/04/24  0750 08/03/24  2033 08/03/24  1643 08/03/24  1221   POC GLUCOSE mg/dl 146* 238* 220* 307* 150* 262* 261* 241* 138 191* 192* 221*     Lab Results   Component Value Date    HGBA1C 7.6 (H) 07/11/2024    HGBA1C 6.8 (H) 10/20/2023    HGBA1C 6.4 (H) 05/18/2023     Results from last 7 days   Lab Units 08/08/24  1719 08/08/24  1457 08/08/24  1414   LACTIC ACID mmol/L 1.5 2.2*  --    PROCALCITONIN ng/ml  --   --  2.96*       Lines/Drains:  Invasive Devices       Peripheral Intravenous Line  Duration             Peripheral IV 08/08/24 Right Antecubital <1 day                        Imaging: Reviewed radiology reports from this admission including: abdominal/pelvic CT  CT abdomen pelvis with contrast   Final Result by eJsus Teague MD (08/08 2117)      Minimal residual inflammatory change in the region of recent sigmoid diverticulitis.      Otherwise no new acute abdominopelvic  abnormality.      Borderline hepatic steatosis.   CBD is minimally increased from the recent comparison exam. No intrahepatic dilation.   No radiopaque biliary calculi.      Partially visualized left posterior pleural-based nodule measuring to 7 mm.   Follow-up CT chest could be considered in 12 months      The study was marked in EPIC for immediate notification.      .      Workstation performed: [id number]         US right upper quadrant   Final Result by Suresh Melo MD (08/08 1921)      Cholelithiasis without acute cholecystitis.      Workstation performed: [id number]         MRI inpatient order    (Results Pending)       EKG and Other Studies Reviewed on Admission:   EKG: NSR. .    ** Please Note: This note has been constructed using a voice recognition system. **

## 2024-08-09 NOTE — ANESTHESIA POSTPROCEDURE EVALUATION
Post-Op Assessment Note    CV Status:  Stable  Pain Score: 0    Pain management: adequate       Mental Status:  Alert and awake   Hydration Status:  Euvolemic   PONV Controlled:  Controlled   Airway Patency:  Patent  Airway: intubated     Post Op Vitals Reviewed: Yes    No anethesia notable event occurred.    Staff: CRNA               BP   132/71   Temp      Pulse  116   Resp   18   SpO2   99

## 2024-08-09 NOTE — UTILIZATION REVIEW
Initial Clinical Review    Admission: Date/Time/Statement:   Admission Orders (From admission, onward)       Ordered        08/08/24 2149  INPATIENT ADMISSION  Once                          Orders Placed This Encounter   Procedures    INPATIENT ADMISSION     Standing Status:   Standing     Number of Occurrences:   1     Order Specific Question:   Level of Care     Answer:   Med Surg [16]     Order Specific Question:   Estimated length of stay     Answer:   More than 2 Midnights     Order Specific Question:   Certification     Answer:   I certify that inpatient services are medically necessary for this patient for a duration of greater than two midnights. See H&P and MD Progress Notes for additional information about the patient's course of treatment.     ED Arrival Information       Expected   -    Arrival   8/8/2024 13:36    Acuity   Urgent              Means of arrival   Ambulance    Escorted by   [facility]    Admission type   Emergency              Arrival complaint   abd pain             Chief Complaint   Patient presents with    Abdominal Pain     Per ems, pt comes from home c/o severe abd pain. Got dx with diverticulitis and discharged a couple days ago. Pt still having pain. +vomit this am.       Initial Presentation: 74 y.o. female with hx DM2, polymyositis, asthma, HTN, HLD ,admitted 8/1-8/5/24  for acute  diverticulitis ( treated w/ Meropenem w/ improvement in sx ) who presents to ED from home via EMS with abdominal pain and diarrhea.Pain  left lower quadrant abdominal pain, dull, 3/10, mostly constant,   Associated 2 episodes of nonbloody watery diarrhea.  Patient has also been decreased appetite, and vomited once today . On exam, pt tachycardic .RUQ tenderness with tinajero's sign  . Mild discomfort in LL quadrant Labs : WBC 18, elevated LFT's , T. bili static pattern, elevated lactic acid 2.2--->1.5 , procal 2.96.   Na 123. Right upper quadrant ultrasound with cholelithiasis, no  acute cholecystitis, CBD 7 mm   CT abdomen pelvis shows resolution of the acute diverticulitis, no abscesses.  Pt given IVF, IV antiemetic in ED. Admitted as inpatient with cholelithiasis, leukocytosis- monitor for development of cholangitis. Plan- trend LFT's , T bili. IV Meropenem. Obtain blood cx. Obtain MRCP . NPO after MN . IVF. Pain control. GI and general sx consults. continue home ACE after adequate hydration   Anticipated Length of Stay/Certification Statement: Patient will be admitted on an inpatient basis with an anticipated length of stay of greater than 2 midnights secondary to choleclithiasis     Date: 8/9    Day 2:   General sx consult- elevated LFTs, cholelithiasis no cholecystitis, in setting of diverticulitis.  Last BM this morning, loose.   Pt is NPO with IVF infusing . LLQ abdominal tenderness. Pt afebrile ,. WBC down to 14.63 today. Pt for MRCP. Continue to trend LFT's  which have increased from admission other than AST which has increased. T and D bili up from admission .   GI consult- suspect choledocholithiasis, unfortunately patient refusing MRI at this time.  Less likely scenario would be that pain is related to resolving diverticulitis/bowel spasm and LFT elevation may be incidental to her pain, and related to reaction to meropenem.  However patient's reported chills and elevated white blood cell count raise the possibility of cholangitis developing . Plan for ERCP for exploration of CBD and removal of any choledocholithiasis present . NPO. Monitor liver enzymes; if ERCP is unremarkable for choledocholithiasis and liver enzymes continue to increase, would recommend discontinuing IV antibiotics so long as no other signs of systemic infection are otherwise manifest . Check acute hepatitis panel and viral serologies.    Medicine-  continues to complain of intermittent left lower abdominal pain.  At its worst pain is a 10 out of 10.  She has not been vomiting today but had 2 episodes of  vomiting and 2 episodes of diarrhea yesterday.  She reports worsening abdominal symptoms and vomiting after eating.  She is resting comfortably now.  She noted some chills yesterday but none today.  Despite GI recommendation for MRCP, patient is refusing due to severe claustrophobia. She has been unable to complete exam in the past. Surgery recommends seeing patient in the outpatient setting post ERCP . Patient refuses Lovenox/heparin due to being derived from pork products.  Will proceed with fondaparinux VTE prophylaxis starting tomorrow 8/10.  Meropenem switched to IV ceftriaxone and po Flagyl per ID Recommendation.Per ID, low cross-reactivity with penicillin allergy and cephalosporin. Documented rash with Metronidazole.  Recommend close monitoring for any sign of allergic reaction.Patient underwent ERCP today. Stone extracted . Per GI, can start CL diet. IVF. Monitor LFT's . After procedure, pt developed new onset A-fib with RVR noted while  in the PACU. Patient started on IV metoprolol and Cardizem drip. Cardizem was titrated down due to decreasing blood pressure. Pt upgraded  to med surg telemetry . [id number] 4. Cardiology consult.     Date: 8/10 Day 3:Has surpassed a 2nd midnight with active treatments and services.  Elevated LFTs, cholelithiasis no cholecystitis, in setting of diverticulitis. Plan for outpatient lap aruna given complicated course.    Pt is s/p ERCP yesterday with removal small stones .LFT's  and d bili decreasing . T bili normalized today . IVF infusing .Advance diet today .  Heart rate down to 70's-80's on Cardizem drip for new A fib s/p ERCP . Pt currently in NSR . Patient would like to wait and see if she has another episode of A-fib before going on any A-fib specific medications. Check echocardiogram.  Monitor on telemetry for today.  Cardiology consult cx'ed overnight . WBC normalized today . Pt on IV Ceftraixone, po Flagyl. Plan for 1 week of abx . No bm, no flatus. Ritu CL diet w/o  N/V . Reports epigastric pain . Abdomen soft. Advance diet as douglas.  Continue to monitor LFT's . Diverticulitis: Would recommend outpatient colonoscopy in about 6 weeks to assess for any luminal lesions                   ED Triage Vitals   Temperature Pulse Respirations Blood Pressure SpO2 Pain Score   08/08/24 1345 08/08/24 1342 08/08/24 1342 08/08/24 1342 08/08/24 1342 08/08/24 1342   98.1 °F (36.7 °C) 102 18 134/58 94 % 5     Weight (last 2 days)       Date/Time Weight    08/10/24 0316 109 (240.08)    08/09/24 0600 107 (235.89)    08/09/24 0350 107 (235.89)            Vital Signs (last 3 days)       Date/Time Temp Pulse Resp BP MAP (mmHg) SpO2 O2 Device Cardiac (WDL) Patient Position - Orthostatic VS Sanchez Coma Scale Score Pain    08/10/24 08:39:58 98.4 °F (36.9 °C) 81 -- 145/73 97 95 % -- -- -- -- --    08/09/24 21:14:02 97.8 °F (36.6 °C) 70 16 102/57 72 93 % Nasal cannula -- Lying -- --    08/09/24 19:35:02 -- 76 -- 131/74 93 98 % -- -- -- -- --    08/09/24 1900 -- -- -- -- -- -- -- -- -- 15 --    08/09/24 18:48:08 97.5 °F (36.4 °C) 124 -- 111/80 90 98 % -- -- -- -- --    08/09/24 1820 -- 99 16 106/65 -- 95 % None (Room air) -- -- -- No Pain    08/09/24 1815 -- 93 12 99/55 -- 95 % None (Room air) -- -- -- No Pain    08/09/24 1800 -- 103 15 134/63 -- 96 % None (Room air) -- -- -- No Pain    08/09/24 1745 -- 116 16 118/69 -- 96 % None (Room air) -- -- -- No Pain    08/09/24 1735 -- 127 18 129/81 -- 98 % None (Room air) -- -- -- No Pain    08/09/24 1727 -- 118 16 129/81 -- 97 % None (Room air) -- -- -- No Pain    08/09/24 1715 -- 112 12 114/74 -- 98 % None (Room air) -- -- -- No Pain    08/09/24 1712 -- 99 16 113/74 -- 96 % None (Room air) -- -- -- No Pain    08/09/24 1705 -- 100 18 99/67 -- 95 % None (Room air) -- -- -- No Pain    08/09/24 1700 -- 93 19 102/59 -- 95 % None (Room air) -- -- -- No Pain    08/09/24 1658 -- 89 16 153/69 -- 95 % None (Room air) -- -- -- No Pain    08/09/24 1645 -- 135 15 116/78 -- 96  % None (Room air) -- -- -- No Pain    08/09/24 1630 -- 121 16 131/66 -- 95 % None (Room air) -- -- -- No Pain    08/09/24 1615 -- 109 14 121/74 -- 96 % None (Room air) -- -- -- No Pain    08/09/24 1600 -- 121 15 117/69 -- 96 % None (Room air) -- -- -- No Pain    08/09/24 1545 -- 125 14 111/57 -- 96 % None (Room air) -- -- -- No Pain    08/09/24 1540 97.1 °F (36.2 °C) 150 16 132/71 -- 97 % None (Room air) X -- 15 No Pain    08/09/24 1330 97 °F (36.1 °C) 98 20 149/88 -- 96 % None (Room air) -- -- -- --    08/09/24 1000 -- -- -- -- -- -- None (Room air) -- -- 15 2    08/09/24 0700 -- 90 18 155/71 102 -- None (Room air) -- Lying -- --    08/09/24 0329 -- -- -- 141/65 94 -- -- -- -- -- 5    08/09/24 0307 98.7 °F (37.1 °C) 95 18 -- -- 94 % -- -- -- 15 --    08/09/24 0100 -- -- -- 140/71 99 -- -- -- -- -- --    08/08/24 2300 -- 91 18 141/73 97 97 % None (Room air) -- Sitting -- --    08/08/24 2100 -- 92 18 142/68 98 97 % None (Room air) -- Sitting -- --    08/08/24 2030 -- 96 -- 135/66 92 96 % -- -- -- -- --    08/08/24 1930 -- 93 18 128/63 89 94 % -- -- Sitting -- --    08/08/24 1900 -- 93 18 132/64 90 95 % -- -- -- -- --    08/08/24 1830 -- 96 -- 120/58 80 95 % -- -- -- -- --    08/08/24 1815 -- 95 -- 132/61 88 96 % -- -- -- -- --    08/08/24 1800 -- 97 -- 107/59 76 94 % -- -- -- -- --    08/08/24 1715 -- 98 18 134/56 85 94 % -- -- -- -- --    08/08/24 1701 -- 100 -- 148/68 98 96 % -- -- -- -- --    08/08/24 1630 -- 95 -- 129/63 88 94 % -- -- -- -- --    08/08/24 1615 -- 94 -- 136/65 93 94 % -- -- -- -- --    08/08/24 1600 -- 97 18 139/67 96 96 % -- -- -- -- --    08/08/24 1545 -- 96 18 142/67 96 94 % None (Room air) -- Sitting -- --    08/08/24 1430 -- -- -- -- -- -- -- -- -- 15 --    08/08/24 1345 98.1 °F (36.7 °C) 103 -- 134/58 83 96 % -- -- -- -- --    08/08/24 1342 -- 102 18 134/58 -- 94 % None (Room air) -- Sitting -- 5              Pertinent Labs/Diagnostic Test Results:   Radiology:  FL ERCP biliary and  pancreatic   Final Interpretation by Nir Howell MD (08/09 1637)      Fluoroscopic guidance for ERCP. Please see procedure note for further details.   Ampulla is floppy and cannulated using the sphincterotome catheter from a little longer position  The common bile duct was deeply cannulated after 3 attempts using a traction sphincterotome. Cannulation was not difficult and no bleeding was observed. Cholangiogram showed common bile duct measuring about 7 to 8 mm with a small filling defect distally.  Sphincterotomy was performed and the duct was swept with 9 to 12 mm balloon catheter with extraction of the stone.      Workstation performed: [id number]         CT abdomen pelvis with contrast   Final Interpretation by Jesus Teague MD (08/08 2117)      Minimal residual inflammatory change in the region of recent sigmoid diverticulitis.      Otherwise no new acute abdominopelvic abnormality.      Borderline hepatic steatosis.   CBD is minimally increased from the recent comparison exam. No intrahepatic dilation.   No radiopaque biliary calculi.      Partially visualized left posterior pleural-based nodule measuring to 7 mm.   Follow-up CT chest could be considered in 12 months      The study was marked in EPIC for immediate notification.      .      Workstation performed: [id number]         US right upper quadrant   Final Interpretation by Suresh Melo MD (08/08 1921)      Cholelithiasis without acute cholecystitis.      Workstation performed: [id number]           Cardiology: 8/8 ECG- Normal sinus rhythm     GI:  ERCP   Final Result by Monika Betancourt MD (08/09 1527)   Ampulla is floppy and cannulated using the sphincterotome catheter from a    little longer position         RECOMMENDATION:   Clear liquid diet, lactated Ringer at 150 mL/h, monitor liver enzymes                       Monika Betancourt MD                  Results from last 7 days   Lab Units 08/10/24  0312 08/09/24  0450 08/09/24  0117  08/08/24 1414 08/05/24 0631 08/04/24  0459   WBC Thousand/uL 7.80 14.63*  --  18.26* 9.53 10.85*   HEMOGLOBIN g/dL 9.6* 10.5*  --  11.3* 10.4* 9.7*   HEMATOCRIT % 29.4* 32.3*  --  34.7* 32.1* 30.1*   PLATELETS Thousands/uL 301 349 359 344 365 324   TOTAL NEUT ABS Thousands/µL 5.14 12.39*  --   --  5.07 6.86   BANDS PCT %  --   --   --  9*  --   --          Results from last 7 days   Lab Units 08/10/24  0312 08/09/24  0450 08/08/24  1414 08/05/24  0631 08/04/24  0459   SODIUM mmol/L 140 138 132* 140 140   POTASSIUM mmol/L 3.5 3.7 4.4 4.3 3.3*   CHLORIDE mmol/L 105 104 98 105 104   CO2 mmol/L 28 24 24 27 26   ANION GAP mmol/L 7 10 10 8 10   BUN mg/dL 7 10 18 5 5   CREATININE mg/dL 0.50* 0.49* 0.65 0.47* 0.40*   EGFR ml/min/1.73sq m 95 96 87 97 102   CALCIUM mg/dL 8.0* 8.2* 8.4 8.3* 8.0*   MAGNESIUM mg/dL  --   --   --  1.8* 1.6*   PHOSPHORUS mg/dL  --   --   --  2.5 2.5     Results from last 7 days   Lab Units 08/10/24  0312 08/09/24  0450 08/09/24  0117 08/08/24 1414 08/05/24 0631 08/04/24  0459   AST U/L 107* 265*  --  674* 15 12*   ALT U/L 198* 269*  --  215* 15 12   ALK PHOS U/L 165* 173*  --  138* 59 47   TOTAL PROTEIN g/dL 6.0* 6.4  --  6.9 6.6 6.3*   ALBUMIN g/dL 3.2* 3.5  --  3.6 3.5 3.3*   TOTAL BILIRUBIN mg/dL 0.96 2.94*  --  1.09* 0.24 0.37   BILIRUBIN DIRECT mg/dL 0.49* 2.08* 1.94*  --   --   --      Results from last 7 days   Lab Units 08/10/24  0844 08/09/24  2222 08/09/24  1555 08/09/24  1055 08/09/24  0759 08/09/24  0120 08/08/24  2052 08/05/24  1556 08/05/24  1058 08/05/24  0727 08/04/24  2105 08/04/24  1620   POC GLUCOSE mg/dl 90 146* 201* 120 124 146* 238* 220* 307* 150* 262* 261*     Results from last 7 days   Lab Units 08/10/24  0312 08/09/24  0450 08/08/24  1414 08/05/24  0631 08/04/24  0459   GLUCOSE RANDOM mg/dL 86 110 312* 156* 140               Results from last 7 days   Lab Units 08/10/24  0312 08/09/24  0117   PROTIME seconds 16.3* 15.0   INR  1.24* 1.11     Results from last 7 days    Lab Units 08/09/24  0450   TSH 3RD GENERATON uIU/mL 0.544     Results from last 7 days   Lab Units 08/08/24  1414   PROCALCITONIN ng/ml 2.96*     Results from last 7 days   Lab Units 08/08/24  1719 08/08/24  1457   LACTIC ACID mmol/L 1.5 2.2*                                   Results from last 7 days   Lab Units 08/08/24  1414   LIPASE u/L 8*                 Results from last 7 days   Lab Units 08/08/24  1458   CLARITY UA  Clear   COLOR UA  Yellow   SPEC GRAV UA  1.015   PH UA  5.5   GLUCOSE UA mg/dl >=1000 (1%)*   KETONES UA mg/dl Trace*   BLOOD UA  Small*   PROTEIN UA mg/dl Trace*   NITRITE UA  Negative   BILIRUBIN UA  Negative   UROBILINOGEN UA (BE) mg/dl <2.0   LEUKOCYTES UA  Elevated glucose may cause decreased leukocyte values. See urine microscopic for UWBC result*   WBC UA /hpf 1-2   RBC UA /hpf None Seen   BACTERIA UA /hpf Occasional   EPITHELIAL CELLS WET PREP /hpf Occasional                         Results from last 7 days   Lab Units 08/04/24  1014   SALMONELLA SP PCR  Negative   SHIGELLA SP/ENTEROINVASIVE E. COLI (EIEC)  Negative   CAMPYLOBACTER SP (JEJUNI AND COLI)  Negative   SHIGA TOXIN 1/SHIGA TOXIN 2  Negative         Results from last 7 days   Lab Units 08/09/24  0117   BLOOD CULTURE  No Growth at 24 hrs.  No Growth at 24 hrs.                   ED Treatment-Medication Administration from 08/08/2024 1336 to 08/09/2024 0303         Date/Time Order Dose Route Action     08/08/2024 1426 sodium chloride 0.9 % bolus 1,000 mL 1,000 mL Intravenous New Bag     08/08/2024 1447 ondansetron (ZOFRAN) injection 4 mg 4 mg Intravenous Given     08/08/2024 1451 acetaminophen (Ofirmev) injection 1,000 mg 1,000 mg Intravenous New Bag     08/08/2024 1559 sodium chloride 0.9 % bolus 1,000 mL 1,000 mL Intravenous New Bag     08/08/2024 2059 insulin glargine (LANTUS) subcutaneous injection 30 Units 0.3 mL 30 Units Subcutaneous Given     08/08/2024 2100 lactated ringers infusion 150 mL/hr Intravenous New Bag      08/08/2024 2028 iohexol (OMNIPAQUE) 350 MG/ML injection (MULTI-DOSE) 85 mL 85 mL Intravenous Given     08/09/2024 0220 meropenem (MERREM) 1,000 mg in sodium chloride 0.9 % 100 mL IVPB 1,000 mg Intravenous New Bag            Past Medical History:   Diagnosis Date    Acute diverticulitis 9/21/2018    Anemia     Asthma     Chronic pain     bulging discs in back,polymyositis    Diabetes mellitus (HCC)     Disease of thyroid gland     Diverticulitis     Hyperlipidemia     Hypertension     Polymyositis (HCC)      Present on Admission:   Acquired hypothyroidism   Diverticulitis   Asthma   Essential hypertension   Leukocytosis   Type 2 diabetes mellitus with other specified complication, without long-term current use of insulin (HCC)   Polymyositis (HCC)      Admitting Diagnosis: Cholelithiasis [K80.20]  Abdominal pain [R10.9]  Elevated LFTs [R79.89]  Vomiting and diarrhea [R11.10, R19.7]  Age/Sex: 74 y.o. female  Admission Orders:  Scheduled Medications:  amLODIPine, 5 mg, Oral, Daily  calcium carbonate, 1,000 mg, Oral, Daily  cefTRIAXone, 2,000 mg, Intravenous, Q24H  Cholecalciferol, 1,000 Units, Oral, Daily  famotidine, 20 mg, Oral, BID  ferrous sulfate, 325 mg, Oral, Every Other Day  fondaparinux, 2.5 mg, Subcutaneous, Q24H  insulin lispro, 1-6 Units, Subcutaneous, 4x Daily (AC & HS)  levothyroxine, 88 mcg, Oral, Early Morning  metroNIDAZOLE, 500 mg, Oral, Q8H ANTONETTE  pantoprazole, 40 mg, Oral, Early Morning  predniSONE, 7.5 mg, Oral, Daily  saccharomyces boulardii, 250 mg, Oral, BID    meropenem (MERREM) 1,000 mg in sodium chloride 0.9 % 100 mL IVPB  Dose: 1,000 mg  Freq: Every 8 hours Route: IV  Indications Comment: with penicillin allergy  Last Dose: 1,000 mg (08/09/24 0944)  Start: 08/09/24 0100 End: 08/09/24 1545  enoxaparin (LOVENOX) subcutaneous injection 40 mg  Dose: 40 mg  Freq: Daily Route: SC  Start: 08/09/24 0900 End: 08/09/24 1027   metoprolol (LOPRESSOR) injection 5 mg  Dose: 5 mg  Freq: Once Route:  IV  Indications of Use: ATRIAL FIBRILLATION  Start: 08/09/24 1600 End: 08/09/24 1547  diltiazem (CARDIZEM) injection 20 mg  Dose: 20 mg  Freq: Once Route: IV  Indications of Use: ATRIAL FIBRILLATION  Start: 08/09/24 1630 End: 08/09/24 1651    insulin glargine (LANTUS) subcutaneous injection 30 Units 0.3 mL  Dose: 30 Units  Freq: Daily at bedtime Route: SC  Start: 08/09/24 2200 End: 08/10/24 0621      Continuous IV Infusions:  diltiazem, 1-15 mg/hr, Intravenous, Titrated  multi-electrolyte, 75 mL/hr, Intravenous, Continuous    lactated ringers infusion  Rate: 150 mL/hr Dose: 150 mL/hr  Freq: Continuous Route: IV  Indications of Use: IV Hydration  Last Dose: Stopped (08/09/24 1600)  Start: 08/08/24 2030 End: 08/09/24 1545  lactated ringers infusion  Rate: 20 mL/hr Dose: 20 mL/hr  Freq: Continuous Route: IV  Indications of Use: IV Hydration  Last Dose: Stopped (08/09/24 1854)  Start: 08/09/24 1630 End: 08/09/24 1851      PRN Meds:  acetaminophen, 650 mg, Oral, Q6H PRN  albuterol, 2 puff, Inhalation, Q6H PRN  ondansetron, 4 mg, Intravenous, Once PRN    NPO 8/9 am    SCD   OOB as douglas    IP CONSULT TO GASTROENTEROLOGY  IP CONSULT TO ACUTE CARE SURGERY    Network Utilization Review Department  ATTENTION: Please call with any questions or concerns to [phone] and carefully listen to the prompts so that you are directed to the right person. All voicemails are confidential.   For Discharge needs, contact Care Management DC Support Team at [phone] opt. [phone]  Send all requests for admission clinical reviews, approved or denied determinations and any other requests to dedicated fax number below belonging to the campus where the patient is receiving treatment. List of dedicated fax numbers for the Facilities:  FACILITY NAME UR FAX NUMBER   ADMISSION DENIALS (Administrative/Medical Necessity) [phone]   DISCHARGE SUPPORT TEAM (NETWORK) [phone]   PARENT CHILD HEALTH (Maternity/NICU/Pediatrics) [phone]   [facility]  [facility] [phone]   [facility] [phone]   [facility] [phone]   [facility] [phone]   [facility] [phone]   [facility] [phone]   [facility] [phone]   [facility] [phone]   [facility] [phone]   [facility] [phone]   [facility] [phone]   [facility] [phone]

## 2024-08-09 NOTE — ASSESSMENT & PLAN NOTE
Recent Labs     08/08/24  1414 08/09/24  0450   WBC 18.26* 14.63*     Patient recently admitted for acute cholecystitis, improvement in white count to 9K on discharge  Presents today later with left lower quadrant abdominal pain, associated chills  Patient meets SIRS with tachycardia and leukocytosis  Blood shows leukocytosis, elevated lactic acid, elevated liver enzymes with obstructive pattern  Procalcitonin 2.9  Right upper quadrant ultrasound with cholelithiasis, mild CBD dilation  CT abdomen shows almost resolution of acute c diverticulitis no evidence of acute cholecystitis  Cannot rule out mild cholangitis.  Despite GI recommendation for MRCP, patient is refusing due to severe claustrophobia.  She has been unable to complete exam in the past.      Plan  Will empirically start meropenem 1 g every 8  Will obtain blood cultures  Continue IV fluids  GI will proceed with ERCP and colonoscopy  Surgery recommends seeing patient in the outpatient setting post ERCP  N.p.o.

## 2024-08-09 NOTE — INCIDENTAL FINDINGS
The following findings require follow up:  Radiographic finding   Finding: Partially visualized left posterior pleural-based nodule measuring to 7 mm.  Follow-up CT chest could be considered in 12 months   Follow up required: 1 year   Follow up should be done within 12 month(s)    Please notify the following clinician to assist with the follow up:    Family physician    Documented in both the discharge summary as well as the AVS follow-up as well as a carbon copy to the family physician.

## 2024-08-09 NOTE — PROGRESS NOTES
[facility]  Progress Note  Name: Jadyn Ac I  MRN: [id number]  Unit/Bed#: -01 I Date of Admission: 8/8/2024   Date of Service: 8/9/2024 I Hospital Day: 1    Assessment & Plan   * Cholelithiasis  Assessment & Plan  Patient denies right upper quadrant abdominal pain however expresses left lower flank pain, associated chills nausea vomiting and diarrhea.  Leukocytosis, elevated lactic acid on blood work  Perez sign positive at bedside    Right upper quadrant ultrasound-Cholelithiasis without acute cholecystitis.,  CBD 7 mm  CT abdomen pelvis-Borderline steatosis (HU on portal venous phase 85).  No intrahepatic/extrahepatic biliary dilation. No radiopaque biliary stones.  CBD measures 8 mm, 7 mm previously.  No concerning solid masses.     Plan  Trend liver enzymes, bilirubin  Patient meets SIRS, although no radiographic evidence of acute cholecystitis, patient could have development of cholangitis  Will treat with meropenem 1 g every 8 hours  ERCP this afternoon  IV fluids  Pain control  Surgical consult  GI consult    Pleural nodule  Assessment & Plan  7 mm pleural nodule  Follow-up CT in 12 months  Discussed finding with patient and daughter.    Elevated serum creatinine  Assessment & Plan  Recent Labs     08/08/24  1414 08/09/24  0450   CREATININE 0.65 0.49*   EGFR 87 96       Estimated Creatinine Clearance: 135.6 mL/min (A) (by C-G formula based on SCr of 0.49 mg/dL (L)).  Can continue home ACE after adequate hydration     Leukocytosis  Assessment & Plan  Recent Labs     08/08/24  1414 08/09/24  0450   WBC 18.26* 14.63*     Patient recently admitted for acute cholecystitis, improvement in white count to 9K on discharge  Presents today later with left lower quadrant abdominal pain, associated chills  Patient meets SIRS with tachycardia and leukocytosis  Blood shows leukocytosis, elevated lactic acid, elevated liver enzymes with obstructive pattern  Procalcitonin 2.9  Right  upper quadrant ultrasound with cholelithiasis, mild CBD dilation  CT abdomen shows almost resolution of acute c diverticulitis no evidence of acute cholecystitis  Cannot rule out mild cholangitis.  Despite GI recommendation for MRCP, patient is refusing due to severe claustrophobia.  She has been unable to complete exam in the past.      Plan  Will empirically start meropenem 1 g every 8  Will obtain blood cultures  Continue IV fluids  GI will proceed with ERCP and colonoscopy  Surgery recommends seeing patient in the outpatient setting post ERCP  N.p.o.     Sikh exemption  Assessment & Plan  Correction from the previous admission  Was documented in the previous admission that patient had refused anticoagulation due to Shinto exemption    On discussion with the patient today  Patient is not Jehovah witness  Patient is Seventh-day Orthodoxy-agreeable to taking anticoagulation  Patient is agreeable for blood transfusion of blood products    Patient has exemptions to food notably no seafood products, no pork and pork products   Patient refuses Lovenox/heparin due to being derived from pork products.  Will proceed with fondaparinux VTE prophylaxis starting tomorrow 8/10.    Essential hypertension  Assessment & Plan  Continue amlodipine 5, continue benazepril 10 mg    Acquired hypothyroidism  Assessment & Plan  Continue levothyroxine 88 mcg    Asthma  Assessment & Plan  Not in acute exacerbation  Continue albuterol inhaler as needed    Type 2 diabetes mellitus with other specified complication, without long-term current use of insulin (HCC)  Assessment & Plan  Lab Results   Component Value Date    HGBA1C 7.6 (H) 07/11/2024       Recent Labs     08/08/24  2052 08/09/24  0120 08/09/24  0759 08/09/24  1055   POCGLU 238* 146* 124 120         Blood Sugar Average: Last 72 hrs:  (P) 157  Home regimen metformin 500 twice daily, Januvia 25, Lantus 30 units at bedtime    Plan  Patient already got her 30 units of Lantus at  bedtime  Monitor glucose closely  Will add sliding scale insulin  Hypoglycemia protocol    Polymyositis (HCC)  Assessment & Plan  Stable without acute flare  Follows up with Dr Corona    Diverticulitis  Assessment & Plan  Recently admitted for acute diverticulitis, there were meropenem due to multiple antibiotic allergies  Patient discharged 8/5.  Patient still complains of loose stools and 1 episode of vomiting.  She has chills  8/8 CT abdomen pelvis   Minimal residual inflammatory change in the region of recent sigmoid diverticulitis.   Continue fluid hydration  Continue antibiotics for now               VTE Pharmacologic Prophylaxis: VTE Score: 2 High Risk (Score >/= 5) - Pharmacological DVT Prophylaxis Ordered: Patient is declining at this time as she cannot have medication with pork products in it we will hold off on VTE prophylaxis today as patient may have ERCP per GI.  Will start on fondaparinux tomorrow per pharmacy recommendation. Sequential Compression Devices Ordered.    Mobility:   JH-HLM Achieved: 3: Sit at edge of bed  JH-HLM Goal achieved. Continue to encourage appropriate mobility.    Patient Centered Rounds: I performed bedside rounds with nursing staff today.  Discussions with Specialists or Other Care Team Provider: GI, surgery    Education and Discussions with Family / Patient: Updated  (daughter) at bedside.    Current Length of Stay: 1 day(s)  Current Patient Status: Inpatient   Discharge Plan: Anticipate discharge in 24-48 hrs to home.    Code Status: Level 1 - Full Code    Subjective:   Patient is evaluated at bedside.  She continues to complain of intermittent left lower abdominal pain.  At its worst pain is a 10 out of 10.  She has not been vomiting today but had 2 episodes of vomiting and 2 episodes of diarrhea yesterday.  She reports worsening abdominal symptoms and vomiting after eating.  She is resting comfortably now.  She noted some chills yesterday but none today.  No  other complaints at this time  Patient was seen by GI and surgery and they recommended MRCP.  Patient has significant claustrophobia and refuses to be in MRI machine.  She has tried in the past and needed an open MRI with Ativan to get through the procedure.  Objective:     Vitals:   Temp (24hrs), Av.4 °F (36.9 °C), Min:98.1 °F (36.7 °C), Max:98.7 °F (37.1 °C)    Temp:  [98.1 °F (36.7 °C)-98.7 °F (37.1 °C)] 98.7 °F (37.1 °C)  HR:  [] 90  Resp:  [18] 18  BP: (107-155)/(56-73) 155/71  SpO2:  [94 %-97 %] 94 %  Body mass index is 32.9 kg/m².     Input and Output Summary (last 24 hours):     Intake/Output Summary (Last 24 hours) at 2024 1208  Last data filed at 2024 0649  Gross per 24 hour   Intake 1100 ml   Output 400 ml   Net 700 ml       Physical Exam:   Physical Exam  Vitals and nursing note reviewed.   Constitutional:       General: She is not in acute distress.     Appearance: She is well-developed.   HENT:      Head: Normocephalic and atraumatic.   Eyes:      Conjunctiva/sclera: Conjunctivae normal.   Cardiovascular:      Rate and Rhythm: Normal rate and regular rhythm.      Heart sounds: No murmur heard.  Pulmonary:      Effort: Pulmonary effort is normal. No respiratory distress.      Breath sounds: Normal breath sounds.   Abdominal:      Palpations: Abdomen is soft.      Tenderness: There is abdominal tenderness in the left lower quadrant. Positive signs include Perez's sign.   Musculoskeletal:         General: No swelling.      Cervical back: Neck supple.   Skin:     General: Skin is warm and dry.      Capillary Refill: Capillary refill takes less than 2 seconds.   Neurological:      Mental Status: She is alert.   Psychiatric:         Mood and Affect: Mood normal.          Additional Data:     Labs:  Results from last 7 days   Lab Units 24  0450 24  0117 24  1414   WBC Thousand/uL 14.63*  --  18.26*   HEMOGLOBIN g/dL 10.5*  --  11.3*   HEMATOCRIT % 32.3*  --  34.7*    PLATELETS Thousands/uL 349   < > 344   BANDS PCT %  --   --  9*   SEGS PCT % 85*  --   --    LYMPHO PCT % 5*  --  3*   MONO PCT % 6  --  4   EOS PCT % 3  --  0    < > = values in this interval not displayed.     Results from last 7 days   Lab Units 08/09/24  0450   SODIUM mmol/L 138   POTASSIUM mmol/L 3.7   CHLORIDE mmol/L 104   CO2 mmol/L 24   BUN mg/dL 10   CREATININE mg/dL 0.49*   ANION GAP mmol/L 10   CALCIUM mg/dL 8.2*   ALBUMIN g/dL 3.5   TOTAL BILIRUBIN mg/dL 2.94*   ALK PHOS U/L 173*   ALT U/L 269*   AST U/L 265*   GLUCOSE RANDOM mg/dL 110     Results from last 7 days   Lab Units 08/09/24  0117   INR  1.11     Results from last 7 days   Lab Units 08/09/24  1055 08/09/24  0759 08/09/24  0120 08/08/24  2052 08/05/24  1556 08/05/24  1058 08/05/24  0727 08/04/24  2105 08/04/24  1620 08/04/24  1128 08/04/24  0750 08/03/24  2033   POC GLUCOSE mg/dl 120 124 146* 238* 220* 307* 150* 262* 261* 241* 138 191*         Results from last 7 days   Lab Units 08/08/24  1719 08/08/24  1457 08/08/24  1414   LACTIC ACID mmol/L 1.5 2.2*  --    PROCALCITONIN ng/ml  --   --  2.96*       Lines/Drains:  Invasive Devices       Peripheral Intravenous Line  Duration             Peripheral IV 08/08/24 Right Antecubital <1 day                          Imaging: Reviewed radiology reports from this admission including: abdominal/pelvic CT and ultrasound(s)    Recent Cultures (last 7 days):   Results from last 7 days   Lab Units 08/09/24  0117   BLOOD CULTURE  Received in Microbiology Lab. Culture in Progress.  Received in Microbiology Lab. Culture in Progress.       Last 24 Hours Medication List:   Current Facility-Administered Medications   Medication Dose Route Frequency Provider Last Rate    acetaminophen  650 mg Oral Q6H PRN Lovely Bang MD      albuterol  2 puff Inhalation Q6H PRN Lovely Bang MD      amLODIPine  5 mg Oral Daily Lovely Bang MD      calcium carbonate  1,000 mg Oral  Daily Lovely Bang MD      Cholecalciferol  1,000 Units Oral Daily Lovely Bang MD      famotidine  20 mg Oral BID Lovely Bang MD      ferrous sulfate  325 mg Oral Every Other Day Lovely Bang MD      [START ON 8/10/2024] fondaparinux  2.5 mg Subcutaneous Q24H Bernabe Zamarripa DO      insulin glargine  30 Units Subcutaneous HS Lovely Bang MD      insulin lispro  1-6 Units Subcutaneous TID AC Lovely Bang MD      insulin lispro  1-6 Units Subcutaneous HS Lovely Bang MD      lactated ringers  150 mL/hr Intravenous Continuous Lovely Bang  mL/hr (08/09/24 0944)    levothyroxine  88 mcg Oral Early Morning Lovely Bang MD      meropenem  1,000 mg Intravenous Q8H Lovely Bang MD 1,000 mg (08/09/24 0944)    pantoprazole  40 mg Oral Early Morning Lovely Bang MD      predniSONE  7.5 mg Oral Daily Lovely Bang MD      saccharomyces boulardii  250 mg Oral BID Lovely Bang MD          Today, Patient Was Seen By: Bernabe Zamarripa DO    **Please Note: This note may have been constructed using a voice recognition system.**

## 2024-08-09 NOTE — ASSESSMENT & PLAN NOTE
Correction from the previous admission  Was documented in the previous admission that patient had refused anticoagulation due to Sabianist exemption    On discussion with the patient today  Patient is not Jehovah witness  Patient is Seventh-day Hindu-agreeable to taking anticoagulation  Patient is agreeable for blood transfusion of blood products    Patient has exemptions to food notably no seafood products, no pork and pork products

## 2024-08-09 NOTE — CASE MANAGEMENT
Case Management Discharge Planning Note    Patient name Jadyn Vieyra /-01 MRN [id number]  : 1950 Date 2024       Current Admission Date: 2024  Current Admission Diagnosis:Cholelithiasis   Patient Active Problem List    Diagnosis Date Noted Date Diagnosed    Elevated serum creatinine 2024     Pleural nodule 2024     Choledocholithiasis 2024     Cholelithiasis 2024     Generalized weakness 2024     Leukocytosis 2024     Rastafarian exemption 2024     Essential hypertension 2022     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(HCC) 2022     Acquired hypothyroidism 2022     Steroid-induced hyperglycemia 2022     Diverticulitis 2018     Polymyositis (HCC)      Hyperlipidemia      Type 2 diabetes mellitus with other specified complication, without long-term current use of insulin (HCC)      Asthma      Chronic pain        LOS (days): 1  Geometric Mean LOS (GMLOS) (days): 2.6  Days to GMLOS:1.9     OBJECTIVE:  Risk of Unplanned Readmission Score: 17.37         Current admission status: Inpatient   Preferred Pharmacy:   [facility] [place] [address]  Phone: [phone] Fax: [phone]    Primary Care Provider: Herlinda Castorena MD    Primary Insurance: AARP [facility] REP  Secondary Insurance:     DISCHARGE DETAILS:    Discharge planning discussed with:: Patient & Jennifer-daughter  Freedom of Choice: Yes  Comments - Freedom of Choice: Both the Pt and daughter-Jennifer confirmed the Pt is currently opened with [facility] for RN, PT and OT services, and would like to resume the services.  CM contacted family/caregiver?: Yes             Contacts  Patient Contacts: Jennifer  Relationship to Patient:: Family  Contact Method: Phone  Phone Number: [phone]  Reason/Outcome: Discharge Planning    Requested Home Health Care         Is the patient interested in C at  discharge?: Yes  Home Health Discipline requested:: Occupational Therapy, Physical Therapy, Nursing  Home Health Agency Name:: [facility]  HHA External Referral Reason (only applicable if external HHA name selected): Patient has established relationship with provider  Home Health Follow-Up Provider:: PCP  Home Health Services Needed:: Gait/ADL Training, Strengthening/Theraputic Exercises to Improve Function, Other (comment)  Homebound Criteria Met:: Requires the Assistance of Another Person for Safe Ambulation or to Leave the Home, Uses an Assist Device (i.e. cane, walker, etc)  Supporting Clincal Findings:: Fatigues Easliy in Short Distances, Limited Endurance    DME Referral Provided  Referral made for DME?: No    Other Referral/Resources/Interventions Provided:  Interventions: HHC  Referral Comments: CM made a C referral to [facility] for resumption of care, RN, PT & OT services.         Treatment Team Recommendation: Home with Home Health Care  Discharge Destination Plan:: Home with Home Health Care

## 2024-08-09 NOTE — NURSING NOTE
Pt arrived to PACU from IR in Afib 140-160. Anesthesia ordered 5mg IV Metoprolol-given. HR continues to be in Afib rate 110 to 130s. Waiting for hospitalist to come to bedside for further orders.

## 2024-08-09 NOTE — PROGRESS NOTES
Patient:    MRN:  [id number]    Aidin Request ID:  [id number]    Level of care reserved:  Home Health Agency    Partner Reserved:  [facility], [place] [id number] [phone]    Clinical needs requested:    Geography searched:  [id number]    Start of Service:    Request sent:  2:51pm EDT on 8/9/2024 by Kosta Tellez    Partner reserved:  5:03pm EDT on 8/9/2024 by Kosta Tellez    Choice list shared:  5:03pm EDT on 8/9/2024 by Kosta Tellez

## 2024-08-09 NOTE — ASSESSMENT & PLAN NOTE
Lab Results   Component Value Date    HGBA1C 7.6 (H) 07/11/2024       Recent Labs     08/08/24 2052   POCGLU 238*       Blood Sugar Average: Last 72 hrs:  (P) 238  Home regimen metformin 500 twice daily, Januvia 25, Lantus 30 units at bedtime    Plan  Patient already got her 30 units of Lantus at bedtime  Monitor glucose closely  Will add sliding scale insulin  Hypoglycemia protocol

## 2024-08-09 NOTE — ASSESSMENT & PLAN NOTE
Recent Labs     08/08/24  1414 08/09/24  0450   CREATININE 0.65 0.49*   EGFR 87 96       Estimated Creatinine Clearance: 135.6 mL/min (A) (by C-G formula based on SCr of 0.49 mg/dL (L)).  Can continue home ACE after adequate hydration

## 2024-08-09 NOTE — ED CARE HANDOFF
Emergency Department Sign Out Note        Sign out and transfer of care from Dr. Lake. See Separate Emergency Department note.     The patient, Jadyn Ac, was evaluated by the previous provider for Acute cholecystitis.    Workup Completed:  CBC, CMP, lipase, UA, ultrasound right upper quadrant    ED Course / Workup Pending (followup):  CT abdomen with plans to admit for acute cholecystitis                                     Procedures  Medical Decision Making  Amount and/or Complexity of Data Reviewed  Labs: ordered.  Radiology: ordered.    Risk  Prescription drug management.            Disposition  Final diagnoses:   Elevated LFTs   Vomiting and diarrhea     Time reflects when diagnosis was documented in both MDM as applicable and the Disposition within this note       Time User Action Codes Description Comment    8/8/2024  8:20 PM Hunter Lee Add [R79.89] Elevated LFTs     8/8/2024  8:38 PM Hunter Lee Add [R11.10,  R19.7] Vomiting and diarrhea           ED Disposition       None          Follow-up Information    None       Patient's Medications   Discharge Prescriptions    No medications on file     No discharge procedures on file.       ED Provider  Electronically Signed by     Jad Knight DO  08/08/24 6061

## 2024-08-09 NOTE — DISCHARGE INSTR - AVS FIRST PAGE
Dear Jadyn Ac,     It was our pleasure to care for you here at [facility].  It is our hope that we were always able to exceed the expected standards for your care during your stay.  You were hospitalized due to cholelithiasis.  You were cared for on the third floor by Bernabe Zamarripa DO under the service of Regina Mccartney MD with the [facility] who covers for your primary care physician (PCP), Herlinda Castorena MD, while you were hospitalized.  If you have any questions or concerns related to this hospitalization, you may contact us at .  For follow up as well as any medication refills, we recommend that you follow up with your primary care physician.  A registered nurse will reach out to you by phone within a few days after your discharge to answer any additional questions that you may have after going home.  However, at this time we provide for you here, the most important instructions / recommendations at discharge:     Notable Medication Adjustments -   Please start taking cefdinir 300 mg every 12 hours for 5 more days  Please start taking Flagyl 500 mg every 8 hours for 4 more days  No other changes were made to your medications.  Please take them as ordered.  Testing Required after Discharge -   None  Important follow up information -   Please follow-up with your primary care provider within 1 week of discharge.  Please follow-up with general surgery outpatient to schedule your surgery.  Please follow-up with outpatient cardiology for new onset atrial fibrillation noted after ERCP procedure.  Other Instructions -   If you develop chest pain or palpitations please return to the emergency department  Please review this entire after visit summary as additional general instructions including medication list, appointments, activity, diet, any pertinent wound care, and other additional recommendations from your care team that may be  provided for you.    Please inform your family physician that you had an abnormality on your CAT scan.  This needs additional follow-up in the future.                        Partially visualized left posterior pleural-based nodule measuring to 7 mm.  Follow-up CT chest could be considered in 12 months    Sincerely,     Bernabe Zamarripa,       ____________________________________________________________________________________________________________________________

## 2024-08-09 NOTE — ASSESSMENT & PLAN NOTE
Correction from the previous admission  Was documented in the previous admission that patient had refused anticoagulation due to Adventism exemption    On discussion with the patient today  Patient is not Jehovah witness  Patient is Seventh-day Nondenominational-agreeable to taking anticoagulation  Patient is agreeable for blood transfusion of blood products    Patient has exemptions to food notably no seafood products, no pork and pork products   Patient refuses Lovenox/heparin due to being derived from pork products.  Will proceed with fondaparinux VTE prophylaxis starting tomorrow 8/10.

## 2024-08-09 NOTE — ASSESSMENT & PLAN NOTE
Patient denies right upper quadrant abdominal pain however expresses left lower flank pain, associated chills nausea vomiting and diarrhea.  Leukocytosis, elevated lactic acid on blood work  Perez sign positive at bedside    Right upper quadrant ultrasound-Cholelithiasis without acute cholecystitis.,  CBD 7 mm  CT abdomen pelvis-Borderline steatosis (HU on portal venous phase 85).  No intrahepatic/extrahepatic biliary dilation. No radiopaque biliary stones.  CBD measures 8 mm, 7 mm previously.  No concerning solid masses.     Plan  Trend liver enzymes, bilirubin  Patient meets SIRS, although no radiographic evidence of acute cholecystitis, patient could have development of cholangitis  Will treat with meropenem 1 g every 8 hours  ERCP this afternoon  IV fluids  Pain control  Surgical consult  GI consult

## 2024-08-09 NOTE — QUICK NOTE
Patient underwent ERCP today.  After procedure, new onset A-fib with RVR noted while waiting in the PACU.  Patient started on IV metoprolol and Cardizem drip.  Cardizem was titrated down due to decreasing blood pressure.  On my evaluation patient, was awake, alert orientated x 3.  Her heart rate was between 108-113 with blood pressure 114/74.  Patient denied chest pain,dizziness, and shortness of breath.  She does not consume anything pork products.   Patient refused Lovenox/heparin due to being derived from pork products. Fondaparinux was held today due to ERCP.  [id number]      Plan:  Rate/Rhythm control: Currently on Cardizem drip  DVT prophylactic ordered: Fondaparinux  Monitoring on telemetry  Cardiology consult   Monitor vital signs and ensure hemodynamically stable  Cardioversion if hemodynamically unstable          Physical Exam  Vitals and nursing note reviewed.   Constitutional:       General: She is not in acute distress.     Appearance: She is well-developed.   HENT:      Head: Normocephalic and atraumatic.   Eyes:      Conjunctiva/sclera: Conjunctivae normal.   Cardiovascular:      Rate and Rhythm: Tachycardia present. Rhythm irregular.      Heart sounds: No murmur heard.  Pulmonary:      Effort: Pulmonary effort is normal. No respiratory distress.      Breath sounds: Normal breath sounds.   Abdominal:      Palpations: Abdomen is soft.      Tenderness: There is no abdominal tenderness.   Musculoskeletal:         General: No swelling.      Cervical back: Neck supple.   Skin:     General: Skin is warm and dry.      Capillary Refill: Capillary refill takes less than 2 seconds.   Neurological:      Mental Status: She is alert.   Psychiatric:         Mood and Affect: Mood normal.

## 2024-08-09 NOTE — CONSULTS
Consultation - General Surgery  Jadyn Ac 74 y.o. female MRN: [id number]  Unit/Bed#: ED-23 Encounter: [id number]    Inpatient consult to Acute Care Surgery  Consult performed by: Dwain Barboza MD  Consult ordered by: Lovely Bang MD      Assessment:  74F with elevated LFTs, cholelithiasis no cholecystitis, in setting of diverticulitis.    Plan:  - NPO, IVF  - IV abx  - f/u MRCP, trend LFTs  - appreciate GI eval/recs    HPI:  Jadyn Ac is a 74 y.o. female with PMHx of diverticulitis, DM, asthma, polymyositis, HLD, HTN, thyroidectomy, hysterectomy, who presents with diarrhea, n/v, intermittent LLQ pain. Started 2d ago, after being discharged 4d ago from [place] for diverticulitis (says her symptoms are the same now as her diverticulitis), when she was given meropenem in hospital, none on d/c. Last cscope in 2018, with sigmoid diverticulosis.  Denies fever. Last ate this morning. Last BM this morning, loose.    Objective   Patient Vitals for the past 24 hrs:   BP Temp Temp src Pulse Resp SpO2   08/08/24 2100 142/68 -- -- 92 18 97 %   08/08/24 2030 135/66 -- -- 96 -- 96 %   08/08/24 1930 128/63 -- -- 93 18 94 %   08/08/24 1900 132/64 -- -- 93 18 95 %   08/08/24 1830 120/58 -- -- 96 -- 95 %   08/08/24 1815 132/61 -- -- 95 -- 96 %   08/08/24 1800 107/59 -- -- 97 -- 94 %   08/08/24 1715 134/56 -- -- 98 18 94 %   08/08/24 1701 148/68 -- -- 100 -- 96 %   08/08/24 1630 129/63 -- -- 95 -- 94 %   08/08/24 1615 136/65 -- -- 94 -- 94 %   08/08/24 1600 139/67 -- -- 97 18 96 %   08/08/24 1545 142/67 -- -- 96 18 94 %   08/08/24 1345 134/58 98.1 °F (36.7 °C) Oral 103 -- 96 %   08/08/24 1342 134/58 -- -- 102 18 94 %       Physical Exam  Constitutional:       General: She is not in acute distress.  HENT:      Head: Normocephalic and atraumatic.   Eyes:      Extraocular Movements: Extraocular movements intact.      Conjunctiva/sclera: Conjunctivae normal.   Cardiovascular:      Rate and Rhythm: Normal  rate.      Pulses: Normal pulses.   Pulmonary:      Effort: Pulmonary effort is normal. No respiratory distress.   Abdominal:      General: There is no distension.      Palpations: Abdomen is soft.      Tenderness: There is abdominal tenderness (mild LLQ).   Musculoskeletal:         General: Normal range of motion.      Cervical back: Normal range of motion.   Skin:     General: Skin is warm and dry.   Neurological:      General: No focal deficit present.      Mental Status: She is alert and oriented to person, place, and time.   Psychiatric:         Mood and Affect: Mood normal.       Review of Systems   Constitutional:  Positive for appetite change and chills. Negative for fever.   HENT:  Negative for ear pain and sore throat.    Eyes:  Negative for pain and visual disturbance.   Respiratory:  Negative for cough and shortness of breath.    Cardiovascular:  Negative for chest pain and palpitations.   Gastrointestinal:  Positive for abdominal pain (LLQ), diarrhea, nausea and vomiting. Negative for abdominal distention and blood in stool.   Genitourinary:  Negative for dysuria and hematuria.   Musculoskeletal:  Negative for arthralgias and back pain.   Skin:  Negative for color change and rash.   Neurological:  Negative for seizures and syncope.   All other systems reviewed and are negative.      Meds: lvx (p), meropenem (8/9-) (due to multiple abx allergies), prednisone (for polymyositis), amlodipine, famotidine, SSI, levothyroxine, PPI (p)    Results:  8/8 RUQ US: cholelithiasis, no cholecystitis  8/8 CTAP: CBD 8mm. No biliary dilation or calculi.    Recent Labs     08/08/24  1414 08/08/24  1457 08/08/24  1719   WBC 18.26*  --   --    HGB 11.3*  --   --      --   --    SODIUM 132*  --   --    K 4.4  --   --    CL 98  --   --    CO2 24  --   --    BUN 18  --   --    CREATININE 0.65  --   --    GLUC 312*  --   --    CALCIUM 8.4  --   --    *  --   --    *  --   --    ALKPHOS 138*  --   --     TBILI 1.09*  --   --    LIPASE 8*  --   --    EGFR 87  --   --    LACTICACID  --  2.2* 1.5     Lab Results: I have personally reviewed pertinent lab results.  Imaging: I have personally reviewed pertinent reports.  EKG, Pathology, and Other Studies: I have personally reviewed pertinent reports.  All current active meds have been reviewed  Counseling / Coordination of Care: Total floor / unit time spent today 30 minutes.  Greater than 50% of total time was spent with the patient and / or family counseling and / or coordination of care.

## 2024-08-09 NOTE — CONSULTS
Consultation -  [facility]  Jadyn Ac 74 y.o. female MRN: [id number]  Unit/Bed#: -01 Encounter: [id number]    ASSESSMENT/PLAN:     #1.  Recurrent postprandial abdominal pain, noted on ultrasound with gallbladder stones, elevated liver enzymes and white blood cell count, and mildly dilated CBD.  Most suspect choledocholithiasis, unfortunately patient refusing MRI at this time.    Less likely scenario would be that pain is related to resolving diverticulitis/bowel spasm and LFT elevation may be incidental to her pain, and related to reaction to meropenem.  However patient's reported chills and elevated white blood cell count raise the possibility of cholangitis developing    -Spoke with patient and patient's daughter about clinical picture, I also spoke with the patient later after discussion with advanced endoscopist Dr. Betancourt, we will plan for ERCP for exploration of CBD and removal of any choledocholithiasis present    -Procedures were explained in detail to the patient and patient's daughter including associated risks and benefits, risks including but not limited to infection, perforation, bleeding and pancreatitis    -Keep patient n.p.o. until procedure    -Monitor liver enzymes; if ERCP is unremarkable for choledocholithiasis and liver enzymes continue to increase, would recommend discontinuing IV antibiotics so long as no other signs of systemic infection are otherwise manifest    -Will check acute hepatitis panel and viral serologies, but otherwise can broaden serologic workup again if ERCP is unremarkable and LFTs fail to resolve or if they worsen      #2.  History of colon polyps, last colonoscopy in 2018    -Recommend outpatient colonoscopy at the same time as EGD, see below.  Should be done in a minimum of 6 to 8 weeks from this time due to diverticulitis      #3.  Chronic GERD, patient reports not using any acid reducing medications and has never had EGD, rule out  Joseph's/dysplasia/early malignancy    -Outpatient EGD at the same time as colonoscopy mentioned above.  Consider use of daily H2 blocker or PPI for the meantime    Inpatient consult to gastroenterology  Consult performed by: Liya Garcia PA-C  Consult ordered by: Lovely Bang MD          Reason for Consult / Principal Problem: Cholelithiasis    HPI: Jadyn Ac is a 74 y.o. year old female [place] with history of diverticulitis in 2018, diabetes and thyroidectomy who presented to the hospital with concerns for abdominal pain.    She had recently been admitted from 8/1 through 8/5 after presenting with intermittent symptoms of abdominal pain and postprandial diarrhea, her CT scan on 8/1 showed evidence of uncomplicated sigmoid diverticulitis and she was treated with IV meropenem over the course of her admission, as she had reported many different antibiotic allergies including to Flagyl, penicillins, fluoroquinolones and others.  She was not discharged on oral antibiotics but also was clinically improving.      Patient said that in the last 1 to 2 days however her symptoms of abdominal pain and postprandial diarrhea had returned.  CT scan was repeated yesterday showing relatively minimal residual changes of diverticulitis and no evidence of complication.  Her CBD was mildly dilated to 8 mm however compared to 7 mm on past admission, and unlike on previous admission her liver enzymes were noted elevated.  This morning total bilirubin is 2.94 with direct of 2.08, alkaline phosphatase 173,  and , INR is 1.11.  Patient denies any known history of liver issues.  She says she is very claustrophobic so is refusing to have MRI done even with preprocedural anxiolytic, which she says has been tried for her in the past for MRI.  She says she is feeling better this morning than she was yesterday, denies any fevers though had had chills at home.    Last colonoscopy was done in  2018 seeing the removal of 1 polyp.  Has never had EGD or ERCP.  She says she does have acid reflux pretty often which she generally tries to manage with diet, does not take any acid reducing medication.      REVIEW OF SYSTEMS:    CONSTITUTIONAL: Denies any fever, chills, or rigors. Good appetite, and no recent weight loss.  HEENT: No earache or tinnitus. Denies hearing loss or visual disturbances.  CARDIOVASCULAR: No chest pain or palpitations.   RESPIRATORY: Denies any cough, hemoptysis, shortness of breath or dyspnea on exertion.  GASTROINTESTINAL: As noted in the History of Present Illness.   GENITOURINARY: No problems with urination. Denies any hematuria or dysuria.  NEUROLOGIC: No dizziness or vertigo, denies headaches.   MUSCULOSKELETAL: Denies any muscle or joint pain.   SKIN: Denies skin rashes or itching.   ENDOCRINE: Denies excessive thirst. Denies intolerance to heat or cold.  PSYCHOSOCIAL: Denies depression or anxiety. Denies any recent memory loss.       Historical Information   Past Medical History:   Diagnosis Date    Acute diverticulitis 9/21/2018    Anemia     Asthma     Chronic pain     bulging discs in back,polymyositis    Diabetes mellitus (HCC)     Disease of thyroid gland     Diverticulitis     Hyperlipidemia     Hypertension     Polymyositis (HCC)      Past Surgical History:   Procedure Laterality Date    EYE SURGERY Bilateral     CATAREACT REMOVED FROM BOTH EYES 2 MTHS AGO    HYSTERECTOMY      SINUS SURGERY      THYROIDECTOMY      TUBAL LIGATION       Social History   Social History     Substance and Sexual Activity   Alcohol Use Not Currently     Social History     Substance and Sexual Activity   Drug Use No     Social History     Tobacco Use   Smoking Status Never   Smokeless Tobacco Never     Family History   Problem Relation Age of Onset    Hepatitis Sister         acute hepatits C virus    Hypertension Daughter     Kidney disease Daughter     Hypertension Son     Mental illness Son         Meds/Allergies       Medications Prior to Admission:     albuterol (PROVENTIL HFA,VENTOLIN HFA) 90 mcg/act inhaler    amLODIPine (NORVASC) 5 mg tablet    benazepril (LOTENSIN) 10 mg tablet    calcium carbonate (OS-YNES) 1250 (500 Ca) MG tablet    Cholecalciferol 25 MCG (1000 UT) tablet    famotidine (PEPCID) 20 mg tablet    ferrous sulfate 325 (65 Fe) mg tablet    insulin glargine (LANTUS) 100 units/mL subcutaneous injection    metFORMIN (GLUCOPHAGE-XR) 500 mg 24 hr tablet    omeprazole (PriLOSEC) 40 MG capsule    predniSONE 5 mg tablet    saccharomyces boulardii (FLORASTOR) 250 mg capsule    sitaGLIPtin (Januvia) 25 mg tablet    Synthroid 88 MCG tablet    Blood Glucose Monitoring Suppl (OneTouch Verio) w/Device KIT    Insulin Pen Needle (Pen Needles) 31G X 5 MM MISC    Lancets (OneTouch Delica Plus [id number]) MISC    OneTouch Verio test strip  Current Facility-Administered Medications   Medication Dose Route Frequency    acetaminophen (TYLENOL) tablet 650 mg  650 mg Oral Q6H PRN    albuterol (PROVENTIL HFA,VENTOLIN HFA) inhaler 2 puff  2 puff Inhalation Q6H PRN    amLODIPine (NORVASC) tablet 5 mg  5 mg Oral Daily    calcium carbonate (TUMS) chewable tablet 1,000 mg  1,000 mg Oral Daily    Cholecalciferol (VITAMIN D3) tablet 1,000 Units  1,000 Units Oral Daily    enoxaparin (LOVENOX) subcutaneous injection 40 mg  40 mg Subcutaneous Daily    famotidine (PEPCID) tablet 20 mg  20 mg Oral BID    ferrous sulfate tablet 325 mg  325 mg Oral Every Other Day    insulin glargine (LANTUS) subcutaneous injection 30 Units 0.3 mL  30 Units Subcutaneous HS    insulin lispro (HumALOG/ADMELOG) 100 units/mL subcutaneous injection 1-6 Units  1-6 Units Subcutaneous TID AC    insulin lispro (HumALOG/ADMELOG) 100 units/mL subcutaneous injection 1-6 Units  1-6 Units Subcutaneous HS    lactated ringers infusion  150 mL/hr Intravenous Continuous    levothyroxine tablet 88 mcg  88 mcg Oral Early Morning    meropenem (MERREM) 1,000 mg in  sodium chloride 0.9 % 100 mL IVPB  1,000 mg Intravenous Q8H    pantoprazole (PROTONIX) EC tablet 40 mg  40 mg Oral Early Morning    predniSONE tablet 7.5 mg  7.5 mg Oral Daily    saccharomyces boulardii (FLORASTOR) capsule 250 mg  250 mg Oral BID       Allergies   Allergen Reactions    Anaprox [Naproxen Sodium] Shortness Of Breath    Levofloxacin Hives    Sulfamethoxazole-Trimethoprim Anaphylaxis    Jardiance [Empagliflozin] GI Intolerance    Penicillins Swelling and Hives    Alendronate Rash    Aspirin Rash    Azathioprine Rash    Metronidazole Rash    Sulfa Antibiotics Rash           Objective     Blood pressure 155/71, pulse 90, temperature 98.7 °F (37.1 °C), temperature source Oral, resp. rate 18, weight 107 kg (235 lb 14.3 oz), SpO2 94%.      Intake/Output Summary (Last 24 hours) at 8/9/2024 0855  Last data filed at 8/9/2024 0649  Gross per 24 hour   Intake 1100 ml   Output 400 ml   Net 700 ml         PHYSICAL EXAM     General Appearance:   Alert, cooperative, no distress, appears stated age    HEENT:   Normocephalic, atraumatic, anicteric.     Neck:  Supple, symmetrical, trachea midline, no adenopathy;    thyroid: no enlargement/tenderness/nodules; no carotid  bruit or JVD    Lungs:   Clear to auscultation bilaterally; no rales, rhonchi or wheezing; respirations unlabored    Heart::   S1 and S2 normal; regular rate and rhythm; no murmur, rub, or gallop.   Abdomen:   Soft, generalized abdominal tenderness without guarding, non-distended; normal bowel sounds; no masses, no organomegaly    Genitalia:   Deferred    Rectal:   Deferred    Extremities:  No cyanosis, clubbing or edema    Pulses:  2+ and symmetric all extremities    Skin:  Skin color, texture, turgor normal, no rashes or lesions    Lymph nodes:  No palpable cervical, axillary or inguinal lymphadenopathy        Lab Results:   Admission on 08/08/2024   Component Date Value    WBC 08/08/2024 18.26 (H)     RBC 08/08/2024 5.02     Hemoglobin 08/08/2024 11.3  (L)     Hematocrit 08/08/2024 34.7 (L)     MCV 08/08/2024 69 (L)     MCH 08/08/2024 22.5 (L)     MCHC 08/08/2024 32.6     RDW 08/08/2024 19.3 (H)     MPV 08/08/2024 9.7     Platelets 08/08/2024 344     Sodium 08/08/2024 132 (L)     Potassium 08/08/2024 4.4     Chloride 08/08/2024 98     CO2 08/08/2024 24     ANION GAP 08/08/2024 10     BUN 08/08/2024 18     Creatinine 08/08/2024 0.65     Glucose 08/08/2024 312 (H)     Calcium 08/08/2024 8.4     AST 08/08/2024 674 (H)     ALT 08/08/2024 215 (H)     Alkaline Phosphatase 08/08/2024 138 (H)     Total Protein 08/08/2024 6.9     Albumin 08/08/2024 3.6     Total Bilirubin 08/08/2024 1.09 (H)     eGFR 08/08/2024 87     Lipase 08/08/2024 8 (L)     Color, UA 08/08/2024 Yellow     Clarity, UA 08/08/2024 Clear     Specific Gravity, UA 08/08/2024 1.015     pH, UA 08/08/2024 5.5     Leukocytes, UA 08/08/2024 Elevated glucose may cause decreased leukocyte values. See urine microscopic for UWBC result (A)     Nitrite, UA 08/08/2024 Negative     Protein, UA 08/08/2024 Trace (A)     Glucose, UA 08/08/2024 >=1000 (1%) (A)     Ketones, UA 08/08/2024 Trace (A)     Urobilinogen, UA 08/08/2024 <2.0     Bilirubin, UA 08/08/2024 Negative     Occult Blood, UA 08/08/2024 Small (A)     Ventricular Rate 08/08/2024 100     Atrial Rate 08/08/2024 100     ID Interval 08/08/2024 162     QRSD Interval 08/08/2024 80     QT Interval 08/08/2024 340     QTC Interval 08/08/2024 438     P Axis 08/08/2024 50     QRS [place] 08/08/2024 24     T Wave [place] 08/08/2024 18     LACTIC ACID 08/08/2024 2.2 (H)     RBC, UA 08/08/2024 None Seen     WBC, UA 08/08/2024 1-2     Epithelial Cells 08/08/2024 Occasional     Bacteria, UA 08/08/2024 Occasional     LACTIC ACID 08/08/2024 1.5     Segmented % 08/08/2024 83 (H)     Bands % 08/08/2024 9 (H)     Lymphocytes % 08/08/2024 3 (L)     Monocytes % 08/08/2024 4     Eosinophils % 08/08/2024 0     Basophils % 08/08/2024 0     Atypical Lymphocytes % 08/08/2024 1 (H)      Absolute Neutrophils 08/08/2024 16.80 (H)     Absolute Lymphocytes 08/08/2024 0.73     Absolute Monocytes 08/08/2024 0.73     Absolute Eosinophils 08/08/2024 0.00     Absolute Basophils 08/08/2024 0.00     RBC Morphology 08/08/2024 Present     Platelet Estimate 08/08/2024 Adequate     Anisocytosis 08/08/2024 Present     POC Glucose 08/08/2024 238 (H)     Bilirubin, Direct 08/09/2024 1.94 (H)     Procalcitonin 08/08/2024 2.96 (H)     Protime 08/09/2024 15.0     INR 08/09/2024 1.11     Platelets 08/09/2024 359     MPV 08/09/2024 9.4     Blood Culture 08/09/2024 Received in Microbiology Lab. Culture in Progress.     Blood Culture 08/09/2024 Received in Microbiology Lab. Culture in Progress.     GGT 08/09/2024 511 (H)     POC Glucose 08/09/2024 146 (H)     Sodium 08/09/2024 138     Potassium 08/09/2024 3.7     Chloride 08/09/2024 104     CO2 08/09/2024 24     ANION GAP 08/09/2024 10     BUN 08/09/2024 10     Creatinine 08/09/2024 0.49 (L)     Glucose 08/09/2024 110     Calcium 08/09/2024 8.2 (L)     AST 08/09/2024 265 (H)     ALT 08/09/2024 269 (H)     Alkaline Phosphatase 08/09/2024 173 (H)     Total Protein 08/09/2024 6.4     Albumin 08/09/2024 3.5     Total Bilirubin 08/09/2024 2.94 (H)     eGFR 08/09/2024 96     WBC 08/09/2024 14.63 (H)     RBC 08/09/2024 4.74     Hemoglobin 08/09/2024 10.5 (L)     Hematocrit 08/09/2024 32.3 (L)     MCV 08/09/2024 68 (L)     MCH 08/09/2024 22.2 (L)     MCHC 08/09/2024 32.5     RDW 08/09/2024 18.6 (H)     MPV 08/09/2024 9.7     Platelets 08/09/2024 349     nRBC 08/09/2024 0     Segmented % 08/09/2024 85 (H)     Immature Grans % 08/09/2024 1     Lymphocytes % 08/09/2024 5 (L)     Monocytes % 08/09/2024 6     Eosinophils Relative 08/09/2024 3     Basophils Relative 08/09/2024 0     Absolute Neutrophils 08/09/2024 12.39 (H)     Absolute Immature Grans 08/09/2024 0.12     Absolute Lymphocytes 08/09/2024 0.79     Absolute Monocytes 08/09/2024 0.85     Eosinophils Absolute 08/09/2024 0.45      Basophils Absolute 08/09/2024 0.03     Bilirubin, Direct 08/09/2024 2.08 (H)     POC Glucose 08/09/2024 124        Imaging Studies: I have personally reviewed pertinent reports.      CT ABDOMEN AND PELVIS WITH IV CONTRAST     INDICATION: recetn qadmit for deescendign colon diverticulits-- back with llq pain adn rug pain with elevated flt's.     COMPARISON:  Same day right upper quadrant ultrasound.  CT abdomen pelvis with contrast on 8/1/2024.     TECHNIQUE: CT examination of the abdomen and pelvis was performed. Multiplanar 2D reformatted images were created from the source data.     This examination, like all CT scans performed in the [facility], was performed utilizing techniques to minimize radiation dose exposure, including the use of iterative reconstruction and automated exposure control. Radiation dose length   product (DLP) for this visit: 803 mGy-cm     IV Contrast: 85 mL of iohexol (OMNIPAQUE)  Enteric Contrast: Not administered.     FINDINGS:     ABDOMEN     LOWER CHEST:  Partially visualized  left posterior pleural-based nodularity (302:1), no prior imaging includes this region.        LIVER/BILIARY TREE:  Borderline steatosis (HU on portal venous phase 85).  No intrahepatic/extrahepatic biliary dilation. No radiopaque biliary stones.  CBD measures 8 mm, 7 mm previously.  No concerning solid masses.     GALLBLADDER: No calcified gallstones. No pericholecystic inflammatory change.     SPLEEN: Stable 1 cm hypodensity in the inferior spleen, likely benign     PANCREAS: Focal coarse parenchymal calcification in the body, likely sequela of prior inflammation.     ADRENAL GLANDS: Unremarkable.     KIDNEYS/URETERS:  No hydronephrosis or urinary tract calculi.  Subcentimeter hypoattenuating renal lesion(s), too small to characterize but statistically likely benign, which do not warrant follow-up (Radiology June 2019).     STOMACH AND BOWEL:  Colonic diverticulosis.  Minimal residual  inflammatory change in the region of recent sigmoid diverticulitis.  A second site of diverticulitis within the descending colon demonstrates minimal residual inflammatory change as well (301:67)  Tiny hiatal hernia.  Duodenal diverticulum, otherwise small bowel remarkable without evidence of obstruction.     APPENDIX: No findings to suggest appendicitis.     ABDOMINOPELVIC CAVITY: No ascites. No pneumoperitoneum. No lymphadenopathy.     VESSELS: Atherosclerosis without abdominal aortic aneurysm.     PELVIS     REPRODUCTIVE ORGANS:  Status post hysterectomy.  Stable 2.4 cm cystic lesion in the right adnexa, no specific follow-up recommended.     URINARY BLADDER: Unremarkable.     ABDOMINAL WALL/INGUINAL REGIONS:  Fat-containing umbilical hernia. Tiny supraumbilical fat-containing hernia.     BONES: No acute fracture or suspicious osseous lesion.        IMPRESSION:     Minimal residual inflammatory change in the region of recent sigmoid diverticulitis.     Otherwise no new acute abdominopelvic abnormality.     Borderline hepatic steatosis.  CBD is minimally increased from the recent comparison exam. No intrahepatic dilation.  No radiopaque biliary calculi.     Partially visualized left posterior pleural-based nodule measuring to 7 mm.  Follow-up CT chest could be considered in 12 months        RIGHT UPPER QUADRANT ULTRASOUND     INDICATION: upper abd pain - elevated flt's.     COMPARISON: No prior abdominal ultrasound. Correlation with CT abdomen pelvis August 1, 2024     TECHNIQUE: Real-time ultrasound of the right upper quadrant was performed with a curvilinear transducer with both volumetric sweeps and still imaging techniques.     FINDINGS:     PANCREAS: Visualized portions of the pancreas are within normal limits.     AORTA AND IVC: Visualized portions are normal for patient age.     LIVER:  Size: Within normal range. The liver measures 16.2 cm in the midclavicular line.  Contour: Surface contour is  smooth.  Parenchyma: Echogenicity and echotexture are within normal limits.  No liver mass identified.  Limited imaging of the main portal vein shows it to be patent and hepatopetal.     BILIARY:  The gallbladder is normal in caliber.  No wall thickening or pericholecystic fluid.  Shadowing gallstone(s) identified.  No sonographic Perez's sign.  No intrahepatic biliary dilatation.  CBD measures 7.0 mm.  No choledocholithiasis.     KIDNEY:  Right kidney measures 9.2 x 4.3 x 4.7 cm. Volume 97.1 mL  Kidney within normal limits.     ASCITES: None.     IMPRESSION:     Cholelithiasis without acute cholecystitis.             The patient was seen and examined by Dr. Betancourt, all sanchez medical decisions were made with Dr. Betancourt.  Thank you for allowing us to participate in the care of this pleasant patient.  We will follow up with you closely.

## 2024-08-09 NOTE — ANESTHESIA PREPROCEDURE EVALUATION
Procedure:  ERCP    Relevant Problems   ANESTHESIA (within normal limits)      CARDIO   (+) Essential hypertension   (+) Hyperlipidemia      ENDO   (+) Acquired hypothyroidism   (+) Type 2 diabetes mellitus with other specified complication, without long-term current use of insulin (HCC)      MUSCULOSKELETAL   (+) Polymyositis (HCC)      NEURO/PSYCH   (+) Chronic pain      PULMONARY   (+) Asthma      Digestive   (+) Choledocholithiasis   (+) Cholelithiasis        Physical Exam    Airway    Mallampati score: III  TM Distance: >3 FB  Neck ROM: full     Dental   Comment: Numerous missig teeth; denies loose teeth; upper denture plate     Cardiovascular      Pulmonary      Other Findings  post-pubertal.      Anesthesia Plan  ASA Score- 3     Anesthesia Type- general with ASA Monitors.         Additional Monitors:     Airway Plan: ETT.           Plan Factors-Exercise tolerance (METS): >4 METS.    Chart reviewed. EKG reviewed.  Existing labs reviewed. Patient summary reviewed.    Patient is not a current smoker.              Induction- intravenous.    Postoperative Plan- Plan for postoperative opioid use.     Perioperative Resuscitation Plan - Level 1 - Full Code.       Informed Consent- Anesthetic plan and risks discussed with patient.  I personally reviewed this patient with the CRNA. Discussed and agreed on the Anesthesia Plan with the CRNA..

## 2024-08-09 NOTE — ASSESSMENT & PLAN NOTE
Recently admitted for acute diverticulitis, there were meropenem due to multiple antibiotic allergies  Patient discharged 8/5.  Patient still complains of loose stools and 1 episode of vomiting.  She has chills  8/8 CT abdomen pelvis   Minimal residual inflammatory change in the region of recent sigmoid diverticulitis.   Continue fluid hydration  Continue antibiotics for now

## 2024-08-09 NOTE — ASSESSMENT & PLAN NOTE
Recent Labs     08/08/24  1414   CREATININE 0.65   EGFR 87     Estimated Creatinine Clearance: 100.8 mL/min (by C-G formula based on SCr of 0.65 mg/dL).  Can continue home ACE after adequate hydration

## 2024-08-09 NOTE — ASSESSMENT & PLAN NOTE
Recent Labs     08/08/24  1414   WBC 18.26*   Patient recently admitted for acute cholecystitis, improvement in white count to 9K on discharge  Presents today later with left lower quadrant abdominal pain, associated chills  Patient meets SIRS with tachycardia and leukocytosis  Blood shows leukocytosis, elevated lactic acid, elevated liver enzymes with obstructive pattern  Procalcitonin 2.9  Right upper quadrant ultrasound with cholelithiasis, mild CBD dilation  CT abdomen shows almost resolution of acute c diverticulitis no evidence of acute cholecystitis  Cannot rule out mild cholangitis      Plan  Will empirically start meropenem 1 g every 8  Will obtain blood cultures  Continue IV fluids  MRCP to decide on ERCP  Surgical consult, appreciate recs  GI consult, appreciate recs  N.p.o. at midnight

## 2024-08-09 NOTE — ASSESSMENT & PLAN NOTE
Patient denies right upper quadrant abdominal pain however expresses left lower flank pain, associated chills nausea vomiting and diarrhea.  Leukocytosis, elevated lactic acid on blood work  Perez sign positive at bedside    Right upper quadrant ultrasound-Cholelithiasis without acute cholecystitis.,  CBD 7 mm  CT abdomen pelvis-Borderline steatosis (HU on portal venous phase 85).  No intrahepatic/extrahepatic biliary dilation. No radiopaque biliary stones.  CBD measures 8 mm, 7 mm previously.  No concerning solid masses.     Plan  Trend liver enzymes, bilirubin  Patient meets SIRS, although no radiographic evidence of acute cholecystitis, patient could have development of cholangitis  Will treat with meropenem 1 g every 8 hours  MRCP  IV fluids  Pain control  Surgical consult  GI consult

## 2024-08-09 NOTE — QUICK NOTE
Meropenem switched to ceftriaxone and Flagyl per ID Recommendation.Per ID, low cross-reactivity with penicillin allergy and cephalosporin. Documented rash with Metronidazole.  Recommend close monitoring for any sign of allergic reaction.

## 2024-08-10 PROBLEM — I48.91 ATRIAL FIBRILLATION WITH RAPID VENTRICULAR RESPONSE (HCC): Status: ACTIVE | Noted: 2024-08-10

## 2024-08-10 LAB
ALBUMIN SERPL BCG-MCNC: 3.2 G/DL (ref 3.5–5)
ALP SERPL-CCNC: 165 U/L (ref 34–104)
ALT SERPL W P-5'-P-CCNC: 198 U/L (ref 7–52)
ANA SER QL IA: NEGATIVE
ANION GAP SERPL CALCULATED.3IONS-SCNC: 7 MMOL/L (ref 4–13)
AST SERPL W P-5'-P-CCNC: 107 U/L (ref 13–39)
ATRIAL RATE: 161 BPM
ATRIAL RATE: 178 BPM
BASOPHILS # BLD AUTO: 0.02 THOUSANDS/ÂΜL (ref 0–0.1)
BASOPHILS NFR BLD AUTO: 0 % (ref 0–1)
BILIRUB DIRECT SERPL-MCNC: 0.49 MG/DL (ref 0–0.2)
BILIRUB SERPL-MCNC: 0.96 MG/DL (ref 0.2–1)
BUN SERPL-MCNC: 7 MG/DL (ref 5–25)
CALCIUM SERPL-MCNC: 8 MG/DL (ref 8.4–10.2)
CHLORIDE SERPL-SCNC: 105 MMOL/L (ref 96–108)
CO2 SERPL-SCNC: 28 MMOL/L (ref 21–32)
CREAT SERPL-MCNC: 0.5 MG/DL (ref 0.6–1.3)
EOSINOPHIL # BLD AUTO: 0.43 THOUSAND/ÂΜL (ref 0–0.61)
EOSINOPHIL NFR BLD AUTO: 6 % (ref 0–6)
ERYTHROCYTE [DISTWIDTH] IN BLOOD BY AUTOMATED COUNT: 18.4 % (ref 11.6–15.1)
GFR SERPL CREATININE-BSD FRML MDRD: 95 ML/MIN/1.73SQ M
GLUCOSE SERPL-MCNC: 163 MG/DL (ref 65–140)
GLUCOSE SERPL-MCNC: 206 MG/DL (ref 65–140)
GLUCOSE SERPL-MCNC: 247 MG/DL (ref 65–140)
GLUCOSE SERPL-MCNC: 86 MG/DL (ref 65–140)
GLUCOSE SERPL-MCNC: 90 MG/DL (ref 65–140)
HAV IGM SER QL: NORMAL
HBV CORE IGM SER QL: NORMAL
HBV SURFACE AG SER QL: NORMAL
HCT VFR BLD AUTO: 29.4 % (ref 34.8–46.1)
HCV AB SER QL: NORMAL
HGB BLD-MCNC: 9.6 G/DL (ref 11.5–15.4)
IMM GRANULOCYTES # BLD AUTO: 0.03 THOUSAND/UL (ref 0–0.2)
IMM GRANULOCYTES NFR BLD AUTO: 0 % (ref 0–2)
INR PPP: 1.24 (ref 0.85–1.19)
LYMPHOCYTES # BLD AUTO: 1.44 THOUSANDS/ÂΜL (ref 0.6–4.47)
LYMPHOCYTES NFR BLD AUTO: 19 % (ref 14–44)
MCH RBC QN AUTO: 22.3 PG (ref 26.8–34.3)
MCHC RBC AUTO-ENTMCNC: 32.7 G/DL (ref 31.4–37.4)
MCV RBC AUTO: 68 FL (ref 82–98)
MONOCYTES # BLD AUTO: 0.74 THOUSAND/ÂΜL (ref 0.17–1.22)
MONOCYTES NFR BLD AUTO: 10 % (ref 4–12)
NEUTROPHILS # BLD AUTO: 5.14 THOUSANDS/ÂΜL (ref 1.85–7.62)
NEUTS SEG NFR BLD AUTO: 65 % (ref 43–75)
NRBC BLD AUTO-RTO: 0 /100 WBCS
PLATELET # BLD AUTO: 301 THOUSANDS/UL (ref 149–390)
PMV BLD AUTO: 9.5 FL (ref 8.9–12.7)
POTASSIUM SERPL-SCNC: 3.5 MMOL/L (ref 3.5–5.3)
PR INTERVAL: 0 MS
PR INTERVAL: 0 MS
PROT SERPL-MCNC: 6 G/DL (ref 6.4–8.4)
PROTHROMBIN TIME: 16.3 SECONDS (ref 12.3–15)
QRS AXIS: 18 DEGREES
QRS AXIS: 22 DEGREES
QRSD INTERVAL: 72 MS
QRSD INTERVAL: 74 MS
QT INTERVAL: 284 MS
QT INTERVAL: 296 MS
QTC INTERVAL: 431 MS
QTC INTERVAL: 439 MS
RBC # BLD AUTO: 4.31 MILLION/UL (ref 3.81–5.12)
SODIUM SERPL-SCNC: 140 MMOL/L (ref 135–147)
T WAVE AXIS: -81 DEGREES
T WAVE AXIS: -86 DEGREES
VENTRICULAR RATE: 132 BPM
VENTRICULAR RATE: 138 BPM
WBC # BLD AUTO: 7.8 THOUSAND/UL (ref 4.31–10.16)

## 2024-08-10 PROCEDURE — 86664 EPSTEIN-BARR NUCLEAR ANTIGEN: CPT | Performed by: SURGERY

## 2024-08-10 PROCEDURE — 86015 ACTIN ANTIBODY EACH: CPT | Performed by: SURGERY

## 2024-08-10 PROCEDURE — 86665 EPSTEIN-BARR CAPSID VCA: CPT | Performed by: SURGERY

## 2024-08-10 PROCEDURE — 93010 ELECTROCARDIOGRAM REPORT: CPT | Performed by: INTERNAL MEDICINE

## 2024-08-10 PROCEDURE — 85610 PROTHROMBIN TIME: CPT | Performed by: SURGERY

## 2024-08-10 PROCEDURE — 80076 HEPATIC FUNCTION PANEL: CPT | Performed by: SURGERY

## 2024-08-10 PROCEDURE — 80048 BASIC METABOLIC PNL TOTAL CA: CPT | Performed by: SURGERY

## 2024-08-10 PROCEDURE — 80074 ACUTE HEPATITIS PANEL: CPT | Performed by: SURGERY

## 2024-08-10 PROCEDURE — 99232 SBSQ HOSP IP/OBS MODERATE 35: CPT | Performed by: SURGERY

## 2024-08-10 PROCEDURE — 85025 COMPLETE CBC W/AUTO DIFF WBC: CPT | Performed by: SURGERY

## 2024-08-10 PROCEDURE — 86663 EPSTEIN-BARR ANTIBODY: CPT | Performed by: SURGERY

## 2024-08-10 PROCEDURE — 99232 SBSQ HOSP IP/OBS MODERATE 35: CPT | Performed by: INTERNAL MEDICINE

## 2024-08-10 PROCEDURE — 86038 ANTINUCLEAR ANTIBODIES: CPT | Performed by: SURGERY

## 2024-08-10 PROCEDURE — 82948 REAGENT STRIP/BLOOD GLUCOSE: CPT

## 2024-08-10 RX ORDER — INSULIN LISPRO 100 [IU]/ML
1-6 INJECTION, SOLUTION INTRAVENOUS; SUBCUTANEOUS
Status: DISCONTINUED | OUTPATIENT
Start: 2024-08-10 | End: 2024-08-10

## 2024-08-10 RX ORDER — INSULIN GLARGINE 100 [IU]/ML
30 INJECTION, SOLUTION SUBCUTANEOUS
Status: DISCONTINUED | OUTPATIENT
Start: 2024-08-10 | End: 2024-08-11 | Stop reason: HOSPADM

## 2024-08-10 RX ORDER — INSULIN LISPRO 100 [IU]/ML
1-6 INJECTION, SOLUTION INTRAVENOUS; SUBCUTANEOUS
Status: DISCONTINUED | OUTPATIENT
Start: 2024-08-10 | End: 2024-08-11 | Stop reason: HOSPADM

## 2024-08-10 RX ADMIN — FAMOTIDINE 20 MG: 20 TABLET ORAL at 17:06

## 2024-08-10 RX ADMIN — PANTOPRAZOLE SODIUM 40 MG: 20 TABLET, DELAYED RELEASE ORAL at 05:13

## 2024-08-10 RX ADMIN — LEVOTHYROXINE SODIUM 88 MCG: 88 TABLET ORAL at 05:13

## 2024-08-10 RX ADMIN — METRONIDAZOLE 500 MG: 500 TABLET ORAL at 13:18

## 2024-08-10 RX ADMIN — INSULIN LISPRO 2 UNITS: 100 INJECTION, SOLUTION INTRAVENOUS; SUBCUTANEOUS at 20:39

## 2024-08-10 RX ADMIN — Medication 1000 UNITS: at 09:19

## 2024-08-10 RX ADMIN — AMLODIPINE BESYLATE 5 MG: 5 TABLET ORAL at 09:18

## 2024-08-10 RX ADMIN — DEXTROSE 2000 MG: 50 INJECTION, SOLUTION INTRAVENOUS at 20:30

## 2024-08-10 RX ADMIN — INSULIN GLARGINE 30 UNITS: 100 INJECTION, SOLUTION SUBCUTANEOUS at 20:39

## 2024-08-10 RX ADMIN — FONDAPARINUX SODIUM 2.5 MG: 2.5 INJECTION, SOLUTION SUBCUTANEOUS at 09:20

## 2024-08-10 RX ADMIN — METRONIDAZOLE 500 MG: 500 TABLET ORAL at 03:17

## 2024-08-10 RX ADMIN — PREDNISONE 7.5 MG: 1 TABLET ORAL at 09:18

## 2024-08-10 RX ADMIN — Medication 250 MG: at 17:06

## 2024-08-10 RX ADMIN — FAMOTIDINE 20 MG: 20 TABLET ORAL at 09:19

## 2024-08-10 RX ADMIN — METRONIDAZOLE 500 MG: 500 TABLET ORAL at 20:30

## 2024-08-10 RX ADMIN — Medication 250 MG: at 09:18

## 2024-08-10 RX ADMIN — INSULIN LISPRO 3 UNITS: 100 INJECTION, SOLUTION INTRAVENOUS; SUBCUTANEOUS at 17:07

## 2024-08-10 RX ADMIN — SODIUM CHLORIDE, SODIUM GLUCONATE, SODIUM ACETATE, POTASSIUM CHLORIDE, MAGNESIUM CHLORIDE, SODIUM PHOSPHATE, DIBASIC, AND POTASSIUM PHOSPHATE 75 ML/HR: .53; .5; .37; .037; .03; .012; .00082 INJECTION, SOLUTION INTRAVENOUS at 13:18

## 2024-08-10 RX ADMIN — CALCIUM CARBONATE 1000 MG: 500 TABLET, CHEWABLE ORAL at 09:18

## 2024-08-10 NOTE — ASSESSMENT & PLAN NOTE
Recently admitted for acute diverticulitis, there were meropenem due to multiple antibiotic allergies  Patient discharged 8/5.  Patient still complains of loose stools and 1 episode of vomiting.  She has chills  8/8 CT abdomen pelvis   Minimal residual inflammatory change in the region of recent sigmoid diverticulitis.   ID reached out for concern of antibiotic coverage.  Initial choice of meropenem was due to patient's allergies.  They recommend metronidazole.  Switched antibiotics.  Plan    Continue fluid hydration  Continue antibiotics IV metronidazole and ceftriaxone   Plan to discharge on oral antibiotics for a course of 7 days total, per surgery recommendation.

## 2024-08-10 NOTE — PROGRESS NOTES
"Progress Note -General Surgery   Jadyn Ac 74 y.o. female MRN: [id number]  Unit/Bed#: S -01 Encounter: [id number]    Assessment:  74F with elevated LFTs, cholelithiasis no cholecystitis, in setting of diverticulitis.   S/p 8/9 ERCP (GI)   Post ERCP complicated by A-fib RVR      Plan:  Medicine/cardiology for atrial fibrillation  Plan for 1 week antibiotics  Plan for outpatient lap aruna given complicated course   advance diet as tolerated        Subjective/Objective     Subjective:   Epigastric pain.  No nausea no vomiting.  Passing flatus no bowel movements.    Objective:     Blood pressure 145/73, pulse 81, temperature 98.4 °F (36.9 °C), resp. rate 16, height 5' 11\" (1.803 m), weight 109 kg (240 lb 1.3 oz), SpO2 95%.,Body mass index is 33.48 kg/m².      Intake/Output Summary (Last 24 hours) at 8/10/2024 0914  Last data filed at 8/10/2024 0549  Gross per 24 hour   Intake 420 ml   Output 450 ml   Net -30 ml       Invasive Devices       Peripheral Intravenous Line  Duration             Peripheral IV 08/09/24 Left Antecubital <1 day                    Physical Exam:   Gen: NAD, Comfortable  Neuro: A&O, No focal deficits  Head: Normal Cephalic, Atraumatic  Eye: EOMI, PERRLA, No scleral icterus  Neck: Supple, No JVD, Midline trachea  CV: RRR, Cap refill <2 sec  Pulm: Normal work of breathing, no respiratory distress  Abd: Soft, Non-Distended, Non-Tender  Ext: No edema, Non-tender  Skin: warm, dry, intact      "

## 2024-08-10 NOTE — ASSESSMENT & PLAN NOTE
Patient was noted to be in atrial fibrillation with RVR post ERCP procedure on 8/9.  Initially given 5 mg metoprolol which did not resolve.  She was started on Cardizem drip and shortly resolved to normal sinus rhythm.  Patient's blood pressure was low so drip was titrated down.  At this time and since then patient has been in normal sinus rhythm with help medication.  Patient is on fondaparinux DVT prophylaxis as she cannot have pork products per [employer] identity.  Had extensive discussion with patient and daughter about new atrial fibrillation.  Offered to start patient on metoprolol to keep her rate controlled.  Family and patient would like to hold off on treatment until he follow-up with cardiology and surveilled for future episodes of atrial fibrillation  Patient has been in normal sinus overnight.       Plan  Continue monitoring patient on telemetry overnight: NSR, no significant events overnight  Continue aspirin 81 mg daily  Follow-up with cardiology outpatient  Recommend Holter monitor to assess for future episodes of atrial fibrillation  Patient will discuss options for anticoagulation in the outpatient setting, refusing at this time.

## 2024-08-10 NOTE — ASSESSMENT & PLAN NOTE
Lab Results   Component Value Date    HGBA1C 7.6 (H) 07/11/2024       Recent Labs     08/09/24  0759 08/09/24  1055 08/09/24  1555 08/09/24  2222   POCGLU 124 120 201* 146*         Blood Sugar Average: Last 72 hrs:  (P) 162.5  Home regimen metformin 500 twice daily, Januvia 25, Lantus 30 units at bedtime  Post ERCP GI recommends clear liquid diet.  Will advance per their recommendations    Plan  Patient already got her 30 units of Lantus at bedtime  Monitor glucose closely  Will add sliding scale insulin  Hypoglycemia protocol

## 2024-08-10 NOTE — ASSESSMENT & PLAN NOTE
Patient denies right upper quadrant abdominal pain however expresses left lower flank pain, associated chills nausea vomiting and diarrhea.  Leukocytosis, elevated lactic acid on blood work  Perez sign positive at bedside    Right upper quadrant ultrasound-Cholelithiasis without acute cholecystitis.,  CBD 7 mm  CT abdomen pelvis-Borderline steatosis (HU on portal venous phase 85).  No intrahepatic/extrahepatic biliary dilation. No radiopaque biliary stones.  CBD measures 8 mm, 7 mm previously.  No concerning solid masses.   Explained reasoning of patient allergy but they recommend metronidazole.  Switched coverage.  ERCP completed by GI and stone removed.  WBC continually improving, today 8/10 7.8.  Liver enzymes improved today bilirubin 0.49, alk phos 165, ,   Hepatitis panel normal    Plan  Continue on Flagyl and ceftriaxone per ID recommendation, start cefdinir and Flagyl on discharge  IV fluids  Pain control  Follow-up with surgery in the outpatient setting  Follow-up with GI following discharge  Follow-up with general surgery

## 2024-08-10 NOTE — ASSESSMENT & PLAN NOTE
Patient was noted to be in atrial fibrillation with RVR post ERCP procedure on 8/9.  Initially given 5 mg metoprolol which did not resolve.  She was started on Cardizem drip and shortly resolved to normal sinus rhythm.  Patient's blood pressure was low so drip was titrated down.  At this time and since then patient has been in normal sinus rhythm with help medication.  Patient is on fondaparinux DVT prophylaxis as she cannot have pork products per [employer] identity.  Had extensive discussion with patient and daughter about new atrial fibrillation.  Offered to start patient on metoprolol to keep her rate controlled.  Family and patient would like to hold off on treatment until he follow-up with cardiology and surveilled for future episodes of atrial fibrillation  Patient has been in normal sinus overnight.       Plan  Continue monitoring patient on telemetry overnight  Continue aspirin 81 mg daily  Follow-up with cardiology outpatient  Recommend Holter monitor to assess for future episodes of atrial fibrillation  Patient will discuss options for anticoagulation in the outpatient setting, refusing at this time.

## 2024-08-10 NOTE — ASSESSMENT & PLAN NOTE
Correction from the previous admission  Was documented in the previous admission that patient had refused anticoagulation due to [employer] exemption    On discussion with the patient today  Patient is not Jehovah witness  Patient is [employer]-day [employer]-agreeable to taking anticoagulation  Patient is agreeable for blood transfusion of blood products    Patient has exemptions to food notably no seafood products, no pork and pork products   Patient refuses Lovenox/heparin due to being derived from pork products.  Will proceed with fondaparinux VTE prophylaxis starting tomorrow 8/10.  After episode of A-fib with RVR in PACU, patient was started on fondaparinux 8/9 and will continue today 8/10

## 2024-08-10 NOTE — ASSESSMENT & PLAN NOTE
Recently admitted for acute diverticulitis, there were meropenem due to multiple antibiotic allergies  Patient discharged 8/5.  Patient still complains of loose stools and 1 episode of vomiting.  She has chills  8/8 CT abdomen pelvis   Minimal residual inflammatory change in the region of recent sigmoid diverticulitis.   ID reached out for concern of antibiotic coverage.  Initial choice of meropenem was due to patient's allergies.  They recommend metronidazole.  Switched antibiotics.  Plan    Continue fluid hydration  Continue antibiotics IV metronidazole and ceftriaxone for now  Plan to discharge on oral antibiotics for a course of 7 days total, per surgery recommendation.

## 2024-08-10 NOTE — ASSESSMENT & PLAN NOTE
Correction from the previous admission  Was documented in the previous admission that patient had refused anticoagulation due to [employer] exemption    On discussion with the patient today  Patient is not Jehovah witness  Patient is Seventh-day Jewish-agreeable to taking anticoagulation  Patient is agreeable for blood transfusion of blood products    Patient has exemptions to food notably no seafood products, no pork and pork products   Patient refuses Lovenox/heparin due to being derived from pork products.  Will proceed with fondaparinux VTE prophylaxis starting tomorrow 8/10.  After episode of A-fib with RVR in PACU, patient was started on fondaparinux 8/9 and will continue today 8/10

## 2024-08-10 NOTE — PLAN OF CARE
Problem: PAIN - ADULT  Goal: Verbalizes/displays adequate comfort level or baseline comfort level  Description: Interventions:  - Encourage patient to monitor pain and request assistance  - Assess pain using appropriate pain scale  - Administer analgesics based on type and severity of pain and evaluate response  - Implement non-pharmacological measures as appropriate and evaluate response  - Consider cultural and social influences on pain and pain management  - Notify physician/advanced practitioner if interventions unsuccessful or patient reports new pain  Outcome: Progressing     Problem: SAFETY ADULT  Goal: Patient will remain free of falls  Description: INTERVENTIONS:  - Educate patient/family on patient safety including physical limitations  - Instruct patient to call for assistance with activity   - Consult OT/PT to assist with strengthening/mobility   - Keep Call bell within reach  - Keep bed low and locked with side rails adjusted as appropriate  - Keep care items and personal belongings within reach  - Initiate and maintain comfort rounds  - Make Fall Risk Sign visible to staff  - Offer Toileting every  Hours, in advance of need  - Initiate/Maintain alarm  - Obtain necessary fall risk management equipment:   - Apply yellow socks and bracelet for high fall risk patients  - Consider moving patient to room near nurses station  Outcome: Progressing  Goal: Maintain or return to baseline ADL function  Description: INTERVENTIONS:  -  Assess patient's ability to carry out ADLs; assess patient's baseline for ADL function and identify physical deficits which impact ability to perform ADLs (bathing, care of mouth/teeth, toileting, grooming, dressing, etc.)  - Assess/evaluate cause of self-care deficits   - Assess range of motion  - Assess patient's mobility; develop plan if impaired  - Assess patient's need for assistive devices and provide as appropriate  - Encourage maximum independence but intervene and supervise  when necessary  - Involve family in performance of ADLs  - Assess for home care needs following discharge   - Consider OT consult to assist with ADL evaluation and planning for discharge  - Provide patient education as appropriate  Outcome: Progressing  Goal: Maintains/Returns to pre admission functional level  Description: INTERVENTIONS:  - Perform AM-PAC 6 Click Basic Mobility/ Daily Activity assessment daily.  - Set and communicate daily mobility goal to care team and patient/family/caregiver.   - Collaborate with rehabilitation services on mobility goals if consulted  - Perform Range of Motion  times a day.  - Reposition patient every  hours.  - Dangle patient  times a day  - Stand patient  times a day  - Ambulate patient  times a day  - Out of bed to chair  times a day   - Out of bed for meals  times a day  - Out of bed for toileting  - Record patient progress and toleration of activity level   Outcome: Progressing     Problem: SAFETY ADULT  Goal: Maintains/Returns to pre admission functional level  Description: INTERVENTIONS:  - Perform AM-PAC 6 Click Basic Mobility/ Daily Activity assessment daily.  - Set and communicate daily mobility goal to care team and patient/family/caregiver.   - Collaborate with rehabilitation services on mobility goals if consulted  - Perform Range of Motion  times a day.  - Reposition patient every  hours.  - Dangle patient  times a day  - Stand patient  times a day  - Ambulate patient times a day  - Out of bed to chair  times a day   - Out of bed for meals  times a day  - Out of bed for toileting  - Record patient progress and toleration of activity level   Outcome: Progressing     Problem: Nutrition/Hydration-ADULT  Goal: Nutrient/Hydration intake appropriate for improving, restoring or maintaining nutritional needs  Description: Monitor and assess patient's nutrition/hydration status for malnutrition. Collaborate with interdisciplinary team and initiate plan and interventions as  ordered.  Monitor patient's weight and dietary intake as ordered or per policy. Utilize nutrition screening tool and intervene as necessary. Determine patient's food preferences and provide high-protein, high-caloric foods as appropriate.     INTERVENTIONS:  - Monitor oral intake, urinary output, labs, and treatment plans  - Assess nutrition and hydration status and recommend course of action  - Evaluate amount of meals eaten  - Assist patient with eating if necessary   - Allow adequate time for meals  - Recommend/ encourage appropriate diets, oral nutritional supplements, and vitamin/mineral supplements  - Order, calculate, and assess calorie counts as needed  - Recommend, monitor, and adjust tube feedings and TPN/PPN based on assessed needs  - Assess need for intravenous fluids  - Provide specific nutrition/hydration education as appropriate  - Include patient/family/caregiver in decisions related to nutrition  Outcome: Progressing     Problem: Prexisting or High Potential for Compromised Skin Integrity  Goal: Skin integrity is maintained or improved  Description: INTERVENTIONS:  - Identify patients at risk for skin breakdown  - Assess and monitor skin integrity  - Assess and monitor nutrition and hydration status  - Monitor labs   - Assess for incontinence   - Turn and reposition patient  - Assist with mobility/ambulation  - Relieve pressure over bony prominences  - Avoid friction and shearing  - Provide appropriate hygiene as needed including keeping skin clean and dry  - Evaluate need for skin moisturizer/barrier cream  - Collaborate with interdisciplinary team   - Patient/family teaching  - Consider wound care consult   Outcome: Progressing

## 2024-08-10 NOTE — ASSESSMENT & PLAN NOTE
Patient denies right upper quadrant abdominal pain however expresses left lower flank pain, associated chills nausea vomiting and diarrhea.  Leukocytosis, elevated lactic acid on blood work  Perez sign positive at bedside    Right upper quadrant ultrasound-Cholelithiasis without acute cholecystitis.,  CBD 7 mm  CT abdomen pelvis-Borderline steatosis (HU on portal venous phase 85).  No intrahepatic/extrahepatic biliary dilation. No radiopaque biliary stones.  CBD measures 8 mm, 7 mm previously.  No concerning solid masses.   Explained reasoning of patient allergy but they recommend metronidazole.  Switched coverage.  ERCP completed by GI and stone removed.  WBC continually improving, today 8/10 7.8.  Liver enzymes improved today bilirubin 0.49, alk phos 165, ,     Plan  Ordered hepatitis panel and EBV, follow results  Continue on Flagyl and ceftriaxone per ID recommendation  IV fluids  Pain control  Follow-up with surgery in the outpatient setting  GI consult

## 2024-08-10 NOTE — ASSESSMENT & PLAN NOTE
Recent Labs     08/08/24  1414 08/09/24  0450 08/10/24  0312   CREATININE 0.65 0.49* 0.50*   EGFR 87 96 95       Estimated Creatinine Clearance: 134.2 mL/min (A) (by C-G formula based on SCr of 0.5 mg/dL (L)).  Can continue home ACE after adequate hydration

## 2024-08-10 NOTE — DISCHARGE SUMMARY
Formerly [facility]  Discharge- Jadyn Ac 1950, 74 y.o. female MRN: [id number]  Unit/Bed#: S -Droa Encounter: [id number]  Primary Care Provider: Herlinda Castorena MD   Date and time admitted to hospital: 8/8/2024  1:36 PM    * Cholelithiasis  Assessment & Plan  Patient denies right upper quadrant abdominal pain however expresses left lower flank pain, associated chills nausea vomiting and diarrhea.  Leukocytosis, elevated lactic acid on blood work  Perez sign positive at bedside    Right upper quadrant ultrasound-Cholelithiasis without acute cholecystitis.,  CBD 7 mm  CT abdomen pelvis-Borderline steatosis (HU on portal venous phase 85).  No intrahepatic/extrahepatic biliary dilation. No radiopaque biliary stones.  CBD measures 8 mm, 7 mm previously.  No concerning solid masses.   Explained reasoning of patient allergy but they recommend metronidazole.  Switched coverage.  ERCP completed by GI and stone removed.  WBC continually improving, today 8/10 7.8.  Liver enzymes improved today bilirubin 0.49, alk phos 165, ,   Hepatitis panel normal    Plan  Continue on Flagyl and ceftriaxone per ID recommendation, start cefdinir and Flagyl on discharge  IV fluids  Pain control  Follow-up with surgery in the outpatient setting  Follow-up with GI following discharge  Follow-up with general surgery     Atrial fibrillation with rapid ventricular response (HCC)  Assessment & Plan  Patient was noted to be in atrial fibrillation with RVR post ERCP procedure on 8/9.  Initially given 5 mg metoprolol which did not resolve.  She was started on Cardizem drip and shortly resolved to normal sinus rhythm.  Patient's blood pressure was low so drip was titrated down.  At this time and since then patient has been in normal sinus rhythm with help medication.  Patient is on fondaparinux DVT prophylaxis as she cannot have pork products per [employer] identity.  Had extensive discussion with patient  and daughter about new atrial fibrillation.  Offered to start patient on metoprolol to keep her rate controlled.  Family and patient would like to hold off on treatment until he follow-up with cardiology and surveilled for future episodes of atrial fibrillation  Patient has been in normal sinus overnight.       Plan  Continue monitoring patient on telemetry overnight: NSR, no significant events overnight  Continue aspirin 81 mg daily  Follow-up with cardiology outpatient  Recommend Holter monitor to assess for future episodes of atrial fibrillation  Patient will discuss options for anticoagulation in the outpatient setting, refusing at this time.    Pleural nodule  Assessment & Plan  7 mm pleural nodule  Follow-up CT in 12 months  Discussed finding with patient and daughter.    Elevated serum creatinine  Assessment & Plan  Recent Labs     08/09/24  0450 08/10/24  0312 08/11/24  0556   CREATININE 0.49* 0.50* 0.52*   EGFR 96 95 94       Estimated Creatinine Clearance: 129.6 mL/min (A) (by C-G formula based on SCr of 0.52 mg/dL (L)).  Can continue home ACE after adequate hydration     Leukocytosis  Assessment & Plan  Recent Labs     08/09/24 0450 08/10/24  0312 08/11/24  0556   WBC 14.63* 7.80 8.60     Patient recently admitted for acute cholecystitis, improvement in white count to 9K on discharge  Presents today later with left lower quadrant abdominal pain, associated chills  Patient meets SIRS with tachycardia and leukocytosis  Blood shows leukocytosis, elevated lactic acid, elevated liver enzymes with obstructive pattern  Procalcitonin 2.9  Right upper quadrant ultrasound with cholelithiasis, mild CBD dilation  CT abdomen shows almost resolution of acute c diverticulitis no evidence of acute cholecystitis  Cannot rule out mild cholangitis.  Despite GI recommendation for MRCP, patient is refusing due to severe claustrophobia.  She has been unable to complete exam in the past. ID reached out about antibiotic  coverage.  Explained reasoning of patient allergy but they recommend metronidazole.  Switched coverage.  ERCP completed by GI and stone removed.  WBC continually improving, today 8/10 7.8.  Pending blood cultures        Plan  Continue metronidazole and ceftriaxone, Plan to discharge on cefdinir and Flagyl  Continue IV fluids  Surgery recommends seeing patient in the outpatient setting post ERCP  Diet will be advanced     Amish exemption  Assessment & Plan  Correction from the previous admission  Was documented in the previous admission that patient had refused anticoagulation due to Catholic exemption    On discussion with the patient today  Patient is not Jehovah witness  Patient is Seventh-day Islam-agreeable to taking anticoagulation  Patient is agreeable for blood transfusion of blood products    Patient has exemptions to food notably no seafood products, no pork and pork products   Patient refuses Lovenox/heparin due to being derived from pork products.  Will proceed with fondaparinux VTE prophylaxis starting tomorrow 8/10.  After episode of A-fib with RVR in PACU, patient was started on fondaparinux 8/9 and will continue today 8/10    Essential hypertension  Assessment & Plan  Continue amlodipine 5, continue benazepril 10 mg    Acquired hypothyroidism  Assessment & Plan  Continue levothyroxine 88 mcg    Asthma  Assessment & Plan  Not in acute exacerbation  Continue albuterol inhaler as needed    Type 2 diabetes mellitus with other specified complication, without long-term current use of insulin (HCC)  Assessment & Plan  Lab Results   Component Value Date    HGBA1C 7.6 (H) 07/11/2024       Recent Labs     08/10/24  1555 08/10/24  2019 08/11/24  0812 08/11/24  1108   POCGLU 247* 206* 116 206*         Blood Sugar Average: Last 72 hrs:  (P) [id number]  Home regimen metformin 500 twice daily, Januvia 25, Lantus 30 units at bedtime  Post ERCP GI recommends clear liquid diet.  Will advance per  their recommendations    Plan  Patient already got her 30 units of Lantus at bedtime  Monitor glucose closely  Will add sliding scale insulin  Hypoglycemia protocol    Polymyositis (HCC)  Assessment & Plan  Stable without acute flare  Follows up with Dr Corona    Diverticulitis  Assessment & Plan  Recently admitted for acute diverticulitis, there were meropenem due to multiple antibiotic allergies  Patient discharged 8/5.  Patient still complains of loose stools and 1 episode of vomiting.  She has chills  8/8 CT abdomen pelvis   Minimal residual inflammatory change in the region of recent sigmoid diverticulitis.   ID reached out for concern of antibiotic coverage.  Initial choice of meropenem was due to patient's allergies.  They recommend metronidazole.  Switched antibiotics.  Plan    Continue fluid hydration  Continue antibiotics IV metronidazole and ceftriaxone   Plan to discharge on oral antibiotics for a course of 7 days total, per surgery recommendation.        Medical Problems       Resolved Problems  Date Reviewed: 8/5/2024   None       Discharging Resident: Beverly Romero MD  Discharging Attending: No att. providers found  PCP: Herlinda Castorena MD  Admission Date:   Admission Orders (From admission, onward)       Ordered        08/08/24 2145  INPATIENT ADMISSION  Once                          Discharge Date: 08/11/24    Consultations During Hospital Stay:  Surgery, GI    Procedures Performed:   ERCP    Significant Findings / Test Results:   Elevated LFTs, obstructive pattern.  Cholelithiasis without cholecystitis.    Incidental Findings:   7 mm pleural nodule on left side.    I reviewed the above mentioned incidental findings with the patient and/or family and they expressed understanding.    Test Results Pending at Discharge (will require follow up):  None     Outpatient Tests Requested:  None    Complications: None    Reason for Admission: [facility]  "Course:   Jadyn Ac is a 74 y.o. female patient who originally presented to the hospital on 8/8/2024 due to left lower abdominal pain and diarrhea ongoing for 2 days.  Patient had recently admitted for acute diverticulitis and completed course of antibiotics in the hospital and was discharged without symptoms.  She was without symptoms for around 2 days when she started developing lower abdominal pain and diarrhea.  Labs revealed elevated liver enzymes, obstructive pattern.  Patient was evaluated by surgery and GI.  No acute cholecystitis, choledocholithiasis noted on imaging.  Considering suspicion and recommended MRCP.  Patient has been unable to tolerate MRI in the past and refused MRCP due to claustrophobia.  Discussion with GI and family, decision was made to proceed with ERCP.  Procedure went well and afterwards in the PACU patient developed A-fib with RVR.  Resolved after Cardizem drip and patient was on telemetry overnight.  No events appreciated.  Discussed with family and patient placing patient on metoprolol or anticoagulation due to new episode of atrial fibrillation and noted history of some intermittent palpitations.  At this time they are refusing new medications but will follow-up with cardiology outpatient to evaluate for future episodes of atrial fibrillation.    Hospital Course: No notes on file        Please see above list of diagnoses and related plan for additional information.     Condition at Discharge: good    Discharge Day Visit / Exam:   Subjective: Patient was seen and examined at bedside.  No complaints.  Sitting comfortably on the bed.  No further abdominal pain.  Examination unremarkable.  Vitals: Blood Pressure: 117/68 (08/11/24 0900)  Pulse: 77 (08/11/24 0900)  Temperature: 97.7 °F (36.5 °C) (08/11/24 0804)  Temp Source: Axillary (08/09/24 2114)  Respirations: 14 (08/10/24 2116)  Height: 5' 11\" (180.3 cm) (08/11/24 0900)  Weight - Scale: 110 kg (242 lb 8.1 oz) (08/11/24 " 0900)  SpO2: 95 % (08/11/24 0804)  Exam:   Physical Exam  Vitals and nursing note reviewed.   Constitutional:       General: She is not in acute distress.     Appearance: She is well-developed.   HENT:      Head: Normocephalic and atraumatic.   Eyes:      Conjunctiva/sclera: Conjunctivae normal.   Cardiovascular:      Rate and Rhythm: Normal rate and regular rhythm.      Heart sounds: No murmur heard.  Pulmonary:      Effort: Pulmonary effort is normal. No respiratory distress.      Breath sounds: Normal breath sounds.   Abdominal:      Palpations: Abdomen is soft.      Tenderness: There is no abdominal tenderness.   Musculoskeletal:         General: No swelling.      Cervical back: Neck supple.   Skin:     General: Skin is warm and dry.      Capillary Refill: Capillary refill takes less than 2 seconds.   Neurological:      Mental Status: She is alert.   Psychiatric:         Mood and Affect: Mood normal.          Discussion with Family: Attempted to update  (daughter) via phone. Left voicemail.     Discharge instructions/Information to patient and family:   See after visit summary for information provided to patient and family.      Provisions for Follow-Up Care:  See after visit summary for information related to follow-up care and any pertinent home health orders.      Mobility at time of Discharge:   Basic Mobility Inpatient Raw Score: 16  JH-HLM Goal: 5: Stand one or more mins  JH-HLM Achieved: 3: Sit at edge of bed  HLM Goal achieved. Continue to encourage appropriate mobility.     Disposition:   Home    Planned Readmission: no    Discharge Medications:  See after visit summary for reconciled discharge medications provided to patient and/or family.      **Please Note: This note may have been constructed using a voice recognition system**

## 2024-08-10 NOTE — ASSESSMENT & PLAN NOTE
Lab Results   Component Value Date    HGBA1C 7.6 (H) 07/11/2024       Recent Labs     08/09/24  1555 08/09/24  2222 08/10/24  0844 08/10/24  1123   POCGLU 201* 146* 90 163*         Blood Sugar Average: Last 72 hrs:  (P) 153.5  Home regimen metformin 500 twice daily, Januvia 25, Lantus 30 units at bedtime  Post ERCP GI recommends clear liquid diet.  Will advance per their recommendations    Plan  Patient already got her 30 units of Lantus at bedtime  Monitor glucose closely  Will add sliding scale insulin  Hypoglycemia protocol

## 2024-08-10 NOTE — PROGRESS NOTES
"Progress Note - Jadyn Ac 74 y.o. female MRN: [id number]    Unit/Bed#: S -01 Encounter: [id number]        Assessment/Plan:  Recurrent postprandial abdominal pain, noted to have gallstones, elevated liver enzymes and white blood cell count, and mildly dilated CBD.   -Underwent ERCP yesterday as patient could not tolerate MRCP due to claustrophobia-ERCP revealed a small distal filling defect and sphincterotomy was performed with balloon sweep with extraction of the stone  -Patient on clear liquid diet, ov IVF, was on Cardizem drip due to atrial fibrillation with RVR post ERCP  -Surgery following and is planning for outpatient cholecystectomy  -LFTs have improved significantly today with normalization of bilirubin  -Spoke with primary team, will advance diet to low-fat  -Did have recent episode of diverticulitis which started about a week ago, last colonoscopy performed in 2018 and would recommend colonoscopy in 6 weeks  -Advised patient and daughter of outpatient follow-up        Subjective:   Patient is lying in bed.  She is eating and she is feeling much better.  Daughter at bedside.  She told me this morning she had some gas pain but no real significant abdominal pain.  Denies any nausea or vomiting.      Objective:     Vitals: /73   Pulse 81   Temp 98.4 °F (36.9 °C)   Resp 16   Ht 5' 11\" (1.803 m)   Wt 109 kg (240 lb 1.3 oz)   SpO2 95%   BMI 33.48 kg/m²       Physical Exam:  Gen-alert, nad  Abd-soft positive bowel sounds nontender nondistended, no r/r/g       Lab, Imaging and other studies:   Recent Results (from the past 72 hour(s))   CBC and differential    Collection Time: 08/08/24  2:14 PM   Result Value Ref Range    WBC 18.26 (H) 4.31 - 10.16 Thousand/uL    RBC 5.02 3.81 - 5.12 Million/uL    Hemoglobin 11.3 (L) 11.5 - 15.4 g/dL    Hematocrit 34.7 (L) 34.8 - 46.1 %    MCV 69 (L) 82 - 98 fL    MCH 22.5 (L) 26.8 - 34.3 pg    MCHC 32.6 31.4 - 37.4 g/dL    RDW 19.3 (H) 11.6 - 15.1 %    " MPV 9.7 8.9 - 12.7 fL    Platelets 344 149 - 390 Thousands/uL   Comprehensive metabolic panel    Collection Time: 08/08/24  2:14 PM   Result Value Ref Range    Sodium 132 (L) 135 - 147 mmol/L    Potassium 4.4 3.5 - 5.3 mmol/L    Chloride 98 96 - 108 mmol/L    CO2 24 21 - 32 mmol/L    ANION GAP 10 4 - 13 mmol/L    BUN 18 5 - 25 mg/dL    Creatinine 0.65 0.60 - 1.30 mg/dL    Glucose 312 (H) 65 - 140 mg/dL    Calcium 8.4 8.4 - 10.2 mg/dL     (H) 13 - 39 U/L     (H) 7 - 52 U/L    Alkaline Phosphatase 138 (H) 34 - 104 U/L    Total Protein 6.9 6.4 - 8.4 g/dL    Albumin 3.6 3.5 - 5.0 g/dL    Total Bilirubin 1.09 (H) 0.20 - 1.00 mg/dL    eGFR 87 ml/min/1.73sq m   Lipase    Collection Time: 08/08/24  2:14 PM   Result Value Ref Range    Lipase 8 (L) 11 - 82 u/L   Manual Differential(PHLEBS Do Not Order)    Collection Time: 08/08/24  2:14 PM   Result Value Ref Range    Segmented % 83 (H) 43 - 75 %    Bands % 9 (H) 0 - 8 %    Lymphocytes % 3 (L) 14 - 44 %    Monocytes % 4 4 - 12 %    Eosinophils % 0 0 - 6 %    Basophils % 0 0 - 1 %    Atypical Lymphocytes % 1 (H) <=0 %    Absolute Neutrophils 16.80 (H) 1.85 - 7.62 Thousand/uL    Absolute Lymphocytes 0.73 0.60 - 4.47 Thousand/uL    Absolute Monocytes 0.73 0.00 - 1.22 Thousand/uL    Absolute Eosinophils 0.00 0.00 - 0.40 Thousand/uL    Absolute Basophils 0.00 0.00 - 0.10 Thousand/uL    Total Counted      RBC Morphology Present     Platelet Estimate Adequate Adequate    Anisocytosis Present    Procalcitonin    Collection Time: 08/08/24  2:14 PM   Result Value Ref Range    Procalcitonin 2.96 (H) <=0.25 ng/ml   ECG 12 lead    Collection Time: 08/08/24  2:20 PM   Result Value Ref Range    Ventricular Rate 100 BPM    Atrial Rate 100 BPM    NC Interval 162 ms    QRSD Interval 80 ms    QT Interval 340 ms    QTC Interval 438 ms    P Axis 50 degrees    QRS Axis 24 degrees    T Wave Axis 18 degrees   Lactic acid, plasma (w/reflex if result > 2.0)    Collection Time: 08/08/24   2:57 PM   Result Value Ref Range    LACTIC ACID 2.2 (H) 0.5 - 2.0 mmol/L   UA w Reflex to Microscopic w Reflex to Culture    Collection Time: 08/08/24  2:58 PM    Specimen: Urine, Other   Result Value Ref Range    Color, UA Yellow     Clarity, UA Clear     Specific Gravity, UA 1.015 1.003 - 1.030    pH, UA 5.5 4.5, 5.0, 5.5, 6.0, 6.5, 7.0, 7.5, 8.0    Leukocytes, UA (A) Negative     Elevated glucose may cause decreased leukocyte values. See urine microscopic for UWBC result    Nitrite, UA Negative Negative    Protein, UA Trace (A) Negative mg/dl    Glucose, UA >=1000 (1%) (A) Negative mg/dl    Ketones, UA Trace (A) Negative mg/dl    Urobilinogen, UA <2.0 <2.0 mg/dl mg/dl    Bilirubin, UA Negative Negative    Occult Blood, UA Small (A) Negative   Urine Microscopic    Collection Time: 08/08/24  2:58 PM   Result Value Ref Range    RBC, UA None Seen None Seen, 1-2 /hpf    WBC, UA 1-2 None Seen, 1-2 /hpf    Epithelial Cells Occasional None Seen, Occasional /hpf    Bacteria, UA Occasional None Seen, Occasional /hpf   Lactic acid 2 Hours    Collection Time: 08/08/24  5:19 PM   Result Value Ref Range    LACTIC ACID 1.5 0.5 - 2.0 mmol/L   Fingerstick Glucose (POCT)    Collection Time: 08/08/24  8:52 PM   Result Value Ref Range    POC Glucose 238 (H) 65 - 140 mg/dl   Bilirubin, direct    Collection Time: 08/09/24  1:17 AM   Result Value Ref Range    Bilirubin, Direct 1.94 (H) 0.00 - 0.20 mg/dL   Protime-INR    Collection Time: 08/09/24  1:17 AM   Result Value Ref Range    Protime 15.0 12.3 - 15.0 seconds    INR 1.11 0.85 - 1.19   Platelet count    Collection Time: 08/09/24  1:17 AM   Result Value Ref Range    Platelets 359 149 - 390 Thousands/uL    MPV 9.4 8.9 - 12.7 fL   Blood culture    Collection Time: 08/09/24  1:17 AM    Specimen: Arm, Left; Blood   Result Value Ref Range    Blood Culture No Growth at 24 hrs.    Blood culture    Collection Time: 08/09/24  1:17 AM    Specimen: Arm, Right; Blood   Result Value Ref Range     Blood Culture No Growth at 24 hrs.    Gamma GT    Collection Time: 08/09/24  1:17 AM   Result Value Ref Range     (H) 9 - 64 U/L   Fingerstick Glucose (POCT)    Collection Time: 08/09/24  1:20 AM   Result Value Ref Range    POC Glucose 146 (H) 65 - 140 mg/dl   Comprehensive metabolic panel    Collection Time: 08/09/24  4:50 AM   Result Value Ref Range    Sodium 138 135 - 147 mmol/L    Potassium 3.7 3.5 - 5.3 mmol/L    Chloride 104 96 - 108 mmol/L    CO2 24 21 - 32 mmol/L    ANION GAP 10 4 - 13 mmol/L    BUN 10 5 - 25 mg/dL    Creatinine 0.49 (L) 0.60 - 1.30 mg/dL    Glucose 110 65 - 140 mg/dL    Calcium 8.2 (L) 8.4 - 10.2 mg/dL     (H) 13 - 39 U/L     (H) 7 - 52 U/L    Alkaline Phosphatase 173 (H) 34 - 104 U/L    Total Protein 6.4 6.4 - 8.4 g/dL    Albumin 3.5 3.5 - 5.0 g/dL    Total Bilirubin 2.94 (H) 0.20 - 1.00 mg/dL    eGFR 96 ml/min/1.73sq m   CBC and differential    Collection Time: 08/09/24  4:50 AM   Result Value Ref Range    WBC 14.63 (H) 4.31 - 10.16 Thousand/uL    RBC 4.74 3.81 - 5.12 Million/uL    Hemoglobin 10.5 (L) 11.5 - 15.4 g/dL    Hematocrit 32.3 (L) 34.8 - 46.1 %    MCV 68 (L) 82 - 98 fL    MCH 22.2 (L) 26.8 - 34.3 pg    MCHC 32.5 31.4 - 37.4 g/dL    RDW 18.6 (H) 11.6 - 15.1 %    MPV 9.7 8.9 - 12.7 fL    Platelets 349 149 - 390 Thousands/uL    nRBC 0 /100 WBCs    Segmented % 85 (H) 43 - 75 %    Immature Grans % 1 0 - 2 %    Lymphocytes % 5 (L) 14 - 44 %    Monocytes % 6 4 - 12 %    Eosinophils Relative 3 0 - 6 %    Basophils Relative 0 0 - 1 %    Absolute Neutrophils 12.39 (H) 1.85 - 7.62 Thousands/µL    Absolute Immature Grans 0.12 0.00 - 0.20 Thousand/uL    Absolute Lymphocytes 0.79 0.60 - 4.47 Thousands/µL    Absolute Monocytes 0.85 0.17 - 1.22 Thousand/µL    Eosinophils Absolute 0.45 0.00 - 0.61 Thousand/µL    Basophils Absolute 0.03 0.00 - 0.10 Thousands/µL   Bilirubin, direct    Collection Time: 08/09/24  4:50 AM   Result Value Ref Range    Bilirubin, Direct 2.08 (H)  0.00 - 0.20 mg/dL   TSH, 3rd generation with Free T4 reflex    Collection Time: 08/09/24  4:50 AM   Result Value Ref Range    TSH 3RD GENERATON 0.544 0.450 - 4.500 uIU/mL   Fingerstick Glucose (POCT)    Collection Time: 08/09/24  7:59 AM   Result Value Ref Range    POC Glucose 124 65 - 140 mg/dl   Fingerstick Glucose (POCT)    Collection Time: 08/09/24 10:55 AM   Result Value Ref Range    POC Glucose 120 65 - 140 mg/dl   ECG 12 lead    Collection Time: 08/09/24  3:41 PM   Result Value Ref Range    Ventricular Rate 138 BPM    Atrial Rate 178 BPM    SC Interval 0 ms    QRSD Interval 72 ms    QT Interval 284 ms    QTC Interval 431 ms    P Axis  degrees    QRS Axis 22 degrees    T Wave Axis -86 degrees   ECG 12 lead    Collection Time: 08/09/24  3:44 PM   Result Value Ref Range    Ventricular Rate 132 BPM    Atrial Rate 161 BPM    SC Interval 0 ms    QRSD Interval 74 ms    QT Interval 296 ms    QTC Interval 439 ms    P Axis  degrees    QRS Axis 18 degrees    T Wave Axis -81 degrees   Fingerstick Glucose (POCT)    Collection Time: 08/09/24  3:55 PM   Result Value Ref Range    POC Glucose 201 (H) 65 - 140 mg/dl   Fingerstick Glucose (POCT)    Collection Time: 08/09/24 10:22 PM   Result Value Ref Range    POC Glucose 146 (H) 65 - 140 mg/dl   SILVANO Screen w/ Reflex to Titer/Pattern    Collection Time: 08/10/24  3:12 AM   Result Value Ref Range    SILVANO Negative Negative   Basic metabolic panel    Collection Time: 08/10/24  3:12 AM   Result Value Ref Range    Sodium 140 135 - 147 mmol/L    Potassium 3.5 3.5 - 5.3 mmol/L    Chloride 105 96 - 108 mmol/L    CO2 28 21 - 32 mmol/L    ANION GAP 7 4 - 13 mmol/L    BUN 7 5 - 25 mg/dL    Creatinine 0.50 (L) 0.60 - 1.30 mg/dL    Glucose 86 65 - 140 mg/dL    Calcium 8.0 (L) 8.4 - 10.2 mg/dL    eGFR 95 ml/min/1.73sq m   CBC and differential    Collection Time: 08/10/24  3:12 AM   Result Value Ref Range    WBC 7.80 4.31 - 10.16 Thousand/uL    RBC 4.31 3.81 - 5.12 Million/uL    Hemoglobin 9.6  (L) 11.5 - 15.4 g/dL    Hematocrit 29.4 (L) 34.8 - 46.1 %    MCV 68 (L) 82 - 98 fL    MCH 22.3 (L) 26.8 - 34.3 pg    MCHC 32.7 31.4 - 37.4 g/dL    RDW 18.4 (H) 11.6 - 15.1 %    MPV 9.5 8.9 - 12.7 fL    Platelets 301 149 - 390 Thousands/uL    nRBC 0 /100 WBCs    Segmented % 65 43 - 75 %    Immature Grans % 0 0 - 2 %    Lymphocytes % 19 14 - 44 %    Monocytes % 10 4 - 12 %    Eosinophils Relative 6 0 - 6 %    Basophils Relative 0 0 - 1 %    Absolute Neutrophils 5.14 1.85 - 7.62 Thousands/µL    Absolute Immature Grans 0.03 0.00 - 0.20 Thousand/uL    Absolute Lymphocytes 1.44 0.60 - 4.47 Thousands/µL    Absolute Monocytes 0.74 0.17 - 1.22 Thousand/µL    Eosinophils Absolute 0.43 0.00 - 0.61 Thousand/µL    Basophils Absolute 0.02 0.00 - 0.10 Thousands/µL   Hepatic function panel    Collection Time: 08/10/24  3:12 AM   Result Value Ref Range    Total Bilirubin 0.96 0.20 - 1.00 mg/dL    Bilirubin, Direct 0.49 (H) 0.00 - 0.20 mg/dL    Alkaline Phosphatase 165 (H) 34 - 104 U/L     (H) 13 - 39 U/L     (H) 7 - 52 U/L    Total Protein 6.0 (L) 6.4 - 8.4 g/dL    Albumin 3.2 (L) 3.5 - 5.0 g/dL   Protime-INR    Collection Time: 08/10/24  3:12 AM   Result Value Ref Range    Protime 16.3 (H) 12.3 - 15.0 seconds    INR 1.24 (H) 0.85 - 1.19   Fingerstick Glucose (POCT)    Collection Time: 08/10/24  8:44 AM   Result Value Ref Range    POC Glucose 90 65 - 140 mg/dl

## 2024-08-10 NOTE — PROGRESS NOTES
[facility]  Progress Note  Name: Jadyn Ac I  MRN: [id number]  Unit/Bed#: S -Dora I Date of Admission: 8/8/2024   Date of Service: 8/10/2024 I Hospital Day: 2    Assessment & Plan   * Cholelithiasis  Assessment & Plan  Patient denies right upper quadrant abdominal pain however expresses left lower flank pain, associated chills nausea vomiting and diarrhea.  Leukocytosis, elevated lactic acid on blood work  Perez sign positive at bedside    Right upper quadrant ultrasound-Cholelithiasis without acute cholecystitis.,  CBD 7 mm  CT abdomen pelvis-Borderline steatosis (HU on portal venous phase 85).  No intrahepatic/extrahepatic biliary dilation. No radiopaque biliary stones.  CBD measures 8 mm, 7 mm previously.  No concerning solid masses.   Explained reasoning of patient allergy but they recommend metronidazole.  Switched coverage.  ERCP completed by GI and stone removed.  WBC continually improving, today 8/10 7.8.  Liver enzymes improved today bilirubin 0.49, alk phos 165, ,     Plan  Ordered hepatitis panel and EBV, follow results  Continue on Flagyl and ceftriaxone per ID recommendation  IV fluids  Pain control  Follow-up with surgery in the outpatient setting  GI consult    Atrial fibrillation with rapid ventricular response (HCC)  Assessment & Plan  Patient was noted to be in atrial fibrillation with RVR post ERCP procedure on 8/9.  Initially given 5 mg metoprolol which did not resolve.  She was started on Cardizem drip and shortly resolved to normal sinus rhythm.  Patient's blood pressure was low so drip was titrated down.  At this time and since then patient has been in normal sinus rhythm with help medication.  Patient is on fondaparinux DVT prophylaxis as she cannot have pork products per [employer] identity.  Had extensive discussion with patient and daughter about new atrial fibrillation.  Offered to start patient on metoprolol to keep her rate controlled.   Family and patient would like to hold off on treatment until he follow-up with cardiology and surveilled for future episodes of atrial fibrillation  Patient has been in normal sinus overnight.       Plan  Continue monitoring patient on telemetry overnight  Continue aspirin 81 mg daily  Follow-up with cardiology outpatient  Recommend Holter monitor to assess for future episodes of atrial fibrillation  Patient will discuss options for anticoagulation in the outpatient setting, refusing at this time.    Pleural nodule  Assessment & Plan  7 mm pleural nodule  Follow-up CT in 12 months  Discussed finding with patient and daughter.    Elevated serum creatinine  Assessment & Plan  Recent Labs     08/08/24  1414 08/09/24  0450 08/10/24  0312   CREATININE 0.65 0.49* 0.50*   EGFR 87 96 95       Estimated Creatinine Clearance: 134.2 mL/min (A) (by C-G formula based on SCr of 0.5 mg/dL (L)).  Can continue home ACE after adequate hydration     Leukocytosis  Assessment & Plan  Recent Labs     08/08/24  1414 08/09/24  0450 08/10/24  0312   WBC 18.26* 14.63* 7.80     Patient recently admitted for acute cholecystitis, improvement in white count to 9K on discharge  Presents today later with left lower quadrant abdominal pain, associated chills  Patient meets SIRS with tachycardia and leukocytosis  Blood shows leukocytosis, elevated lactic acid, elevated liver enzymes with obstructive pattern  Procalcitonin 2.9  Right upper quadrant ultrasound with cholelithiasis, mild CBD dilation  CT abdomen shows almost resolution of acute c diverticulitis no evidence of acute cholecystitis  Cannot rule out mild cholangitis.  Despite GI recommendation for MRCP, patient is refusing due to severe claustrophobia.  She has been unable to complete exam in the past. ID reached out about antibiotic coverage.  Explained reasoning of patient allergy but they recommend metronidazole.  Switched coverage.  ERCP completed by GI and stone removed.  WBC  continually improving, today 8/10 7.8.  Pending blood cultures        Plan  Discontinue meropenem per ID recommendation  Start metronidazole and ceftriaxone  Continue IV fluids  Surgery recommends seeing patient in the outpatient setting post ERCP  Diet will be advanced per GI recommendation  Plan to discharge on cefdinir and Flagyl    Synagogue exemption  Assessment & Plan  Correction from the previous admission  Was documented in the previous admission that patient had refused anticoagulation due to Religion exemption    On discussion with the patient today  Patient is not Jehovah witness  Patient is [employer]-day [employer]-agreeable to taking anticoagulation  Patient is agreeable for blood transfusion of blood products    Patient has exemptions to food notably no seafood products, no pork and pork products   Patient refuses Lovenox/heparin due to being derived from pork products.  Will proceed with fondaparinux VTE prophylaxis starting tomorrow 8/10.  After episode of A-fib with RVR in PACU, patient was started on fondaparinux 8/9 and will continue today 8/10    Essential hypertension  Assessment & Plan  Continue amlodipine 5, continue benazepril 10 mg    Acquired hypothyroidism  Assessment & Plan  Continue levothyroxine 88 mcg    Asthma  Assessment & Plan  Not in acute exacerbation  Continue albuterol inhaler as needed    Type 2 diabetes mellitus with other specified complication, without long-term current use of insulin (HCC)  Assessment & Plan  Lab Results   Component Value Date    HGBA1C 7.6 (H) 07/11/2024       Recent Labs     08/09/24  0759 08/09/24  1055 08/09/24  1555 08/09/24  2222   POCGLU 124 120 201* 146*         Blood Sugar Average: Last 72 hrs:  (P) 162.5  Home regimen metformin 500 twice daily, Januvia 25, Lantus 30 units at bedtime  Post ERCP GI recommends clear liquid diet.  Will advance per their recommendations    Plan  Patient already got her 30 units of Lantus at bedtime  Monitor glucose  closely  Will add sliding scale insulin  Hypoglycemia protocol    Polymyositis (HCC)  Assessment & Plan  Stable without acute flare  Follows up with Dr Corona    Diverticulitis  Assessment & Plan  Recently admitted for acute diverticulitis, there were meropenem due to multiple antibiotic allergies  Patient discharged 8/5.  Patient still complains of loose stools and 1 episode of vomiting.  She has chills  8/8 CT abdomen pelvis   Minimal residual inflammatory change in the region of recent sigmoid diverticulitis.   ID reached out for concern of antibiotic coverage.  Initial choice of meropenem was due to patient's allergies.  They recommend metronidazole.  Switched antibiotics.  Plan    Continue fluid hydration  Continue antibiotics IV metronidazole and ceftriaxone for now  Plan to discharge on oral antibiotics for a course of 7 days total, per surgery recommendation.               VTE Pharmacologic Prophylaxis: VTE Score: 8 High Risk (Score >/= 5) - Pharmacological DVT Prophylaxis Ordered: Fondaparinux as patient cannot have pork products per [employer].. Sequential Compression Devices Ordered.    Mobility:   Basic Mobility Inpatient Raw Score: 16  JH-HLM Goal: 5: Stand one or more mins  JH-HLM Achieved: 3: Sit at edge of bed  JH-HLM Goal achieved. Continue to encourage appropriate mobility.    Patient Centered Rounds: I performed bedside rounds with nursing staff today.  Discussions with Specialists or Other Care Team Provider: GI, surgery    Education and Discussions with Family / Patient: Updated  (son and daughter) at bedside.    Current Length of Stay: 2 day(s)  Current Patient Status: Inpatient   Discharge Plan: Anticipate discharge tomorrow to home.    Code Status: Level 1 - Full Code    Subjective:   Patient evaluated at bedside today.  She reports some mild epigastric discomfort that feels like she needs to belch.  ERCP went well yesterday.  In PACU patient developed A-fib with RVR.  Initially  given metoprolol which did not resolve symptoms but then Cardizem drip put her back into normal sinus rhythm.  Once the treatment was completed patient has been in normal sinus overnight into today.  She is on telemetry.  She denies any complaints of palpitations.  She has no other complaints at this time.    Had extensive discussion with patient and daughter about new atrial fibrillation.  Offered to start patient on metoprolol to keep her rate controlled.  Family and patient would like to hold off on treatment until he follow-up with cardiology and surveilled for future episodes of atrial fibrillation.  Surgery is recommending outpatient cholecystectomy.  Patient and daughter are refusing that as well but will follow-up with surgery in the future if she develops more gallbladder issues.    Will keep patient overnight to assess for new episodes of atrial fibrillation and to monitor p.o. intake as her diet is progressed per GI.  Patient and family are agreeable with plan.    Objective:     Vitals:   Temp (24hrs), Av.6 °F (36.4 °C), Min:97 °F (36.1 °C), Max:98.4 °F (36.9 °C)    Temp:  [97 °F (36.1 °C)-98.4 °F (36.9 °C)] 98.4 °F (36.9 °C)  HR:  [] 81  Resp:  [12-20] 16  BP: ()/(55-88) 145/73  SpO2:  [93 %-98 %] 95 %  Body mass index is 33.48 kg/m².     Input and Output Summary (last 24 hours):     Intake/Output Summary (Last 24 hours) at 8/10/2024 1123  Last data filed at 8/10/2024 0549  Gross per 24 hour   Intake 420 ml   Output 450 ml   Net -30 ml       Physical Exam:   Physical Exam  Vitals and nursing note reviewed.   Constitutional:       General: She is not in acute distress.     Appearance: She is well-developed.   HENT:      Head: Normocephalic and atraumatic.   Eyes:      Conjunctiva/sclera: Conjunctivae normal.   Cardiovascular:      Rate and Rhythm: Normal rate and regular rhythm.      Heart sounds: No murmur heard.  Pulmonary:      Effort: Pulmonary effort is normal. No respiratory  distress.      Breath sounds: Normal breath sounds.   Abdominal:      General: Abdomen is flat. Bowel sounds are normal. There is no distension.      Palpations: Abdomen is soft.      Tenderness: There is no abdominal tenderness.   Musculoskeletal:         General: No swelling.      Cervical back: Neck supple.   Skin:     General: Skin is warm and dry.      Capillary Refill: Capillary refill takes less than 2 seconds.   Neurological:      Mental Status: She is alert.   Psychiatric:         Mood and Affect: Mood normal.          Additional Data:     Labs:  Results from last 7 days   Lab Units 08/10/24  0312 08/09/24  0117 08/08/24  1414   WBC Thousand/uL 7.80   < > 18.26*   HEMOGLOBIN g/dL 9.6*   < > 11.3*   HEMATOCRIT % 29.4*   < > 34.7*   PLATELETS Thousands/uL 301   < > 344   BANDS PCT %  --   --  9*   SEGS PCT % 65   < >  --    LYMPHO PCT % 19   < > 3*   MONO PCT % 10   < > 4   EOS PCT % 6   < > 0    < > = values in this interval not displayed.     Results from last 7 days   Lab Units 08/10/24  0312   SODIUM mmol/L 140   POTASSIUM mmol/L 3.5   CHLORIDE mmol/L 105   CO2 mmol/L 28   BUN mg/dL 7   CREATININE mg/dL 0.50*   ANION GAP mmol/L 7   CALCIUM mg/dL 8.0*   ALBUMIN g/dL 3.2*   TOTAL BILIRUBIN mg/dL 0.96   ALK PHOS U/L 165*   ALT U/L 198*   AST U/L 107*   GLUCOSE RANDOM mg/dL 86     Results from last 7 days   Lab Units 08/10/24  0312   INR  1.24*     Results from last 7 days   Lab Units 08/10/24  0844 08/09/24  2222 08/09/24  1555 08/09/24  1055 08/09/24  0759 08/09/24  0120 08/08/24  2052 08/05/24  1556 08/05/24  1058 08/05/24  0727 08/04/24  2105 08/04/24  1620   POC GLUCOSE mg/dl 90 146* 201* 120 124 146* 238* 220* 307* 150* 262* 261*         Results from last 7 days   Lab Units 08/08/24  1719 08/08/24  1457 08/08/24  1414   LACTIC ACID mmol/L 1.5 2.2*  --    PROCALCITONIN ng/ml  --   --  2.96*       Lines/Drains:  Invasive Devices       Peripheral Intravenous Line  Duration             Peripheral IV  08/09/24 Left Antecubital <1 day                      Telemetry:  Telemetry Orders (From admission, onward)               24 Hour Telemetry Monitoring  Continuous x 24 Hours (Telem)        Expiring   Question:  Reason for 24 Hour Telemetry  Answer:  Arrhythmias requiring acute medical intervention / PPM or ICD malfunction                     Telemetry Reviewed: Normal Sinus Rhythm  Indication for Continued Telemetry Use: Arrthymias requiring medical therapy             Imaging: Reviewed radiology reports from this admission including: abdominal/pelvic CT and ultrasound(s)    Recent Cultures (last 7 days):   Results from last 7 days   Lab Units 08/09/24  0117   BLOOD CULTURE  No Growth at 24 hrs.  No Growth at 24 hrs.       Last 24 Hours Medication List:   Current Facility-Administered Medications   Medication Dose Route Frequency Provider Last Rate    acetaminophen  650 mg Oral Q6H PRN Monika Betancourt MD      albuterol  2 puff Inhalation Q6H PRN Monika Betancourt MD      amLODIPine  5 mg Oral Daily Monika Betancourt MD      calcium carbonate  1,000 mg Oral Daily Monika Betancourt MD      cefTRIAXone  2,000 mg Intravenous Q24H Pilar Delgado MD      Cholecalciferol  1,000 Units Oral Daily Monika Betancourt MD      diltiazem  1-15 mg/hr Intravenous Titrated Andrey Herman MD Stopped (08/09/24 2000)    famotidine  20 mg Oral BID Monika Betancourt MD      ferrous sulfate  325 mg Oral Every Other Day Monika Betancourt MD      fondaparinux  2.5 mg Subcutaneous Q24H Pilar Delgado MD      insulin glargine  30 Units Subcutaneous HS Bernabe Zamarripa DO      insulin lispro  1-6 Units Subcutaneous 4x Daily (AC & HS) Pilar Delgado MD      levothyroxine  88 mcg Oral Early Morning Monika Betancourt MD      metroNIDAZOLE  500 mg Oral Q8H ANTONETTE Pilar Delgado MD      multi-electrolyte  75 mL/hr Intravenous Continuous Pilar Delgado MD 75 mL/hr (08/09/24 2109)    ondansetron  4 mg Intravenous Once PRN Shannan Azul CRNA      pantoprazole  40 mg  Oral Early Morning Monika Betancourt MD      predniSONE  7.5 mg Oral Daily Monika Betancourt MD      saccharomyces boulardii  250 mg Oral BID Monika Betancourt MD          Today, Patient Was Seen By: Bernabe Zamarripa DO    **Please Note: This note may have been constructed using a voice recognition system.**

## 2024-08-10 NOTE — ASSESSMENT & PLAN NOTE
Recent Labs     08/08/24  1414 08/09/24  0450 08/10/24  0312   WBC 18.26* 14.63* 7.80     Patient recently admitted for acute cholecystitis, improvement in white count to 9K on discharge  Presents today later with left lower quadrant abdominal pain, associated chills  Patient meets SIRS with tachycardia and leukocytosis  Blood shows leukocytosis, elevated lactic acid, elevated liver enzymes with obstructive pattern  Procalcitonin 2.9  Right upper quadrant ultrasound with cholelithiasis, mild CBD dilation  CT abdomen shows almost resolution of acute c diverticulitis no evidence of acute cholecystitis  Cannot rule out mild cholangitis.  Despite GI recommendation for MRCP, patient is refusing due to severe claustrophobia.  She has been unable to complete exam in the past. ID reached out about antibiotic coverage.  Explained reasoning of patient allergy but they recommend metronidazole.  Switched coverage.  ERCP completed by GI and stone removed.  WBC continually improving, today 8/10 7.8.  Pending blood cultures        Plan  Discontinue meropenem per ID recommendation  Start metronidazole and ceftriaxone  Continue IV fluids  Surgery recommends seeing patient in the outpatient setting post ERCP  Diet will be advanced per GI recommendation  Plan to discharge on cefdinir and Flagyl

## 2024-08-10 NOTE — ASSESSMENT & PLAN NOTE
Recent Labs     08/08/24  1414 08/09/24  0450 08/10/24  0312   WBC 18.26* 14.63* 7.80     Patient recently admitted for acute cholecystitis, improvement in white count to 9K on discharge  Presents today later with left lower quadrant abdominal pain, associated chills  Patient meets SIRS with tachycardia and leukocytosis  Blood shows leukocytosis, elevated lactic acid, elevated liver enzymes with obstructive pattern  Procalcitonin 2.9  Right upper quadrant ultrasound with cholelithiasis, mild CBD dilation  CT abdomen shows almost resolution of acute c diverticulitis no evidence of acute cholecystitis  Cannot rule out mild cholangitis.  Despite GI recommendation for MRCP, patient is refusing due to severe claustrophobia.  She has been unable to complete exam in the past. ID reached out about antibiotic coverage.  Explained reasoning of patient allergy but they recommend metronidazole.  Switched coverage.  ERCP completed by GI and stone removed.  WBC continually improving, today 8/10 7.8.  Pending blood cultures        Plan  Continue metronidazole and ceftriaxone, Plan to discharge on cefdinir and Flagyl  Continue IV fluids  Surgery recommends seeing patient in the outpatient setting post ERCP  Diet will be advanced

## 2024-08-11 ENCOUNTER — APPOINTMENT (INPATIENT)
Dept: NON INVASIVE DIAGNOSTICS | Facility: HOSPITAL | Age: 74
DRG: 445 | End: 2024-08-11
Payer: COMMERCIAL

## 2024-08-11 VITALS
WEIGHT: 242.51 LBS | HEART RATE: 77 BPM | RESPIRATION RATE: 14 BRPM | BODY MASS INDEX: 33.95 KG/M2 | DIASTOLIC BLOOD PRESSURE: 68 MMHG | OXYGEN SATURATION: 95 % | HEIGHT: 71 IN | SYSTOLIC BLOOD PRESSURE: 117 MMHG | TEMPERATURE: 97.7 F

## 2024-08-11 LAB
ANION GAP SERPL CALCULATED.3IONS-SCNC: 8 MMOL/L (ref 4–13)
AORTIC ROOT: 3 CM
AORTIC VALVE MEAN VELOCITY: 12.9 M/S
APICAL FOUR CHAMBER EJECTION FRACTION: 67 %
ASCENDING AORTA: 3.2 CM
AV AREA BY CONTINUOUS VTI: 1.9 CM2
AV AREA PEAK VELOCITY: 1.7 CM2
AV LVOT MEAN GRADIENT: 2 MMHG
AV LVOT PEAK GRADIENT: 4 MMHG
AV MEAN GRADIENT: 7 MMHG
AV PEAK GRADIENT: 14 MMHG
AV VALVE AREA: 1.88 CM2
AV VELOCITY RATIO: 0.55
BASOPHILS # BLD AUTO: 0.02 THOUSANDS/ÂΜL (ref 0–0.1)
BASOPHILS NFR BLD AUTO: 0 % (ref 0–1)
BSA FOR ECHO PROCEDURE: 2.29 M2
BUN SERPL-MCNC: 9 MG/DL (ref 5–25)
CALCIUM SERPL-MCNC: 8 MG/DL (ref 8.4–10.2)
CHLORIDE SERPL-SCNC: 104 MMOL/L (ref 96–108)
CO2 SERPL-SCNC: 27 MMOL/L (ref 21–32)
CREAT SERPL-MCNC: 0.52 MG/DL (ref 0.6–1.3)
DOP CALC AO PEAK VEL: 1.89 M/S
DOP CALC AO VTI: 33.83 CM
DOP CALC LVOT AREA: 3.14 CM2
DOP CALC LVOT CARDIAC INDEX: 2.09 L/MIN/M2
DOP CALC LVOT CARDIAC OUTPUT: 4.78 L/MIN
DOP CALC LVOT DIAMETER: 2 CM
DOP CALC LVOT PEAK VEL VTI: 20.24 CM
DOP CALC LVOT PEAK VEL: 1.04 M/S
DOP CALC LVOT STROKE INDEX: 27.9 ML/M2
DOP CALC LVOT STROKE VOLUME: 63.55
E WAVE DECELERATION TIME: 217 MS
E/A RATIO: 0.75
EOSINOPHIL # BLD AUTO: 0.46 THOUSAND/ÂΜL (ref 0–0.61)
EOSINOPHIL NFR BLD AUTO: 5 % (ref 0–6)
ERYTHROCYTE [DISTWIDTH] IN BLOOD BY AUTOMATED COUNT: 18.6 % (ref 11.6–15.1)
FRACTIONAL SHORTENING: 32 (ref 28–44)
GFR SERPL CREATININE-BSD FRML MDRD: 94 ML/MIN/1.73SQ M
GLUCOSE SERPL-MCNC: 116 MG/DL (ref 65–140)
GLUCOSE SERPL-MCNC: 124 MG/DL (ref 65–140)
GLUCOSE SERPL-MCNC: 206 MG/DL (ref 65–140)
HCT VFR BLD AUTO: 30.2 % (ref 34.8–46.1)
HGB BLD-MCNC: 9.9 G/DL (ref 11.5–15.4)
IMM GRANULOCYTES # BLD AUTO: 0.05 THOUSAND/UL (ref 0–0.2)
IMM GRANULOCYTES NFR BLD AUTO: 1 % (ref 0–2)
INTERVENTRICULAR SEPTUM IN DIASTOLE (PARASTERNAL SHORT AXIS VIEW): 1.1 CM
INTERVENTRICULAR SEPTUM: 1.1 CM (ref 0.6–1.1)
LAAS-AP2: 18 CM2
LAAS-AP4: 15.9 CM2
LEFT ATRIUM AREA SYSTOLE SINGLE PLANE A4C: 16.2 CM2
LEFT ATRIUM SIZE: 3.3 CM
LEFT ATRIUM VOLUME (MOD BIPLANE): 51 ML
LEFT ATRIUM VOLUME INDEX (MOD BIPLANE): 22.3 ML/M2
LEFT INTERNAL DIMENSION IN SYSTOLE: 2.5 CM (ref 2.1–4)
LEFT VENTRICULAR INTERNAL DIMENSION IN DIASTOLE: 3.7 CM (ref 3.5–6)
LEFT VENTRICULAR POSTERIOR WALL IN END DIASTOLE: 1.1 CM
LEFT VENTRICULAR STROKE VOLUME: 37 ML
LVSV (TEICH): 37 ML
LYMPHOCYTES # BLD AUTO: 2.47 THOUSANDS/ÂΜL (ref 0.6–4.47)
LYMPHOCYTES NFR BLD AUTO: 29 % (ref 14–44)
MCH RBC QN AUTO: 22 PG (ref 26.8–34.3)
MCHC RBC AUTO-ENTMCNC: 32.8 G/DL (ref 31.4–37.4)
MCV RBC AUTO: 67 FL (ref 82–98)
MONOCYTES # BLD AUTO: 0.39 THOUSAND/ÂΜL (ref 0.17–1.22)
MONOCYTES NFR BLD AUTO: 5 % (ref 4–12)
MV E'TISSUE VEL-SEP: 8 CM/S
MV PEAK A VEL: 0.88 M/S
MV PEAK E VEL: 66 CM/S
MV STENOSIS PRESSURE HALF TIME: 63 MS
MV VALVE AREA P 1/2 METHOD: 3.49
NEUTROPHILS # BLD AUTO: 5.21 THOUSANDS/ÂΜL (ref 1.85–7.62)
NEUTS SEG NFR BLD AUTO: 60 % (ref 43–75)
NRBC BLD AUTO-RTO: 0 /100 WBCS
PLATELET # BLD AUTO: 327 THOUSANDS/UL (ref 149–390)
PMV BLD AUTO: 9.4 FL (ref 8.9–12.7)
POTASSIUM SERPL-SCNC: 3.6 MMOL/L (ref 3.5–5.3)
RBC # BLD AUTO: 4.49 MILLION/UL (ref 3.81–5.12)
RIGHT ATRIUM AREA SYSTOLE A4C: 9 CM2
RIGHT VENTRICLE ID DIMENSION: 2.8 CM
SL CV LEFT ATRIUM LENGTH A2C: 4.7 CM
SL CV LV EF: 60
SL CV PED ECHO LEFT VENTRICLE DIASTOLIC VOLUME (MOD BIPLANE) 2D: 59 ML
SL CV PED ECHO LEFT VENTRICLE SYSTOLIC VOLUME (MOD BIPLANE) 2D: 23 ML
SODIUM SERPL-SCNC: 139 MMOL/L (ref 135–147)
TR MAX PG: 28 MMHG
TR PEAK VELOCITY: 2.7 M/S
TRICUSPID ANNULAR PLANE SYSTOLIC EXCURSION: 1.9 CM
TRICUSPID VALVE PEAK REGURGITATION VELOCITY: 2.65 M/S
WBC # BLD AUTO: 8.6 THOUSAND/UL (ref 4.31–10.16)

## 2024-08-11 PROCEDURE — 93306 TTE W/DOPPLER COMPLETE: CPT | Performed by: INTERNAL MEDICINE

## 2024-08-11 PROCEDURE — 93306 TTE W/DOPPLER COMPLETE: CPT

## 2024-08-11 PROCEDURE — 85025 COMPLETE CBC W/AUTO DIFF WBC: CPT

## 2024-08-11 PROCEDURE — 82948 REAGENT STRIP/BLOOD GLUCOSE: CPT

## 2024-08-11 PROCEDURE — 99232 SBSQ HOSP IP/OBS MODERATE 35: CPT | Performed by: SURGERY

## 2024-08-11 PROCEDURE — 99239 HOSP IP/OBS DSCHRG MGMT >30: CPT | Performed by: INTERNAL MEDICINE

## 2024-08-11 PROCEDURE — 80048 BASIC METABOLIC PNL TOTAL CA: CPT

## 2024-08-11 RX ORDER — CEFDINIR 300 MG/1
300 CAPSULE ORAL EVERY 12 HOURS SCHEDULED
Qty: 10 CAPSULE | Refills: 0 | Status: SHIPPED | OUTPATIENT
Start: 2024-08-11 | End: 2024-08-16

## 2024-08-11 RX ORDER — METRONIDAZOLE 500 MG/1
500 TABLET ORAL EVERY 8 HOURS SCHEDULED
Qty: 12 TABLET | Refills: 0 | Status: SHIPPED | OUTPATIENT
Start: 2024-08-11 | End: 2024-08-15

## 2024-08-11 RX ADMIN — Medication 1000 UNITS: at 08:07

## 2024-08-11 RX ADMIN — LEVOTHYROXINE SODIUM 88 MCG: 88 TABLET ORAL at 04:54

## 2024-08-11 RX ADMIN — CALCIUM CARBONATE 1000 MG: 500 TABLET, CHEWABLE ORAL at 08:07

## 2024-08-11 RX ADMIN — METRONIDAZOLE 500 MG: 500 TABLET ORAL at 12:13

## 2024-08-11 RX ADMIN — PREDNISONE 7.5 MG: 1 TABLET ORAL at 08:06

## 2024-08-11 RX ADMIN — Medication 250 MG: at 08:07

## 2024-08-11 RX ADMIN — PANTOPRAZOLE SODIUM 40 MG: 20 TABLET, DELAYED RELEASE ORAL at 04:54

## 2024-08-11 RX ADMIN — FAMOTIDINE 20 MG: 20 TABLET ORAL at 08:07

## 2024-08-11 RX ADMIN — FERROUS SULFATE TAB 325 MG (65 MG ELEMENTAL FE) 325 MG: 325 (65 FE) TAB at 08:07

## 2024-08-11 RX ADMIN — AMLODIPINE BESYLATE 5 MG: 5 TABLET ORAL at 08:07

## 2024-08-11 RX ADMIN — FONDAPARINUX SODIUM 2.5 MG: 2.5 INJECTION, SOLUTION SUBCUTANEOUS at 08:09

## 2024-08-11 RX ADMIN — METRONIDAZOLE 500 MG: 500 TABLET ORAL at 04:54

## 2024-08-11 NOTE — PLAN OF CARE
Problem: PAIN - ADULT  Goal: Verbalizes/displays adequate comfort level or baseline comfort level  Description: Interventions:  - Encourage patient to monitor pain and request assistance  - Assess pain using appropriate pain scale  - Administer analgesics based on type and severity of pain and evaluate response  - Implement non-pharmacological measures as appropriate and evaluate response  - Consider cultural and social influences on pain and pain management  - Notify physician/advanced practitioner if interventions unsuccessful or patient reports new pain  Outcome: Progressing     Problem: SAFETY ADULT  Goal: Patient will remain free of falls  Description: INTERVENTIONS:  - Educate patient/family on patient safety including physical limitations  - Instruct patient to call for assistance with activity   - Consult OT/PT to assist with strengthening/mobility   - Keep Call bell within reach  - Keep bed low and locked with side rails adjusted as appropriate  - Keep care items and personal belongings within reach  - Initiate and maintain comfort rounds  - Make Fall Risk Sign visible to staff  - Offer Toileting every 2 Hours, in advance of need  - Initiate/Maintain bed alarm  - Apply yellow socks and bracelet for high fall risk patients  - Consider moving patient to room near nurses station  Outcome: Progressing  Goal: Maintain or return to baseline ADL function  Description: INTERVENTIONS:  -  Assess patient's ability to carry out ADLs; assess patient's baseline for ADL function and identify physical deficits which impact ability to perform ADLs (bathing, care of mouth/teeth, toileting, grooming, dressing, etc.)  - Assess/evaluate cause of self-care deficits   - Assess range of motion  - Assess patient's mobility; develop plan if impaired  - Assess patient's need for assistive devices and provide as appropriate  - Encourage maximum independence but intervene and supervise when necessary  - Involve family in performance  of ADLs  - Assess for home care needs following discharge   - Consider OT consult to assist with ADL evaluation and planning for discharge  - Provide patient education as appropriate  Outcome: Progressing  Goal: Maintains/Returns to pre admission functional level  Description: INTERVENTIONS:  - Perform AM-PAC 6 Click Basic Mobility/ Daily Activity assessment daily.  - Set and communicate daily mobility goal to care team and patient/family/caregiver.   - Collaborate with rehabilitation services on mobility goals if consulted  - Perform Range of Motion 3 times a day.  - Reposition patient every 2 hours.  - Dangle patient 3 times a day  - Stand patient 3 times a day  - Ambulate patient 3 times a day  - Out of bed to chair 3 times a day   - Out of bed for meals 3 times a day  - Out of bed for toileting  - Record patient progress and toleration of activity level   Outcome: Progressing     Problem: Nutrition/Hydration-ADULT  Goal: Nutrient/Hydration intake appropriate for improving, restoring or maintaining nutritional needs  Description: Monitor and assess patient's nutrition/hydration status for malnutrition. Collaborate with interdisciplinary team and initiate plan and interventions as ordered.  Monitor patient's weight and dietary intake as ordered or per policy. Utilize nutrition screening tool and intervene as necessary. Determine patient's food preferences and provide high-protein, high-caloric foods as appropriate.     INTERVENTIONS:  - Monitor oral intake, urinary output, labs, and treatment plans  - Assess nutrition and hydration status and recommend course of action  - Evaluate amount of meals eaten  - Assist patient with eating if necessary   - Allow adequate time for meals  - Recommend/ encourage appropriate diets, oral nutritional supplements, and vitamin/mineral supplements  - Order, calculate, and assess calorie counts as needed  - Assess need for intravenous fluids  - Provide specific nutrition/hydration  education as appropriate  - Include patient/family/caregiver in decisions related to nutrition  Outcome: Progressing     Problem: Prexisting or High Potential for Compromised Skin Integrity  Goal: Skin integrity is maintained or improved  Description: INTERVENTIONS:  - Identify patients at risk for skin breakdown  - Assess and monitor skin integrity  - Assess and monitor nutrition and hydration status  - Monitor labs   - Assess for incontinence   - Turn and reposition patient  - Assist with mobility/ambulation  - Relieve pressure over bony prominences  - Avoid friction and shearing  - Provide appropriate hygiene as needed including keeping skin clean and dry  - Evaluate need for skin moisturizer/barrier cream  - Collaborate with interdisciplinary team   - Patient/family teaching  - Consider wound care consult   Outcome: Progressing

## 2024-08-11 NOTE — ASSESSMENT & PLAN NOTE
Recent Labs     08/09/24  0450 08/10/24  0312 08/11/24  0556   CREATININE 0.49* 0.50* 0.52*   EGFR 96 95 94       Estimated Creatinine Clearance: 129.6 mL/min (A) (by C-G formula based on SCr of 0.52 mg/dL (L)).  Can continue home ACE after adequate hydration

## 2024-08-11 NOTE — ASSESSMENT & PLAN NOTE
Correction from the previous admission  Was documented in the previous admission that patient had refused anticoagulation due to [employer] exemption    On discussion with the patient today  Patient is not Jehovah witness  Patient is [employer]-agreeable to taking anticoagulation  Patient is agreeable for blood transfusion of blood products    Patient has exemptions to food notably no seafood products, no pork and pork products   Patient refuses Lovenox/heparin due to being derived from pork products.  Will proceed with fondaparinux VTE prophylaxis starting tomorrow 8/10.  After episode of A-fib with RVR in PACU, patient was started on fondaparinux 8/9 and will continue today 8/10

## 2024-08-11 NOTE — ASSESSMENT & PLAN NOTE
Lab Results   Component Value Date    HGBA1C 7.6 (H) 07/11/2024       Recent Labs     08/10/24  1555 08/10/24  2019 08/11/24  0812 08/11/24  1108   POCGLU 247* 206* 116 206*         Blood Sugar Average: Last 72 hrs:  (P) 166.9363781358712436  Home regimen metformin 500 twice daily, Januvia 25, Lantus 30 units at bedtime  Post ERCP GI recommends clear liquid diet.  Will advance per their recommendations    Plan  Patient already got her 30 units of Lantus at bedtime  Monitor glucose closely  Will add sliding scale insulin  Hypoglycemia protocol

## 2024-08-11 NOTE — ASSESSMENT & PLAN NOTE
Recent Labs     08/09/24  0450 08/10/24  0312 08/11/24  0556   WBC 14.63* 7.80 8.60     Patient recently admitted for acute cholecystitis, improvement in white count to 9K on discharge  Presents today later with left lower quadrant abdominal pain, associated chills  Patient meets SIRS with tachycardia and leukocytosis  Blood shows leukocytosis, elevated lactic acid, elevated liver enzymes with obstructive pattern  Procalcitonin 2.9  Right upper quadrant ultrasound with cholelithiasis, mild CBD dilation  CT abdomen shows almost resolution of acute c diverticulitis no evidence of acute cholecystitis  Cannot rule out mild cholangitis.  Despite GI recommendation for MRCP, patient is refusing due to severe claustrophobia.  She has been unable to complete exam in the past. ID reached out about antibiotic coverage.  Explained reasoning of patient allergy but they recommend metronidazole.  Switched coverage.  ERCP completed by GI and stone removed.  WBC continually improving, today 8/10 7.8.  Pending blood cultures        Plan  Continue metronidazole and ceftriaxone, Plan to discharge on cefdinir and Flagyl  Continue IV fluids  Surgery recommends seeing patient in the outpatient setting post ERCP  Diet will be advanced

## 2024-08-11 NOTE — ED ATTENDING ATTESTATION
8/8/2024  Hunter BURGOS MD, saw and evaluated the patient. I have discussed the patient with the resident/non-physician practitioner and agree with the resident's/non-physician practitioner's findings, Plan of Care, and MDM as documented in the resident's/non-physician practitioner's note, except where noted. All available labs and Radiology studies were reviewed.  I was present for key portions of any procedure(s) performed by the resident/non-physician practitioner and I was immediately available to provide assistance.       At this point I agree with the current assessment done in the Emergency Department.  I have conducted an independent evaluation of this patient a history and physical is as follows:see h and p above     ED Course  ED Course as of 08/11/24 1352   Thu Aug 08, 2024   1444 Er md note- recent d/c i note- labs/ imaging all reviewed by er md   1447 Er md note pt seen and thoroughly evaluated by er md- case d/w er resident-- 74 yr feamel  with 8/1/-8/5 admit for uncomplicated sigmoid diverticulitis-- d/c ed  3 days ago-  priro to admti was havign lleft sided abd pain- loosenon bloody dairrhea for approx 4 weeks-   brought back to er over last 2 days with several episodes of non bloody vomited which is new- and return of  llq ab d pain and non bloody diarrhea- no fevers- no gu/gyn comps- no other comps - avss-  pulse ox 94-96 % on ra- interpretation is normal- no intervention -  rrr s1/s2-2/6 hsm - cta-b/ -soft abd- pos llq tenderness- mild epigastric tenderness- no peritoneal signs - normal non focal neuro exam    1447 t   1519 Er md note- pt did take am meds    1523 Er md note- pt re-eval- sound asleep--  daughter aware of elevated lft's- and pending  ruq u/s    1856 Er md note- pt just had ruq u/s finished    1935 Er md noyte- pt -re-eval- feels improved- on repeat abd exam - still with mild epigastric/llq pain -- pt has u/s report pending--          Critical Care Time  Procedures

## 2024-08-11 NOTE — QUICK NOTE
Recommend transition to cefpodoxime/ flagyl on DC until 8/15.     Surgery available for further questions as needed.   Outpatient follow up

## 2024-08-11 NOTE — ASSESSMENT & PLAN NOTE
Patient was noted to be in atrial fibrillation with RVR post ERCP procedure on 8/9.  Initially given 5 mg metoprolol which did not resolve.  She was started on Cardizem drip and shortly resolved to normal sinus rhythm.  Patient's blood pressure was low so drip was titrated down.  At this time and since then patient has been in normal sinus rhythm with help medication.  Patient is on fondaparinux DVT prophylaxis as she cannot have pork products per Mandaen identity.  Had extensive discussion with patient and daughter about new atrial fibrillation.  Offered to start patient on metoprolol to keep her rate controlled.  Family and patient would like to hold off on treatment until he follow-up with cardiology and surveilled for future episodes of atrial fibrillation  Patient has been in normal sinus overnight.       Plan  Continue monitoring patient on telemetry overnight: NSR, no significant events overnight  Continue aspirin 81 mg daily  Follow-up with cardiology outpatient  Recommend Holter monitor to assess for future episodes of atrial fibrillation  Patient will discuss options for anticoagulation in the outpatient setting, refusing at this time.

## 2024-08-12 ENCOUNTER — TRANSITIONAL CARE MANAGEMENT (OUTPATIENT)
Dept: FAMILY MEDICINE CLINIC | Facility: CLINIC | Age: 74
End: 2024-08-12

## 2024-08-12 ENCOUNTER — TELEPHONE (OUTPATIENT)
Age: 74
End: 2024-08-12

## 2024-08-12 ENCOUNTER — TELEMEDICINE (OUTPATIENT)
Dept: FAMILY MEDICINE CLINIC | Facility: CLINIC | Age: 74
End: 2024-08-12
Payer: COMMERCIAL

## 2024-08-12 DIAGNOSIS — I10 ESSENTIAL HYPERTENSION: ICD-10-CM

## 2024-08-12 DIAGNOSIS — I48.91 ATRIAL FIBRILLATION WITH RAPID VENTRICULAR RESPONSE (HCC): ICD-10-CM

## 2024-08-12 DIAGNOSIS — E03.9 ACQUIRED HYPOTHYROIDISM: ICD-10-CM

## 2024-08-12 DIAGNOSIS — E78.5 HYPERLIPIDEMIA, UNSPECIFIED HYPERLIPIDEMIA TYPE: ICD-10-CM

## 2024-08-12 DIAGNOSIS — K80.70 CALCULUS OF GALLBLADDER AND BILE DUCT WITHOUT CHOLECYSTITIS OR OBSTRUCTION: ICD-10-CM

## 2024-08-12 DIAGNOSIS — E11.69 TYPE 2 DIABETES MELLITUS WITH OTHER SPECIFIED COMPLICATION, WITHOUT LONG-TERM CURRENT USE OF INSULIN (HCC): Primary | ICD-10-CM

## 2024-08-12 DIAGNOSIS — R91.1 LUNG NODULE: ICD-10-CM

## 2024-08-12 LAB
ACTIN IGG SERPL-ACNC: 7 UNITS (ref 0–19)
EBV NA IGG SER IA-ACNC: <18 U/ML (ref 0–17.9)
EBV VCA IGG SER IA-ACNC: >600 U/ML (ref 0–17.9)
EBV VCA IGM SER IA-ACNC: <36 U/ML (ref 0–35.9)
INTERPRETATION: ABNORMAL

## 2024-08-12 PROCEDURE — 99495 TRANSJ CARE MGMT MOD F2F 14D: CPT | Performed by: FAMILY MEDICINE

## 2024-08-12 NOTE — ASSESSMENT & PLAN NOTE
Lab Results   Component Value Date    CHOLESTEROL 184 07/11/2024    CHOLESTEROL 202 (H) 10/20/2023    CHOLESTEROL 198 05/18/2023     Lab Results   Component Value Date    HDL 72 07/11/2024    HDL 70 10/20/2023    HDL 60 05/18/2023     Lab Results   Component Value Date    TRIG 90 07/11/2024    TRIG 96 10/20/2023    TRIG 110 05/18/2023     Lab Results   Component Value Date    NONHDLC 86 09/22/2018    Controlled

## 2024-08-12 NOTE — UTILIZATION REVIEW
NOTIFICATION OF ADMISSION DISCHARGE   This is a Notification of Discharge from [facility]. Please be advised that this patient has been discharge from our facility. Below you will find the admission and discharge date and time including the patient’s disposition.   UTILIZATION REVIEW CONTACT:  Lisa Acevedo  Utilization   Network Utilization Review Department  Phone: [phone] x carefully listen to the prompts. All voicemails are confidential.  Email: [email or web address]     ADMISSION INFORMATION  PRESENTATION DATE: 8/8/2024  1:36 PM  OBERVATION ADMISSION DATE: N/A  INPATIENT ADMISSION DATE: 8/8/24  9:49 PM   DISCHARGE DATE: 8/11/2024  3:01 PM   DISPOSITION:Home with Home Health Care    Network Utilization Review Department  ATTENTION: Please call with any questions or concerns to [phone] and carefully listen to the prompts so that you are directed to the right person. All voicemails are confidential.   For Discharge needs, contact Care Management DC Support Team at [phone] opt. 2  Send all requests for admission clinical reviews, approved or denied determinations and any other requests to dedicated fax number below belonging to the campus where the patient is receiving treatment. List of dedicated fax numbers for the Facilities:  FACILITY NAME UR FAX NUMBER   ADMISSION DENIALS (Administrative/Medical Necessity) [phone]   DISCHARGE SUPPORT TEAM ([facility]) [phone]   PARENT CHILD HEALTH (Maternity/NICU/Pediatrics) [phone]   [facility] [phone]   [facility] [phone]   [facility] [phone]   [facility] [phone]   [facility] [phone]   [facility] [phone]   [facility] [phone]   [facility]  [phone]   [facility] [phone]   [facility] [phone]   [facility] [phone]   [facility] [phone]

## 2024-08-12 NOTE — PROGRESS NOTES
Virtual TCM Visit:    Verification of patient location:    Patient is located at Home in the following state in which I hold an active license [place]    Assessment & Plan   1. Type 2 diabetes mellitus with other specified complication, without long-term current use of insulin (HCC)  Assessment & Plan:  Stable.   Lab Results   Component Value Date    HGBA1C 7.6 (H) 07/11/2024     Orders:  -     Comprehensive metabolic panel; Future  -     CBC and differential; Future  2. Essential hypertension  Assessment & Plan:  Stable on home BP checks. Continue amlodipine, benazepril.   3. Atrial fibrillation with rapid ventricular response (HCC)  Assessment & Plan:  Episodes of a fib during hospitalization. She had declined starting medication for it at the time but is agreeable to following up with cardiology outpatient for possible holter monitor. I reviewed risks of a fib.   4. Acquired hypothyroidism  Assessment & Plan:  Controlled on recent labs. Continue synthroid.   5. Lung nodule  -     CT chest wo contrast; Future; Expected date: 08/11/2025  6. Hyperlipidemia, unspecified hyperlipidemia type  Assessment & Plan:  Lab Results   Component Value Date    CHOLESTEROL 184 07/11/2024    CHOLESTEROL 202 (H) 10/20/2023    CHOLESTEROL 198 05/18/2023     Lab Results   Component Value Date    HDL 72 07/11/2024    HDL 70 10/20/2023    HDL 60 05/18/2023     Lab Results   Component Value Date    TRIG 90 07/11/2024    TRIG 96 10/20/2023    TRIG 110 05/18/2023     Lab Results   Component Value Date    NONHDLC 86 09/22/2018    Controlled   7. Calculus of gallbladder and bile duct without cholecystitis or obstruction  Assessment & Plan:  Reviewed recent hospitalization records. She will follow up with general surgery and GI outpatient. Complete antibiotic course.          Encounter provider Herlinda Castorena MD     Provider located at [facility]  [address]  [address]  [place] [facility] [place] [place] [address]    After connecting  through televideo, the patient was identified by name and date of birth. Jadyn Ac was informed that this is a telemedicine visit and that the visit is being conducted through the [employer] Teams platform. She agrees to proceed..  My office door was closed. No one else was in the room.  She acknowledged consent and understanding of privacy and security of the video platform. The patient has agreed to participate and understands they can discontinue the visit at any time.    Patient is aware this is a billable service.    History of Present Illness     Transitional Care Management Review:   Jadyn Ac is a 74 y.o. female here for TCM follow up.    During the TCM phone call patient stated:  TCM Call       Date and time call was made  8/12/2024 10:23 AM    Hospital care reviewed  Records reviewed    Patient was hospitialized at  [facility]    Date of Admission  08/08/24    Date of discharge  08/11/24    Diagnosis  Cholelithiasis    Disposition  Home    Were the patients medications reviewed and updated  No  Has a virtual appt with Dr Castorena this afternoon and will review them at the appt    Current Symptoms  --  Nausea but no vomiting    Fatigue severity  Mild          TCM Call       Post hospital issues  None    Should patient be enrolled in anticoag monitoring?  No    Scheduled for follow up?  Yes    Patients specialists  Other (comment); Cardiologist    Other specialists names  [facility]    Did you obtain your prescribed medications  Yes    Do you need help managing your prescriptions or medications  No    Is transportation to your appointment needed  Yes    Specify why  She does not drive    I have advised the patient to call PCP with any new or worsening symptoms  Adonis Douglas LPN    Living Arrangements  Family members    Support System  Family    The type of support provided  Physical; Emotional    Do you have social support  Yes, as much as I need    Are you recieving any outpatient  services  No    Are you recieving home care services  Yes    Types of home care services  Nurse visit    Have you fallen in the last 12 months  No    Interperter language line needed  No    Counseling  Patient    Counseling topics  Activities of daily living; Importance of RX compliance; patient and family education; instructions for management; Risk factor reduction; Home health agency benefits    Comments  Jadyn states that she is having nausea but is tolerating her diet and she did not get her Pepcid yet from the pharmacy. Virtual appt today with Dr Castorena and she wants to discuss whether/not to have her gallbladder removed. [id number]          HPI  Jadyn has visit today for hospital discharge f/u after she was recently admitted to [facility] from 8/9 to 8/11/24 for abdominal pain and diarrhea. Found to have elevated bilirubin. She was evaluated by surgery and GI. She had ERCP done, but developed A fib with RVR which is new. Resolved after cardizem drip. Medications for A fib were discussed, but family declined stating they will follow up with cardiology outpatient first. She is on antibiotics for her gall bladder and will f/u with GI and general surgery outpatient.   Currently just complains of fatigue.   Review of Systems   Constitutional:  Positive for fatigue.   HENT: Negative.     Eyes: Negative.    Respiratory: Negative.     Cardiovascular: Negative.    Gastrointestinal: Negative.    Endocrine: Negative.    Genitourinary: Negative.    Musculoskeletal: Negative.    Skin: Negative.    Allergic/Immunologic: Negative.    Neurological: Negative.    Hematological: Negative.    Psychiatric/Behavioral: Negative.       Objective     There were no vitals taken for this visit.    Physical Exam  Constitutional:       General: She is not in acute distress.     Appearance: Normal appearance. She is not ill-appearing, toxic-appearing or diaphoretic.   HENT:      Head: Normocephalic and atraumatic.   Eyes:      General:          Right eye: No discharge.         Left eye: No discharge.      Conjunctiva/sclera: Conjunctivae normal.   Pulmonary:      Effort: Pulmonary effort is normal.   Neurological:      Mental Status: She is alert.   Psychiatric:         Mood and Affect: Mood normal.         Behavior: Behavior normal.         Thought Content: Thought content normal.         Judgment: Judgment normal.       Medications have been reviewed by provider in current encounter  Administrative Statements       Herlinda Castorena MD      VIRTUAL VISIT DISCLAIMER    Cathymaxx Ac verbally agrees to participate in Virtual Care Services. Pt is aware that Virtual Care Services could be limited without vital signs or the ability to perform a full hands-on physical exam. Jadyn Ac understands she or the provider may request at any time to terminate the video visit and request the patient to seek care or treatment in person.

## 2024-08-12 NOTE — TELEPHONE ENCOUNTER
The pt is calling asking for a virtual hospital followup appt as she was in both [place] and [place] for a few days. She states she can't walk to come into the office. She is aware of the appt she already has - but would like it moved up and virtual if you can check with provider and get back to her.

## 2024-08-12 NOTE — ASSESSMENT & PLAN NOTE
Episodes of a fib during hospitalization. She had declined starting medication for it at the time but is agreeable to following up with cardiology outpatient for possible holter monitor. I reviewed risks of a fib.

## 2024-08-12 NOTE — ASSESSMENT & PLAN NOTE
Reviewed recent hospitalization records. She will follow up with general surgery and GI outpatient. Complete antibiotic course.

## 2024-08-12 NOTE — TELEPHONE ENCOUNTER
Called patient and rescheduled her Oct follow up to a [facility] Follow up 8/21/2024 with Dr. Swartz

## 2024-08-13 ENCOUNTER — TELEPHONE (OUTPATIENT)
Age: 74
End: 2024-08-13

## 2024-08-13 NOTE — TELEPHONE ENCOUNTER
[facility] called and just wanted to give Dr. Castorena an update.      They have referral for home care visits, and will be seeing patient on 8/15.

## 2024-08-14 LAB
BACTERIA BLD CULT: NORMAL
BACTERIA BLD CULT: NORMAL

## 2024-08-15 ENCOUNTER — TELEPHONE (OUTPATIENT)
Age: 74
End: 2024-08-15

## 2024-08-15 NOTE — TELEPHONE ENCOUNTER
Patient calling, she states she received a VM in regards to her appt. No message seen in the chart.warm transferred to the office.

## 2024-08-15 NOTE — TELEPHONE ENCOUNTER
Pt calling to schedule hospital fu w/ Dr. Betancourt or any PA's at [place]. There are no appts, I warm transfer pt to gi triage nurse Sue.

## 2024-08-20 ENCOUNTER — TELEPHONE (OUTPATIENT)
Age: 74
End: 2024-08-20

## 2024-08-20 ENCOUNTER — TELEPHONE (OUTPATIENT)
Dept: OTHER | Facility: OTHER | Age: 74
End: 2024-08-20

## 2024-08-20 NOTE — TELEPHONE ENCOUNTER
Patient was calling asking if appt tomorrow is virtual. I confirmed that it was and she should be on at 11:45 am. No further questions at this time.

## 2024-08-21 ENCOUNTER — TELEMEDICINE (OUTPATIENT)
Dept: ENDOCRINOLOGY | Facility: CLINIC | Age: 74
End: 2024-08-21
Payer: COMMERCIAL

## 2024-08-21 ENCOUNTER — TELEPHONE (OUTPATIENT)
Age: 74
End: 2024-08-21

## 2024-08-21 VITALS — WEIGHT: 242 LBS | HEIGHT: 71 IN | BODY MASS INDEX: 33.88 KG/M2

## 2024-08-21 DIAGNOSIS — R19.7 DIARRHEA, UNSPECIFIED TYPE: ICD-10-CM

## 2024-08-21 DIAGNOSIS — E03.9 ACQUIRED HYPOTHYROIDISM: ICD-10-CM

## 2024-08-21 DIAGNOSIS — I10 PRIMARY HYPERTENSION: ICD-10-CM

## 2024-08-21 DIAGNOSIS — E78.5 HYPERLIPIDEMIA, UNSPECIFIED HYPERLIPIDEMIA TYPE: ICD-10-CM

## 2024-08-21 DIAGNOSIS — Z79.4 TYPE 2 DIABETES MELLITUS WITH HYPERGLYCEMIA, WITH LONG-TERM CURRENT USE OF INSULIN (HCC): Primary | ICD-10-CM

## 2024-08-21 DIAGNOSIS — E11.69 TYPE 2 DIABETES MELLITUS WITH OTHER SPECIFIED COMPLICATION, WITHOUT LONG-TERM CURRENT USE OF INSULIN (HCC): ICD-10-CM

## 2024-08-21 DIAGNOSIS — E11.65 TYPE 2 DIABETES MELLITUS WITH HYPERGLYCEMIA, WITH LONG-TERM CURRENT USE OF INSULIN (HCC): Primary | ICD-10-CM

## 2024-08-21 PROCEDURE — 99214 OFFICE O/P EST MOD 30 MIN: CPT | Performed by: INTERNAL MEDICINE

## 2024-08-21 PROCEDURE — G2211 COMPLEX E/M VISIT ADD ON: HCPCS | Performed by: INTERNAL MEDICINE

## 2024-08-21 RX ORDER — PEN NEEDLE, DIABETIC 30 GX3/16"
NEEDLE, DISPOSABLE MISCELLANEOUS
Qty: 100 EACH | Refills: 3 | Status: SHIPPED | OUTPATIENT
Start: 2024-08-21

## 2024-08-21 RX ORDER — METFORMIN HCL 500 MG
1000 TABLET, EXTENDED RELEASE 24 HR ORAL 2 TIMES DAILY WITH MEALS
Qty: 360 TABLET | Refills: 3 | Status: SHIPPED | OUTPATIENT
Start: 2024-08-21

## 2024-08-21 RX ORDER — LEVOTHYROXINE SODIUM 88 MCG
TABLET ORAL
Qty: 90 TABLET | Refills: 1 | Status: SHIPPED | OUTPATIENT
Start: 2024-08-21

## 2024-08-21 RX ORDER — INSULIN GLARGINE 100 [IU]/ML
30 INJECTION, SOLUTION SUBCUTANEOUS
Qty: 15 ML | Refills: 3 | Status: SHIPPED | OUTPATIENT
Start: 2024-08-21

## 2024-08-21 NOTE — PROGRESS NOTES
Virtual Regular Visit  Name: Jadyn Ac      : 1950      MRN: [id number]  Encounter Provider: Crystal Swartz MD  Encounter Date: 2024   Encounter department: [facility]    Verification of patient location:    Patient is located at Home in the following state in which I hold an active license [place]    Assessment & Plan   1. Type 2 diabetes mellitus with other specified complication, without long-term current use of insulin (HCC)  2. Diarrhea, unspecified type  -     Ambulatory referral to Gastroenterology  3. Type 2 diabetes mellitus with hyperglycemia, with long-term current use of insulin (HCC)  4. Primary hypertension  Poorly controlled based on A1c  Recommend the following for now  -Continue Lantus 30 units subcutaneous at bedtime  -Increase metformin XR as tolerated to 1000 mg orally twice a day  - Discontinue januvia   -advised patient to stagger check blood sugars before meals and at bedtime, send log for review in 2 weeks  - consistent carb diet   Follow up in 3 months    5. Hypothyroidism   TSH within normal limits   Continue Synthroid at current dose, repeat thyroid function tests  6-8 weeks  6. Hyperlipidemia  - continue statin       Encounter provider Crystal Swartz MD    The patient was identified by name and date of birth. Jadyn Ac was informed that this is a telemedicine visit and that the visit is being conducted through Telephone.  My office door was closed. No one else was in the room.  She acknowledged consent and understanding of privacy and security of the video platform. The patient has agreed to participate and understands they can discontinue the visit at any time.    Patient is aware this is a billable service.     It was my intent to perform this visit via video technology but the patient was not able to do a video connection so the visit was completed via audio telephone only.      History of Present Illness       Jadyn  "Osorio is a 74 y.o. adult who presents with type 2 diabetes mellitus also has a h/o asthma, hypertension hyperlipidemia, polymyositis,   Last seen as a telemedicine visit in 2023    Recently admitted to  the hospital with cholelithiasis, discharged on 24. Sates that she has been fatigued since she has been home and that her BG have been very high. BG are somewhat better than what they were before the hospital stay  170 in the morning, 270s   She was also diagnosed with atrial fibrillation    Currently taking  Metformin  mg BID   Lantus 30 units at bedtime   Januvia 25 mg orally daily     Recall of BG, checking twice a day   Fastin-120   Pre-dinner:  200-250     Appetite is good, eating 3 meals; admits to high carbs since she is following a low fiber diet since discharge   Occasional crackers as snacks     Has increased urinary frequency   Feeling very weak and is needing help to get up. Will be starting physical and occupational therapy next week   Has on an off abdominal pain   No vomiting     Takes Prednisone 7.5 mg daily   Taking benazapril , amlodipine  - BP well controlled at home per histpry     Taking Synthroid 88 mcg orally daily       Review of Systems -as above     Objective     Ht 5' 11\" (1.803 m)   Wt 110 kg (242 lb)   BMI 33.75 kg/m²   Physical Exam - could not be performed     Visit Time  Total Visit Duration:30 min         "

## 2024-08-21 NOTE — TELEPHONE ENCOUNTER
FYI:  Kenya ph# [phone] at Visiting Nurses stated her visit with pt is delayed and she is awaiting pt's response.

## 2024-08-21 NOTE — TELEPHONE ENCOUNTER
"Andrey, occupational therapist called, regarding patient care,\" Occupational therapy initial evaluation was performed today. Will return for home safety activities of daily living. Home exercise program and fall risk prevention strategies.\"  "

## 2024-08-22 ENCOUNTER — TELEPHONE (OUTPATIENT)
Dept: OTHER | Facility: OTHER | Age: 74
End: 2024-08-22

## 2024-08-23 NOTE — TELEPHONE ENCOUNTER
ALEJANDRO Zambrano from [facility], called to let the office know that she had a consult with PT today for LTC option thru the State of [place]. Please contact [place] @ [phone] with any questions or concerns.    Thank You

## 2024-08-27 ENCOUNTER — TELEPHONE (OUTPATIENT)
Age: 74
End: 2024-08-27

## 2024-08-27 NOTE — TELEPHONE ENCOUNTER
BRIA Hickman from [facility] called to report an unwitnessed fall. States that pt fell in the bathroom. Family was able to assist in getting her up. No injuries to report. Pt seems to be doing well now.

## 2024-08-29 ENCOUNTER — TELEPHONE (OUTPATIENT)
Age: 74
End: 2024-08-29

## 2024-08-29 NOTE — TELEPHONE ENCOUNTER
Received call from Liana visiting RN currently with patient to report diarrhea since yesterday morning; 4 episodes this morning with watery stools. Has not taken anything to help slow down. Increased fluid intake; no Pedialyte or Gatorade but can get some. Patient had Metformin increased to 1000  mg BID last week and was on oral antibiotics recently. Patient has history of diverticulitis and diabetic. Report stable vital signs this morning: Temp=97.2. GQ=490/75 , HR=79, PO2= 95%. TT=293 this morning. Please follow up with patient on mobile # [phone] with provider response for diarrhea, care advice.

## 2024-09-04 NOTE — TELEPHONE ENCOUNTER
Spoke with patient, she is no longer having diarrhea since she started taking imodium. However, she is still experiencing stomach pain after eating, weakness, and she states her sugar was been running high in the mornings.

## 2024-09-04 NOTE — TELEPHONE ENCOUNTER
I spoke to the patient and informed her. I scheduled her for an appointment on Friday 9/6/24 at 1:45pm. Can you please change the appointment type to virtual?  Thank you!    Jennifer Bonilla LPN

## 2024-09-04 NOTE — TELEPHONE ENCOUNTER
It looks like she has an appointment tomorrow with GI. They can address her concerns during appointment. Her sugars may be running high due to whatever is causing her GI issues. We can do a virtual visit to address the sugars after her GI appointment.

## 2024-09-05 ENCOUNTER — TELEPHONE (OUTPATIENT)
Age: 74
End: 2024-09-05

## 2024-09-05 ENCOUNTER — TELEMEDICINE (OUTPATIENT)
Dept: GASTROENTEROLOGY | Facility: CLINIC | Age: 74
End: 2024-09-05
Payer: COMMERCIAL

## 2024-09-05 ENCOUNTER — RA CDI HCC (OUTPATIENT)
Dept: OTHER | Facility: HOSPITAL | Age: 74
End: 2024-09-05

## 2024-09-05 ENCOUNTER — TELEPHONE (OUTPATIENT)
Dept: GASTROENTEROLOGY | Facility: CLINIC | Age: 74
End: 2024-09-05

## 2024-09-05 DIAGNOSIS — G89.29 OTHER CHRONIC PAIN: ICD-10-CM

## 2024-09-05 DIAGNOSIS — R53.1 GENERALIZED WEAKNESS: ICD-10-CM

## 2024-09-05 DIAGNOSIS — K80.50 CHOLEDOCHOLITHIASIS: Primary | ICD-10-CM

## 2024-09-05 DIAGNOSIS — K57.92 DIVERTICULITIS: ICD-10-CM

## 2024-09-05 DIAGNOSIS — M33.20 POLYMYOSITIS (HCC): Primary | ICD-10-CM

## 2024-09-05 PROCEDURE — 99213 OFFICE O/P EST LOW 20 MIN: CPT | Performed by: PHYSICIAN ASSISTANT

## 2024-09-05 NOTE — TELEPHONE ENCOUNTER
Andrey from Physical Therapy calling to report that the patient was discharged today from OT and her daughter would like Dr. Castorena to put in a new order to get her continue at home  OT or in the office .

## 2024-09-05 NOTE — PROGRESS NOTES
Virtual Regular Visit  Name: Jadyn Ac      : 1950      MRN: [id number]  Encounter Provider: Leatha Hays PA-C  Encounter Date: 2024   Encounter department: [facility]    Verification of patient location:    Patient is located at Home in the following state in which I hold an active license [place]    Assessment & Plan     74-year-old female with history of A-fib not on anticoagulation, asthma, type 2 diabetes who presents the office for follow-up after hospitalization for diverticulitis and choledocholithiasis.    Acute, uncomplicated sigmoid diverticulitis  Noted on CT scan 2024.  Repeat CT scan 1 week later showed improving findings.  She followed a low fiber diet for the past 2 weeks.  Now having some alternating bowel habits with occasional constipation.  Last colonoscopy  with 1 polyp removed.    -Recommend patient start a fiber supplement such as Metamucil or Benefiber 1 tablespoon daily.  -Recommend patient reach out if any recurrence of severe abdominal pain.    - Patient will also need a colonoscopy.  She had new diagnosis of A-fib during hospitalization.  She followed up with PCP.  Per note, she declined starting medications at that time but was agreeable to follow-up with cardiology.  She has office appointment with them at the beginning of October.  - I will wait until patient sees cardiologist prior to setting up colonoscopy    -Patient will need direct admission for colonoscopy for bowel prep due to significantly poor mobility and unable to sit up from seat without help  -GoLytely prep    2.  Choledocholithiasis  Patient was found to have elevated liver enzymes.  CT and ultrasound did not show obvious choledocholithiasis however she would not undergo MRI.  ERCP was performed 2024 with sphincterotomy and extraction of stone.    -Liver enzymes were downtrending prior to discharge.  - Recommend repeat liver enzymes now to ensure  resolution.  -Labs faxed to [phone]  -Patient was recommended to follow-up with general surgery again after seeing cardiologist to discuss elective cholecystectomy. She is aware of this recommendation.      Encounter provider Leatha Hays PA-C    The patient was identified by name and date of birth. Jadyn Ac was informed that this is a telemedicine visit and that the visit is being conducted through the Epic Embedded platform. She agrees to proceed..  My office door was closed. No one else was in the room.  She acknowledged consent and understanding of privacy and security of the video platform. The patient has agreed to participate and understands they can discontinue the visit at any time.    .    Patient is aware this is a billable service.     History of Present Illness     Jadyn Ac is a 74 y.o. adult who presents after hospitalization for diverticulitis and choledocholithiasis.    Patient reports doing okay.  She continues to struggle with severe weakness in her legs.  She is following with family doctor for this due to history of myositis.  She is completing physical therapy.  She does report occasional abdominal pain after eating, mainly on the left side.  This is intermittent.  She reports bowel movements are alternating, sometimes with diarrhea.  Now more occasionally passage of hard stools.    Prior colonoscopy in 2018 with removal of polyp.    Review of Systems  Pertinent Medical History         Medical History Reviewed by provider this encounter:       Current Outpatient Medications on File Prior to Visit   Medication Sig Dispense Refill    albuterol (PROVENTIL HFA,VENTOLIN HFA) 90 mcg/act inhaler INHALE 2 PUFFS EVERY 6 (SIX) HOURS AS NEEDED FOR WHEEZING 8.5 g 1    amLODIPine (NORVASC) 5 mg tablet TAKE 1 TABLET BY MOUTH DAILY 90 tablet 1    benazepril (LOTENSIN) 10 mg tablet TAKE 1 TABLET DAILY 90 tablet 1    Blood Glucose Monitoring Suppl (OneTouch Verio) w/Device KIT Use 2  (two) times a day 1 kit 0    calcium carbonate (OS-YNES) 1250 (500 Ca) MG tablet 1 tablet with meals Orally      Cholecalciferol 25 MCG (1000 UT) tablet Take 1 tablet by mouth daily      famotidine (PEPCID) 20 mg tablet Take 1 tablet (20 mg total) by mouth 2 (two) times a day 60 tablet 0    ferrous sulfate 325 (65 Fe) mg tablet Every 12 hours      insulin glargine (LANTUS) 100 units/mL subcutaneous injection Inject 30 Units under the skin daily at bedtime 15 mL 3    Insulin Pen Needle (Pen Needles) 31G X 5 MM MISC USE DAILY FOR INSULIN ADMINISTRATION 100 each 3    Lancets (OneTouch Delica Plus Ldbats80M) MISC Use daily as directed. 100 each 0    metFORMIN (GLUCOPHAGE-XR) 500 mg 24 hr tablet Take 2 tablets (1,000 mg total) by mouth 2 (two) times a day with meals 360 tablet 3    omeprazole (PriLOSEC) 40 MG capsule omeprazole 40 mg capsule,delayed release      OneTouch Verio test strip CHECK BLOOD GLUCOSE TWICE DAILY 100 strip 1    predniSONE 5 mg tablet TAKE 1-1/2 TABS DAILY 45 tablet 5    Synthroid 88 MCG tablet TAKE ONE TABLET EVERY DAY IN THE EARLY MORNING ON AN EMPTY STOMACH. 90 tablet 1     No current facility-administered medications on file prior to visit.      Objective     There were no vitals taken for this visit.  Physical Exam    Patient was sitting comfortably in chair.  Did not appear in distress.  Alert and oriented.    Visit Time  Total Visit Duration: 20

## 2024-09-05 NOTE — TELEPHONE ENCOUNTER
Pt will need to have blood work completed. Was asked to fax order over to [phone]. Fax was sent and went through.

## 2024-09-06 ENCOUNTER — TELEMEDICINE (OUTPATIENT)
Dept: FAMILY MEDICINE CLINIC | Facility: CLINIC | Age: 74
End: 2024-09-06
Payer: COMMERCIAL

## 2024-09-06 DIAGNOSIS — M33.20 POLYMYOSITIS (HCC): ICD-10-CM

## 2024-09-06 DIAGNOSIS — E11.65 TYPE 2 DIABETES MELLITUS WITH HYPERGLYCEMIA, WITH LONG-TERM CURRENT USE OF INSULIN (HCC): Primary | ICD-10-CM

## 2024-09-06 DIAGNOSIS — Z79.4 TYPE 2 DIABETES MELLITUS WITH HYPERGLYCEMIA, WITH LONG-TERM CURRENT USE OF INSULIN (HCC): Primary | ICD-10-CM

## 2024-09-06 PROCEDURE — G2211 COMPLEX E/M VISIT ADD ON: HCPCS | Performed by: FAMILY MEDICINE

## 2024-09-06 PROCEDURE — 99214 OFFICE O/P EST MOD 30 MIN: CPT | Performed by: FAMILY MEDICINE

## 2024-09-06 NOTE — ASSESSMENT & PLAN NOTE
Feels like her symptoms have been worsening lately. Recommend follow up with her rheumatologist Dr. Corona. For now continue prednisone.

## 2024-09-06 NOTE — ASSESSMENT & PLAN NOTE
Managed by endocrinologist Dr. Swartz. She does take prednisone daily for polymyositis which likely causes some episodes of hyperglycemia, however overall her glucose levels are currently stable. She will continue to keep a home glucose log and follow up with Dr. Swartz if she experiences worsening hyperglycemia.   Continue latnus 30 units, metfromin 1000mg BID.   Lab Results   Component Value Date    HGBA1C 7.6 (H) 07/11/2024

## 2024-09-06 NOTE — PROGRESS NOTES
Virtual Regular Visit  Name: Jadyn Ac      : 1950      MRN: [id number]  Encounter Provider: Herlinda Castorena MD  Encounter Date: 2024   Encounter department: [facility]    Verification of patient location:    Patient is located at Home in the following state in which I hold an active license [place]    Assessment & Plan   1. Type 2 diabetes mellitus with hyperglycemia, with long-term current use of insulin (HCC)  Assessment & Plan:  Managed by endocrinologist Dr. Swartz. She does take prednisone daily for polymyositis which likely causes some episodes of hyperglycemia, however overall her glucose levels are currently stable. She will continue to keep a home glucose log and follow up with Dr. Swartz if she experiences worsening hyperglycemia.   Continue latnus 30 units, metfromin 1000mg BID.   Lab Results   Component Value Date    HGBA1C 7.6 (H) 2024     2. Polymyositis (HCC)  Assessment & Plan:  Feels like her symptoms have been worsening lately. Recommend follow up with her rheumatologist Dr. Corona. For now continue prednisone.          Encounter provider Herlinda Castorena MD    The patient was identified by name and date of birth. Jadyn Ac was informed that this is a telemedicine visit and that the visit is being conducted through the [employer] Teams platform. She agrees to proceed..  My office door was closed. No one else was in the room.  She acknowledged consent and understanding of privacy and security of the video platform. The patient has agreed to participate and understands they can discontinue the visit at any time.    Patient is aware this is a billable service.     History of Present Illness     HPI  Jadyn is a 73 yo female who has a history of hypertension, a fib, asthma, type II DM, polymyositis, hypothyroidism who has visit today to discuss her recently worsening polymyositis symptoms.   She reports she is unable to stand on her own anymore and feels like she is  getting weaker.   Her rheumatologist is Dr. Corona, but has not recently followed up with Dr. Corona. Last visit was about 6 months ago. Currently on prednisone 5mg to 7.5mg daily.   She also reports that her sugars have been around 140s-150s in the morning. She follows endocrinologist Dr. Swartz who recently dc'd her jardiance and increased dose of metformin to 1000 mg BID as well as Lantus to 30 units at bedtime.   She has no symptoms of hypo/hyperglycemia, but does report there were two times her sugars have gone >150 fasting.     Review of Systems   Constitutional: Negative.    HENT: Negative.     Eyes: Negative.    Respiratory: Negative.     Cardiovascular: Negative.    Gastrointestinal: Negative.    Endocrine: Negative.    Genitourinary: Negative.    Musculoskeletal: Negative.    Neurological: Negative.    Hematological: Negative.    Psychiatric/Behavioral: Negative.       Pertinent Medical History       Objective     There were no vitals taken for this visit.  Physical Exam  It was my intent to perform this visit via video technology but the patient was not able to do a video connection so the visit was completed via audio telephone only.   Visit Time  Total Visit Duration: 15 min.

## 2024-09-10 ENCOUNTER — TELEMEDICINE (OUTPATIENT)
Dept: RHEUMATOLOGY | Facility: CLINIC | Age: 74
End: 2024-09-10
Payer: COMMERCIAL

## 2024-09-10 ENCOUNTER — TELEPHONE (OUTPATIENT)
Dept: RHEUMATOLOGY | Facility: CLINIC | Age: 74
End: 2024-09-10

## 2024-09-10 ENCOUNTER — TELEPHONE (OUTPATIENT)
Age: 74
End: 2024-09-10

## 2024-09-10 DIAGNOSIS — M33.20 POLYMYOSITIS (HCC): Primary | ICD-10-CM

## 2024-09-10 DIAGNOSIS — Z79.52 CURRENT CHRONIC USE OF SYSTEMIC STEROIDS: ICD-10-CM

## 2024-09-10 DIAGNOSIS — M15.0 PRIMARY GENERALIZED (OSTEO)ARTHRITIS: ICD-10-CM

## 2024-09-10 PROCEDURE — G2211 COMPLEX E/M VISIT ADD ON: HCPCS | Performed by: INTERNAL MEDICINE

## 2024-09-10 PROCEDURE — 99214 OFFICE O/P EST MOD 30 MIN: CPT | Performed by: INTERNAL MEDICINE

## 2024-09-10 NOTE — PROGRESS NOTES
Virtual Regular Visit    Verification of patient location:    Patient is located at Home in the following state in which I hold an active license [place]      Assessment/Plan:    Problem List Items Addressed This Visit          Musculoskeletal and Integument    Polymyositis (HCC) - Primary    Relevant Orders    Ambulatory Referral to General Surgery    CK    Sedimentation rate, automated    C-reactive protein     Other Visit Diagnoses       Current chronic use of systemic steroids        Primary generalized (osteo)arthritis                     Reason for visit is follow up.      Chief Complaint   Patient presents with    Virtual Regular Visit          Encounter provider Tonia Corona MD    Provider located at [facility] [address], [facility] [address]  [facility] [address]  [phone]      Recent Visits  Date Type Provider Dept   09/06/24 Telemedicine Herlinda Castorena MD [facility]   Showing recent visits within past 7 days and meeting all other requirements  Today's Visits  Date Type Provider Dept   09/10/24 Telemedicine Tonia Corona MD [facility]   Showing today's visits and meeting all other requirements  Future Appointments  No visits were found meeting these conditions.  Showing future appointments within next 150 days and meeting all other requirements       The patient was identified by name and date of birth. Jadyn Ac was informed that this is a telemedicine visit and that the visit is being conducted through the Epic Embedded platform. She agrees to proceed..  My office door was closed. No one else was in the room.  She acknowledged consent and understanding of privacy and security of the video platform. The patient has agreed to participate and understands they can discontinue the visit at any time.    Patient is aware this is a billable service.       Subjective    HPI     INITIAL VISIT NOTE  (3/2022):  Ms. Ac is a 71-year-old female with history significant for polymyositis diagnosed in 2002 and primary generalized osteoarthritis who presents to [facility] with [facility].  She is currently on prednisone 5 mg once daily.  She is transferring care from Dr. Megan Jaquez at [facility].  She is self-referred today.     Patient reports in 2002 after she presented with proximal muscle weakness primarily affecting her bilateral lower extremities she underwent a muscle biopsy which was consistent with polymyositis.  She was also referred to [facility] for their opinion.  She reports since then she has been on prednisone constantly but at varying doses.  She was initially started on methotrexate which she was on for a few years but cannot recall if this specifically helped her.  It was discontinued eventually due to shortness of breath.  She was then started on mycophenolate which she was on for approximately 10 years and states that it was discontinued 1-2 years ago due to diarrhea.  The diarrhea resolved upon discontinuation of the medication.  She is unsure if this really helped with the polymyositis either.  She has also received about 3-4 doses of IVIG in 2009 and she feels like this would help her the most.  This has not been done any time recently.  She reports over the years she may experience 1-2 flare ups of leg weakness for which the prednisone dose is increased.  This most recently occurred about 6-9 months ago at which time her prednisone was increased to 50 mg once daily and gradually tapered to her current dose of 5 mg once daily.  She reports with this dose she is still feeling very weak and usually the lowest effective dose is about 15 mg once daily.  She does not want to increase the steroids due to side effects such as weight gain.  She does not experience significant upper body weakness.  She reports with her lower extremities she is unable to get up from a seated position  without assistance and unable to stand without assistance.  She has had home physical therapy in the past but none recently.     She denies fevers, unintentional weight loss, alopecia, dry eyes (report some dry mouth in the morning), inflammatory eye disease, skin rash, psoriasis, photosensitivity, skin thickening/tightening, recurrent sinus disease, epistaxis, mouth/nose ulcers, swollen glands, chest pain, persistent shortness of breath (does experience this sometimes due to asthma), cough, hemoptysis, dysphagia, persistent acid reflux, inflammatory bowel disease, blood clots, miscarriages, Raynaud's, concerning joint pain/swelling/stiffness or a family history of autoimmune disease.        6/24/2022:  Patient presents for a follow-up of polymyositis.  She is currently on prednisone 7.5 mg once daily.  I reviewed the workup done after the last office visit which showed an elevated CK of 478.  The C-reactive protein was elevated at 21.  An SILVANO screen, Sjogren's antibodies, rheumatoid factor, anti CCP antibody, ESR, hepatitis panel, CBC and vitamin-D levels were unremarkable.  An ultrasound of her bilateral thighs showed increased echotexture throughout the rectus femoris musculature bilaterally without evidence of hyperemia.  A DEXA scan was normal.  An x-ray of her right hand showed ulnar subluxation of the 2nd through 5th metacarpophalangeal joints with diffuse osteopenia.  There was no marginal erosions or juxta-articular osteopenia.     She does not report any new complaints from the last office visit.  She did start home physical therapy for the bilateral lower extremity muscle strengthening which helps to some degree and she is continuing with exercises on her own.        1/9/2023:  Patient presents for a follow-up of polymyositis.  She is currently on prednisone 7.5 mg once daily.  After the last office visit we discussed a trial with IVIG to see if this improves her muscle strength but she reports after each  infusion she actually felt like she was getting weaker.  In view of this she stopped the IVIG infusions in November 2022.     She has been receiving home physical therapy and feels like this does help but overall she is reporting significant weakness in her bilateral lower extremities.  No new complaints.        6/1/2023:  Patient presents for a follow-up of polymyositis.  She is currently on prednisone 7.5 mg once daily.  I reviewed her labs done in January which showed an elevated C-reactive protein of 19 and a CK of 543.  After the last visit we did try prednisone 20 mg once daily which she states she was on for 2 months.  This did not make any significant change in her symptoms as she did not notice any improvement in the weakness.  She is now on 7.5 mg once daily and does not think that this helps either.  She reports for short durations of time when she has been off steroids she actually does not feel any different from being on steroids versus off.  She is still feeling very fatigued and reports generalized weakness.  She is enrolled with home physical therapy.        12/21/2023:  Patient presents for a follow-up of polymyositis.  She is currently on prednisone 7.5 mg once daily.  She did not start the azathioprine as she was undergoing eye surgery and did not want to begin immunosuppression.     She continues to feel weakness in her legs but reports it is more prominent in her right leg.  She has not tried to taper down on the steroids.      3/22/2024:  Patient presents for a follow-up of polymyositis.  She is currently on prednisone 7.5 mg once daily.  She cannot recall why she is not on the azathioprine but after reviewing her primary care physician's note there is a mention that she developed a skin rash and she was not sure if this was secondary to the azathioprine or alendronate so she discontinued both medications.  It is mentioned that she contacted my office but there is no telephone note  noted.    She is continuing to report generalized weakness more prominent in her legs.  She has now developed progressive pain also affecting her hip and low back region and reports she is essentially immobile.  She is unable to go to the lab to get her blood work done and does not feel like she can attend in office visits with me.      9/10/2024:  Patient presents for a follow-up of polymyositis.  She is currently on prednisone 7.5 mg once daily.  I reviewed her labs done after the last visit which showed a slightly elevated CK of 254.  An ESR and CRP were slightly elevated at 58 and 16, respectively.    She mentions since the last visit she has been feeling more generalized weakness, especially focused in her right leg diffusely.  She has difficulty with getting up from a seated position and requires assistance.  She is then able to ambulate to some degree with use of a walker.  She has also been noticing fatigue in her arms.  She reports symptoms worsened after she was recently hospitalized for diverticulitis and also diagnosed with atrial fibrillation.      Past Medical History:   Diagnosis Date    Acute diverticulitis 9/21/2018    Anemia     Asthma     Chronic pain     bulging discs in back,polymyositis    Diabetes mellitus (HCC)     Disease of thyroid gland     Diverticulitis     Hyperlipidemia     Hypertension     Polymyositis (HCC)        Past Surgical History:   Procedure Laterality Date    EYE SURGERY Bilateral     CATAREACT REMOVED FROM BOTH EYES 2 MTHS AGO    HYSTERECTOMY      SINUS SURGERY      THYROIDECTOMY      TUBAL LIGATION         Current Outpatient Medications   Medication Sig Dispense Refill    albuterol (PROVENTIL HFA,VENTOLIN HFA) 90 mcg/act inhaler INHALE 2 PUFFS EVERY 6 (SIX) HOURS AS NEEDED FOR WHEEZING 8.5 g 1    amLODIPine (NORVASC) 5 mg tablet TAKE 1 TABLET BY MOUTH DAILY 90 tablet 1    benazepril (LOTENSIN) 10 mg tablet TAKE 1 TABLET DAILY 90 tablet 1    Blood Glucose Monitoring Suppl  (OneTouch Verio) w/Device KIT Use 2 (two) times a day 1 kit 0    calcium carbonate (OS-YNES) 1250 (500 Ca) MG tablet 1 tablet with meals Orally      Cholecalciferol 25 MCG (1000 UT) tablet Take 1 tablet by mouth daily      famotidine (PEPCID) 20 mg tablet Take 1 tablet (20 mg total) by mouth 2 (two) times a day 60 tablet 0    ferrous sulfate 325 (65 Fe) mg tablet Every 12 hours      insulin glargine (LANTUS) 100 units/mL subcutaneous injection Inject 30 Units under the skin daily at bedtime 15 mL 3    Insulin Pen Needle (Pen Needles) 31G X 5 MM MISC USE DAILY FOR INSULIN ADMINISTRATION 100 each 3    Lancets (OneTouch Delica Plus [id number]) MISC Use daily as directed. 100 each 0    metFORMIN (GLUCOPHAGE-XR) 500 mg 24 hr tablet Take 2 tablets (1,000 mg total) by mouth 2 (two) times a day with meals 360 tablet 3    omeprazole (PriLOSEC) 40 MG capsule omeprazole 40 mg capsule,delayed release      OneTouch Verio test strip CHECK BLOOD GLUCOSE TWICE DAILY 100 strip 1    predniSONE 5 mg tablet TAKE 1-1/2 TABS DAILY 45 tablet 5    Synthroid 88 MCG tablet TAKE ONE TABLET EVERY DAY IN THE EARLY MORNING ON AN EMPTY STOMACH. 90 tablet 1     No current facility-administered medications for this visit.        Allergies   Allergen Reactions    Anaprox [Naproxen Sodium] Shortness Of Breath    Levofloxacin Hives    Sulfamethoxazole-Trimethoprim Anaphylaxis    Jardiance [Empagliflozin] GI Intolerance    Penicillins Swelling and Hives    Alendronate Rash    Aspirin Rash    Azathioprine Rash    Metronidazole Rash    Sulfa Antibiotics Rash       Review of Systems  Constitutional: Negative for weight change, fevers, chills, night sweats.  Positive for fatigue.  ENT/Mouth: Negative for hearing changes, ear pain, nasal congestion, sinus pain, hoarseness, sore throat, rhinorrhea, swallowing difficulty.   Eyes: Negative for pain, redness, discharge, vision changes.   Cardiovascular: Negative for chest pain, SOB, palpitations.   Respiratory:  Negative for cough, sputum, wheezing, dyspnea.   Gastrointestinal: Negative for nausea, vomiting, diarrhea, constipation, pain, heartburn.  Genitourinary: Negative for dysuria, urinary frequency, hematuria.   Musculoskeletal: As per HPI.  Skin: Negative for skin rash, color changes.   Neuro: Negative for numbness, tingling, loss of consciousness.  Positive for weakness.  Psych: Negative for anxiety, depression.   Heme/Lymph: Negative for easy bruising, bleeding, lymphadenopathy.        Assessment and Plan:   Ms. Ac is a 74-year-old female with history significant for polymyositis diagnosed in 2002 and primary generalized osteoarthritis who presents for a follow-up.  She is currently on prednisone 7.5 mg once daily.       Sahil Polymyositis  Jerome Salamanca presents today for a follow-up of polymyositis which was diagnosed in 2002.  She primarily presents with bilateral lower extremity weakness which was considered by her prior rheumatologist to be secondary to residual irreversible weakness as a result of the myopathy as opposed to ongoing active inflammation.  In view of this she was only maintained on prednisone at 7.5 mg once daily for symptomatic relief but given the chronicity of use she is unsure how much it actually helps.  Prior medication options included methotrexate which was discontinued due to pulmonary issues, mycophenolate which was discontinued due to diarrhea, IVIG which she recalls as being beneficial but it was unclear why this was discontinued (retried from 6/22-11/22 without any benefit) and azathioprine in 12/23 which she discontinued due to a skin rash.       - To further evaluate if there may be concerns for active muscle disease I did proceed with an ultrasound of her bilateral thighs in 6/22 which did not show hyperemia/evidence of active inflammation.  We again discussed that the ongoing weakness is likely a result of chronic myopathic changes which may be irreversible.  I primarily recommend  continued physical therapy.  She mentions since the last visit she has been experiencing progressive muscle weakness focused in her right leg.  As it is unclear if her symptoms are related to active versus chronic inflammation I recommend proceeding with a muscle biopsy from the right thigh/quadriceps region.  A referral to general surgery will be placed.  I will see her back for a follow-up visit once this is complete and determine if increased immunosuppression is required.  For now she will continue the prednisone at 7.5 mg once daily.     # Chronic steroid use  - She is aware of the side effects of chronic steroid use including but not limited to risk for infections, hypertension, diabetes, cataracts/glaucoma, gastritis, osteoporosis and avascular necrosis.  Reassuringly a DEXA scan in 5/22 was normal but she is due to update a scan at this time and has not yet scheduled it.  She should continue with daily calcium and vitamin-D supplements.  She self discontinued the alendronate per chart review as she was concerned that this may be contributing to a skin rash.  She is not inclined to start any new prophylactic medications at this time.      Visit Time  Total Visit Duration: 21 minutes.

## 2024-09-10 NOTE — TELEPHONE ENCOUNTER
Lakeisha from [facility] called in request Dr Castorena to sign paperwork for Physical therapy and Ot. She will be faxing that over today Please be aware and look out for this.   [phone]

## 2024-09-15 ENCOUNTER — APPOINTMENT (EMERGENCY)
Dept: RADIOLOGY | Facility: HOSPITAL | Age: 74
DRG: 493 | End: 2024-09-15
Payer: COMMERCIAL

## 2024-09-15 ENCOUNTER — HOSPITAL ENCOUNTER (INPATIENT)
Facility: HOSPITAL | Age: 74
LOS: 3 days | Discharge: HOME HEALTH | DRG: 493 | End: 2024-09-18
Attending: EMERGENCY MEDICINE | Admitting: SURGERY
Payer: COMMERCIAL

## 2024-09-15 ENCOUNTER — APPOINTMENT (EMERGENCY)
Dept: CT IMAGING | Facility: HOSPITAL | Age: 74
DRG: 493 | End: 2024-09-15
Payer: COMMERCIAL

## 2024-09-15 ENCOUNTER — APPOINTMENT (INPATIENT)
Dept: RADIOLOGY | Facility: HOSPITAL | Age: 74
DRG: 493 | End: 2024-09-15
Payer: COMMERCIAL

## 2024-09-15 DIAGNOSIS — S09.90XA INJURY OF HEAD, INITIAL ENCOUNTER: ICD-10-CM

## 2024-09-15 DIAGNOSIS — E03.9 ACQUIRED HYPOTHYROIDISM: ICD-10-CM

## 2024-09-15 DIAGNOSIS — S82.832A CLOSED AVULSION FRACTURE OF DISTAL END OF LEFT FIBULA, INITIAL ENCOUNTER: ICD-10-CM

## 2024-09-15 DIAGNOSIS — S82.892A CLOSED FRACTURE OF LEFT ANKLE, INITIAL ENCOUNTER: ICD-10-CM

## 2024-09-15 DIAGNOSIS — E11.65 TYPE 2 DIABETES MELLITUS WITH HYPERGLYCEMIA, WITH LONG-TERM CURRENT USE OF INSULIN (HCC): ICD-10-CM
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
## 2024-09-15 DIAGNOSIS — Z79.4 TYPE 2 DIABETES MELLITUS WITH HYPERGLYCEMIA, WITH LONG-TERM CURRENT USE OF INSULIN (HCC): ICD-10-CM

## 2024-09-15 DIAGNOSIS — S82.009A PATELLA FRACTURE: ICD-10-CM

## 2024-09-15 DIAGNOSIS — I10 PRIMARY HYPERTENSION: ICD-10-CM

## 2024-09-15 DIAGNOSIS — S16.1XXA ACUTE STRAIN OF NECK MUSCLE, INITIAL ENCOUNTER: ICD-10-CM

## 2024-09-15 PROBLEM — S82.001A RIGHT PATELLA FRACTURE: Status: ACTIVE | Noted: 2024-09-15

## 2024-09-15 LAB
ABO GROUP BLD: NORMAL
ABO GROUP BLD: NORMAL
ANION GAP SERPL CALCULATED.3IONS-SCNC: 10 MMOL/L (ref 4–13)
BASOPHILS # BLD AUTO: 0.04 THOUSANDS/ΜL (ref 0–0.1)
BASOPHILS NFR BLD AUTO: 0 % (ref 0–1)
BLD GP AB SCN SERPL QL: POSITIVE
BLOOD GROUP ANTIBODIES SERPL: NORMAL
BUN SERPL-MCNC: 22 MG/DL (ref 5–25)
CALCIUM SERPL-MCNC: 8.5 MG/DL (ref 8.4–10.2)
CHLORIDE SERPL-SCNC: 102 MMOL/L (ref 96–108)
CO2 SERPL-SCNC: 23 MMOL/L (ref 21–32)
CREAT SERPL-MCNC: 0.64 MG/DL (ref 0.6–1.3)
EOSINOPHIL # BLD AUTO: 0.05 THOUSAND/ΜL (ref 0–0.61)
EOSINOPHIL NFR BLD AUTO: 0 % (ref 0–6)
ERYTHROCYTE [DISTWIDTH] IN BLOOD BY AUTOMATED COUNT: 19.4 % (ref 11.6–15.1)
GLUCOSE SERPL-MCNC: 167 MG/DL (ref 65–140)
GLUCOSE SERPL-MCNC: 249 MG/DL (ref 65–140)
HCT VFR BLD AUTO: 31.5 % (ref 36.5–46.1)
HGB BLD-MCNC: 10.3 G/DL (ref 12–15.4)
IMM GRANULOCYTES # BLD AUTO: 0.14 THOUSAND/UL (ref 0–0.2)
IMM GRANULOCYTES NFR BLD AUTO: 1 % (ref 0–2)
INR PPP: 1.05 (ref 0.85–1.19)
LYMPHOCYTES # BLD AUTO: 1.26 THOUSANDS/ΜL (ref 0.6–4.47)
LYMPHOCYTES NFR BLD AUTO: 8 % (ref 14–44)
MCH RBC QN AUTO: 22.6 PG (ref 26.8–34.3)
MCHC RBC AUTO-ENTMCNC: 32.7 G/DL (ref 31.4–37.4)
MCV RBC AUTO: 69 FL (ref 82–98)
MONOCYTES # BLD AUTO: 0.76 THOUSAND/ΜL (ref 0.17–1.22)
MONOCYTES NFR BLD AUTO: 5 % (ref 4–12)
NEUTROPHILS # BLD AUTO: 13.73 THOUSANDS/ΜL (ref 1.85–7.62)
NEUTS SEG NFR BLD AUTO: 86 % (ref 43–75)
NRBC BLD AUTO-RTO: 0 /100 WBCS
PLATELET # BLD AUTO: 303 THOUSANDS/UL (ref 149–390)
PLATELET # BLD AUTO: 347 THOUSANDS/UL (ref 149–390)
PMV BLD AUTO: 10.1 FL (ref 8.9–12.7)
PMV BLD AUTO: 9.9 FL (ref 8.9–12.7)
POTASSIUM SERPL-SCNC: 4.6 MMOL/L (ref 3.5–5.3)
PROTHROMBIN TIME: 14.4 SECONDS (ref 12.3–15)
RBC # BLD AUTO: 4.55 MILLION/UL (ref 3.88–5.12)
RH BLD: NEGATIVE
RH BLD: NEGATIVE
SODIUM SERPL-SCNC: 135 MMOL/L (ref 135–147)
SPECIMEN EXPIRATION DATE: NORMAL
WBC # BLD AUTO: 15.98 THOUSAND/UL (ref 4.31–10.16)

## 2024-09-15 PROCEDURE — 99285 EMERGENCY DEPT VISIT HI MDM: CPT

## 2024-09-15 PROCEDURE — 29515 APPLICATION SHORT LEG SPLINT: CPT | Performed by: EMERGENCY MEDICINE

## 2024-09-15 PROCEDURE — 99223 1ST HOSP IP/OBS HIGH 75: CPT

## 2024-09-15 PROCEDURE — 96374 THER/PROPH/DIAG INJ IV PUSH: CPT

## 2024-09-15 PROCEDURE — 96375 TX/PRO/DX INJ NEW DRUG ADDON: CPT

## 2024-09-15 PROCEDURE — 86850 RBC ANTIBODY SCREEN: CPT | Performed by: NURSE PRACTITIONER

## 2024-09-15 PROCEDURE — 96361 HYDRATE IV INFUSION ADD-ON: CPT

## 2024-09-15 PROCEDURE — 99222 1ST HOSP IP/OBS MODERATE 55: CPT | Performed by: SURGERY

## 2024-09-15 PROCEDURE — 73600 X-RAY EXAM OF ANKLE: CPT

## 2024-09-15 PROCEDURE — 85049 AUTOMATED PLATELET COUNT: CPT | Performed by: NURSE PRACTITIONER

## 2024-09-15 PROCEDURE — 82948 REAGENT STRIP/BLOOD GLUCOSE: CPT

## 2024-09-15 PROCEDURE — 86900 BLOOD TYPING SEROLOGIC ABO: CPT | Performed by: NURSE PRACTITIONER

## 2024-09-15 PROCEDURE — 73610 X-RAY EXAM OF ANKLE: CPT

## 2024-09-15 PROCEDURE — 73590 X-RAY EXAM OF LOWER LEG: CPT

## 2024-09-15 PROCEDURE — 85025 COMPLETE CBC W/AUTO DIFF WBC: CPT | Performed by: EMERGENCY MEDICINE

## 2024-09-15 PROCEDURE — 70450 CT HEAD/BRAIN W/O DYE: CPT

## 2024-09-15 PROCEDURE — 80048 BASIC METABOLIC PNL TOTAL CA: CPT | Performed by: EMERGENCY MEDICINE

## 2024-09-15 PROCEDURE — 86870 RBC ANTIBODY IDENTIFICATION: CPT | Performed by: NURSE PRACTITIONER

## 2024-09-15 PROCEDURE — 72125 CT NECK SPINE W/O DYE: CPT

## 2024-09-15 PROCEDURE — 73564 X-RAY EXAM KNEE 4 OR MORE: CPT

## 2024-09-15 PROCEDURE — 36415 COLL VENOUS BLD VENIPUNCTURE: CPT | Performed by: EMERGENCY MEDICINE

## 2024-09-15 PROCEDURE — 85610 PROTHROMBIN TIME: CPT

## 2024-09-15 PROCEDURE — 99285 EMERGENCY DEPT VISIT HI MDM: CPT | Performed by: EMERGENCY MEDICINE

## 2024-09-15 PROCEDURE — 86901 BLOOD TYPING SEROLOGIC RH(D): CPT | Performed by: NURSE PRACTITIONER

## 2024-09-15 RX ORDER — HYDROMORPHONE HCL IN WATER/PF 6 MG/30 ML
0.2 PATIENT CONTROLLED ANALGESIA SYRINGE INTRAVENOUS
Status: DISCONTINUED | OUTPATIENT
Start: 2024-09-15 | End: 2024-09-18 | Stop reason: HOSPADM

## 2024-09-15 RX ORDER — SODIUM CHLORIDE, SODIUM LACTATE, POTASSIUM CHLORIDE, CALCIUM CHLORIDE 600; 310; 30; 20 MG/100ML; MG/100ML; MG/100ML; MG/100ML
150 INJECTION, SOLUTION INTRAVENOUS CONTINUOUS
Status: DISCONTINUED | OUTPATIENT
Start: 2024-09-15 | End: 2024-09-15

## 2024-09-15 RX ORDER — AMLODIPINE BESYLATE 5 MG/1
5 TABLET ORAL DAILY
Status: DISCONTINUED | OUTPATIENT
Start: 2024-09-16 | End: 2024-09-18 | Stop reason: HOSPADM

## 2024-09-15 RX ORDER — SODIUM CHLORIDE, SODIUM GLUCONATE, SODIUM ACETATE, POTASSIUM CHLORIDE, MAGNESIUM CHLORIDE, SODIUM PHOSPHATE, DIBASIC, AND POTASSIUM PHOSPHATE .53; .5; .37; .037; .03; .012; .00082 G/100ML; G/100ML; G/100ML; G/100ML; G/100ML; G/100ML; G/100ML
125 INJECTION, SOLUTION INTRAVENOUS CONTINUOUS
Status: DISCONTINUED | OUTPATIENT
Start: 2024-09-15 | End: 2024-09-17

## 2024-09-15 RX ORDER — FERROUS SULFATE 325(65) MG
325 TABLET ORAL
Status: DISCONTINUED | OUTPATIENT
Start: 2024-09-16 | End: 2024-09-18 | Stop reason: HOSPADM

## 2024-09-15 RX ORDER — OXYCODONE HYDROCHLORIDE 5 MG/1
5 TABLET ORAL EVERY 4 HOURS PRN
Status: DISCONTINUED | OUTPATIENT
Start: 2024-09-15 | End: 2024-09-18 | Stop reason: HOSPADM

## 2024-09-15 RX ORDER — CEFAZOLIN SODIUM 2 G/50ML
2000 SOLUTION INTRAVENOUS
Status: CANCELLED | OUTPATIENT
Start: 2024-09-16 | End: 2024-09-17

## 2024-09-15 RX ORDER — INSULIN GLARGINE 100 [IU]/ML
30 INJECTION, SOLUTION SUBCUTANEOUS
Status: DISCONTINUED | OUTPATIENT
Start: 2024-09-15 | End: 2024-09-18 | Stop reason: HOSPADM

## 2024-09-15 RX ORDER — ALBUTEROL SULFATE 90 UG/1
2 INHALANT RESPIRATORY (INHALATION) EVERY 6 HOURS PRN
Status: DISCONTINUED | OUTPATIENT
Start: 2024-09-15 | End: 2024-09-18 | Stop reason: HOSPADM

## 2024-09-15 RX ORDER — ACETAMINOPHEN 325 MG/1
975 TABLET ORAL EVERY 8 HOURS SCHEDULED
Status: DISCONTINUED | OUTPATIENT
Start: 2024-09-15 | End: 2024-09-18 | Stop reason: HOSPADM

## 2024-09-15 RX ORDER — LISINOPRIL 10 MG/1
10 TABLET ORAL DAILY
Status: DISCONTINUED | OUTPATIENT
Start: 2024-09-16 | End: 2024-09-18 | Stop reason: HOSPADM

## 2024-09-15 RX ORDER — PANTOPRAZOLE SODIUM 40 MG/1
40 TABLET, DELAYED RELEASE ORAL
Status: DISCONTINUED | OUTPATIENT
Start: 2024-09-15 | End: 2024-09-18 | Stop reason: HOSPADM

## 2024-09-15 RX ORDER — ACETAMINOPHEN 10 MG/ML
1000 INJECTION, SOLUTION INTRAVENOUS ONCE
Status: COMPLETED | OUTPATIENT
Start: 2024-09-15 | End: 2024-09-15

## 2024-09-15 RX ORDER — SENNOSIDES 8.6 MG
2 TABLET ORAL DAILY
Status: DISCONTINUED | OUTPATIENT
Start: 2024-09-16 | End: 2024-09-18 | Stop reason: HOSPADM

## 2024-09-15 RX ORDER — ENOXAPARIN SODIUM 100 MG/ML
30 INJECTION SUBCUTANEOUS EVERY 12 HOURS
Status: DISCONTINUED | OUTPATIENT
Start: 2024-09-15 | End: 2024-09-16

## 2024-09-15 RX ORDER — LEVOTHYROXINE SODIUM 88 UG/1
88 TABLET ORAL
Status: DISCONTINUED | OUTPATIENT
Start: 2024-09-16 | End: 2024-09-18 | Stop reason: HOSPADM

## 2024-09-15 RX ORDER — HYDROMORPHONE HCL/PF 1 MG/ML
0.5 SYRINGE (ML) INJECTION ONCE
Status: COMPLETED | OUTPATIENT
Start: 2024-09-15 | End: 2024-09-15

## 2024-09-15 RX ORDER — DOCUSATE SODIUM 100 MG/1
100 CAPSULE, LIQUID FILLED ORAL 2 TIMES DAILY
Status: DISCONTINUED | OUTPATIENT
Start: 2024-09-15 | End: 2024-09-18 | Stop reason: HOSPADM

## 2024-09-15 RX ADMIN — ENOXAPARIN SODIUM 30 MG: 30 INJECTION SUBCUTANEOUS at 17:27

## 2024-09-15 RX ADMIN — ACETAMINOPHEN 975 MG: 325 TABLET ORAL at 22:04

## 2024-09-15 RX ADMIN — PANTOPRAZOLE SODIUM 40 MG: 40 TABLET, DELAYED RELEASE ORAL at 17:28

## 2024-09-15 RX ADMIN — DOCUSATE SODIUM 100 MG: 100 CAPSULE, LIQUID FILLED ORAL at 17:28

## 2024-09-15 RX ADMIN — SODIUM CHLORIDE, SODIUM LACTATE, POTASSIUM CHLORIDE, AND CALCIUM CHLORIDE 150 ML/HR: .6; .31; .03; .02 INJECTION, SOLUTION INTRAVENOUS at 14:24

## 2024-09-15 RX ADMIN — ACETAMINOPHEN 1000 MG: 10 INJECTION INTRAVENOUS at 15:07

## 2024-09-15 RX ADMIN — HYDROMORPHONE HYDROCHLORIDE 0.5 MG: 1 INJECTION, SOLUTION INTRAMUSCULAR; INTRAVENOUS; SUBCUTANEOUS at 14:20

## 2024-09-15 RX ADMIN — SODIUM CHLORIDE, SODIUM GLUCONATE, SODIUM ACETATE, POTASSIUM CHLORIDE, MAGNESIUM CHLORIDE, SODIUM PHOSPHATE, DIBASIC, AND POTASSIUM PHOSPHATE 125 ML/HR: .53; .5; .37; .037; .03; .012; .00082 INJECTION, SOLUTION INTRAVENOUS at 18:14

## 2024-09-15 NOTE — ASSESSMENT & PLAN NOTE
- Closed right patellar fracture, present on admission.  - Status post fall on 9/15.  - Appreciate Orthopedic surgery evaluation, recommendations and interventions as noted.  - Non operative management  - Maintain WBAT RLE in knee immobilizer.  - Monitor right lower extremity neurovascular exam.  - Continue multimodal analgesic regimen.  - Continue DVT prophylaxis.  - PT and OT evaluation and treatment as indicated.  - Outpatient follow up with Orthopedic surgery for re-evaluation.

## 2024-09-15 NOTE — ASSESSMENT & PLAN NOTE
- Closed left ankle fracture, present on admission.  - Status post fall on 9/15.  - Appreciate Orthopedic surgery evaluation, recommendations and interventions as noted.  - S/p reduction and splint in ED  - NPO for ORIF L ankle today  - Maintain NWB LLE in splint   - Monitor left lower extremity neurovascular exam.  - Continue multimodal analgesic regimen.  - Continue DVT prophylaxis.  - PT and OT evaluation and treatment as indicated.  - Outpatient follow up with Orthopedic surgery for re-evaluation.

## 2024-09-15 NOTE — ASSESSMENT & PLAN NOTE
NWB LLE in splint  Ice and elevation  Tentative plan for OR for ORIF left ankle either tomorrow (9/16) or Tuesday (9/17)  NPO midnight  Consent obtained, on file in OR.  Pre-op labs reviewed: cmp, cbc, type & screen; INR pending  Clear for OR per trauma  Pain management per primary  DVT ppx per primary  Medical management per primary    Case discussed and reviewed with Dr. Mason

## 2024-09-15 NOTE — ED PROVIDER NOTES
1. Fall, initial encounter    2. Injury of head, initial encounter    3. Acute strain of neck muscle, initial encounter    4. Patella fracture    5. Closed avulsion fracture of distal end of left fibula, initial encounter    6. Closed fracture of left ankle, initial encounter      ED Disposition       None          Assessment & Plan       Medical Decision Making  74-year-old female with past medical history of hypertension, hypothyroidism, type 2 diabetes mellitus presents following fall at 0400 this morning    Differential diagnoses include but not limited to: Fall, sprain, strain, spinal injury, intracranial hemorrhage    Initial workup to include CT head and neck, x-ray of left ankle, x-ray of right knee.  Patient requesting medicine for pain in ankle.  Patient given 0.5 mg of Dilaudid.  Notified that x-rays were concerning for L sided distal fibular fracture and right patella fracture.  Patient placed into short leg splint on left leg and knee immobilizer in right leg.  Patient tolerated procedure well and will be admitted to trauma service for further treatment.    Amount and/or Complexity of Data Reviewed  Labs: ordered.  Radiology: ordered. Decision-making details documented in ED Course.    Risk  Prescription drug management.                  ED Course as of 09/15/24 2046   Sun Sep 15, 2024   1424 CT head without contrast  IMPRESSION:     No acute intracranial abnormality. 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                 Workstation performed: [id number]     1502 CT spine cervical without contrast     IMPRESSION:     No cervical spine fracture or traumatic malalignment.     Stable multilevel cervical spondylosis.           Workstation performed: [id number]     1502 XR concerning for left distal fibular fracture and R knee for patellar fracture. Patient placed in L sided short leg splint and R knee immobilizer   1503 ED attending discussed case with trauma, patient to be admitted to their service for further  assessment and treatment   1531 Patient reevaluated and resting comfortably in room. Pain is well controlled and splint in place. Awaiting trauma evaluation and admission       Medications   docusate sodium (COLACE) capsule 100 mg (100 mg Oral Given 9/15/24 1728)   senna (SENOKOT) tablet 17.2 mg (has no administration in time range)   multi-electrolyte (PLASMALYTE-A/ISOLYTE-S PH 7.4) IV solution (has no administration in time range)   enoxaparin (LOVENOX) subcutaneous injection 30 mg (30 mg Subcutaneous Given 9/15/24 1727)   albuterol (PROVENTIL HFA,VENTOLIN HFA) inhaler 2 puff (has no administration in time range)   amLODIPine (NORVASC) tablet 5 mg (has no administration in time range)   lisinopril (ZESTRIL) tablet 10 mg (has no administration in time range)   Cholecalciferol (VITAMIN D3) tablet 1,000 Units (has no administration in time range)   pantoprazole (PROTONIX) EC tablet 40 mg (40 mg Oral Given 9/15/24 1728)   ferrous sulfate tablet 325 mg (has no administration in time range)   insulin glargine (LANTUS) subcutaneous injection 30 Units 0.3 mL (has no administration in time range)   levothyroxine tablet 88 mcg (has no administration in time range)   acetaminophen (TYLENOL) tablet 975 mg (has no administration in time range)   oxyCODONE (ROXICODONE) split tablet 2.5 mg (has no administration in time range)   oxyCODONE (ROXICODONE) IR tablet 5 mg (has no administration in time range)   HYDROmorphone HCl (DILAUDID) injection 0.2 mg (has no administration in time range)   HYDROmorphone (DILAUDID) injection 0.5 mg (0.5 mg Intravenous Given 9/15/24 1420)   acetaminophen (Ofirmev) injection 1,000 mg (0 mg Intravenous Stopped 9/15/24 1522)       History of Present Illness       74-year-old female with past medical history of hypertension, hypothyroidism, type 2 diabetes mellitus, polymyositis presents following fall at 0400 this morning.  Patient accompanied in ED by her sons who state that patient stays with her  sister.  Patient was ambulating to bathroom at 0400 this morning with walker when she had a fall onto hardwood floor.  Patient states that she fell backwards hitting posterior head on floor in addition to striking right knee and left ankle.  Admits to head strike, denies loss of consciousness.  Denies any blood thinning medications or aspirin. At present denies any headaches, dizziness, vision changes, fever, chills, sore throat, cough, chest pain, shortness of breath, abdominal pain, nausea, vomiting, diarrhea, dysuria, hematuria.              Review of Systems   Musculoskeletal:  Positive for joint swelling.        Pain and swelling in left ankle and right knee   Neurological:  Positive for headaches.   All other systems reviewed and are negative.          Objective     ED Triage Vitals   Temperature Pulse Blood Pressure Respirations SpO2 Patient Position - Orthostatic VS   09/15/24 1304 09/15/24 1304 09/15/24 1304 09/15/24 1304 09/15/24 1304 09/15/24 1508   98 °F (36.7 °C) (!) 106 98/56 18 96 % Lying      Temp Source Heart Rate Source BP Location FiO2 (%) Pain Score    09/15/24 1304 09/15/24 1304 09/15/24 1304 -- 09/15/24 1304    Oral Monitor Right arm  10 - Worst Possible Pain        Physical Exam  Vitals and nursing note reviewed.   Constitutional:       General: She is not in acute distress.     Appearance: She is well-developed. She is not ill-appearing.   HENT:      Head: Normocephalic and atraumatic.   Eyes:      Conjunctiva/sclera: Conjunctivae normal.   Cardiovascular:      Rate and Rhythm: Regular rhythm. Tachycardia present.      Heart sounds: No murmur heard.  Pulmonary:      Effort: Pulmonary effort is normal. No respiratory distress.      Breath sounds: Normal breath sounds.   Abdominal:      General: Abdomen is flat. Bowel sounds are normal.      Palpations: Abdomen is soft.      Tenderness: There is no abdominal tenderness. There is no right CVA tenderness, left CVA tenderness, guarding or rebound.    Musculoskeletal:         General: No swelling.      Cervical back: Neck supple.      Right knee: Swelling and bony tenderness present. No effusion, erythema, ecchymosis or lacerations.      Left knee: Normal.      Right ankle: Normal.      Left ankle: Swelling present. No deformity, ecchymosis or lacerations. Tenderness present. Decreased range of motion.   Skin:     General: Skin is warm and dry.      Capillary Refill: Capillary refill takes less than 2 seconds.   Neurological:      General: No focal deficit present.      Mental Status: She is alert and oriented to person, place, and time. Mental status is at baseline.   Psychiatric:         Mood and Affect: Mood normal.         Labs Reviewed   CBC AND DIFFERENTIAL - Abnormal       Result Value    WBC 15.98 (*)     RBC 4.55      Hemoglobin 10.3 (*)     Hematocrit 31.5 (*)     MCV 69 (*)     MCH 22.6 (*)     MCHC 32.7      RDW 19.4 (*)     MPV 9.9      Platelets 347      nRBC 0      Segmented % 86 (*)     Immature Grans % 1      Lymphocytes % 8 (*)     Monocytes % 5      Eosinophils Relative 0      Basophils Relative 0      Absolute Neutrophils 13.73 (*)     Absolute Immature Grans 0.14      Absolute Lymphocytes 1.26      Absolute Monocytes 0.76      Eosinophils Absolute 0.05      Basophils Absolute 0.04     BASIC METABOLIC PANEL - Abnormal    Sodium 135      Potassium 4.6      Chloride 102      CO2 23      ANION GAP 10      BUN 22      Creatinine 0.64      Glucose 249 (*)     Calcium 8.5      eGFR        Narrative:     Notes:     1. eGFR calculation is only valid for adults 18 years and older.  2. EGFR calculation cannot be performed for patients who are transgender, non-binary, or whose legal sex, sex at birth, and gender identity differ.   PLATELET COUNT   PROTIME-INR   TYPE AND SCREEN     CT head without contrast   Final Interpretation by Tracy Thompson MD (09/15 1419)      No acute intracranial abnormality. 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                   Workstation performed: [id number]         CT spine cervical without contrast   Final Interpretation by Tracy Thompson MD (09/15 1430)      No cervical spine fracture or traumatic malalignment.      Stable multilevel cervical spondylosis.            Workstation performed: [id number]         XR ankle 3+ views LEFT    (Results Pending)   XR knee 4+ vw right injury    (Results Pending)   XR tibia fibula 2 vw left    (Results Pending)   XR ankle 2 vw left    (Results Pending)   XR ankle 3+ vw left    (Results Pending)       Splint application    Date/Time: 9/15/2024 3:05 PM    Performed by: Jad Knight DO  Authorized by: Jad Knight DO  Universal Protocol:  Consent: Verbal consent obtained.  Risks and benefits: risks, benefits and alternatives were discussed  Consent given by: patient    Pre-procedure details:     Sensation:  Normal  Procedure details:     Laterality:  Left    Location:  Leg    Leg:  L lower legCast type:  Short leg        Splint type:  Short leg    Supplies:  Cotton padding  Splint application    Date/Time: 9/15/2024 3:21 PM    Performed by: Jad Knight DO  Authorized by: Jad Knight DO  Universal Protocol:  Consent: Verbal consent obtained.  Risks and benefits: risks, benefits and alternatives were discussed  Consent given by: patient    Pre-procedure details:     Sensation:  Normal  Procedure details:     Laterality:  Right    Location:  Knee    Knee:  R knee    Supplies:  Knee immobilizer           Jad Knight DO  09/15/24 2046

## 2024-09-15 NOTE — CONSULTS
Consultation - Orthopedics   Name: Jadyn Ac 74 y.o. adult I MRN: [id number]  Unit/Bed#: ED-43 I Date of Admission: 9/15/2024   Date of Service: 9/15/2024 I Hospital Day: 0   Inpatient consult to Orthopedic Surgery  Consult performed by: Kary Ames PA-C  Consult ordered by: CHICA Vance        Physician Requesting Evaluation: Silvestre White MD   Reason for Evaluation / Principal Problem: right patellar fracture, left ankle fracture    Assessment & Plan  Right patella fracture  WBAT RLE in knee immobilizer  No immediate orthopedic intervention anticipated  PT/OT  Follow up with orthopedics at discharge  Fracture of left ankle  NWB LLE in splint  Ice and elevation  Tentative plan for OR for ORIF left ankle either tomorrow (9/16) or Tuesday (9/17)  NPO midnight  Consent obtained, on file in OR.  Pre-op labs reviewed: cmp, cbc, type & screen; INR pending  Clear for OR per trauma  Pain management per primary  DVT ppx per primary  Medical management per primary    Case discussed and reviewed with Dr. Mason    Orthopedics service will follow.    History of Present Illness   HPI: Jadyn Ac is a 74 y.o. year old adult who presents with left ankle and right knee pain. She reports tripping when coming out of the bathroom earlier today at immediate onset of left ankle and right knee pain.  She reports history of shingles to her right lower extremity that resulted in polymyositis and permanent nerve damage, per patient. She was been unable to straight leg raise since prior to this incident. She does use walker at baseline for ambulation. Denies numbness or tingling. PMH diabetes. Denies blood thinners, unable to take aspirin due to allergy. Offers no additional complaints at this time. Denies any additional pain.    Review of Systems  I have reviewed the patient's PMH, PSH, Social History, Family History, Meds, and Allergies    Objective      Temp:  [98 °F (36.7 °C)] 98 °F (36.7  °C)  HR:  [] 89  Resp:  [18] 18  BP: ()/(56-57) 113/57  O2 Device: None (Room air)          I/O         09/13 0701 09/14 0700 09/14 0701  09/15 0700 09/15 0701 09/16 0700    IV Piggyback   100    Total Intake(mL/kg)   100 (1)    Net   +100                 Lines/Drains/Airways       Active Status       None                  Physical Exam  Vitals and nursing note reviewed.   Constitutional:       General: She is not in acute distress.     Appearance: She is not ill-appearing or toxic-appearing.   Musculoskeletal:         General: Swelling, tenderness and signs of injury present.      Comments: Musculoskeletal: right lower extremity  Skin dry and intact. Swelling about anterior knee. Currently in knee immobilizer. No erythema.  TTP about right knee  SILT s/s/sp/dp/t.   Unable to straight leg raise 2/2 polymyositis, pt reports being unable to straight leg raise prior injury  +ankle dorsi/plantar flexion, EHL/FHL. Able to wiggle toes  No pain with passive ROM of hip  2+ DP/PT pulse  Musculature is soft and compressible, no pain with passive stretch    Musculoskeletal: left lower extremity  Skin dry and intact, no erythema. Swelling about medial and lateral ankle  TTP medial/lateral ankle  SILT s/s/sp/dp/t.   ROM of ankle deferred in setting of fracture  Motor intact 5/5 strength with hip flexion/extension, knee flexion/extension, EHL/FHL  Toes warm and well perfused    Tertiary: all other noninjured extremities were palpated and ranged looking for secondary injuries. Patient had no pain with range of motion of other major joints. No tenderness over all other joints/long bones as except already stated.     Neurological:      Mental Status: She is alert.          Lab Results: I have reviewed the following results:    Recent Labs     09/15/24  1413   WBC 15.98*   HGB 10.3*   HCT 31.5*      BUN 22   CREATININE 0.64     Blood Culture:   Lab Results   Component Value Date    BLOODCX No Growth After 5 Days.  "08/09/2024    BLOODCX No Growth After 5 Days. 08/09/2024     Wound Culture: No results found for: \"WOUNDCULT\"    Images reviewed with attending:  XR left ankle: bimalleolar fracture    XR right knee: fracture superior pole of patella    CT head without contrast   Final Result by Tracy Thompson MD (09/15 0232)      No acute intracranial abnormality. 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                  Workstation performed: [id number]         CT spine cervical without contrast   Final Result by Trayc Thompson MD (09/15 5939)      No cervical spine fracture or traumatic malalignment.      Stable multilevel cervical spondylosis.            Workstation performed: [id number]         XR ankle 3+ views LEFT    (Results Pending)   XR knee 4+ vw right injury    (Results Pending)   XR tibia fibula 2 vw left    (Results Pending)   XR ankle 2 vw left    (Results Pending)     Procedure: splint application  Pt was placed in a well padded AO splint. Pt tolerated the procedure well and was neurovascularly intact both pre and post procedure. Post reduction orthogonal x rays obtained.    "

## 2024-09-15 NOTE — ED PROCEDURE NOTE
"PROCEDURE  Orthopedic injury treatment    Date/Time: 9/15/2024 3:28 PM    Performed by: Hunter Lee MD  Authorized by: Hunter Lee MD    Patient Location:  ED  [place] Protocol:  Procedure performed by:  Consent: Verbal consent obtained.  Risks and benefits: risks, benefits and alternatives were discussed  Consent given by: patient  Time out: Immediately prior to procedure a \"time out\" was called to verify the correct patient, procedure, equipment, support staff and site/side marked as required.  Timeout called at: 9/15/2024 3:28 PM.  Patient understanding: patient states understanding of the procedure being performed  Required blood products, implants, devices, and special equipment available: NONE.  Patient identity confirmed: verbally with patient    Injury location: LEFT ANKLE SUBLUXATION - MORTISE INSTABILITY.  Neurovascular status: Neurovascularly intact    Distal perfusion: normal    Neurological function: normal    Range of motion: normal    Local anesthesia used?: No    General anesthesia used?: No    Skeletal traction used?: Yes    Immobilization:  Splint  Splint type:  Short leg  Supplies used:  Cotton padding, elastic bandage and Ortho-Glass  Neurovascular status: Neurovascularly intact    Distal perfusion: normal    Neurological function: normal    Range of motion: normal    Patient tolerance:  Patient tolerated the procedure well with no immediate complications   PT GIVEN IV HYDROMORPHONE PRIOR TO PROCEDURE -- LEFT ANKLE PULLED FORWARD AND STRAIGHTENED BY ER MD WITH COUNTER TRACTION BY ER RESIDENT -- FOLLOWED BY LEFT SHORT LEG STATIC SPLINT-- PT TOLERATED PROCEDURE WITH MILD PAIN --        Hunter Lee MD  09/15/24 1531    "

## 2024-09-15 NOTE — H&P
H&P - Trauma   Name: Jadyn Ac 74 y.o. adult I MRN: [id number]  Unit/Bed#: ED-43 I Date of Admission: 9/15/2024   Date of Service: 9/15/2024 I Hospital Day: 0     Assessment & Plan  Fall, initial encounter    Injury of head, initial encounter    Acute strain of neck muscle, initial encounter    Patella fracture    Closed avulsion fracture of distal end of left fibula, initial encounter        Trauma Alert: evaluation   Model of Arrival: Ambulance    Trauma Team: Attending Christopher and WILLIE Milton  Consultants: Orthopedics     History of Present Illness   Chief Complaint: b/l LE pain  Mechanism:Fall     Jadyn Ac is a 74 y.o. adult who presents after coming out of the bathroom and fell.  Immediate pain in right knee and left ankle.  Did not sstrike her head, no LOC.  HAs a history of myositis.  Family at bedside and very supportive.  Will order pain medication.  PAtient is highly allergic to Ibuprofen.  Will consult ORthopedics.    Review of Systems   Constitutional:  Positive for activity change.   Eyes: Negative.    Respiratory: Negative.     Cardiovascular: Negative.    Gastrointestinal: Negative.    Endocrine: Negative.    Genitourinary: Negative.    Musculoskeletal:         Hx of myositis   Skin: Negative.    Allergic/Immunologic: Negative.    Neurological: Negative.    Hematological: Negative.    Psychiatric/Behavioral: Negative.       I have reviewed the patient's PMH, PSH, Social History, Family History, Meds, and Allergies  Immunization History   Administered Date(s) Administered    COVID-19 PFIZER VACCINE 0.3 ML IM 10/13/2021     Last Tetanus: unkown    1. Before the illness or injury that brought you to the Emergency, did you need someone to help you on a regular basis? 0=No   2. Since the illness or injury that brought you to the Emergency, have you needed more help than usual to take care of yourself? 0=No   3. Have you been hospitalized for one or more nights during the past 6 months  (excluding a stay in the Emergency Department)? 0=No   4. In general, do you see well? 0=Yes   5. In general, do you have serious problems with your memory? 0=No   6. Do you take more than three different medications everyday? 1=Yes   TOTAL   2     Did you order a geriatric consult if the score was 2 or greater?: yes       Objective   Initial Vitals:   Temperature: 98 °F (36.7 °C) (09/15/24 1304)  Pulse: (!) 106 (09/15/24 1304)  Respirations: 18 (09/15/24 1304)  Blood Pressure: 98/56 (09/15/24 1304)    Primary Survey:   Airway:        Status: patent;        Pre-hospital Interventions: none          Breathing:                      Right breath sounds: normal       Left breath sounds: normal  Circulation:        Rhythm: regular       Rate: regular   Right Pulses Left Pulses    R radial: 2+    R pedal: 2+     L radial: 2+  L femoral: 2+  L pedal: 2+       Disability:        GCS: Eye: 4; Verbal: 5 Motor: 6 Total: 15       Right Pupil: round;  reactive         Left Pupil:  round;  reactive      R Motor Strength L Motor Strength    R : 5/5  R dorsiflex: 5/5  R plantarflex: 5/5 L : 5/5  L dorsiflex: 5/5  L plantarflex: 5/5        Sensory:  No sensory deficit  Exposure:           Secondary Survey:  Physical Exam  Constitutional:       Appearance: Normal appearance.   HENT:      Head: Normocephalic.      Right Ear: External ear normal.      Left Ear: External ear normal.      Nose: Nose normal.      Mouth/Throat:      Mouth: Mucous membranes are moist.   Eyes:      Extraocular Movements: Extraocular movements intact.      Pupils: Pupils are equal, round, and reactive to light.   Cardiovascular:      Rate and Rhythm: Normal rate and regular rhythm.      Pulses: Normal pulses.      Heart sounds: Normal heart sounds.   Pulmonary:      Effort: Pulmonary effort is normal.      Breath sounds: Normal breath sounds.   Abdominal:      General: Abdomen is flat.   Genitourinary:     Comments: deferred  Musculoskeletal:          General: Swelling and tenderness present.      Cervical back: Normal range of motion and neck supple.   Skin:     General: Skin is warm and dry.      Capillary Refill: Capillary refill takes less than 2 seconds.   Neurological:      General: No focal deficit present.      Mental Status: She is alert and oriented to person, place, and time.   Psychiatric:         Mood and Affect: Mood normal.         Behavior: Behavior normal.         Thought Content: Thought content normal.         Judgment: Judgment normal.         Invasive Devices       Peripheral Intravenous Line  Duration             Peripheral IV 09/15/24 Right Antecubital <1 day                    Lab Results: I have reviewed the following results:   Recent Labs     09/15/24  1413   WBC 15.98*   HGB 10.3*   HCT 31.5*      SODIUM 135   K 4.6      CO2 23   BUN 22   CREATININE 0.64   GLUC 249*       Imaging Results: I have personally reviewed pertinent images saved in PACS. CT scan findings (and other pertinent positive findings on images) were discussed with radiology. My interpretation of the images/reports are as follows:  Chest Xray(s):    FAST exam(s): N/A   CT Scan(s): pending   Additional Xray(s): Left ankle  Right patella     Other Studies: HCT - neg  CT C-spine - neg

## 2024-09-16 ENCOUNTER — APPOINTMENT (INPATIENT)
Dept: RADIOLOGY | Facility: HOSPITAL | Age: 74
DRG: 493 | End: 2024-09-16
Payer: COMMERCIAL

## 2024-09-16 ENCOUNTER — ANESTHESIA (INPATIENT)
Dept: PERIOP | Facility: HOSPITAL | Age: 74
DRG: 493 | End: 2024-09-16
Payer: COMMERCIAL

## 2024-09-16 ENCOUNTER — ANESTHESIA EVENT (INPATIENT)
Dept: PERIOP | Facility: HOSPITAL | Age: 74
DRG: 493 | End: 2024-09-16
Payer: COMMERCIAL

## 2024-09-16 PROBLEM — R26.2 AMBULATORY DYSFUNCTION: Status: ACTIVE | Noted: 2024-09-16

## 2024-09-16 PROBLEM — Z91.89 AT RISK FOR DELIRIUM: Status: ACTIVE | Noted: 2024-09-16

## 2024-09-16 PROBLEM — Z79.899 ENCOUNTER FOR MEDICATION REVIEW: Status: ACTIVE | Noted: 2024-09-16

## 2024-09-16 PROBLEM — H54.7 VISION IMPAIRMENT: Status: ACTIVE | Noted: 2024-09-16

## 2024-09-16 PROBLEM — G47.00 INSOMNIA: Status: ACTIVE | Noted: 2024-09-16

## 2024-09-16 PROBLEM — I48.0 PAROXYSMAL A-FIB (HCC): Status: ACTIVE | Noted: 2024-09-16

## 2024-09-16 LAB
ANION GAP SERPL CALCULATED.3IONS-SCNC: 9 MMOL/L (ref 4–13)
BASOPHILS # BLD AUTO: 0.04 THOUSANDS/ΜL (ref 0–0.1)
BASOPHILS NFR BLD AUTO: 0 % (ref 0–1)
BUN SERPL-MCNC: 13 MG/DL (ref 5–25)
CALCIUM SERPL-MCNC: 8 MG/DL (ref 8.4–10.2)
CHLORIDE SERPL-SCNC: 104 MMOL/L (ref 96–108)
CO2 SERPL-SCNC: 25 MMOL/L (ref 21–32)
CREAT SERPL-MCNC: 0.48 MG/DL (ref 0.6–1.3)
EOSINOPHIL # BLD AUTO: 0.54 THOUSAND/ΜL (ref 0–0.61)
EOSINOPHIL NFR BLD AUTO: 4 % (ref 0–6)
ERYTHROCYTE [DISTWIDTH] IN BLOOD BY AUTOMATED COUNT: 19.3 % (ref 11.6–15.1)
GLUCOSE SERPL-MCNC: 109 MG/DL (ref 65–140)
GLUCOSE SERPL-MCNC: 109 MG/DL (ref 65–140)
GLUCOSE SERPL-MCNC: 111 MG/DL (ref 65–140)
GLUCOSE SERPL-MCNC: 171 MG/DL (ref 65–140)
HCT VFR BLD AUTO: 28.4 % (ref 36.5–46.1)
HGB BLD-MCNC: 9.6 G/DL (ref 12–15.4)
IMM GRANULOCYTES # BLD AUTO: 0.11 THOUSAND/UL (ref 0–0.2)
IMM GRANULOCYTES NFR BLD AUTO: 1 % (ref 0–2)
LYMPHOCYTES # BLD AUTO: 3.03 THOUSANDS/ΜL (ref 0.6–4.47)
LYMPHOCYTES NFR BLD AUTO: 23 % (ref 14–44)
MCH RBC QN AUTO: 22.9 PG (ref 26.8–34.3)
MCHC RBC AUTO-ENTMCNC: 33.8 G/DL (ref 31.4–37.4)
MCV RBC AUTO: 68 FL (ref 82–98)
MONOCYTES # BLD AUTO: 1.06 THOUSAND/ΜL (ref 0.17–1.22)
MONOCYTES NFR BLD AUTO: 8 % (ref 4–12)
NEUTROPHILS # BLD AUTO: 8.32 THOUSANDS/ΜL (ref 1.85–7.62)
NEUTS SEG NFR BLD AUTO: 64 % (ref 43–75)
NRBC BLD AUTO-RTO: 0 /100 WBCS
PLATELET # BLD AUTO: 299 THOUSANDS/UL (ref 149–390)
PMV BLD AUTO: 9.9 FL (ref 8.9–12.7)
POTASSIUM SERPL-SCNC: 3.8 MMOL/L (ref 3.5–5.3)
RBC # BLD AUTO: 4.19 MILLION/UL (ref 3.88–5.12)
SODIUM SERPL-SCNC: 138 MMOL/L (ref 135–147)
WBC # BLD AUTO: 13.1 THOUSAND/UL (ref 4.31–10.16)

## 2024-09-16 PROCEDURE — C1713 ANCHOR/SCREW BN/BN,TIS/BN: HCPCS | Performed by: ORTHOPAEDIC SURGERY

## 2024-09-16 PROCEDURE — NC001 PR NO CHARGE: Performed by: ORTHOPAEDIC SURGERY

## 2024-09-16 PROCEDURE — 99232 SBSQ HOSP IP/OBS MODERATE 35: CPT | Performed by: SURGERY

## 2024-09-16 PROCEDURE — 73600 X-RAY EXAM OF ANKLE: CPT

## 2024-09-16 PROCEDURE — 27520 TREAT KNEECAP FRACTURE: CPT | Performed by: ORTHOPAEDIC SURGERY

## 2024-09-16 PROCEDURE — 0QSH04Z REPOSITION LEFT TIBIA WITH INTERNAL FIXATION DEVICE, OPEN APPROACH: ICD-10-PCS | Performed by: ORTHOPAEDIC SURGERY

## 2024-09-16 PROCEDURE — 82948 REAGENT STRIP/BLOOD GLUCOSE: CPT

## 2024-09-16 PROCEDURE — 27792 TREATMENT OF ANKLE FRACTURE: CPT | Performed by: ORTHOPAEDIC SURGERY

## 2024-09-16 PROCEDURE — 27792 TREATMENT OF ANKLE FRACTURE: CPT | Performed by: PHYSICIAN ASSISTANT

## 2024-09-16 PROCEDURE — 99222 1ST HOSP IP/OBS MODERATE 55: CPT | Performed by: NURSE PRACTITIONER

## 2024-09-16 PROCEDURE — 27829 TREAT LOWER LEG JOINT: CPT | Performed by: ORTHOPAEDIC SURGERY

## 2024-09-16 PROCEDURE — 85025 COMPLETE CBC W/AUTO DIFF WBC: CPT | Performed by: SURGERY

## 2024-09-16 PROCEDURE — 27829 TREAT LOWER LEG JOINT: CPT | Performed by: PHYSICIAN ASSISTANT

## 2024-09-16 PROCEDURE — 80048 BASIC METABOLIC PNL TOTAL CA: CPT | Performed by: SURGERY

## 2024-09-16 PROCEDURE — 0QSK04Z REPOSITION LEFT FIBULA WITH INTERNAL FIXATION DEVICE, OPEN APPROACH: ICD-10-PCS | Performed by: ORTHOPAEDIC SURGERY

## 2024-09-16 DEVICE — 2.7MM/3.5MM LCP LATERAL DISTAL FIBULA PLATE 7H/LEFT/125MM
Type: IMPLANTABLE DEVICE | Site: ANKLE | Status: FUNCTIONAL
Brand: LCP

## 2024-09-16 DEVICE — 2.7MM LOCKING SCREW SLF-TPNG WITH T8 STARDRIVE RECESS 20MM: Type: IMPLANTABLE DEVICE | Site: ANKLE | Status: FUNCTIONAL

## 2024-09-16 DEVICE — 3.5MM CORTEX SCREW SELF-TAPPING 48MM: Type: IMPLANTABLE DEVICE | Site: ANKLE | Status: FUNCTIONAL

## 2024-09-16 DEVICE — 2.7MM LOCKING SCREW SLF-TPNG WITH T8 STARDRIVE RECESS 18MM: Type: IMPLANTABLE DEVICE | Site: ANKLE | Status: FUNCTIONAL

## 2024-09-16 DEVICE — 3.5MM CORTEX SCREW SELF-TAPPING 16MM: Type: IMPLANTABLE DEVICE | Site: ANKLE | Status: FUNCTIONAL

## 2024-09-16 DEVICE — 2.7MM LOCKING SCREW SLF-TPNG WITH T8 STARDRIVE RECESS 8MM: Type: IMPLANTABLE DEVICE | Site: ANKLE | Status: FUNCTIONAL

## 2024-09-16 DEVICE — 3.5MM CORTEX SCREW SELF-TAPPING 18MM: Type: IMPLANTABLE DEVICE | Site: ANKLE | Status: FUNCTIONAL

## 2024-09-16 DEVICE — 3.5MM CORTEX SCREW SELF-TAPPING 26MM: Type: IMPLANTABLE DEVICE | Site: ANKLE | Status: FUNCTIONAL

## 2024-09-16 DEVICE — 3.5MM CORTEX SCREW SELF-TAPPING 50MM: Type: IMPLANTABLE DEVICE | Site: ANKLE | Status: FUNCTIONAL

## 2024-09-16 DEVICE — 2.7MM LOCKING SCREW SLF-TPNG WITH T8 STARDRIVE RECESS 16MM: Type: IMPLANTABLE DEVICE | Site: ANKLE | Status: FUNCTIONAL

## 2024-09-16 DEVICE — 2.7MM CORTEX SCREW SELF-TAPPING 30MM: Type: IMPLANTABLE DEVICE | Site: ANKLE | Status: FUNCTIONAL

## 2024-09-16 RX ORDER — LIDOCAINE HYDROCHLORIDE 20 MG/ML
INJECTION, SOLUTION EPIDURAL; INFILTRATION; INTRACAUDAL; PERINEURAL AS NEEDED
Status: DISCONTINUED | OUTPATIENT
Start: 2024-09-16 | End: 2024-09-16

## 2024-09-16 RX ORDER — INSULIN LISPRO 100 [IU]/ML
1-6 INJECTION, SOLUTION INTRAVENOUS; SUBCUTANEOUS EVERY 6 HOURS SCHEDULED
Status: DISCONTINUED | OUTPATIENT
Start: 2024-09-16 | End: 2024-09-16

## 2024-09-16 RX ORDER — CEFAZOLIN SODIUM 2 G/50ML
SOLUTION INTRAVENOUS AS NEEDED
Status: DISCONTINUED | OUTPATIENT
Start: 2024-09-16 | End: 2024-09-16

## 2024-09-16 RX ORDER — INSULIN LISPRO 100 [IU]/ML
1-6 INJECTION, SOLUTION INTRAVENOUS; SUBCUTANEOUS EVERY 6 HOURS SCHEDULED
Status: DISCONTINUED | OUTPATIENT
Start: 2024-09-17 | End: 2024-09-17

## 2024-09-16 RX ORDER — FONDAPARINUX SODIUM 5 MG/.4ML
2.5 INJECTION SUBCUTANEOUS EVERY 24 HOURS
Status: DISCONTINUED | OUTPATIENT
Start: 2024-09-16 | End: 2024-09-18 | Stop reason: HOSPADM

## 2024-09-16 RX ORDER — CEFAZOLIN SODIUM 2 G/50ML
2000 SOLUTION INTRAVENOUS EVERY 8 HOURS
Status: COMPLETED | OUTPATIENT
Start: 2024-09-16 | End: 2024-09-17

## 2024-09-16 RX ORDER — BUPIVACAINE HYDROCHLORIDE 2.5 MG/ML
INJECTION, SOLUTION EPIDURAL; INFILTRATION; INTRACAUDAL AS NEEDED
Status: DISCONTINUED | OUTPATIENT
Start: 2024-09-16 | End: 2024-09-16

## 2024-09-16 RX ORDER — FENTANYL CITRATE 50 UG/ML
INJECTION, SOLUTION INTRAMUSCULAR; INTRAVENOUS AS NEEDED
Status: DISCONTINUED | OUTPATIENT
Start: 2024-09-16 | End: 2024-09-16

## 2024-09-16 RX ORDER — ONDANSETRON 2 MG/ML
INJECTION INTRAMUSCULAR; INTRAVENOUS AS NEEDED
Status: DISCONTINUED | OUTPATIENT
Start: 2024-09-16 | End: 2024-09-16

## 2024-09-16 RX ORDER — PROPOFOL 10 MG/ML
INJECTION, EMULSION INTRAVENOUS AS NEEDED
Status: DISCONTINUED | OUTPATIENT
Start: 2024-09-16 | End: 2024-09-16

## 2024-09-16 RX ORDER — FENTANYL CITRATE/PF 50 MCG/ML
50 SYRINGE (ML) INJECTION
Status: DISCONTINUED | OUTPATIENT
Start: 2024-09-16 | End: 2024-09-16 | Stop reason: HOSPADM

## 2024-09-16 RX ORDER — MAGNESIUM HYDROXIDE 1200 MG/15ML
LIQUID ORAL AS NEEDED
Status: DISCONTINUED | OUTPATIENT
Start: 2024-09-16 | End: 2024-09-16 | Stop reason: HOSPADM

## 2024-09-16 RX ORDER — SODIUM CHLORIDE, SODIUM LACTATE, POTASSIUM CHLORIDE, CALCIUM CHLORIDE 600; 310; 30; 20 MG/100ML; MG/100ML; MG/100ML; MG/100ML
INJECTION, SOLUTION INTRAVENOUS CONTINUOUS PRN
Status: DISCONTINUED | OUTPATIENT
Start: 2024-09-16 | End: 2024-09-16

## 2024-09-16 RX ORDER — SODIUM CHLORIDE, SODIUM LACTATE, POTASSIUM CHLORIDE, CALCIUM CHLORIDE 600; 310; 30; 20 MG/100ML; MG/100ML; MG/100ML; MG/100ML
20 INJECTION, SOLUTION INTRAVENOUS CONTINUOUS
Status: CANCELLED | OUTPATIENT
Start: 2024-09-16

## 2024-09-16 RX ORDER — PHENYLEPHRINE HCL IN 0.9% NACL 1 MG/10 ML
SYRINGE (ML) INTRAVENOUS AS NEEDED
Status: DISCONTINUED | OUTPATIENT
Start: 2024-09-16 | End: 2024-09-16

## 2024-09-16 RX ADMIN — BUPIVACAINE HYDROCHLORIDE 20 ML: 2.5 INJECTION, SOLUTION EPIDURAL; INFILTRATION; INTRACAUDAL; PERINEURAL at 14:45

## 2024-09-16 RX ADMIN — Medication 100 MCG: at 15:48

## 2024-09-16 RX ADMIN — AMLODIPINE BESYLATE 5 MG: 5 TABLET ORAL at 08:36

## 2024-09-16 RX ADMIN — OXYCODONE HYDROCHLORIDE 5 MG: 5 TABLET ORAL at 19:51

## 2024-09-16 RX ADMIN — DOCUSATE SODIUM 100 MG: 100 CAPSULE, LIQUID FILLED ORAL at 18:54

## 2024-09-16 RX ADMIN — SENNOSIDES 17.2 MG: 8.6 TABLET, FILM COATED ORAL at 08:36

## 2024-09-16 RX ADMIN — ENOXAPARIN SODIUM 30 MG: 30 INJECTION SUBCUTANEOUS at 05:40

## 2024-09-16 RX ADMIN — LIDOCAINE HYDROCHLORIDE 100 MG: 20 INJECTION, SOLUTION EPIDURAL; INFILTRATION; INTRACAUDAL at 15:20

## 2024-09-16 RX ADMIN — CEFAZOLIN SODIUM 2000 MG: 2 SOLUTION INTRAVENOUS at 22:24

## 2024-09-16 RX ADMIN — BUPIVACAINE HYDROCHLORIDE 20 ML: 2.5 INJECTION, SOLUTION EPIDURAL; INFILTRATION; INTRACAUDAL; PERINEURAL at 14:40

## 2024-09-16 RX ADMIN — Medication 100 MCG: at 15:45

## 2024-09-16 RX ADMIN — Medication 1000 UNITS: at 08:35

## 2024-09-16 RX ADMIN — SODIUM CHLORIDE, SODIUM GLUCONATE, SODIUM ACETATE, POTASSIUM CHLORIDE, MAGNESIUM CHLORIDE, SODIUM PHOSPHATE, DIBASIC, AND POTASSIUM PHOSPHATE 125 ML/HR: .53; .5; .37; .037; .03; .012; .00082 INJECTION, SOLUTION INTRAVENOUS at 08:39

## 2024-09-16 RX ADMIN — ONDANSETRON 4 MG: 2 INJECTION INTRAMUSCULAR; INTRAVENOUS at 16:53

## 2024-09-16 RX ADMIN — SODIUM CHLORIDE, SODIUM LACTATE, POTASSIUM CHLORIDE, AND CALCIUM CHLORIDE: .6; .31; .03; .02 INJECTION, SOLUTION INTRAVENOUS at 15:16

## 2024-09-16 RX ADMIN — OXYCODONE HYDROCHLORIDE 5 MG: 5 TABLET ORAL at 08:36

## 2024-09-16 RX ADMIN — Medication 100 MCG: at 15:35

## 2024-09-16 RX ADMIN — FENTANYL CITRATE 25 MCG: 50 INJECTION INTRAMUSCULAR; INTRAVENOUS at 16:14

## 2024-09-16 RX ADMIN — Medication 100 MCG: at 15:51

## 2024-09-16 RX ADMIN — ACETAMINOPHEN 975 MG: 325 TABLET ORAL at 22:24

## 2024-09-16 RX ADMIN — DOCUSATE SODIUM 100 MG: 100 CAPSULE, LIQUID FILLED ORAL at 08:35

## 2024-09-16 RX ADMIN — INSULIN GLARGINE 30 UNITS: 100 INJECTION, SOLUTION SUBCUTANEOUS at 22:24

## 2024-09-16 RX ADMIN — FERROUS SULFATE TAB 325 MG (65 MG ELEMENTAL FE) 325 MG: 325 (65 FE) TAB at 08:36

## 2024-09-16 RX ADMIN — FENTANYL CITRATE 25 MCG: 50 INJECTION INTRAMUSCULAR; INTRAVENOUS at 16:24

## 2024-09-16 RX ADMIN — PANTOPRAZOLE SODIUM 40 MG: 40 TABLET, DELAYED RELEASE ORAL at 08:36

## 2024-09-16 RX ADMIN — SODIUM CHLORIDE, SODIUM GLUCONATE, SODIUM ACETATE, POTASSIUM CHLORIDE, MAGNESIUM CHLORIDE, SODIUM PHOSPHATE, DIBASIC, AND POTASSIUM PHOSPHATE 125 ML/HR: .53; .5; .37; .037; .03; .012; .00082 INJECTION, SOLUTION INTRAVENOUS at 01:45

## 2024-09-16 RX ADMIN — CEFAZOLIN SODIUM 2000 MG: 2 SOLUTION INTRAVENOUS at 15:40

## 2024-09-16 RX ADMIN — FONDAPARINUX SODIUM 2.5 MG: 5 INJECTION, SOLUTION SUBCUTANEOUS at 22:24

## 2024-09-16 RX ADMIN — PROPOFOL 200 MG: 10 INJECTION, EMULSION INTRAVENOUS at 15:20

## 2024-09-16 RX ADMIN — LISINOPRIL 10 MG: 10 TABLET ORAL at 08:35

## 2024-09-16 NOTE — PLAN OF CARE
Problem: PAIN - ADULT  Goal: Verbalizes/displays adequate comfort level or baseline comfort level  Description: Interventions:  - Encourage patient to monitor pain and request assistance  - Assess pain using appropriate pain scale  - Administer analgesics based on type and severity of pain and evaluate response  - Implement non-pharmacological measures as appropriate and evaluate response  - Consider cultural and social influences on pain and pain management  - Notify physician/advanced practitioner if interventions unsuccessful or patient reports new pain  Outcome: Progressing     Problem: INFECTION - ADULT  Goal: Absence or prevention of progression during hospitalization  Description: INTERVENTIONS:  - Assess and monitor for signs and symptoms of infection  - Monitor lab/diagnostic results  - Monitor all insertion sites, i.e. indwelling lines, tubes, and drains  - Monitor endotracheal if appropriate and nasal secretions for changes in amount and color  - [place] appropriate cooling/warming therapies per order  - Administer medications as ordered  - Instruct and encourage patient and family to use good hand hygiene technique  - Identify and instruct in appropriate isolation precautions for identified infection/condition  Outcome: Progressing  Goal: Absence of fever/infection during neutropenic period  Description: INTERVENTIONS:  - Monitor WBC    Outcome: Progressing     Problem: SAFETY ADULT  Goal: Patient will remain free of falls  Description: INTERVENTIONS:  - Educate patient/family on patient safety including physical limitations  - Instruct patient to call for assistance with activity   - Consult OT/PT to assist with strengthening/mobility   - Keep Call bell within reach  - Keep bed low and locked with side rails adjusted as appropriate  - Keep care items and personal belongings within reach  - Initiate and maintain comfort rounds  - Make Fall Risk Sign visible to staff  - Initiate/Maintain bed alarm  -  Obtain necessary fall risk management equipment  - Apply yellow socks and bracelet for high fall risk patients  - Consider moving patient to room near nurses station  Outcome: Progressing  Goal: Maintain or return to baseline ADL function  Description: INTERVENTIONS:  -  Assess patient's ability to carry out ADLs; assess patient's baseline for ADL function and identify physical deficits which impact ability to perform ADLs (bathing, care of mouth/teeth, toileting, grooming, dressing, etc.)  - Assess/evaluate cause of self-care deficits   - Assess range of motion  - Assess patient's mobility; develop plan if impaired  - Assess patient's need for assistive devices and provide as appropriate  - Encourage maximum independence but intervene and supervise when necessary  - Involve family in performance of ADLs  - Assess for home care needs following discharge   - Consider OT consult to assist with ADL evaluation and planning for discharge  - Provide patient education as appropriate  Outcome: Progressing  Goal: Maintains/Returns to pre admission functional level  Description: INTERVENTIONS:  - Perform AM-PAC 6 Click Basic Mobility/ Daily Activity assessment daily.  - Set and communicate daily mobility goal to care team and patient/family/caregiver.   - Collaborate with rehabilitation services on mobility goals if consulted  - Reposition patient every 2 hours.  - Out of bed for meals 3 times a day  - Out of bed for toileting  - Record patient progress and toleration of activity level   Outcome: Progressing     Problem: Prexisting or High Potential for Compromised Skin Integrity  Goal: Skin integrity is maintained or improved  Description: INTERVENTIONS:  - Identify patients at risk for skin breakdown  - Assess and monitor skin integrity  - Assess and monitor nutrition and hydration status  - Monitor labs   - Assess for incontinence   - Turn and reposition patient  - Assist with mobility/ambulation  - Relieve pressure over  bony prominences  - Avoid friction and shearing  - Provide appropriate hygiene as needed including keeping skin clean and dry  - Evaluate need for skin moisturizer/barrier cream  - Collaborate with interdisciplinary team   - Patient/family teaching  - Consider wound care consult   Outcome: Progressing

## 2024-09-16 NOTE — ANESTHESIA POSTPROCEDURE EVALUATION
Post-Op Assessment Note    CV Status:  Stable  Pain Score: 0    Pain management: adequate       Mental Status:  Alert and awake   Hydration Status:  Euvolemic   PONV Controlled:  Controlled   Airway Patency:  Patent  Airway: intubated     Post Op Vitals Reviewed: Yes    No anethesia notable event occurred.    Staff: CRNA               BP   98.3   Temp   140/94   Pulse  87   Resp   16   SpO2   100

## 2024-09-16 NOTE — ASSESSMENT & PLAN NOTE
At a baseline ambulates with walker  PT/OT following  Fall precautions  Out of bed as tolerated  Encourage early and frequent mobilization  Encourage adequate hydration and nutrition  Provide adequate pain management   Goal is undetermined  Continue with PT/OT for continued strength and balance training

## 2024-09-16 NOTE — ASSESSMENT & PLAN NOTE
Patient does have vision impairment  Wears glasses at baseline for reading  Recommend use of corrective lenses at all appropriate times  Encourage adequate lighting and encourage use of assistance with ambulation  Keep personal belongings close to avoid reaching  Encourage appropriate footwear at all times  Recommend large font for printed materials provided to patient

## 2024-09-16 NOTE — PLAN OF CARE
Problem: PAIN - ADULT  Goal: Verbalizes/displays adequate comfort level or baseline comfort level  Description: Interventions:  - Encourage patient to monitor pain and request assistance  - Assess pain using appropriate pain scale  - Administer analgesics based on type and severity of pain and evaluate response  - Implement non-pharmacological measures as appropriate and evaluate response  - Consider cultural and social influences on pain and pain management  - Notify physician/advanced practitioner if interventions unsuccessful or patient reports new pain  Outcome: Progressing     Problem: INFECTION - ADULT  Goal: Absence or prevention of progression during hospitalization  Description: INTERVENTIONS:  - Assess and monitor for signs and symptoms of infection  - Monitor lab/diagnostic results  - Monitor all insertion sites, i.e. indwelling lines, tubes, and drains  - Monitor endotracheal if appropriate and nasal secretions for changes in amount and color  - [place] appropriate cooling/warming therapies per order  - Administer medications as ordered  - Instruct and encourage patient and family to use good hand hygiene technique  - Identify and instruct in appropriate isolation precautions for identified infection/condition  Outcome: Progressing  Goal: Absence of fever/infection during neutropenic period  Description: INTERVENTIONS:  - Monitor WBC    Outcome: Progressing     Problem: SAFETY ADULT  Goal: Patient will remain free of falls  Description: INTERVENTIONS:  - Educate patient/family on patient safety including physical limitations  - Instruct patient to call for assistance with activity   - Consult OT/PT to assist with strengthening/mobility   - Keep Call bell within reach  - Keep bed low and locked with side rails adjusted as appropriate  - Keep care items and personal belongings within reach  - Initiate and maintain comfort rounds  - Make Fall Risk Sign visible to staff  - Apply yellow socks and bracelet  for high fall risk patients  - Consider moving patient to room near nurses station  Outcome: Progressing  Goal: Maintain or return to baseline ADL function  Description: INTERVENTIONS:  -  Assess patient's ability to carry out ADLs; assess patient's baseline for ADL function and identify physical deficits which impact ability to perform ADLs (bathing, care of mouth/teeth, toileting, grooming, dressing, etc.)  - Assess/evaluate cause of self-care deficits   - Assess range of motion  - Assess patient's mobility; develop plan if impaired  - Assess patient's need for assistive devices and provide as appropriate  - Encourage maximum independence but intervene and supervise when necessary  - Involve family in performance of ADLs  - Assess for home care needs following discharge   - Consider OT consult to assist with ADL evaluation and planning for discharge  - Provide patient education as appropriate  Outcome: Progressing  Goal: Maintains/Returns to pre admission functional level  Description: INTERVENTIONS:  - Perform AM-PAC 6 Click Basic Mobility/ Daily Activity assessment daily.  - Set and communicate daily mobility goal to care team and patient/family/caregiver.   - Collaborate with rehabilitation services on mobility goals if consulted  - Record patient progress and toleration of activity level   Outcome: Progressing

## 2024-09-16 NOTE — ASSESSMENT & PLAN NOTE
First-line treatment is behavior modification  Maintain sleep-wake cycle, avoid nighttime interruptions  Avoid caffeine throughout the day  Avoid napping throughout the day  Encourage physical activity throughout the day  Avoid sedative hypnotics including benzodiazepines and benadryl  If needed could use melatonin 3 mg nightly

## 2024-09-16 NOTE — ANESTHESIA PROCEDURE NOTES
Peripheral Block    Patient location during procedure: holding area  Start time: 9/16/2024 2:45 PM  Reason for block: at surgeon's request and post-op pain management  Staffing  Performed by: Hunter Tilley MD  Authorized by: Hunter Tilley MD    Preanesthetic Checklist  Completed: patient identified, IV checked, site marked, risks and benefits discussed, surgical consent, monitors and equipment checked, pre-op evaluation and timeout performed  Peripheral Block  Patient position: supine  Prep: ChloraPrep  Patient monitoring: frequent blood pressure checks, continuous pulse oximetry and heart rate  Block type: Adductor Canal  Laterality: left  Injection technique: single-shot  Procedures: ultrasound guided, Ultrasound guidance required for the procedure to increase accuracy and safety of medication placement and decrease risk of complications.  Ultrasound permanent image saved    Needle  Needle type: Stimuplex   Needle length: 4 in  Needle localization: anatomical landmarks and ultrasound guidance  Assessment  Injection assessment: incremental injection, frequent aspiration, injected with ease, negative aspiration, negative for heart rate change, no paresthesia on injection, no symptoms of intraneural/intravenous injection and needle tip visualized at all times  Paresthesia pain: none  Post-procedure:  site cleaned  patient tolerated the procedure well with no immediate complications  Additional Notes  Uncomplicated single shot adductor canal block.

## 2024-09-16 NOTE — ASSESSMENT & PLAN NOTE
Most recent blood pressure 139/66  Currently on amlodipine 5 mg daily, lisinopril 10 mg daily  Avoid hypotension  Management per primary

## 2024-09-16 NOTE — OP NOTE
Op Note- Orthopedics   Jadyn Ac  MRN: [id number]      Unit/Bed#: S -01      PreOp Dx: Left bimalleolar equivalent ankle fracture    Postop Dx: As preoperative diagnosis    Procedure: Operative fixation of left bimalleolar equivalent ankle fracture    Surgeon: Higinio    Assistants: Dileep Soni PA-C    No Qualified Resident Was Available For this Case.  The physician assistant was needed for all portions of this case including prepping and draping the patient as well as aiding in reduction and fixation of the operative site and closure of the incision site including placement of an AO splint    Fluids:     EBL:     Drains: None    Specimens: None    Complications: None    Condition: Stable and transferred to postanesthesia care unit    Implant: Synthes distal fibular locking plate    74-year-old female, minimal ambulator, history of polymyositis which has limited patient's ambulatory function to the point where she stands to go to the bathroom with assistance of a walker, presents at this time for treatment of her left bimalleolar equivalent ankle fracture. The patient was told of all the pros and cons of operative and nonoperative intervention. Some of the complications of operative intervention include infection, bleeding, scarring, nerve injury, vascular injury, malunion, nonunion, continued pain, decreased range of motion, DVT, PE death, loss of limb, need for further surgery, not obtain an results. The patient wished. Patient did consent for operative intervention for this pathology.    Patient was brought to the operating room. Patient was anesthetized as anesthesia team. Patient was prepped and draped in normal sterile fashion. After this was done, we did conduct a time out to make sure correct. Patient was in the room, prepped marked and draped. Implants were in the room, Rep. For the implants were present, DVT prophylaxis and antibiotics were dressed.  Incision was made over the lateral  malleoli region.  After going through skin, fatty tissue, we did go straight down to bone and the distal aspect of the fibula, but, we did use blunt dissection proximally in order to avoid any injury to the superficial peroneal nerve.  Once this was done, we were able to visualize the fracture sites.  We were able to at this time reduce our fracture with the aid of clamps.  Once this was confirmed, we were able to place 2 lag screws in a lag by technique fashion.  Once this was done, neutralization plate with a hybrid construct of cortical locking screws were then placed.  Ankle was stressed and it showed some opening of the medial clear space.  At this time, to syndesmotic screws were placed in a tricortical fashion.  Once this was done, we were able to stress the ankle and was noted to be stable.  Final radiographs were obtained.  Irrigation was used at the operative site.  Vicryl sutures were used to reapproximate portions of the fascial layer.  Vicryl sutures were used to reapproximate the fatty and subcu layers.  This was reinforced with nylon sutures in a horizontal mattress fashion.  Wounds were cleaned and dried.  Acticoat, 4 x 4, Webril, and an AO splint was placed.  Patient was then subsequently undraped, awakened as per anesthesia team, noted to be in stable condition, transferred to postanesthesia care unit.

## 2024-09-16 NOTE — ASSESSMENT & PLAN NOTE
WBAT RLE in knee immobilizer  No immediate orthopedic intervention anticipated  PT/OT  Follow up with orthopedics at discharge

## 2024-09-16 NOTE — ANESTHESIA PREPROCEDURE EVALUATION
Procedure:  OPEN REDUCTION W/ INTERNAL FIXATION (ORIF) ANKLE and all associated procedures (Left: Ankle)    Relevant Problems   CARDIO   (+) Atrial fibrillation with rapid ventricular response (HCC)   (+) Hyperlipidemia   (+) Paroxysmal A-fib ([facility])   (+) Primary hypertension      ENDO   (+) Acquired hypothyroidism   (+) Type 2 diabetes mellitus with hyperglycemia, with long-term current use of insulin (HCC)      MUSCULOSKELETAL   (+) Polymyositis (HCC)      NEURO/PSYCH   (+) Chronic pain      PULMONARY   (+) Asthma        Physical Exam    Airway    Mallampati score: II  TM Distance: >3 FB  Neck ROM: full     Dental       Cardiovascular      Pulmonary      Other Findings  post-pubertal.      Anesthesia Plan  ASA Score- 3     Anesthesia Type- general with ASA Monitors.         Additional Monitors:     Airway Plan: LMA.    Comment: Adductor canal and popliteal block per surgeon.       Plan Factors-Exercise tolerance (METS): <4 METS.    Chart reviewed.                      Induction- intravenous.    Postoperative Plan- Plan for postoperative opioid use. Planned trial extubation    Perioperative Resuscitation Plan - Level 1 - Full Code.       Informed Consent- Anesthetic plan and risks discussed with patient.  I personally reviewed this patient with the CRNA. Discussed and agreed on the Anesthesia Plan with the CRNA..    Anesthesia plan and consent discussed with Jadyn who expressed understanding and agreement. Risks/benefits and alternatives discussed with patient including possible PONV, sore throat, damage to teeth/lips/gums and possibility of rare anesthetic and surgical emergencies.

## 2024-09-16 NOTE — ASSESSMENT & PLAN NOTE
Lab Results   Component Value Date    HGBA1C 7.6 (H) 07/11/2024       Recent Labs     09/16/24  2042 09/17/24  0042 09/17/24  0605 09/17/24  1224   POCGLU 171* 310* 138 231*       Blood Sugar Average: Last 72 hrs:  (P) [id number]    As an outpatient she takes Lantus 30 units nightly, metformin 500 mg twice daily, Januvia 25 mg daily  Avoid hypoglycemia  Recommend diabetic diet  Management per primary

## 2024-09-16 NOTE — NURSING NOTE
Discussed patient refusal of shellfish and pork products with Dr Sylvester; addressed if ioban and povidone scrub should be used during procedure due to patients [employer] beliefs and Dr Sylvester stated not to change the current process.

## 2024-09-16 NOTE — ASSESSMENT & PLAN NOTE
Home medication review:  Lantus 30 units nightly  metformin 1000 mg twice daily  prednisone 5 mg tablets take 1-1/2 tablets daily  amlodipine 5 mg daily   famotidine 20 mg twice daily  benazepril 10 mg daily  Verified with medication list from [facility]

## 2024-09-16 NOTE — ASSESSMENT & PLAN NOTE
S/p fall  X-ray/ - Distal left tibial fracture. Widening of the medial ankle mortise and anterior tibiotalar joint.   Podiatry was consulted and is following  9/16 surgical repair ORIF NWB to LLE in splint  Multimodal pain regimen including geriatric pain protocol  PT/OT consult   Management per orthopedics

## 2024-09-16 NOTE — ASSESSMENT & PLAN NOTE
- found to have new onset atrial fibrillation on recent hospitalization at [facility]  - plans to follow up outpatient with cardiology in October  - Monitor HR   - Not on any rate controlling medications or anticoagulation

## 2024-09-16 NOTE — DISCHARGE INSTR - AVS FIRST PAGE
Discharge Instructions - Orthopedics  Jadyn Ac 74 y.o. adult MRN: [id number]  Unit/Bed#: PACU 3    Weight Bearing Status:                                           Nonweightbearing left lower extremity in splint  Weightbearing as tolerated right lower extremity in knee immobilizer    DVT prophylaxis:  Arixtra BID x 35 days.     Pain:  Continue analgesics as directed    Dressing Instructions:   Please keep splint clean, dry and intact until follow up     Appt Instructions:   If you do not have your appointment, please call the clinic at [phone]  Otherwise follow up as scheduled.    Contact the office sooner if you experience any increased numbness/tingling in the extremities.

## 2024-09-16 NOTE — ASSESSMENT & PLAN NOTE
Patient is alert oriented x4  No cognitive testing upon  No history of dementia or cognitive impairment  Recommend checking vitamin B12 levels  At risk for delirium due to age, medications, anesthesia, acute pain  Recommend delirium precautions  Maintain sleep-wake cycle, avoid nighttime interruptions  minimize overnight interruptions, group overnight vitals/labs/nursing checks as possible  dim lights, close blinds and turn off tv to minimize stimulation and encourage sleep environment in evenings  Provide adequate pain control  Avoid urinary retention and constipation  Provide frequent and early mobilization  Provide frequent redirection and reorientation as needed  Avoid medications that may worsen or precipitate delirium such as tramadol, benzodiazepines, anticholinergics, and Benadryl  Redirect unwanted behaviors as first-line therapy, avoid physical restraints as able to  Continue to monitor

## 2024-09-16 NOTE — PROGRESS NOTES
Progress Note - Trauma   Name: Jadyn Ac 74 y.o. adult I MRN: [id number]  Unit/Bed#: S -01 I Date of Admission: 9/15/2024   Date of Service: 9/16/2024 I Hospital Day: 1    Assessment & Plan  Right patella fracture  - Closed right patellar fracture, present on admission.  - Status post fall on 9/15.  - Appreciate Orthopedic surgery evaluation, recommendations and interventions as noted.  - Non operative management  - Maintain WBAT RLE in knee immobilizer.  - Monitor right lower extremity neurovascular exam.  - Continue multimodal analgesic regimen.  - Continue DVT prophylaxis.  - PT and OT evaluation and treatment as indicated.  - Outpatient follow up with Orthopedic surgery for re-evaluation.    Type 2 diabetes mellitus with hyperglycemia, with long-term current use of insulin ([facility])  Lab Results   Component Value Date    HGBA1C 7.6 (H) 07/11/2024       Recent Labs     09/15/24  2139 09/16/24  0751   POCGLU 167* 109       Blood Sugar Average: Last 72 hrs:  (P) 138    - Follows with endocrinology outpatient  - Continue home Lantus 30 u QHS  - Hold home metformin  - Recently discontinued januvia   - SSI Q6 hours while NPO, adjust as diet is restarted post-op. Consistent carb diet post-op  - Monitor blood glucose  Acquired hypothyroidism  - Continue home synthroid  - Geriatric medicine consult  Primary hypertension  - Continue home amlodipine  - Hold home benazepril ysabel-operatively and restart as BP allows  Fall  - Status post mechanical fall with the below noted injuries.  - Fall precautions.  - Geriatric Medicine consultation for evaluation, medication review and recommendations.  - PT and OT evaluation and treatment as indicated.  - Case Management consultation for disposition planning.    Fracture of left ankle  - Closed left ankle fracture, present on admission.  - Status post fall on 9/15.  - Appreciate Orthopedic surgery evaluation, recommendations and interventions as noted.  - S/p reduction and  "splint in ED  - NPO for ORIF L ankle today  - Maintain NWB LLE in splint   - Monitor left lower extremity neurovascular exam.  - Continue multimodal analgesic regimen.  - Continue DVT prophylaxis.  - PT and OT evaluation and treatment as indicated.  - Outpatient follow up with Orthopedic surgery for re-evaluation.    Paroxysmal A-fib (HCC)  - found to have new onset atrial fibrillation on recent hospitalization at [facility]  - plans to follow up outpatient with cardiology in October  - Monitor HR   - Not on any rate controlling medications or anticoagulation    VTE Prophylaxis: Enoxaparin (Lovenox)     Disposition: OR with orthopedics today Updated daughter over the phone    TRAUMA TERTIARY SURVEY  Summary of Diagnosed Injuries:   Right patellar fx - non op  Left ankle fx - operative fixation pending    Transfer from: n/a    Mechanism of Injury:Fall     History of Present Illness   Chief Complaint: \"I'm comfortable right now\"    24 Hour Events : Patient is resting comfortably in bed, reports she is not in any pain as long as she doesn't move her left ankle. She reports her right knee got more swollen over night    Objective      Temp:  [97.5 °F (36.4 °C)-98 °F (36.7 °C)] 97.9 °F (36.6 °C)  HR:  [] 82  Resp:  [18] 18  BP: ()/(56-71) 119/69  O2 Device: None (Room air)          I/O         09/14 0701  09/15 0700 09/15 0701  09/16 0700 09/16 0701  09/17 0700    P.O.  0     I.V. (mL/kg)  1749.6 (16.7)     IV Piggyback  100     Total Intake(mL/kg)  1849.6 (17.6)     Urine (mL/kg/hr)  270     Total Output  270     Net  +1579.6                  Lines/Drains/Airways       Active Status       None                  Physical Exam   GENERAL APPEARANCE: Patient in no acute distress.  HEENT: NCAT; PERRL, EOMs intact; Mucous membranes moist  CV: Regular rate and rhythm; no murmur/gallops/rubs appreciated.  CHEST / LUNGS: Clear to auscultation; no wheezes/rales/rhonci.  ABD: NABS; soft; non-distended; non-tender.  : " Voiding  EXT: RLE knee swelling and in a knee immobilizer; LLE in splint, able to wiggle his toes, compartments soft; +2 pulses bilaterally upper & lower extremities; no edema.  NEURO: GCS 15; no focal neurologic deficits; neurovascularly intact.  SKIN: Warm, dry and well perfused; no rash; no jaundice.      1. Before the illness or injury that brought you to the Emergency, did you need someone to help you on a regular basis? 1=Yes   2. Since the illness or injury that brought you to the Emergency, have you needed more help than usual to take care of yourself? 1=Yes   3. Have you been hospitalized for one or more nights during the past 6 months (excluding a stay in the Emergency Department)? 1=Yes   4. In general, do you see well? 1=No   5. In general, do you have serious problems with your memory? 1=Yes   6. Do you take more than three different medications everyday? 1=Yes   TOTAL   6     Did you order a geriatric consult if the score was 2 or greater?: yes           Lab Results: I have reviewed the following results: CBC/BMP:   .     09/16/24  0831   WBC 13.10*   HGB 9.6*   HCT 28.4*      SODIUM 138   K 3.8      CO2 25   BUN 13   CREATININE 0.48*   GLUC 109      Recent Labs     09/15/24  2035 09/15/24  2053 09/16/24  0831   WBC  --   --  13.10*   HGB  --   --  9.6*   HCT  --   --  28.4*   PLT  --    < > 299   SODIUM  --   --  138   K  --   --  3.8   CL  --   --  104   CO2  --   --  25   BUN  --   --  13   CREATININE  --   --  0.48*   GLUC  --   --  109   INR 1.05  --   --     < > = values in this interval not displayed.       Imaging Results: Reviewed radiology reports from this admission including: xray(s).  Chest Xray(s): negative for acute findings   FAST exam(s): N/A   CT Scan(s): N/A   Additional Xray(s): positive for acute findings: R patella fx, L ankle fx     Other Studies: No additional pertinent studies reviewed.

## 2024-09-16 NOTE — CONSULTS
Consultation - Geriatric Medicine   Jadyn Osorio 74 y.o. adult MRN: [id number]  Unit/Bed#: S -01 Encounter: [id number]      Assessment & Plan     Fall  Assessment & Plan  [place] fall precautions   Assess patient frequently for physical needs, encourage use of assistant devices as needed and directed by PT/OT  Identify cognitive and physical deficits and behaviors that affect risk of falls  Consider moving patient closer to nursing station to monitor more closely for impulsive behavior which may increase risk of falls  Educate patient/family on patient safety including physical limitations and importance of using call bell for assistance   Modify environment to reduce risk of injury including disconnecting from pole when not in use, ensuring adequate lighting in room and restroom, ensuring that path to restroom is clear and free of trip hazards  PT/OT consulted to assist with strengthening/mobility and assist with discharge planning to appropriate level of care      Fracture of left ankle  Assessment & Plan  S/p fall  X-ray/ - Distal left tibial fracture. Widening of the medial ankle mortise and anterior tibiotalar joint.   Orthopedics was consulted and is following  Plan: OR today   Multimodal pain regimen including geriatric pain protocol  PT/OT consult   Management per orthopedics      * Right patella fracture  Assessment & Plan  S/p fall  Orthopedics was consulted  WBAT to RLE in knee immobilizer  Multimodal pain regimen  PT/OT consult postop    Type 2 diabetes mellitus with hyperglycemia, with long-term current use of insulin ([facility])  Assessment & Plan  Lab Results   Component Value Date    HGBA1C 7.6 (H) 07/11/2024       Recent Labs     09/15/24  2139 09/16/24  0751 09/16/24  1128   POCGLU 167* 109 111       Blood Sugar Average: Last 72 hrs:  (P) 129    As an outpatient she takes Lantus 30 units nightly, metformin 500 mg twice daily, Januvia 25 mg daily  Avoid hypoglycemia  Patient currently n.p.o.  for OR today, recommend diabetic diet  Management per primary    Primary hypertension  Assessment & Plan  Most recent blood pressure 125/73  Currently on amlodipine 5 mg daily, lisinopril 10 mg daily  Avoid hypotension  Management per primary    Paroxysmal A-fib (HCC)  Assessment & Plan  Currently rate controlled and asymptomatic  Management per primary      Acquired hypothyroidism  Assessment & Plan  Most recent TSH 0.544  Currently on levothyroxine 88 mcg daily    At risk for delirium  Assessment & Plan  Patient is alert oriented x4  No cognitive testing upon  No history of dementia or cognitive impairment  Recommend checking vitamin B12 levels  At risk for delirium due to age, medications, anesthesia, acute pain  Recommend delirium precautions  Maintain sleep-wake cycle, avoid nighttime interruptions  minimize overnight interruptions, group overnight vitals/labs/nursing checks as possible  dim lights, close blinds and turn off tv to minimize stimulation and encourage sleep environment in evenings  Provide adequate pain control  Avoid urinary retention and constipation  Provide frequent and early mobilization  Provide frequent redirection and reorientation as needed  Avoid medications that may worsen or precipitate delirium such as tramadol, benzodiazepines, anticholinergics, and Benadryl  Redirect unwanted behaviors as first-line therapy, avoid physical restraints as able to  Continue to monitor      Vision impairment  Assessment & Plan  Patient does have vision impairment  Wears glasses at baseline for reading  Recommend use of corrective lenses at all appropriate times  Encourage adequate lighting and encourage use of assistance with ambulation  Keep personal belongings close to avoid reaching  Encourage appropriate footwear at all times  Recommend large font for printed materials provided to patient      Encounter for medication review  Assessment & Plan  Home medication review:  Lantus 30 units  nightly  metformin 1000 mg twice daily  prednisone 5 mg tablets take 1-1/2 tablets daily  amlodipine 5 mg daily   famotidine 20 mg twice daily  benazepril 10 mg daily  Verified with medication list from [facility]    Ambulatory dysfunction  Assessment & Plan  At a baseline ambulates with walker  PT/OT following  Fall precautions  Out of bed as tolerated  Encourage early and frequent mobilization  Encourage adequate hydration and nutrition  Provide adequate pain management   Goal is OR today  Continue with PT/OT for continued strength and balance training       Insomnia  Assessment & Plan  First-line treatment is behavior modification  Maintain sleep-wake cycle, avoid nighttime interruptions  Avoid caffeine throughout the day  Avoid napping throughout the day  Encourage physical activity throughout the day  Avoid sedative hypnotics including benzodiazepines and benadryl  If needed could use melatonin 3 mg nightly                History of Present Illness   Physician Requesting Consult: Silvestre White MD  Reason for Consult / Principal Problem: fall  Hx and PE limited by: none   Additional history obtained from: daughter       HPI: Jadyn Ac is a 74 y.o. year old adult who presents with history of hypothyroidism, hypertension, type 2 diabetes, A-fib, polymyositis, and ambulatory dysfunction.  She presented to the ER after a fall at home.    [place]  She mostly stays all on 1 floor.  She generally uses a rolling walker for ambulation.  She has had at least 1 other fall in the last 6 months.  She requires assistance for bathing and dressing, her daughter or other family members help with that.  There are grab bars and shower chairs in the bathroom.  She does wear a pad for occasional urinary leakage when she cannot make it to the bathroom fast enough.  Her daughter does the cooking and cleaning.  The patient manages her own medications and finances.   She wears reading eyeglasses, no dentures or hearing aids.  She reports occasional insomnia, does not take any medications.  She also reports occasional depression, does not take any medication and is not interested in any medications.  She denies any anxiety.  Denies any recent appetite loss or weight loss.  She stopped driving a couple years ago.  Patient reports occasional forgetfulness, although nothing herself or her daughters significantly concerned about.  History abided by patient and her daughter was bedside during examination.    Upon exam patient was lying in bed, resting.  She appeared comfortable and was in no acute distress.  She reports 2 out of 10 pain.  She has not trouble sleeping overnight.  Currently n.p.o. for or today.  Per nursing no acute concerns or issues at this time.    Family had offered concerns regarding Lovenox as per their Jain believes they do not accept pork product or shellfish products.  Called pharmacy who reports Lovenox does have part removed of his minute and should not be used.  They did report he could use Arixtra instead.  Secure chat sent to trauma AP Minna and Mark to make them aware.    Inpatient consult to Gerontology  Consult performed by: CHICA Noel  Consult ordered by: Mark Martinez PA-C          Review of Systems   Constitutional:  Negative for activity change, appetite change, chills, fatigue and fever.   HENT:  Positive for trouble swallowing.    Eyes:  Negative for visual disturbance.   Respiratory:  Negative for cough and shortness of breath.    Cardiovascular:  Negative for chest pain and palpitations.   Gastrointestinal:  Positive for diarrhea. Negative for abdominal pain, constipation, nausea and vomiting.   Genitourinary:  Negative for difficulty urinating and dysuria.   Musculoskeletal:  Positive for arthralgias and gait problem. Negative for back pain.   Skin:  Negative for color change and rash.   Neurological:  Positive for  weakness. Negative for dizziness, light-headedness and headaches.   Psychiatric/Behavioral:  Positive for dysphoric mood and sleep disturbance. The patient is not nervous/anxious.            Historical Information   Past Medical History:   Diagnosis Date    Acute diverticulitis 9/21/2018    Anemia     Asthma     Chronic pain     bulging discs in back,polymyositis    Diabetes mellitus (HCC)     Disease of thyroid gland     Diverticulitis     Hyperlipidemia     Hypertension     Polymyositis (HCC)      Past Surgical History:   Procedure Laterality Date    EYE SURGERY Bilateral     CATAREACT REMOVED FROM BOTH EYES 2 MTHS AGO    HYSTERECTOMY      SINUS SURGERY      THYROIDECTOMY      TUBAL LIGATION       Social History   Social History     Substance and Sexual Activity   Alcohol Use Not Currently     Social History     Substance and Sexual Activity   Drug Use No     Social History     Tobacco Use   Smoking Status Never   Smokeless Tobacco Never     Family History:   Family History   Problem Relation Age of Onset    Hepatitis Sister         acute hepatits C virus    Hypertension Daughter     Kidney disease Daughter     Hypertension Son     Mental illness Son        Meds/Allergies   all current active meds have been reviewed    Allergies   Allergen Reactions    Anaprox [Naproxen Sodium] Shortness Of Breath    Levofloxacin Hives    Sulfamethoxazole-Trimethoprim Anaphylaxis    Jardiance [Empagliflozin] GI Intolerance    Penicillins Swelling and Hives    Pork-Derived Products - Food Allergy Other (See Comments)     Yazdanism belief, not true allergy    Shellfish-Derived Products - Food Allergy Other (See Comments)     Yazdanism belief, not true allergy    Alendronate Rash    Aspirin Rash    Azathioprine Rash    Metronidazole Rash    Sulfa Antibiotics Rash       Objective     Intake/Output Summary (Last 24 hours) at 9/16/2024 1330  Last data filed at 9/16/2024 0501  Gross per 24 hour   Intake 1849.58 ml   Output 270 ml   Net  1579.58 ml     Invasive Devices       Peripheral Intravenous Line  Duration             Peripheral IV 09/15/24 Left;Ventral (anterior) Forearm <1 day    Peripheral IV 09/15/24 Right Antecubital <1 day                    Physical Exam  Vitals reviewed.   Constitutional:       General: She is not in acute distress.     Appearance: She is well-developed. She is not ill-appearing.      Comments: Frail appearing    HENT:      Head: Normocephalic and atraumatic.   Cardiovascular:      Rate and Rhythm: Normal rate and regular rhythm.   Pulmonary:      Effort: Pulmonary effort is normal.      Breath sounds: Normal breath sounds.   Abdominal:      General: Bowel sounds are normal. There is no distension.      Palpations: Abdomen is soft.      Tenderness: There is no abdominal tenderness.   Musculoskeletal:         General: Swelling, tenderness and signs of injury present.      Comments: Splint left ankle, knee immobilizer right leg   Skin:     General: Skin is warm and dry.   Neurological:      General: No focal deficit present.      Mental Status: She is alert and oriented to person, place, and time. Mental status is at baseline.      Cranial Nerves: No cranial nerve deficit.      Motor: Weakness present.      Gait: Gait abnormal.   Psychiatric:         Mood and Affect: Mood normal.         Lab Results:   I have personally reviewed pertinent lab results including the following:  -CBC, BMP, type and screen    I have personally reviewed the following imaging study reports in PACS:  -X-ray left ankle, x-ray left tibia-fibula, x-ray right knee CT spine, CT head      Therapies:   PT: consulted  OT: consulted    VTE Prophylaxis: VTE covered by:    None     and Sequential compression device (Venodyne)     Advance care planning/Code status   Code Status: Level 1 - Full Code  Advance Directive and Living Will: Yes    Power of :   yes- son  POLST:  no    Family and Social Support:   Living Arrangements: Lives w/  "Children  Support Systems: Daughter  Assistance Needed: minimal  Type of Current Residence: Private residence  Current Home Care Services: [facility])      Goals of Care: Or today    Please note:  Voice-recognition software may have been used in the preparation of this document.  Occasional wrong word or \"sound-alike\" substitutions may have occurred due to the inherent limitations of voice recognition software.  Interpretation should be guided by context.    "

## 2024-09-16 NOTE — ANESTHESIA PROCEDURE NOTES
Peripheral Block    Patient location during procedure: holding area  Start time: 9/16/2024 2:45 PM  Reason for block: at surgeon's request and post-op pain management  Staffing  Performed by: Hunter Tilley MD  Authorized by: Hunter Tilley MD    Preanesthetic Checklist  Completed: patient identified, IV checked, site marked, risks and benefits discussed, surgical consent, monitors and equipment checked, pre-op evaluation and timeout performed  Peripheral Block  Prep: ChloraPrep  Patient monitoring: frequent blood pressure checks, continuous pulse oximetry and heart rate  Block type: Popliteal  Laterality: left  Injection technique: single-shot  Procedures: ultrasound guided, Ultrasound guidance required for the procedure to increase accuracy and safety of medication placement and decrease risk of complications.  Ultrasound permanent image saved    Needle  Needle type: Stimuplex   Needle localization: anatomical landmarks and ultrasound guidance  Needle insertion depth: 4 cm  Assessment  Injection assessment: incremental injection, frequent aspiration, injected with ease, negative aspiration, negative for heart rate change, no paresthesia on injection, no symptoms of intraneural/intravenous injection and needle tip visualized at all times  Paresthesia pain: none  Post-procedure:  site cleaned  patient tolerated the procedure well with no immediate complications  Additional Notes  Uncomplicated single shot popliteal nerve block.

## 2024-09-16 NOTE — PROGRESS NOTES
Progress Note - Orthopedics   Name: Jadyn Ac 74 y.o. adult I MRN: [id number]  Unit/Bed#: S -01 I Date of Admission: 9/15/2024   Date of Service: 9/16/2024 I Hospital Day: 1    Assessment & Plan  Fracture of left ankle  NWB LLE in splint  Ice and elevation  Tentative plan for OR today 9/16 pending attg evaluation of soft tissue envelope.   NPO for procedure.   Consent obtained, on file in OR.  Pre-op labs reviewed: cmp, cbc, type & screen, INR  Pain management per primary  DVT ppx per primary  Medical management per primary  Case discussed with Dr. Sylvester.     Right patella fracture  WBAT RLE in knee immobilizer  No immediate orthopedic intervention anticipated  PT/OT  Follow up with orthopedics at discharge    Orthopedics service will follow. Please contact the SecureChat role for the Orthopedics service with any questions/concerns.    History of Present Illness   74 y.o.adult seen and evaluated at bedside. Reports her right knee feels better now that she is in knee immobilizer. No significant pain in the left ankle now that she is in splint.  Did not have her ankle elevated or iced overnight.  No other complaints of pain. Otherwise feeling ok today.     Objective      Temp:  [97.5 °F (36.4 °C)-98 °F (36.7 °C)] 97.9 °F (36.6 °C)  HR:  [] 82  Resp:  [18] 18  BP: ()/(56-71) 119/69  O2 Device: None (Room air)          I/O         09/14 0701  09/15 0700 09/15 0701  09/16 0700 09/16 0701  09/17 0700    P.O.  0     I.V. (mL/kg)  1749.6 (16.7)     IV Piggyback  100     Total Intake(mL/kg)  1849.6 (17.6)     Urine (mL/kg/hr)  270     Total Output  270     Net  +1579.6                  Lines/Drains/Airways       Active Status       None                  Physical Exam Musculoskeletal: bilaterallower  Right lower extremity: swelling about the right knee, knee immobilizer in place. +ttp about the patella. KI re-adjusted at bedside.   Left lower extremity: splint in place to left ankle, limiting exam.  " +swelling over forefoot.  Leg elevated at bedside. Discussed importance of elevation with patient and nursing.    Motor intact to +FHL/EHL, +ankle dorsi/plantar flexion on the right. +EHL/FHL on the left.   Sensation intact to saphenous, sural, tibial, superficial peroneal nerve, and deep peroneal bilaterally.   No calf swelling or tenderness to palpation on the right.   Digits are warm and well perfused bilaterally.       Lab Results: I have reviewed the following results: CBC/BMP:   .     09/16/24  0831   WBC 13.10*   HGB 9.6*   HCT 28.4*      SODIUM 138   K 3.8      CO2 25   BUN 13   CREATININE 0.48*   GLUC 109    , Creatinine Clearance: Estimated Creatinine Clearance (by C-G formula based on SCr of 0.48 mg/dL (L))  Female: 137.2 mL/min (A)  Male: 166.5 mL/min (A)  When the gender of the patient is unspecified you will see values for both male and female., LFTs: No new results in last 24 hours. , PTT/INR:  .     09/15/24  2035   INR 1.05      Recent Labs     09/15/24  1413 09/15/24  2035 09/15/24  2053 09/16/24  0831   WBC 15.98*  --   --  13.10*   HGB 10.3*  --   --  9.6*   HCT 31.5*  --   --  28.4*     --    < > 299   BUN 22  --   --  13   CREATININE 0.64  --   --  0.48*   INR  --  1.05  --   --     < > = values in this interval not displayed.     Blood Culture:    Lab Results   Component Value Date    BLOODCX No Growth After 5 Days. 08/09/2024    BLOODCX No Growth After 5 Days. 08/09/2024     Wound Culture: No results found for: \"WOUNDCULT\"  "

## 2024-09-16 NOTE — ASSESSMENT & PLAN NOTE
- Status post mechanical fall with the below noted injuries.  - Fall precautions.  - Geriatric Medicine consultation for evaluation, medication review and recommendations.  - PT and OT evaluation and treatment as indicated.  - Case Management consultation for disposition planning.

## 2024-09-16 NOTE — UTILIZATION REVIEW
Initial Clinical Review    Admission: Date/Time/Statement:   Admission Orders (From admission, onward)       Ordered        09/15/24 1619  Inpatient Admission  Once                          Orders Placed This Encounter   Procedures    Inpatient Admission     Standing Status:   Standing     Number of Occurrences:   1     Order Specific Question:   Level of Care     Answer:   Med Surg [16]     Order Specific Question:   Bed Type     Answer:   Trauma [7]     Order Specific Question:   Estimated length of stay     Answer:   More than 2 Midnights     Order Specific Question:   Certification     Answer:   I certify that inpatient services are medically necessary for this patient for a duration of greater than two midnights. See H&P and MD Progress Notes for additional information about the patient's course of treatment.     ED Arrival Information       Expected   -    Arrival   9/15/2024 12:55    Acuity   Urgent              Means of arrival   Wheelchair    Escorted by   Family Member    Service   Trauma    Admission type   Emergency              Arrival complaint   Fall (+hs,-bt) ankle pain             Chief Complaint   Patient presents with    Leg Injury     Pt was walking to bathroom around 4am and states legs gave out, +HS, denies thinners/ASA/LOC, swelling/pain to R ankle, L knee, and back of head.       Initial Presentation: 74 y.o. adult who presented via w/c with familyl to [facility] ED. Admitted as Inpatient for evaluation and treatment of R patella fx and closed avulsion fx of distal end of L fibula s/p fall.      PMHx:  has a past medical history of Acute diverticulitis (9/21/2018), Anemia, Asthma, Chronic pain, Diabetes mellitus (HCC), Disease of thyroid gland, Diverticulitis, Hyperlipidemia, Hypertension, and Polymyositis (HCC).      Presented w/ S/P fall when coming out of the bathroom and felt immediate pain in the R knee and L ankle. Pt did no hit her head, no LOC. . On exam, R knee with welling  and bony tenderness. No effusion, erythema, ecchymosis or lacerations. L ankle swelling present. No deformity, ecchymosis or lacerations noted. Tenderness present. Decreased ROM. Labs Glucose 249, WBC 15.98, H/H 10.3/31.5. Xray R knee: Right superior patella fracture.  Xray L ankle:Lateral malleolus fracture with improved alignment of the ankle joint status post splinting as above. Persistent slight widening of the medial mortise.     Plan: Pain control with multimodal analgesic regimen. IV fluids, DVT prophylaxis. Continue home Lantus 30 u QHS. Hold home metformin  Ortho consulted.    Ortho Consult: R Patella Fracture. On exam, Swelling to R anterior knee with tenderness w/  Immobilizer. No erythema. Unable to straight leg raise 2/2 polymyositis, pt reports being unable to straight leg raise prior injury, +ankle dorsi/plantar flexion, EHL/FHL. Able to wiggle toes. Left LE: Swelling about medial and lateral ankle. TTP medial/lateral ankle.  SILT s/s/sp/dp/t. Motor intact 5/5 strength with hip flexion/extension, knee flexion/extension, EHL/FHL. ROM of ankle deferred in setting of fracture. Toes warm and well perfused. Plan: WBAT R LE in knee immobilizer. No immediate ortho intervention anticipated. PT/OT. NWB LLE in splint. Ice and elevation. OR for ORIF 9/16 or 9/17. Pain control.       Date: 9/16/24   Day 2: Pt reports knee feels better with knee immobilizer. No significant pain in the L ankle now that it's in a splint. Labs: POC Glucose 171, WBC 13.10, H/H 9.6/28.4. Plan: Continue NWB LLE, ice and elevation. OR today pending attending eval soft tissue envelope. Ancef IV Q 8 hrs, IV fluids, WBAT R LE in knee immobilizer. PT/OT. Pain control with multimodal analgesic regimen. DVT prophylaxis. Continue home Lantus 30 u QHS. Hold home metformin. SSI Q6 hours while NPO, adjust as diet is restarted post-op. Consistent carb diet post-op. Monitor closely glucose.  Geriatric consulted.   9/16/24 Operative Note: Procedure:  Operative fixation of left bimalleolar equivalent ankle fracture. Anesthesia Type- general with ASA Monitors.     Geriatric consult: Pt is alert and oriented. At risk for delirium due to age, medications, anesthesia, acute pain. Delirium precautions. Multimodal pain regimen including geriatric pain protocol. Avoid medications that may worsen or precipitate delirium such as tramadol, benzodiazepines, anticholinergics, and Benadryl. Patient currently n.p.o. for OR today, recommend diabetic diet. Recommend checking vitamin B12 levels.     Date: 9/17/24  Day 3: Has surpassed a 2nd midnight with active treatments and services.  Pt reports improvement in her ankle and knee pain since admission. s/p ORIF 9/16/2024 of L ankle. On exam of B/L LE: Skin over right knee intact, KI in place. TTP over the right knee. Splint in place to LLE. Motor intact to +FHL/EHL, +ankle dorsi/plantar flexion on the right. +FHL/EHL on the left. Sensation intact to saphenous, sural, tibial, superficial peroneal nerve, and deep peroneal bilaterally. Digits warm and well perfused bilaterally. No calf swelling or tenderness to palpation. Labs: Glucose 164, WBC 13.95, H/H 8.5/25.4. POC Glucose 310. Plan: NWB LLE in splint. WBAT R LE in knee immobilizer. PT/OT, Pain control w/ multimodal analgesic regimen. DVT prophylaxis. D/C ancef IV, IV fluids, Continue home Lantus 30 u QHS. Hold home metformin.SSI Q6 hours while NPO, adjust as diet is restarted post-op. Consistent carb diet post-op. Monitor blood glucose.    ED Triage Vitals [09/15/24 1304]   Temperature Pulse Respirations Blood Pressure SpO2 Pain Score   98 °F (36.7 °C) (!) 106 18 98/56 96 % 10 - Worst Possible Pain     Weight (last 2 days)       Date/Time Weight    09/15/24 1305 105 (230.82)            Vital Signs (last 3 days)       Date/Time Temp Pulse Resp BP MAP (mmHg) SpO2 O2 Device Patient Position - Orthostatic VS [place] Coma Scale Score Pain    09/16/24 1423 97.6 °F (36.4 °C) 94 17  170/82 -- 96 % None (Room air) -- -- 4 09/16/24 11:27:23 97.6 °F (36.4 °C) 85 18 125/73 90 93 % -- -- -- --    09/16/24 0835 -- -- -- -- -- -- -- -- -- 8 09/16/24 0800 -- -- -- -- -- 93 % None (Room air) -- 15 8    09/16/24 07:44:03 97.9 °F (36.6 °C) 82 18 119/69 86 95 % -- -- -- --    09/16/24 0401 -- -- -- -- -- -- -- -- 15 --    09/16/24 03:09:29 97.5 °F (36.4 °C) 85 -- 130/71 91 96 % -- -- -- --    09/15/24 23:11:45 -- 86 -- 123/63 83 95 % -- -- -- --    09/15/24 2300 -- -- -- -- -- -- -- -- 15 --    09/15/24 2204 -- -- -- -- -- -- -- -- -- 4    09/15/24 2000 -- -- -- -- -- -- -- -- 15 --    09/15/24 19:26:53 98 °F (36.7 °C) 89 -- 118/71 87 92 % -- -- -- --    09/15/24 18:07:50 97.8 °F (36.6 °C) 92 -- 127/68 88 94 % -- -- -- --    09/15/24 18:07:24 97.8 °F (36.6 °C) 92 -- 127/68 88 94 % -- -- -- --    09/15/24 1510 -- -- -- -- -- -- -- -- 15 --    09/15/24 1508 -- 89 18 113/57 -- 95 % None (Room air) Lying -- --    09/15/24 1420 -- -- -- -- -- -- -- -- -- 10 - Worst Possible Pain    09/15/24 1304 98 °F (36.7 °C) 106 18 98/56 73 96 % None (Room air) -- -- 10 - Worst Possible Pain              Pertinent Labs/Diagnostic Test Results:   Radiology:  XR ankle 3+ vw left   Final Interpretation by Sunday Silva MD (09/16 3789)      Lateral malleolus fracture. Improved alignment as above. Overlying splint obscures fine osseous detail.         Computerized Assisted Algorithm (CAA) may have been used to analyze all applicable images.               Workstation performed: [id number]         XR ankle 2 vw left   Final Interpretation by Sunday Silva MD (09/16 5738)      Lateral malleolus fracture with improved alignment of the ankle joint status post splinting as above. Persistent slight widening of the medial mortise.         Computerized Assisted Algorithm (CAA) may have been used to analyze all applicable images.               Workstation performed: [id number]         XR ankle 3+ views LEFT   Final  Interpretation by Dallas Dickens MD (09/16 1238)      Distal left tibial fracture. Widening of the medial ankle mortise and anterior tibiotalar joint.         Computerized Assisted Algorithm (CAA) may have been used to analyze all applicable images.               Workstation performed: [id number]         XR knee 4+ vw right injury   Final Interpretation by Dallas Dickens MD (09/16 1240)      Right superior patella fracture.         Computerized Assisted Algorithm (CAA) may have been used to analyze all applicable images.         Workstation performed: [id number]         XR tibia fibula 2 vw left   Final Interpretation by Dallas Dickens MD (09/16 1246)      Left distal fibula/lateral malleolar minimally displaced fracture with widening of the medial ankle mortise and anterior tibiotalar joint.            Computerized Assisted Algorithm (CAA) may have been used to analyze all applicable images.               Workstation performed: [id number]         CT head without contrast   Final Interpretation by Tracy Thompson MD (09/15 1419)      No acute intracranial abnormality. 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                  Workstation performed: [id number]         CT spine cervical without contrast   Final Interpretation by Tracy Thompson MD (09/15 1430)      No cervical spine fracture or traumatic malalignment.      Stable multilevel cervical spondylosis.            Workstation performed: [id number]         XR ankle 2 vw left    (Results Pending)           Results from last 7 days   Lab Units 09/16/24  0831 09/15/24  2053 09/15/24  1413   WBC Thousand/uL 13.10*  --  15.98*   HEMOGLOBIN g/dL 9.6*  --  10.3*   HEMATOCRIT % 28.4*  --  31.5*   PLATELETS Thousands/uL 299 303 347   TOTAL NEUT ABS Thousands/µL 8.32*  --  13.73*         Results from last 7 days   Lab Units 09/16/24  0831 09/15/24  1413   SODIUM mmol/L 138 135   POTASSIUM mmol/L 3.8 4.6   CHLORIDE mmol/L 104 102   CO2 mmol/L 25 23   ANION GAP mmol/L 9 10   BUN mg/dL  13 22   CREATININE mg/dL 0.48* 0.64   CALCIUM mg/dL 8.0* 8.5         Results from last 7 days   Lab Units 09/16/24  1128 09/16/24  0751 09/15/24  2139   POC GLUCOSE mg/dl 111 109 167*     Results from last 7 days   Lab Units 09/16/24  0831 09/15/24  1413   GLUCOSE RANDOM mg/dL 109 249*       Results from last 7 days   Lab Units 09/15/24  2035   PROTIME seconds 14.4   INR  1.05       ED Treatment-Medication Administration from 09/15/2024 1255 to 09/15/2024 1800         Date/Time Order Dose Route Action     09/15/2024 1420 HYDROmorphone (DILAUDID) injection 0.5 mg 0.5 mg Intravenous Given     09/15/2024 1424 lactated ringers infusion 150 mL/hr Intravenous New Bag     09/15/2024 1507 acetaminophen (Ofirmev) injection 1,000 mg 1,000 mg Intravenous New Bag     09/15/2024 1728 docusate sodium (COLACE) capsule 100 mg 100 mg Oral Given     09/15/2024 1727 enoxaparin (LOVENOX) subcutaneous injection 30 mg 30 mg Subcutaneous Given     09/15/2024 1728 pantoprazole (PROTONIX) EC tablet 40 mg 40 mg Oral Given            Past Medical History:   Diagnosis Date    Acute diverticulitis 9/21/2018    Anemia     Asthma     Chronic pain     bulging discs in back,polymyositis    Diabetes mellitus (HCC)     Disease of thyroid gland     Diverticulitis     Hyperlipidemia     Hypertension     Polymyositis (HCC)      Present on Admission:   Right patella fracture   Fracture of left ankle   Acquired hypothyroidism   Primary hypertension      Admitting Diagnosis: Patella fracture [S82.009A]  Injury of head, initial encounter [S09.90XA]  Acute strain of neck muscle, initial encounter [S16.1XXA]  Closed fracture of left ankle, initial encounter [S82.892A]  Closed avulsion fracture of distal end of left fibula, initial encounter [S82.832A]  Age/Sex: 74 y.o. adult  Admission Orders:  Scheduled Medications:  acetaminophen, 975 mg, Oral, Q8H ANTONETTE  amLODIPine, 5 mg, Oral, Daily  Cholecalciferol, 1,000 Units, Oral, Daily  docusate sodium, 100 mg, Oral,  BID  ferrous sulfate, 325 mg, Oral, Daily With Breakfast  fondaparinux, 2.5 mg, Subcutaneous, Q24H  insulin glargine, 30 Units, Subcutaneous, HS  insulin lispro, 1-6 Units, Subcutaneous, Q6H ANTONETTE  levothyroxine, 88 mcg, Oral, Early Morning  lisinopril, 10 mg, Oral, Daily  pantoprazole, 40 mg, Oral, BID AC  senna, 2 tablet, Oral, Daily  CeFAZolin (ANCEF) IVPB (premix in dextrose) 2,000 mg 50 mL  Dose: 2,000 mg  Freq: Every 8 hours Route: IV  Last Dose: 2,000 mg (09/17/24 0833)  Start: 09/16/24 2330 End: 09/17/24 0903 - given 9/16 and 9/17 @ 0833.     Continuous IV Infusions:  multi-electrolyte, 125 mL/hr, Intravenous, Continuous  actated ringers infusion  Rate: 150 mL/hr Dose: 150 mL/hr  Freq: Continuous Route: IV  Indications of Use: IV Hydration  Last Dose: Stopped (09/15/24 1700)  Start: 09/15/24 1415 End: 09/15/24 1641    PRN Meds:  albuterol, 2 puff, Inhalation, Q6H PRN  fentaNYL, 50 mcg, Intravenous, Q5 Min PRN  HYDROmorphone, 0.2 mg, Intravenous, Q3H PRN oxyCODONE, 5 mg, Oral, Q4H PRN  oxyCODONE, 2.5 mg, Oral, Q4H PRN  oxyCODONE (ROXICODONE) IR tablet 5 mg  Dose: 5 mg  Freq: Every 4 hours PRN Route: PO  PRN Reason: severe pain  Start: 09/15/24 1634 - given x2 9/16      IP CONSULT TO ORTHOPEDIC SURGERY  IP CONSULT TO GERONTOLOGY    Network Utilization Review Department  ATTENTION: Please call with any questions or concerns to [phone] and carefully listen to the prompts so that you are directed to the right person. All voicemails are confidential.   For Discharge needs, contact Care Management DC Support Team at [phone] opt. [phone]  Send all requests for admission clinical reviews, approved or denied determinations and any other requests to dedicated fax number below belonging to the campus where the patient is receiving treatment. List of dedicated fax numbers for the Facilities:  FACILITY NAME UR FAX NUMBER   ADMISSION DENIALS (Administrative/Medical Necessity) [phone]   DISCHARGE SUPPORT TEAM  (NETWORK) [phone]   PARENT CHILD HEALTH (Maternity/NICU/Pediatrics) [phone]   [facility] [phone]   [facility] [phone]   [facility] [phone]   [facility] [phone]   [facility] [phone]   [facility] [phone]   [facility] [phone]   [facility] [phone]   [facility] [phone]   [facility] [phone]   [facility] [phone]   [facility] [phone]

## 2024-09-16 NOTE — ASSESSMENT & PLAN NOTE
Meherrin fall precautions   Assess patient frequently for physical needs, encourage use of assistant devices as needed and directed by PT/OT  Identify cognitive and physical deficits and behaviors that affect risk of falls  Consider moving patient closer to nursing station to monitor more closely for impulsive behavior which may increase risk of falls  Educate patient/family on patient safety including physical limitations and importance of using call bell for assistance   Modify environment to reduce risk of injury including disconnecting from pole when not in use, ensuring adequate lighting in room and restroom, ensuring that path to restroom is clear and free of trip hazards  PT/OT consulted to assist with strengthening/mobility and assist with discharge planning to appropriate level of care

## 2024-09-16 NOTE — ASSESSMENT & PLAN NOTE
JAYNE LLE in splint  Ice and elevation  Tentative plan for OR today 9/16 pending attg evaluation of soft tissue envelope.   NPO for procedure.   Consent obtained, on file in OR.  Pre-op labs reviewed: cmp, cbc, type & screen, INR  Pain management per primary  DVT ppx per primary  Medical management per primary  Case discussed with Dr. Sylvester.

## 2024-09-16 NOTE — ASSESSMENT & PLAN NOTE
Lab Results   Component Value Date    HGBA1C 7.6 (H) 07/11/2024       Recent Labs     09/15/24  2139 09/16/24  0751   POCGLU 167* 109       Blood Sugar Average: Last 72 hrs:  (P) 138    - Follows with endocrinology outpatient  - Continue home Lantus 30 u QHS  - Hold home metformin  - Recently discontinued januvia   - SSI Q6 hours while NPO, adjust as diet is restarted post-op. Consistent carb diet post-op  - Monitor blood glucose

## 2024-09-17 LAB
ANION GAP SERPL CALCULATED.3IONS-SCNC: 7 MMOL/L (ref 4–13)
BASOPHILS # BLD AUTO: 0.03 THOUSANDS/ΜL (ref 0–0.1)
BASOPHILS NFR BLD AUTO: 0 % (ref 0–1)
BUN SERPL-MCNC: 14 MG/DL (ref 5–25)
CALCIUM SERPL-MCNC: 7.6 MG/DL (ref 8.4–10.2)
CHLORIDE SERPL-SCNC: 106 MMOL/L (ref 96–108)
CO2 SERPL-SCNC: 26 MMOL/L (ref 21–32)
CREAT SERPL-MCNC: 0.73 MG/DL (ref 0.6–1.3)
EOSINOPHIL # BLD AUTO: 0.53 THOUSAND/ΜL (ref 0–0.61)
EOSINOPHIL NFR BLD AUTO: 4 % (ref 0–6)
ERYTHROCYTE [DISTWIDTH] IN BLOOD BY AUTOMATED COUNT: 18.9 % (ref 11.6–15.1)
GLUCOSE SERPL-MCNC: 138 MG/DL (ref 65–140)
GLUCOSE SERPL-MCNC: 164 MG/DL (ref 65–140)
GLUCOSE SERPL-MCNC: 231 MG/DL (ref 65–140)
GLUCOSE SERPL-MCNC: 231 MG/DL (ref 65–140)
GLUCOSE SERPL-MCNC: 249 MG/DL (ref 65–140)
GLUCOSE SERPL-MCNC: 310 MG/DL (ref 65–140)
HCT VFR BLD AUTO: 25.4 % (ref 36.5–46.1)
HGB BLD-MCNC: 8.5 G/DL (ref 12–15.4)
IMM GRANULOCYTES # BLD AUTO: 0.13 THOUSAND/UL (ref 0–0.2)
IMM GRANULOCYTES NFR BLD AUTO: 1 % (ref 0–2)
LYMPHOCYTES # BLD AUTO: 3.1 THOUSANDS/ΜL (ref 0.6–4.47)
LYMPHOCYTES NFR BLD AUTO: 22 % (ref 14–44)
MCH RBC QN AUTO: 23 PG (ref 26.8–34.3)
MCHC RBC AUTO-ENTMCNC: 33.5 G/DL (ref 31.4–37.4)
MCV RBC AUTO: 69 FL (ref 82–98)
MONOCYTES # BLD AUTO: 1.06 THOUSAND/ΜL (ref 0.17–1.22)
MONOCYTES NFR BLD AUTO: 8 % (ref 4–12)
NEUTROPHILS # BLD AUTO: 9.1 THOUSANDS/ΜL (ref 1.85–7.62)
NEUTS SEG NFR BLD AUTO: 65 % (ref 43–75)
NRBC BLD AUTO-RTO: 0 /100 WBCS
PLATELET # BLD AUTO: 278 THOUSANDS/UL (ref 149–390)
PMV BLD AUTO: 10 FL (ref 8.9–12.7)
POTASSIUM SERPL-SCNC: 3.9 MMOL/L (ref 3.5–5.3)
RBC # BLD AUTO: 3.69 MILLION/UL (ref 3.88–5.12)
SODIUM SERPL-SCNC: 139 MMOL/L (ref 135–147)
WBC # BLD AUTO: 13.95 THOUSAND/UL (ref 4.31–10.16)

## 2024-09-17 PROCEDURE — 97167 OT EVAL HIGH COMPLEX 60 MIN: CPT

## 2024-09-17 PROCEDURE — 99024 POSTOP FOLLOW-UP VISIT: CPT | Performed by: PHYSICIAN ASSISTANT

## 2024-09-17 PROCEDURE — 80048 BASIC METABOLIC PNL TOTAL CA: CPT | Performed by: PHYSICIAN ASSISTANT

## 2024-09-17 PROCEDURE — 97535 SELF CARE MNGMENT TRAINING: CPT

## 2024-09-17 PROCEDURE — 82948 REAGENT STRIP/BLOOD GLUCOSE: CPT

## 2024-09-17 PROCEDURE — 99233 SBSQ HOSP IP/OBS HIGH 50: CPT | Performed by: SURGERY

## 2024-09-17 PROCEDURE — 97530 THERAPEUTIC ACTIVITIES: CPT

## 2024-09-17 PROCEDURE — 97163 PT EVAL HIGH COMPLEX 45 MIN: CPT

## 2024-09-17 PROCEDURE — 99232 SBSQ HOSP IP/OBS MODERATE 35: CPT | Performed by: NURSE PRACTITIONER

## 2024-09-17 PROCEDURE — 85025 COMPLETE CBC W/AUTO DIFF WBC: CPT | Performed by: PHYSICIAN ASSISTANT

## 2024-09-17 RX ORDER — INSULIN LISPRO 100 [IU]/ML
1-6 INJECTION, SOLUTION INTRAVENOUS; SUBCUTANEOUS
Status: DISCONTINUED | OUTPATIENT
Start: 2024-09-17 | End: 2024-09-18 | Stop reason: HOSPADM

## 2024-09-17 RX ORDER — INSULIN LISPRO 100 [IU]/ML
1-6 INJECTION, SOLUTION INTRAVENOUS; SUBCUTANEOUS
Status: DISCONTINUED | OUTPATIENT
Start: 2024-09-17 | End: 2024-09-17

## 2024-09-17 RX ADMIN — DOCUSATE SODIUM 100 MG: 100 CAPSULE, LIQUID FILLED ORAL at 17:35

## 2024-09-17 RX ADMIN — ACETAMINOPHEN 975 MG: 325 TABLET ORAL at 13:50

## 2024-09-17 RX ADMIN — LISINOPRIL 10 MG: 10 TABLET ORAL at 08:33

## 2024-09-17 RX ADMIN — ACETAMINOPHEN 975 MG: 325 TABLET ORAL at 05:22

## 2024-09-17 RX ADMIN — DOCUSATE SODIUM 100 MG: 100 CAPSULE, LIQUID FILLED ORAL at 08:32

## 2024-09-17 RX ADMIN — CEFAZOLIN SODIUM 2000 MG: 2 SOLUTION INTRAVENOUS at 08:33

## 2024-09-17 RX ADMIN — FONDAPARINUX SODIUM 2.5 MG: 5 INJECTION, SOLUTION SUBCUTANEOUS at 21:47

## 2024-09-17 RX ADMIN — PANTOPRAZOLE SODIUM 40 MG: 40 TABLET, DELAYED RELEASE ORAL at 17:39

## 2024-09-17 RX ADMIN — INSULIN LISPRO 5 UNITS: 100 INJECTION, SOLUTION INTRAVENOUS; SUBCUTANEOUS at 00:46

## 2024-09-17 RX ADMIN — ACETAMINOPHEN 975 MG: 325 TABLET ORAL at 21:47

## 2024-09-17 RX ADMIN — Medication 1000 UNITS: at 08:32

## 2024-09-17 RX ADMIN — FERROUS SULFATE TAB 325 MG (65 MG ELEMENTAL FE) 325 MG: 325 (65 FE) TAB at 08:32

## 2024-09-17 RX ADMIN — INSULIN LISPRO 3 UNITS: 100 INJECTION, SOLUTION INTRAVENOUS; SUBCUTANEOUS at 17:43

## 2024-09-17 RX ADMIN — INSULIN GLARGINE 30 UNITS: 100 INJECTION, SOLUTION SUBCUTANEOUS at 21:47

## 2024-09-17 RX ADMIN — LEVOTHYROXINE SODIUM 88 MCG: 88 TABLET ORAL at 05:22

## 2024-09-17 RX ADMIN — SENNOSIDES 17.2 MG: 8.6 TABLET, FILM COATED ORAL at 08:32

## 2024-09-17 RX ADMIN — INSULIN LISPRO 3 UNITS: 100 INJECTION, SOLUTION INTRAVENOUS; SUBCUTANEOUS at 13:08

## 2024-09-17 RX ADMIN — AMLODIPINE BESYLATE 5 MG: 5 TABLET ORAL at 08:32

## 2024-09-17 RX ADMIN — PANTOPRAZOLE SODIUM 40 MG: 40 TABLET, DELAYED RELEASE ORAL at 08:33

## 2024-09-17 NOTE — CASE MANAGEMENT
Case Management Assessment & Discharge Planning Note    Patient name Jadyn Ac  Location S /S -01 MRN [id number]  : 1950 Date 2024       Current Admission Date: 9/15/2024  Current Admission Diagnosis:Right patella fracture   Patient Active Problem List    Diagnosis Date Noted Date Diagnosed    Paroxysmal A-fib (HCC) 2024     Encounter for medication review 2024     At risk for delirium 2024     Ambulatory dysfunction 2024     Vision impairment 2024     Insomnia 2024     Fall 09/15/2024     Right patella fracture 09/15/2024     Fracture of left ankle 09/15/2024     Atrial fibrillation with rapid ventricular response (HCC) 08/10/2024     Elevated serum creatinine 2024     Pleural nodule 2024     Choledocholithiasis 2024     Cholelithiasis 2024     Generalized weakness 2024     Leukocytosis 2024     Hinduism exemption 2024     Primary hypertension 2022     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(HCC) 2022     Acquired hypothyroidism 2022     Steroid-induced hyperglycemia 2022     Diverticulitis 2018     Polymyositis (HCC)      Hyperlipidemia      Type 2 diabetes mellitus with hyperglycemia, with long-term current use of insulin (HCC)      Asthma      Chronic pain        LOS (days): 2  Geometric Mean LOS (GMLOS) (days): 4.2  Days to GMLOS:2.2     OBJECTIVE:    Risk of Unplanned Readmission Score: 17.67         Current admission status: Inpatient       Preferred Pharmacy:   [facility] [address] [address]  Phone: [phone] Fax: [phone]    Primary Care Provider: Herlinda Castorena MD    Primary Insurance: AARP [employer] REP  Secondary Insurance:     ASSESSMENT:  Active Health Care Proxies    There are no active Health Care Proxies on file.    Readmission Root Cause  30 Day Readmission: No    Patient Information  Admitted  from:: Home  Mental Status: Alert  During Assessment patient was accompanied by: Not accompanied during assessment  Assessment information provided by:: Patient, Daughter  Primary Caregiver: Other (Comment)  Caregiver's Name:: children, paid caregivers  Support Systems: Daughter, Family members, Son  County of Residence: [place]  What [facility] do you live in?: [place]  Home entry access options. Select all that apply.: Stairs, Ramp  Type of Current Residence: 2 story home  Upon entering residence, is there a bedroom on the main floor (no further steps)?: Yes  Upon entering residence, is there a bathroom on the main floor (no further steps)?: Yes  Living Arrangements: Lives w/ Daughter    Activities of Daily Living Prior to Admission  Functional Status: Assistance  Completes ADLs independently?: No  Level of ADL dependence: Assistance  Ambulates independently?: No  Level of ambulatory dependence: Assistance  Does patient use assisted devices?: Yes  Assisted Devices (DME) used: Wheelchair, Walker, Other (Comment) (Lift recliner chair)  Does patient currently own DME?: Yes  What DME does the patient currently own?: Wheelchair, Walker, Other (Comment) (Lift recliner chair)  Does patient have a history of Outpatient Therapy (PT/OT)?: Yes  Does the patient have a history of Short-Term Rehab?: Yes  Does patient have a history of HHC?: Yes  Does patient currently have HHC?: No    Patient Information Continued  Income Source: Pension/long term  Does patient have prescription coverage?: Yes  Does patient receive dialysis treatments?: No  Does patient have a history of substance abuse?: No    Means of Transportation  Means of Transport to Appts:: Family transport    Social Determinants of Health (SDOH)      Flowsheet Row Most Recent Value   Housing Stability    In the last 12 months, was there a time when you were not able to pay the mortgage or rent on time? N   In the past 12 months, how many times have you moved where you  were living? 0   At any time in the past 12 months, were you homeless or living in a shelter (including now)? N   Transportation Needs    In the past 12 months, has lack of transportation kept you from medical appointments or from getting medications? no   In the past 12 months, has lack of transportation kept you from meetings, work, or from getting things needed for daily living? No   Food Insecurity    Within the past 12 months, you worried that your food would run out before you got the money to buy more. Never true   Within the past 12 months, the food you bought just didn't last and you didn't have money to get more. Never true   Utilities    In the past 12 months has the electric, gas, oil, or water company threatened to shut off services in your home? No     DISCHARGE DETAILS:    Discharge planning discussed with:: patient at bedside, daughter Jennifer over phone  Freedom of Choice: Yes  Comments - Freedom of Choice: home w/ HHC and family assistance  CM contacted family/caregiver?: Yes  Were Treatment Team discharge recommendations reviewed with patient/caregiver?: Yes  Did patient/caregiver verbalize understanding of patient care needs?: Yes  Were patient/caregiver advised of the risks associated with not following Treatment Team discharge recommendations?: Yes    Contacts  Patient Contacts: Jennifer  Relationship to Patient:: Family (daughter)  Contact Method: Phone  Phone Number: [phone]  Reason/Outcome: Discharge Planning, Emergency Contact, Referral, Continuity of Care    Requested Home Health Care         Is the patient interested in HHC at discharge?: Yes  Home Health Discipline requested:: Occupational Therapy, Physical Therapy, Nursing  Home Health Agency Name:: Other (pending referrals)  Home Health Follow-Up Provider:: PCP  Home Health Services Needed:: Gait/ADL Training, Evaluate Functional Status and Safety, Strengthening/Theraputic Exercises to Improve Function  Homebound Criteria Met:: Uses  an Assist Device (i.e. cane, walker, etc)  Supporting Clincal Findings:: Limited Endurance    Other Referral/Resources/Interventions Provided:  Interventions: HHC  Referral Comments: Patient admitted to [facility]/p fall. CM spoke with daughter Jennifer over phone to complete assessment/ discuss dcp. Patient lives with daughter in a 2 story home; has AUGUSTUS but can use a ramp as well. Patient requires assistance for ambulation and ADLs- has caregivers, and assistance from children. Patient also owns and uses a wheelchair, walker and lift recliner. Patient does have a history of STR- however did not have a good experience. Patient has had [facility], in the past. Per daughters they would like for patient to d/c home with HH and family assistance. Referral placed for [facility], via [facility] at this time. Daughter asking if an order can be placed for a specific transport chair-- stating patient has info on her phone. CM met with patient at bedside who was able to provide info on DME daughter was in reference to-- a hydraulic life transport chair. CM notified patient they will need to speak with [facility] to see if this is something they carry, or if they carry something similar. CM to follow up as able to continue with dcp.    Would you like to participate in our [facility] service program?  : No - Declined    Treatment Team Recommendation: Short Term Rehab, SNF  Discharge Destination Plan:: Home with Home Health Care  Transport at Discharge : Family

## 2024-09-17 NOTE — OCCUPATIONAL THERAPY NOTE
Occupational Therapy Evaluation + Treatment     Patient Name: Jadyn Ac  Today's Date: 9/17/2024  Problem List  Principal Problem:    Right patella fracture  Active Problems:    Type 2 diabetes mellitus with hyperglycemia, with long-term current use of insulin (HCC)    Acquired hypothyroidism    Primary hypertension    Fall    Fracture of left ankle    Paroxysmal A-fib (HCC)    Encounter for medication review    At risk for delirium    Ambulatory dysfunction    Vision impairment    Insomnia    Past Medical History  Past Medical History:   Diagnosis Date    Acute diverticulitis 9/21/2018    Anemia     Asthma     Chronic pain     bulging discs in back,polymyositis    Diabetes mellitus (HCC)     Disease of thyroid gland     Diverticulitis     Hyperlipidemia     Hypertension     Polymyositis (HCC)      Past Surgical History  Past Surgical History:   Procedure Laterality Date    EYE SURGERY Bilateral     CATAREACT REMOVED FROM BOTH EYES 2 MTHS AGO    HYSTERECTOMY      ORIF TIBIA & FIBULA FRACTURES Left 9/16/2024    Procedure: OPEN REDUCTION W/ INTERNAL FIXATION (ORIF) ANKLE and all associated procedures;  Surgeon: Zakiya Sylvester MD;  Location: [facility];  Service: Orthopedics    SINUS SURGERY      THYROIDECTOMY      TUBAL LIGATION             09/17/24 0928   OT Last Visit   OT Visit Date 09/17/24   Note Type   Note type Evaluation   Pain Assessment   Pain Assessment Tool FLACC   Pain Location/Orientation Orientation: Right;Location: [facility] Pain Intervention(s) Repositioned;Cold applied;Ambulation/increased activity;Emotional support   Pain Rating: FLACC (Rest) - Face 0   Pain Rating: FLACC (Rest) - Legs 0   Pain Rating: FLACC (Rest) - Activity 0   Pain Rating: FLACC (Rest) - Cry 0   Pain Rating: FLACC (Rest) - Consolability 0   Score: FLACC (Rest) 0   Pain Rating: FLACC (Activity) - Face 1   Pain Rating: FLACC (Activity) - Legs 0   Pain Rating: FLACC (Activity) - Activity 1   Pain Rating: FLACC  (Activity) - Cry 1   Pain Rating: FLACC (Activity) - Consolability 1   Score: FLACC (Activity) 4   Restrictions/Precautions   Weight Bearing Precautions Per Order Yes   RLE Weight Bearing Per Order (S)  WBAT  (non-op in knee immobilizer)   LLE Weight Bearing Per Order (S)  NWB  (POD #1 ORIF tib/fib)   Other Precautions Cognitive;Chair Alarm;Bed Alarm;Multiple lines;Fall Risk;Pain   Home Living   Type of Home House   Home Layout Two level;Performs ADLs on one level  (3 AUGUSTUS with option of portable ramp. Has stair glide to 2nd floor - does not use as she is unable to stand from stair glide chair)   Bathroom Shower/Tub Walk-in shower   Bathroom Toilet Raised  (has an elevating toilet seat to assist in standing her up)   Bathroom Equipment Grab bars in shower;Tub transfer bench;Commode   Bathroom Accessibility Accessible   Home Equipment Walker;Wheelchair-manual  (lift recliner chair, adjustable bed)   Additional Comments use of RW at baseline - pt reports that she needs Ax1 for OOB transfers and Ax1 for transfers that do not have a lift feature to assist. Ax1-2 for shower transfers. Is able to walk with RW with mod I   Prior Function   Level of [place] Needs assistance with ADLs   Lives With Daughter   Receives Help From Family;Home health  (Reports that between family and HHAs she has 24/7 care)   IADLs Family/Friend/Other provides transportation;Family/Friend/Other provides meals;Family/Friend/Other provides medication management   Falls in the last 6 months 1 to 4   Vocational Retired   Lifestyle   Autonomy PTA pt living with daughter in 2SH with FFSU, pt requiring (A) with ADLS and IADLs, (+)fall, (-)drives, use of RW at baseline, requiring Ax1 for transfers   Reciprocal Relationships supportive daughter + sons stop in to assist as needed, + HHAs daily   Service to Others retired   Intrinsic Gratification enjoys spending time with family   General   Additional Pertinent History Admit due to fall in bathroom  "resulting in fracture of L ankle and R patella fracture. WBAT to RLE NWB to LLE in splint. PMH: DM II, HTN, a fib, polymyositis   Family/Caregiver Present No   Subjective   Subjective \"I don't know if I can do this\"   ADL   Eating Assistance 7  Independent   Grooming Assistance 7  Independent   UB Bathing Assistance 5  Supervision/Setup   LB Bathing Assistance 2  Maximal Assistance   UB Dressing Assistance 5  Supervision/Setup   LB Dressing Assistance 1  Total Assistance   LB Dressing Deficit Don/doff R sock   Toileting Assistance  2  Maximal Assistance   Toileting Deficit Clothing management down;Perineal hygiene;Clothing management up;Bedside commode   Bed Mobility   Supine to Sit 2  Maximal assistance   Additional items Assist x 1;Increased time required;Verbal cues;LE management   Additional Comments sitting EOB with (S)   Transfers   Sit to Stand 1  Dependent   Additional items Assist x 2;Increased time required;Verbal cues   Stand to Sit 1  Dependent   Additional items Assist x 2;Increased time required;Verbal cues   Sliding Board transfer   (trialled in treatment session - see below)   Additional Comments unable to clear buttocks for EOB in standing   Balance   Static Sitting Fair +   Dynamic Sitting Fair   Activity Tolerance   Activity Tolerance Patient limited by fatigue;Patient limited by pain   Medical Staff Made Aware PT NOAH Long Assessment   RUE Assessment WFL   LUE Assessment   LUE Assessment WFL   Hand Function   Gross Motor Coordination Functional   Fine Motor Coordination Functional   Cognition   Overall Cognitive Status WFL   Arousal/Participation Alert;Cooperative   Attention Attends with cues to redirect   Orientation Level Oriented X4   Memory Decreased short term memory;Decreased recall of recent events;Decreased recall of precautions   Following Commands Follows one step commands without difficulty   Comments pleasant and cooperative, questionable insight and higher level " deficits   Assessment   Limitation Decreased ADL status;Decreased Safe judgement during ADL;Decreased cognition;Decreased endurance;Decreased self-care trans;Decreased high-level ADLs  (impaired balance, fxnl mobility, act douglas, fxnl reach, standing douglas, strength, attention to task, safety awareness, insight, pacing, problem solving, learning new tasks)   Prognosis Good   Assessment Pt is a 74 y.o. adult seen for OT evaluation s/p admission to [facility] on [facility]/15/[facility]24 due to fall in bathroom. Diagnosed with Right patella fracture. Personal and env factors supporting pt at time of IE include supportive daughter + sons + HHAs, attitude towards recovery, and FFSU. Personal and env factors inhibiting engagement in occupations include advanced age, limited social support, difficulty completing ADLs, and difficulty completing IADLs. Performance deficits that affect the pt’s occupational performance can be seen above. Due to pt's current functional limitations and medical complications pt is functioning below baseline. Pt would benefit from continued skilled OT treatment in order to maximize safety, independence and overall performance with ADLs, functional mobility, and functional transfers in order to achieve highest level of function.   Goals   Patient Goals to go home   LTG Time Frame 10-14   Long Term Goal see goals listed below   Plan   Treatment Interventions ADL retraining;Functional transfer training;Endurance training;Patient/family training;Equipment evaluation/education;Compensatory technique education;Energy conservation;Activityengagement   Goal Expiration Date 09/27/24   OT Treatment Day 0   OT Frequency 3-5x/wk   Discharge Recommendation   Rehab Resource Intensity Level, OT II (Moderate Resource Intensity)   Additional Comments  Pt currently requesting to return home with family, if pt to return home HIGHLY recommend return with leatha lift + standard sling + toileting/shower sling.   AM-PAC Daily Activity Inpatient    Lower Body Dressing 2   Bathing 2   Toileting 2   Upper Body Dressing 3   Grooming 3   Eating 4   Daily Activity Raw Score 16   Daily Activity Standardized Score (Calc for Raw Score >=11) 35.96   AM-PAC Applied Cognition Inpatient   Following a Speech/Presentation 3   Understanding Ordinary Conversation 4   Taking Medications 3   Remembering Where Things Are Placed or Put Away 3   Remembering List of 4-5 Errands 3   Taking Care of Complicated Tasks 3   Applied Cognition Raw Score 19   Applied Cognition Standardized Score 39.77   Additional Treatment Session   Start Time 0945   End Time 1015   Treatment Assessment Pt seen for additional skilled OT treatment session to complete toileting tasks. Pt provided with edu on use of beasy board - able to complete transfer from EOB to drop arm BSC with mod A x2 - A for advancing BLE - and use of armrests on BSC for pulling. A with clothing management and hygiene on BSC - demonstrating good lateral leaning on BSC for placement of beasy board. Completing transfer from BSC to drop arm recliner chair - requiring increased (A) with transfer due to fatigue and increased pain. Pt continues to be limited by overall strength, endurance, pain. Will cont to follow and see as appropriate/able   Additional Treatment Day 1   End of Consult   Patient Position at End of Consult Bedside chair;Bed/Chair alarm activated;All needs within reach  (with leatha sling placed underneath patient)       GOALS:      -Patient will be Mod I with UB dressing using AE and AD as needed in order to increase (I) with ADLs    -Patient will be Mod I with UB bathing using AE and AD as needed in order to increase (I) with ADLs    -Patient will be Mod A  with LB dressing with use of AE and AD as needed in order to increase (I) with ADLs    -Patient will be Mod A  with LB bathing with use of AE and AD as needed in order to increase (I) with ADLs    -Patient will complete toileting w/ Min A  w/ G hygiene/thoroughness  in order to reduce caregiver burden    -Patient will demonstrate Min A x 1 with bed mobility for ability to manage own comfort and initiate OOB tasks.     -Patient will perform functional transfers with Mod A x 1 to/from all surfaces using beasy board as needed in order to increase (I) with functional tasks    -Patient will tolerate therapeutic activities for greater than 30 min, in order to increase tolerance for functional activities.     -Patient will increase OOB/sitting tolerance to 2-4 hours per day to increase participation in self-care and leisure tasks with no s/s of exertion.     -Patient will engage in ongoing cognitive assessment in order to assist with safe discharge planning/recommendations.        The patient's raw score on the -PAC Daily Activity Inpatient Short Form is 16. A raw score of less than 19 suggests the patient may benefit from discharge to post-acute rehabilitation services. HOWEVER please refer to the recommendation of the Occupational Therapist for safe discharge planning.    This session, pt required and most appropriately benefited from skilled OT/PT co-eval due to extensive physical assistance of SKILLED therapists, significant regression from functional baseline, decreased activity tolerance, and unpredictable medical and/or functional status. OT and PT goals were addressed separately as seen in documentation.     Luz Elena Cesar MS, OTR/L

## 2024-09-17 NOTE — PROGRESS NOTES
Progress Note - Orthopedics   Name: Jadyn Ac 74 y.o. adult I MRN: [id number]  Unit/Bed#: S -01 I Date of Admission: 9/15/2024   Date of Service: 9/17/2024 I Hospital Day: 2    Assessment & Plan  Fracture of left ankle  Now s/p ORIF 9/16/2024 with Dr. Sylvester (POD 1)  NWB LLE in splint  Pain management per primary  Monitor for signs/symptoms of blood loss anemia, transfuse for hgb of less than 7, per primary team.   DVT: patient takes arixtra at baseline.   Medical management per primary  Patient to follow up with Dr. Sylvester upon discharge.     Right patella fracture  WBAT RLE in knee immobilizer  No immediate orthopedic intervention anticipated  PT/OT  Follow up with orthopedics at discharge    Ok for discharge from Orthopedics service perspective. Orthopedics service will follow. Please contact the SecureChat role for the Orthopedics service with any questions/concerns.    History of Present Illness   74 y.o.adult seen and examined at bedside. Reports improvement in her ankle and knee pain since admission. No new or different complaints today. Eating breakfast at time of consultation.     Objective      Temp:  [97.4 °F (36.3 °C)-98.9 °F (37.2 °C)] 98 °F (36.7 °C)  HR:  [] 87  Resp:  [16-21] 18  BP: (114-170)/() 127/66  O2 Device: None (Room air)          I/O         09/15 0701 09/16 0700 09/16 0701  09/17 0700 09/17 0701  09/18 0700    P.O. 0 240     I.V. (mL/kg) 1749.6 (16.7)      IV Piggyback 100      Total Intake(mL/kg) 1849.6 (17.6) 240 (2.3)     Urine (mL/kg/hr) 270      Total Output 270      Net +1579.6 +240            Unmeasured Urine Occurrence  3 x           Lines/Drains/Airways       Active Status       None                  Physical Exam Musculoskeletal: bilaterallower  Skin over right knee intact, KI in place. TTP over the right knee.   Splint in place to LLE.   Motor intact to +FHL/EHL, +ankle dorsi/plantar flexion on the right.   +FHL/EHL on the left.   Sensation intact  "to saphenous, sural, tibial, superficial peroneal nerve, and deep peroneal bilaterally.   Digits warm and well perfused bilaterally.   No calf swelling or tenderness to palpation      Lab Results: I have reviewed the following results: CBC/BMP:   .     09/17/24 0519   WBC 13.95*   HGB 8.5*   HCT 25.4*      SODIUM 139   K 3.9      CO2 26   BUN 14   CREATININE 0.73   GLUC 164*      Recent Labs     09/15/24  1413 09/15/24  2035 09/15/24  2053 09/16/24  0831 09/17/24 0519   WBC 15.98*  --   --  13.10* 13.95*   HGB 10.3*  --   --  9.6* 8.5*   HCT 31.5*  --   --  28.4* 25.4*     --    < > 299 278   BUN 22  --   --  13 14   CREATININE 0.64  --   --  0.48* 0.73   INR  --  1.05  --   --   --     < > = values in this interval not displayed.     Blood Culture:    Lab Results   Component Value Date    BLOODCX No Growth After 5 Days. 08/09/2024    BLOODCX No Growth After 5 Days. 08/09/2024     Wound Culture: No results found for: \"WOUNDCULT\"  "

## 2024-09-17 NOTE — PLAN OF CARE
Problem: PAIN - ADULT  Goal: Verbalizes/displays adequate comfort level or baseline comfort level  Description: Interventions:  - Encourage patient to monitor pain and request assistance  - Assess pain using appropriate pain scale  - Administer analgesics based on type and severity of pain and evaluate response  - Implement non-pharmacological measures as appropriate and evaluate response  - Consider cultural and social influences on pain and pain management  - Notify physician/advanced practitioner if interventions unsuccessful or patient reports new pain  Outcome: Progressing     Problem: INFECTION - ADULT  Goal: Absence or prevention of progression during hospitalization  Description: INTERVENTIONS:  - Assess and monitor for signs and symptoms of infection  - Monitor lab/diagnostic results  - Monitor all insertion sites, i.e. indwelling lines, tubes, and drains  - Monitor endotracheal if appropriate and nasal secretions for changes in amount and color  - [place] appropriate cooling/warming therapies per order  - Administer medications as ordered  - Instruct and encourage patient and family to use good hand hygiene technique  - Identify and instruct in appropriate isolation precautions for identified infection/condition  Outcome: Progressing  Goal: Absence of fever/infection during neutropenic period  Description: INTERVENTIONS:  - Monitor WBC    Outcome: Progressing     Problem: SAFETY ADULT  Goal: Patient will remain free of falls  Description: INTERVENTIONS:  - Educate patient/family on patient safety including physical limitations  - Instruct patient to call for assistance with activity   - Consult OT/PT to assist with strengthening/mobility   - Keep Call bell within reach  - Keep bed low and locked with side rails adjusted as appropriate  - Keep care items and personal belongings within reach  - Initiate and maintain comfort rounds  - Make Fall Risk Sign visible to staff  - Apply yellow socks and bracelet  for high fall risk patients  - Consider moving patient to room near nurses station  Outcome: Progressing  Goal: Maintain or return to baseline ADL function  Description: INTERVENTIONS:  -  Assess patient's ability to carry out ADLs; assess patient's baseline for ADL function and identify physical deficits which impact ability to perform ADLs (bathing, care of mouth/teeth, toileting, grooming, dressing, etc.)  - Assess/evaluate cause of self-care deficits   - Assess range of motion  - Assess patient's mobility; develop plan if impaired  - Assess patient's need for assistive devices and provide as appropriate  - Encourage maximum independence but intervene and supervise when necessary  - Involve family in performance of ADLs  - Assess for home care needs following discharge   - Consider OT consult to assist with ADL evaluation and planning for discharge  - Provide patient education as appropriate  Outcome: Progressing  Goal: Maintains/Returns to pre admission functional level  Description: INTERVENTIONS:  - Perform AM-PAC 6 Click Basic Mobility/ Daily Activity assessment daily.  - Set and communicate daily mobility goal to care team and patient/family/caregiver.   - Collaborate with rehabilitation services on mobility goals if consulted  - Record patient progress and toleration of activity level   Outcome: Progressing     Problem: Prexisting or High Potential for Compromised Skin Integrity  Goal: Skin integrity is maintained or improved  Description: INTERVENTIONS:  - Identify patients at risk for skin breakdown  - Assess and monitor skin integrity  - Assess and monitor nutrition and hydration status  - Monitor labs   - Assess for incontinence   - Turn and reposition patient  - Assist with mobility/ambulation  - Relieve pressure over bony prominences  - Avoid friction and shearing  - Provide appropriate hygiene as needed including keeping skin clean and dry  - Evaluate need for skin moisturizer/barrier cream  -  Collaborate with interdisciplinary team   - Patient/family teaching  - Consider wound care consult   Outcome: Progressing

## 2024-09-17 NOTE — ASSESSMENT & PLAN NOTE
- found to have new onset atrial fibrillation on recent hospitalization at [facility]  - plans to follow up outpatient with cardiology in October  - Monitor HR   - Not on any rate controlling medications or anticoagulation

## 2024-09-17 NOTE — ASSESSMENT & PLAN NOTE
- Closed left ankle fracture, present on admission.  - Status post fall on 9/15.  - Appreciate Orthopedic surgery evaluation, recommendations and interventions as noted.  - S/p reduction and splint in ED  - S/p ORIF  - Maintain NWB LLE in splint   - Monitor left lower extremity neurovascular exam.  - Continue multimodal analgesic regimen.  - Continue DVT prophylaxis.  - PT and OT evaluation and treatment as indicated.  - Outpatient follow up with Orthopedic surgery for re-evaluation.

## 2024-09-17 NOTE — PROGRESS NOTES
Progress Note And Post Op note - Trauma   Name: Jadyn Ac 74 y.o. adult I MRN: [id number]  Unit/Bed#: S -01 I Date of Admission: 9/15/2024   Date of Service: 9/17/2024 I Hospital Day: 2    Assessment & Plan  Right patella fracture  - Closed right patellar fracture, present on admission.  - Status post fall on 9/15.  - Appreciate Orthopedic surgery evaluation, recommendations and interventions as noted.  - Non operative management  - Maintain WBAT RLE in knee immobilizer.  - Monitor right lower extremity neurovascular exam.  - Continue multimodal analgesic regimen.  - Continue DVT prophylaxis.  - PT and OT evaluation and treatment as indicated.  - Outpatient follow up with Orthopedic surgery for re-evaluation.    Type 2 diabetes mellitus with hyperglycemia, with long-term current use of insulin ([facility])  Lab Results   Component Value Date    HGBA1C 7.6 (H) 07/11/2024       Recent Labs     09/16/24  0751 09/16/24  1128 09/16/24  2042 09/17/24  0042   POCGLU 109 111 171* 310*       Blood Sugar Average: Last 72 hrs:  (P) 173.6    - Follows with endocrinology outpatient  - Continue home Lantus 30 u QHS  - Hold home metformin  - Recently discontinued januvia   - SSI Q6 hours while NPO, adjust as diet is restarted post-op. Consistent carb diet post-op  - Monitor blood glucose  Acquired hypothyroidism  - Continue home synthroid  - Geriatric medicine consult  Primary hypertension  - Continue home amlodipine  - Hold home benazepril ysabel-operatively and restart as BP allows  Fall  - Status post mechanical fall with the below noted injuries.  - Fall precautions.  - Geriatric Medicine consultation for evaluation, medication review and recommendations.  - PT and OT evaluation and treatment as indicated.  - Case Management consultation for disposition planning.    Fracture of left ankle  - Closed left ankle fracture, present on admission.  - Status post fall on 9/15.  - Appreciate Orthopedic surgery evaluation,  recommendations and interventions as noted.  - S/p reduction and splint in ED  - S/p ORIF  - Maintain NWB LLE in splint   - Monitor left lower extremity neurovascular exam.  - Continue multimodal analgesic regimen.  - Continue DVT prophylaxis.  - PT and OT evaluation and treatment as indicated.  - Outpatient follow up with Orthopedic surgery for re-evaluation.    Paroxysmal A-fib (HCC)  - found to have new onset atrial fibrillation on recent hospitalization at [facility]  - plans to follow up outpatient with cardiology in October  - Monitor HR   - Not on any rate controlling medications or anticoagulation  Encounter for medication review    At risk for delirium    Ambulatory dysfunction    Vision impairment    Insomnia      Bowel Regimen: colace, senokot  VTE Prophylaxis:Sequential compression device (Venodyne)  and Fondaparinux (Arixtra)     Disposition: pending PT/OT evaluation for placement.    History of Present Illness   Chief complaint: when can I walk normally.     24 Hour Events : Patient underwent an ORIF of the left ankle. Patient is currently pain free from adductor canal and popliteal block. Patient has been tolerating oral intake since operation, has voided approximately 550 mL, and is passing gas. Patient has yet to have a bowel movement.     Objective      Temp:  [97.4 °F (36.3 °C)-98.9 °F (37.2 °C)] 98.9 °F (37.2 °C)  HR:  [] 90  Resp:  [16-21] 16  BP: (114-170)/() 121/63  O2 Device: None (Room air)          I/O         09/15 0701  09/16 0700 09/16 0701  09/17 0700    P.O. 0     I.V. (mL/kg) 1749.6 (16.7)     IV Piggyback 100     Total Intake(mL/kg) 1849.6 (17.6)     Urine (mL/kg/hr) 270     Total Output 270     Net +1579.6                 Lines/Drains/Airways       Active Status       None                  Physical Exam  Vitals and nursing note reviewed. Exam conducted with a chaperone present.   Constitutional:       General: She is not in acute distress.  HENT:      Head: Normocephalic and  atraumatic.      Right Ear: Tympanic membrane and ear canal normal.      Left Ear: Tympanic membrane and ear canal normal.      Nose: Nose normal. No congestion or rhinorrhea.      Mouth/Throat:      Mouth: Mucous membranes are moist.   Eyes:      Pupils: Pupils are equal, round, and reactive to light.   Cardiovascular:      Rate and Rhythm: Normal rate.      Pulses: Normal pulses.      Heart sounds: Normal heart sounds. No murmur heard.     No friction rub. No gallop.   Pulmonary:      Effort: Pulmonary effort is normal. No respiratory distress.      Breath sounds: Normal breath sounds. No stridor. No wheezing, rhonchi or rales.   Abdominal:      Palpations: Abdomen is soft.      Tenderness: There is abdominal tenderness (mild) in the right upper quadrant and epigastric area. There is no right CVA tenderness, left CVA tenderness or guarding.      Hernia: No hernia is present.   Musculoskeletal:         General: No tenderness.      Cervical back: No tenderness.      Thoracic back: No tenderness or bony tenderness. Normal range of motion.      Lumbar back: No tenderness or bony tenderness. Normal range of motion.      Right hip: No tenderness or bony tenderness.      Left hip: No tenderness or bony tenderness.      Right upper leg: No tenderness or bony tenderness.      Left upper leg: No tenderness or bony tenderness.      Right knee: Bony tenderness (along patella) present. No tenderness.      Left knee: No bony tenderness. No tenderness.      Right lower leg: No tenderness or bony tenderness.      Left lower leg: No tenderness or bony tenderness.      Right ankle: No tenderness.      Right foot: No tenderness or bony tenderness.      Left foot: No tenderness or bony tenderness.      Comments: Left lower extremity is in a stirrup and short leg splint.    Right knee in a knee immobilizer brace.   Skin:     General: Skin is warm.      Capillary Refill: Capillary refill takes less than 2 seconds.   Neurological:       General: No focal deficit present.      Mental Status: She is alert and oriented to person, place, and time. Mental status is at baseline.             Lab Results: I have reviewed the following results: CBC/BMP:   .     09/16/24  0831   WBC 13.10*   HGB 9.6*   HCT 28.4*      SODIUM 138   K 3.8      CO2 25   BUN 13   CREATININE 0.48*   GLUC 109      Recent Labs     09/15/24  2035 09/15/24  2053 09/16/24  0831   WBC  --   --  13.10*   HGB  --   --  9.6*   HCT  --   --  28.4*   PLT  --    < > 299   SODIUM  --   --  138   K  --   --  3.8   CL  --   --  104   CO2  --   --  25   BUN  --   --  13   CREATININE  --   --  0.48*   GLUC  --   --  109   INR 1.05  --   --     < > = values in this interval not displayed.       Imaging Review: No pertinent imaging studies reviewed.  Other Studies: No additional pertinent studies reviewed.

## 2024-09-17 NOTE — PHYSICAL THERAPY NOTE
PHYSICAL THERAPY EVALUATION NOTE          Patient Name: Jadyn Ac  Today's Date: 2024        AGE:   74 y.o.  Mrn:   [id number]  ADMIT DX:  Patella fracture [S82.009A]  Injury of head, initial encounter [S09.90XA]  Acute strain of neck muscle, initial encounter [S16.1XXA]  Closed fracture of left ankle, initial encounter [S82.892A]  Closed avulsion fracture of distal end of left fibula, initial encounter [S82.832A]    Past Medical History:  Past Medical History:   Diagnosis Date    Acute diverticulitis 2018    Anemia     Asthma     Chronic pain     bulging discs in back,polymyositis    Diabetes mellitus (HCC)     Disease of thyroid gland     Diverticulitis     Hyperlipidemia     Hypertension     Polymyositis (HCC)        Past Surgical History:  Past Surgical History:   Procedure Laterality Date    EYE SURGERY Bilateral     CATAREACT REMOVED FROM BOTH EYES 2 MTHS AGO    HYSTERECTOMY      ORIF TIBIA & FIBULA FRACTURES Left 2024    Procedure: OPEN REDUCTION W/ INTERNAL FIXATION (ORIF) ANKLE and all associated procedures;  Surgeon: Zakiya Sylvester MD;  Location: [facility];  Service: Orthopedics    SINUS SURGERY      THYROIDECTOMY      TUBAL LIGATION       Length Of Stay: 2        PHYSICAL THERAPY EVALUATION:    Patient's identity confirmed via 2 patient identifiers (full name and ) at start of session       24 1010   PT Last Visit   PT Visit Date 24   Note Type   Note type Evaluation   Pain Assessment   Pain Assessment Tool FLACC   Pain Location/Orientation Orientation: Right;Location: Leg  (knee)   Pain Onset/Description Onset: Ongoing   Effect of Pain on Daily Activities limits tolerance to activity/mobility, weight-bearing through [facility]   Hospital Pain Intervention(s) Repositioned;Ambulation/increased activity;Emotional support   Pain Rating: FLACC (Rest) - Face 0   Pain Rating: FLACC (Rest) - Legs 0   Pain Rating: FLACC  (Rest) - Activity 0   Pain Rating: FLACC (Rest) - Cry 0   Pain Rating: FLACC (Rest) - Consolability 0   Score: FLACC (Rest) 0   Pain Rating: FLACC (Activity) - Face 1   Pain Rating: FLACC (Activity) - Legs 0   Pain Rating: FLACC (Activity) - Activity 1   Pain Rating: FLACC (Activity) - Cry 1   Pain Rating: FLACC (Activity) - Consolability 1   Score: FLACC (Activity) 4   Restrictions/Precautions   Weight Bearing Precautions Per Order Yes   RLE Weight Bearing Per Order (S)  WBAT  (in knee immobilizer)   LLE Weight Bearing Per Order (S)  NWB   Other Precautions Cognitive;Chair Alarm;Bed Alarm;WBS;Multiple lines;Fall Risk;Pain   Home Living   Type of Home House   Home Layout Two level;Performs ADLs on one level;Able to live on main level with bedroom/bathroom  (3 AUGUSTUS w/ portable ramp option, stairglide to 2nd floor, pt maintains 1st floor set-up)   Bathroom Shower/Tub Walk-in shower   Bathroom Toilet Raised  (lift on toilet)   Bathroom Equipment Grab bars in shower   Home Equipment Walker;Wheelchair-manual  (lift recliner chair, adjustable bed)   Prior Function   Level of [place] Needs assistance with functional mobility;Needs assistance with ADLs;Needs assistance with IADLS   Lives With Daughter   Receives Help From Family;Personal care attendant  (supportive daughter and 2 sons, pt reports between her children and her home health aides she has 24/7 support available)   IADLs Family/Friend/Other provides transportation;Family/Friend/Other provides meals;Family/Friend/Other provides medication management   Falls in the last 6 months 1 to 4   Comments Pt reports at baseline she requries Ax1 to get out of bed and to get out of the shower, uses recliner lift chair and lift toilet seat, is able to ambulate ind w/ RW   General   Additional Pertinent History Pt s/p fall w/ R patella fx and L bimalleolar ankle fx. 9/16/24: L ankle ORIF. NWB LLE, WBAT RLE in knee immobilizer   Family/Caregiver Present No   Cognition    Overall Cognitive Status Impaired   Arousal/Participation Cooperative   Orientation Level Oriented to person;Oriented to situation   Memory Decreased recall of precautions;Decreased short term memory   Following Commands Follows one step commands with increased time or repetition   Comments Pt ID via name and ; pt agreeable to PT eval and mobility, pleasant throughout, VC for weight-bearing restrictions provided as needed   RLE Assessment   RLE Assessment X  (grossly assessed w/ functional mobility)   Strength RLE   R Hip Flexion 2-/5   LLE Assessment   LLE Assessment X  (grossly assessed w/ functional mobility)   Strength LLE   L Hip Flexion 2-/5   L Knee Flexion 2-/5   L Knee Extension 2-/5   Light Touch   RLE Light Touch Grossly intact   LLE Light Touch Grossly intact  (pt reports intact sensation to L toes)   Bed Mobility   Supine to Sit 2  Maximal assistance   Additional items Assist x 1;HOB elevated;Bedrails;Increased time required;LE management;Verbal cues   Additional Comments able to maintain sitting balance at EOB w/ supervision   Transfers   Sit to Stand 1  Dependent   Additional items Assist x 2;Increased time required;Verbal cues   Stand to Sit 1  Dependent   Additional items Assist x 2;Increased time required;Verbal cues   Additional Comments dependent Ax2 for 2 sit<>stand transfer trials, pt unable to clear buttock from EOB during either attempt   Ambulation/Elevation   Gait pattern Not appropriate;Not tested   Ambulation/Elevation Additional Comments pt unable to achieve a standing position   Balance   Static Sitting Fair +   Dynamic Sitting Fair   Activity Tolerance   Activity Tolerance Patient limited by pain   Medical Staff Made Aware Pt benefited from PT/OT care coordination w/ OT Luz Elena due to signficant level of assistance required w/ mobility and to allow for challenge of pt's activity tolerance, PT and OT goals were addressed individually during session; DONOVAN Lamb   Nurse Made Aware NOAH  [facility] [place] [facility]   Prognosis Fair   Problem List Decreased strength;Decreased endurance;Impaired balance;Decreased mobility;Decreased cognition;Decreased skin integrity;Orthopedic restrictions;Pain   Assessment Jadyn Ac is a 74 y.o. Female who [facility] due to fall and diagnosis of R patella fx. Orders for PT eval and treat received. Comorbidities affecting pt's functional mobility at time of evaluation include: s/p day 1 L ankle ORIF, DM, asthma, polymyositis, choric pain, HTN. Personal factors affecting DC include: ambulating w/ assistive device, inability to ambulate household distances, inability to navigate level surfaces w/o external assistance, decreased cognition, positive fall history, inability to perform IADLs, and inability to perform ADLs. At baseline, pt requires Ax1 to mobilize out of bed and amb ind w/ RW, and w/ 1-4 fall(s) in the previous 6 months. Upon evaluation, pt presents w/ the following deficits: impaired strength, impaired skin integrity, impaired balance, impaired cognition, decreased safety awareness, decreased endurance/activity tolerance, and pain affecting mobility. Pt currently requires max Ax1 for bed mobility, dependent Ax2 for transfer attempts. Pt's clinical presentation is unstable/unpredictable due to abnormal lab values, need for increased assistance w/ functional mobility compared to baseline, pain affecting mobility tolerance, need for input for mobility technique, recent drastic decline in mobility status, recent h/o falls, ongoing medical management. From a PT/mobility standpoint given the above findings, DC recommendation is level: II (Moderate Rehab Resource Intensity). During current admission, pt will benefit from continued skilled inpatient PT in the acute care setting in order to address the above deficits and to maximize function and mobility prior to DC from acute care.   Goals   STG Expiration Date 09/27/24   Short Term Goal #1 Pt  will: perform bilateral rolling bed mobility w/ min Ax1 to decrease pt's burden of care; perform supine<>sit mobility w/ min Ax1 to increase pt's independence w/ functional mobility; sit at EOB independently for at least 30 minutes to increase pt's tolerance to an upright sitting position and prepare for OOB transfers; perform transfers w/ min Ax1 to increase pt's OOB mobility; self-propel manual WC at least 50' and independently manage WC parts (breaks, armrests, leg rests) as an alternative means to ambulation for mobility; increase balance ratings by at least 1 grade to decrease pt's risk of falls   Plan   Treatment/Interventions Functional transfer training;LE strengthening/ROM;Therapeutic exercise;Endurance training;Cognitive reorientation;Patient/family training;Equipment eval/education;Bed mobility;Compensatory technique education  (WC training)   PT Frequency 3-5x/wk   Discharge Recommendation   Rehab Resource Intensity Level, PT II (Moderate Resource Intensity)   Equipment Recommended   (leatha-lift)   Additional Comments (S)  If pt DC home (pt reports wanting to return home upon DC) - recommend a leatha lift  (pt has a walker and a WC at home)   AM-PAC Basic Mobility Inpatient   Turning in Flat Bed Without Bedrails 2   Lying on Back to Sitting on Edge of Flat Bed Without Bedrails 2   Moving Bed to Chair 1   Standing Up From Chair Using Arms 1   Walk in Room 1   Climb 3-5 Stairs With Railing 1   Basic Mobility Inpatient Raw Score 8   [facility] Highest Level Of Mobility   -HLM Goal 3: Sit at edge of bed   -HLM Achieved 4: Move to chair/commode   Additional Treatment Session   Start Time 0945   End Time 1010   Treatment Assessment Pt reported needing to urinate and was agreeable to further PT intervention consisting of transferring OOB to the drop arm commode and to the drop arm recliner chair. Pt educated on a sliding/beasy board for OOB mobility. Using the beasy board pt transferring from EOB>commode  towards her R w/ mod Ax2. VC for hand placement and technique w/ assistance w/ BLE repositioning provided during transfer. Post attempting to urinate on the commode, pt performed an additional beasy board transfer from commode>recliner chair towards her R w/ max Ax2. Significant increased time required during 2nd transfer due to pt fatigue. Pt again required physical assistance w/ repositioning of BLE during transfer trial. Pt continues to be functioning below baseline level, and remains limited in functional mobility due to multiple weight-bearing restrictions, pain, impaired balance, decreased activity tolerance. Pt will continue benefit from PT to promote independence w/ functional mobility, address mobility restrictions, and progress towards set goals. Recommend DC w/ level: II (Moderate Rehab Resource Intensity) when medically cleared.   Equipment Use drop arm commode, beasy board, drop arm recliner chair   Additional Treatment Day 1   End of Consult   Patient Position at End of Consult Bedside chair;Bed/Chair alarm activated;All needs within reach   End of Consult Comments BLE elevated, R knee immobilizer adjsuted, leatha sling positioned under pt in the chair       The patient's AM-PAC Basic Mobility Inpatient Short Form Raw Score is 8. A Raw score of less than or equal to 16 suggests the patient may benefit from discharge to post-acute rehabilitation services. Please also refer to the recommendation of the Physical Therapist for safe discharge planning.    Pt will benefit from skilled inpatient PT during this admission in order to facilitate progress towards goals and to maximize functional independence prior to DC      DC rec: level II (Moderate Rehab Resource Intensity)        Mercedes Smith, PT, DPT  09/17/24

## 2024-09-17 NOTE — PLAN OF CARE
Problem: PAIN - ADULT  Goal: Verbalizes/displays adequate comfort level or baseline comfort level  Description: Interventions:  - Encourage patient to monitor pain and request assistance  - Assess pain using appropriate pain scale  - Administer analgesics based on type and severity of pain and evaluate response  - Implement non-pharmacological measures as appropriate and evaluate response  - Consider cultural and social influences on pain and pain management  - Notify physician/advanced practitioner if interventions unsuccessful or patient reports new pain  Outcome: Progressing     Problem: INFECTION - ADULT  Goal: Absence or prevention of progression during hospitalization  Description: INTERVENTIONS:  - Assess and monitor for signs and symptoms of infection  - Monitor lab/diagnostic results  - Monitor all insertion sites, i.e. indwelling lines, tubes, and drains  - Monitor endotracheal if appropriate and nasal secretions for changes in amount and color  - [place] appropriate cooling/warming therapies per order  - Administer medications as ordered  - Instruct and encourage patient and family to use good hand hygiene technique  - Identify and instruct in appropriate isolation precautions for identified infection/condition  Outcome: Progressing  Goal: Absence of fever/infection during neutropenic period  Description: INTERVENTIONS:  - Monitor WBC    Outcome: Progressing

## 2024-09-17 NOTE — PLAN OF CARE
Problem: OCCUPATIONAL THERAPY ADULT  Goal: Performs self-care activities at highest level of function for planned discharge setting.  See evaluation for individualized goals.  Description: Treatment Interventions: ADL retraining, Functional transfer training, Endurance training, Patient/family training, Equipment evaluation/education, Compensatory technique education, Energy conservation, Activityengagement          See flowsheet documentation for full assessment, interventions and recommendations.   Note: Limitation: Decreased ADL status, Decreased Safe judgement during ADL, Decreased cognition, Decreased endurance, Decreased self-care trans, Decreased high-level ADLs (impaired balance, fxnl mobility, act douglas, fxnl reach, standing douglas, strength, attention to task, safety awareness, insight, pacing, problem solving, learning new tasks)  Prognosis: Good  Assessment: Pt is a 74 y.o. adult seen for OT evaluation s/p admission to [facility] on 9/15/2024 due to fall in bathroom. Diagnosed with Right patella fracture. Personal and env factors supporting pt at time of IE include supportive daughter + sons + HHAs, attitude towards recovery, and FFSU. Personal and env factors inhibiting engagement in occupations include advanced age, limited social support, difficulty completing ADLs, and difficulty completing IADLs. Performance deficits that affect the pt’s occupational performance can be seen above. Due to pt's current functional limitations and medical complications pt is functioning below baseline. Pt would benefit from continued skilled OT treatment in order to maximize safety, independence and overall performance with ADLs, functional mobility, and functional transfers in order to achieve highest level of function.     Rehab Resource Intensity Level, OT: II (Moderate Resource Intensity)

## 2024-09-17 NOTE — CASE MANAGEMENT
Case Management Progress Note    Patient name Jadyn Vieyra S /S -01 MRN [id number]  : 1950 Date 2024       LOS (days): 2  Geometric Mean LOS (GMLOS) (days): 4.2  Days to GMLOS:2.4        OBJECTIVE:        Current admission status: Inpatient  Preferred Pharmacy:   [address] [address]  Phone: [phone] Fax: [phone]    Primary Care Provider: Herlinda Castorena MD    Primary Insurance: [employer] [facility] RE[facility]  Secondary Insurance:     PROGRESS NOTE:  CM notified by therapy that patient is requesting dcp is discussed with daughter, instead of her. Currently no family is at bedside.     Call x2 made to Jennifer cowan- no response. VM left with request for call back. Awaiting return call.

## 2024-09-17 NOTE — PROGRESS NOTES
Progress Note - Geriatric Medicine   Name: Jadyn Ac 74 y.o. adult I MRN: [id number]  Unit/Bed#: S -01 I Date of Admission: 9/15/2024   Date of Service: 9/17/2024 I Hospital Day: 2     Assessment & Plan  Fall  [place] fall precautions   Assess patient frequently for physical needs, encourage use of assistant devices as needed and directed by PT/OT  Identify cognitive and physical deficits and behaviors that affect risk of falls  Consider moving patient closer to nursing station to monitor more closely for impulsive behavior which may increase risk of falls  Educate patient/family on patient safety including physical limitations and importance of using call bell for assistance   Modify environment to reduce risk of injury including disconnecting from pole when not in use, ensuring adequate lighting in room and restroom, ensuring that path to restroom is clear and free of trip hazards  PT/OT consulted to assist with strengthening/mobility and assist with discharge planning to appropriate level of care    Right patella fracture  S/p fall  WBAT RLE in knee immobilizer  No immediate orthopedic intervention anticipated  Multimodal pain regimen  PT/OT consult postop  Outpatient follow-up with orthopedics  Fracture of left ankle  S/p fall  X-ray/ - Distal left tibial fracture. Widening of the medial ankle mortise and anterior tibiotalar joint.   Podiatry was consulted and is following  9/16 surgical repair ORIF NWB to LLE in splint  Multimodal pain regimen including geriatric pain protocol  PT/OT consult   Management per orthopedics    At risk for delirium  Patient is alert oriented x4  No cognitive testing upon  No history of dementia or cognitive impairment  Recommend checking vitamin B12 levels  At risk for delirium due to age, medications, anesthesia, acute pain  Recommend delirium precautions  Maintain sleep-wake cycle, avoid nighttime interruptions  minimize overnight interruptions, group overnight  vitals/labs/nursing checks as possible  dim lights, close blinds and turn off tv to minimize stimulation and encourage sleep environment in evenings  Provide adequate pain control  Avoid urinary retention and constipation  Provide frequent and early mobilization  Provide frequent redirection and reorientation as needed  Avoid medications that may worsen or precipitate delirium such as tramadol, benzodiazepines, anticholinergics, and Benadryl  Redirect unwanted behaviors as first-line therapy, avoid physical restraints as able to  Continue to monitor    Insomnia  First-line treatment is behavior modification  Maintain sleep-wake cycle, avoid nighttime interruptions  Avoid caffeine throughout the day  Avoid napping throughout the day  Encourage physical activity throughout the day  Avoid sedative hypnotics including benzodiazepines and benadryl  If needed could use melatonin 3 mg nightly    Ambulatory dysfunction  At a baseline ambulates with walker  PT/OT following  Fall precautions  Out of bed as tolerated  Encourage early and frequent mobilization  Encourage adequate hydration and nutrition  Provide adequate pain management   Goal is undetermined  Continue with PT/OT for continued strength and balance training         Subjective:   Jadyn is a 74-year-old female being seen for a geriatrics follow-up.  On exam patient patient awake in chair, just finished using the bedpan, assisted patient off bedpan.  Patient reports she is sleeping well occasionally wakes up due to pain in her legs.  She has a good appetite. Patient is comfortable at rest, but reports pain when moving her legs.  Patient has not had a bowel movement since before admission.  Discussed patient with bedside nurse no acute concerns.    Review of Systems   Constitutional:  Positive for activity change. Negative for appetite change, chills, fatigue and fever.   HENT:  Positive for sore throat. Negative for ear pain and trouble swallowing.    Eyes:   Negative for pain.   Respiratory:  Negative for cough, chest tightness and shortness of breath.    Cardiovascular:  Positive for leg swelling. Negative for chest pain and palpitations.   Gastrointestinal:  Negative for abdominal pain, constipation, diarrhea, nausea and vomiting.   Genitourinary:  Negative for difficulty urinating and dysuria.   Musculoskeletal:  Positive for arthralgias, back pain and gait problem.   Neurological:  Positive for headaches. Negative for dizziness and light-headedness.   Psychiatric/Behavioral:  Negative for dysphoric mood and sleep disturbance. The patient is not nervous/anxious.          Objective:     Vitals: Blood pressure 139/66, pulse 93, temperature 98.4 °F (36.9 °C), resp. rate 15, weight 105 kg (230 lb 13.2 oz), SpO2 95%.,Body mass index is 32.19 kg/m².      Intake/Output Summary (Last 24 hours) at 9/17/2024 1447  Last data filed at 9/16/2024 2224  Gross per 24 hour   Intake 240 ml   Output --   Net 240 ml       Current Medications: Reviewed    Physical Exam:   Physical Exam  Vitals reviewed.   Constitutional:       General: She is not in acute distress.     Appearance: Normal appearance. She is obese. She is not ill-appearing.   HENT:      Head: Normocephalic.   Cardiovascular:      Rate and Rhythm: Normal rate and regular rhythm.      Pulses: Normal pulses.      Heart sounds: Normal heart sounds. No murmur heard.  Pulmonary:      Effort: Pulmonary effort is normal. No respiratory distress.      Breath sounds: Normal breath sounds.   Abdominal:      General: Bowel sounds are normal. There is no distension.      Tenderness: There is no abdominal tenderness. There is no guarding.   Musculoskeletal:         General: Signs of injury present.      Right lower leg: Edema present.      Left lower leg: Edema present.      Comments: LLE ace wrapped s/p surgey  RLE in leg immobilizer   Skin:     General: Skin is warm and dry.   Neurological:      General: No focal deficit present.       "Mental Status: She is alert and oriented to person, place, and time. Mental status is at baseline.      Cranial Nerves: No cranial nerve deficit.      Motor: Weakness present.      Gait: Gait abnormal.   Psychiatric:         Mood and Affect: Mood normal.         Behavior: Behavior normal.          Invasive Devices       Peripheral Intravenous Line  Duration             Peripheral IV 09/15/24 Left;Ventral (anterior) Forearm 1 day    Peripheral IV 09/16/24 Right Forearm <1 day                    Lab, Imaging and other studies: Reviewed radiology reports from this admission including: xray(s).  Left ankle.  Reviewed CBC and BMP from today    Please note:  Voice-recognition software may have been used in the preparation of this document.  Occasional wrong word or \"sound-alike\" substitutions may have occurred due to the inherent limitations of voice recognition software.  Interpretation should be guided by context.     "

## 2024-09-17 NOTE — PLAN OF CARE
Problem: PHYSICAL THERAPY ADULT  Goal: Performs mobility at highest level of function for planned discharge setting.  See evaluation for individualized goals.  Description: Treatment/Interventions: Functional transfer training, LE strengthening/ROM, Therapeutic exercise, Endurance training, Cognitive reorientation, Patient/family training, Equipment eval/education, Bed mobility, Compensatory technique education (WC training)  Equipment Recommended:  (leatha-lift)       See flowsheet documentation for full assessment, interventions and recommendations.  9/17/2024 1128 by Mercedes Smith PT  Note: Prognosis: Fair  Problem List: Decreased strength, Decreased endurance, Impaired balance, Decreased mobility, Decreased cognition, Decreased skin integrity, Orthopedic restrictions, Pain  Assessment: Jadyn Ac is a 74 y.o. Female who presents to [facility] on [facility] due to fall and diagnosis of R patella fx. Orders for PT eval and treat received. Comorbidities affecting pt's functional mobility at time of evaluation include: s/p day 1 L ankle ORIF, DM, asthma, polymyositis, choric pain, HTN. Personal factors affecting DC include: ambulating w/ assistive device, inability to ambulate household distances, inability to navigate level surfaces w/o external assistance, decreased cognition, positive fall history, inability to perform IADLs, and inability to perform ADLs. At baseline, pt requires Ax1 to mobilize out of bed and amb ind w/ RW, and w/ 1-4 fall(s) in the previous 6 months. Upon evaluation, pt presents w/ the following deficits: impaired strength, impaired skin integrity, impaired balance, impaired cognition, decreased safety awareness, decreased endurance/activity tolerance, and pain affecting mobility. Pt currently requires max Ax1 for bed mobility, dependent Ax2 for transfer attempts. Pt's clinical presentation is unstable/unpredictable due to abnormal lab values, need for increased assistance w/ functional  mobility compared to baseline, pain affecting mobility tolerance, need for input for mobility technique, recent drastic decline in mobility status, recent h/o falls, ongoing medical management. From a PT/mobility standpoint given the above findings, DC recommendation is level: II (Moderate Rehab Resource Intensity). During current admission, pt will benefit from continued skilled inpatient PT in the acute care setting in order to address the above deficits and to maximize function and mobility prior to DC from acute care.        Rehab Resource Intensity Level, PT: II (Moderate Resource Intensity)    See flowsheet documentation for full assessment.

## 2024-09-17 NOTE — ASSESSMENT & PLAN NOTE
Now s/p ORIF 9/16/2024 with Dr. Sylvester (POD 1)  JAYNE LLE in splint  Pain management per primary  Monitor for signs/symptoms of blood loss anemia, transfuse for hgb of less than 7, per primary team.   DVT: patient takes arixtra at baseline.   Medical management per primary  Patient to follow up with Dr. Sylvester upon discharge.

## 2024-09-17 NOTE — ASSESSMENT & PLAN NOTE
Lab Results   Component Value Date    HGBA1C 7.6 (H) 07/11/2024       Recent Labs     09/16/24  0751 09/16/24  1128 09/16/24 2042 09/17/24  0042   POCGLU 109 111 171* 310*       Blood Sugar Average: Last 72 hrs:  (P) 173.6    - Follows with endocrinology outpatient  - Continue home Lantus 30 u QHS  - Hold home metformin  - Recently discontinued januvia   - SSI Q6 hours while NPO, adjust as diet is restarted post-op. Consistent carb diet post-op  - Monitor blood glucose

## 2024-09-18 ENCOUNTER — TELEPHONE (OUTPATIENT)
Age: 74
End: 2024-09-18

## 2024-09-18 VITALS
DIASTOLIC BLOOD PRESSURE: 81 MMHG | OXYGEN SATURATION: 95 % | WEIGHT: 230.82 LBS | BODY MASS INDEX: 32.19 KG/M2 | TEMPERATURE: 98.1 F | HEART RATE: 89 BPM | RESPIRATION RATE: 15 BRPM | SYSTOLIC BLOOD PRESSURE: 112 MMHG

## 2024-09-18 LAB
ANION GAP SERPL CALCULATED.3IONS-SCNC: 8 MMOL/L (ref 4–13)
BASOPHILS # BLD AUTO: 0.03 THOUSANDS/ΜL (ref 0–0.1)
BASOPHILS NFR BLD AUTO: 0 % (ref 0–1)
BUN SERPL-MCNC: 15 MG/DL (ref 5–25)
CALCIUM SERPL-MCNC: 7.3 MG/DL (ref 8.4–10.2)
CHLORIDE SERPL-SCNC: 105 MMOL/L (ref 96–108)
CO2 SERPL-SCNC: 24 MMOL/L (ref 21–32)
CREAT SERPL-MCNC: 0.65 MG/DL (ref 0.6–1.3)
EOSINOPHIL # BLD AUTO: 0.41 THOUSAND/ΜL (ref 0–0.61)
EOSINOPHIL NFR BLD AUTO: 4 % (ref 0–6)
ERYTHROCYTE [DISTWIDTH] IN BLOOD BY AUTOMATED COUNT: 18.9 % (ref 11.6–15.1)
GLUCOSE SERPL-MCNC: 159 MG/DL (ref 65–140)
GLUCOSE SERPL-MCNC: 181 MG/DL (ref 65–140)
GLUCOSE SERPL-MCNC: 212 MG/DL (ref 65–140)
GLUCOSE SERPL-MCNC: 252 MG/DL (ref 65–140)
HCT VFR BLD AUTO: 23.7 % (ref 36.5–46.1)
HGB BLD-MCNC: 7.9 G/DL (ref 12–15.4)
HGB BLD-MCNC: 8.1 G/DL (ref 12–15.4)
IMM GRANULOCYTES # BLD AUTO: 0.14 THOUSAND/UL (ref 0–0.2)
IMM GRANULOCYTES NFR BLD AUTO: 1 % (ref 0–2)
LYMPHOCYTES # BLD AUTO: 3.07 THOUSANDS/ΜL (ref 0.6–4.47)
LYMPHOCYTES NFR BLD AUTO: 26 % (ref 14–44)
MCH RBC QN AUTO: 22.8 PG (ref 26.8–34.3)
MCHC RBC AUTO-ENTMCNC: 33.3 G/DL (ref 31.4–37.4)
MCV RBC AUTO: 69 FL (ref 82–98)
MONOCYTES # BLD AUTO: 0.9 THOUSAND/ΜL (ref 0.17–1.22)
MONOCYTES NFR BLD AUTO: 8 % (ref 4–12)
NEUTROPHILS # BLD AUTO: 7.32 THOUSANDS/ΜL (ref 1.85–7.62)
NEUTS SEG NFR BLD AUTO: 61 % (ref 43–75)
NRBC BLD AUTO-RTO: 0 /100 WBCS
PLATELET # BLD AUTO: 251 THOUSANDS/UL (ref 149–390)
PMV BLD AUTO: 10 FL (ref 8.9–12.7)
POTASSIUM SERPL-SCNC: 3.8 MMOL/L (ref 3.5–5.3)
RBC # BLD AUTO: 3.46 MILLION/UL (ref 3.88–5.12)
SODIUM SERPL-SCNC: 137 MMOL/L (ref 135–147)
WBC # BLD AUTO: 11.87 THOUSAND/UL (ref 4.31–10.16)

## 2024-09-18 PROCEDURE — 99238 HOSP IP/OBS DSCHRG MGMT 30/<: CPT | Performed by: PHYSICIAN ASSISTANT

## 2024-09-18 PROCEDURE — 99024 POSTOP FOLLOW-UP VISIT: CPT | Performed by: PHYSICIAN ASSISTANT

## 2024-09-18 PROCEDURE — 85018 HEMOGLOBIN: CPT | Performed by: PHYSICIAN ASSISTANT

## 2024-09-18 PROCEDURE — 86905 BLOOD TYPING RBC ANTIGENS: CPT

## 2024-09-18 PROCEDURE — 85025 COMPLETE CBC W/AUTO DIFF WBC: CPT | Performed by: SURGERY

## 2024-09-18 PROCEDURE — 80048 BASIC METABOLIC PNL TOTAL CA: CPT | Performed by: SURGERY

## 2024-09-18 PROCEDURE — 99232 SBSQ HOSP IP/OBS MODERATE 35: CPT | Performed by: NURSE PRACTITIONER

## 2024-09-18 PROCEDURE — 82948 REAGENT STRIP/BLOOD GLUCOSE: CPT

## 2024-09-18 RX ORDER — DOCUSATE SODIUM 100 MG/1
100 CAPSULE, LIQUID FILLED ORAL 2 TIMES DAILY
Start: 2024-09-18

## 2024-09-18 RX ORDER — SENNOSIDES 8.6 MG
17.2 TABLET ORAL DAILY
Start: 2024-09-19

## 2024-09-18 RX ORDER — FONDAPARINUX SODIUM 5 MG/.4ML
2.5 INJECTION SUBCUTANEOUS EVERY 24 HOURS
Qty: 7 ML | Refills: 0 | Status: SHIPPED | OUTPATIENT
Start: 2024-09-18 | End: 2024-10-23

## 2024-09-18 RX ORDER — ACETAMINOPHEN 325 MG/1
975 TABLET ORAL EVERY 8 HOURS SCHEDULED
Start: 2024-09-18

## 2024-09-18 RX ORDER — OXYCODONE HYDROCHLORIDE 5 MG/1
5 TABLET ORAL EVERY 4 HOURS PRN
Qty: 20 TABLET | Refills: 0 | Status: SHIPPED | OUTPATIENT
Start: 2024-09-18 | End: 2024-09-28

## 2024-09-18 RX ORDER — ASPIRIN 81 MG/1
81 TABLET ORAL 2 TIMES DAILY
Qty: 70 TABLET | Refills: 0 | Status: CANCELLED | OUTPATIENT
Start: 2024-09-18 | End: 2024-10-23

## 2024-09-18 RX ORDER — LANOLIN ALCOHOL/MO/W.PET/CERES
3 CREAM (GRAM) TOPICAL
Status: DISCONTINUED | OUTPATIENT
Start: 2024-09-18 | End: 2024-09-18 | Stop reason: HOSPADM

## 2024-09-18 RX ADMIN — ACETAMINOPHEN 975 MG: 325 TABLET ORAL at 06:18

## 2024-09-18 RX ADMIN — DOCUSATE SODIUM 100 MG: 100 CAPSULE, LIQUID FILLED ORAL at 08:14

## 2024-09-18 RX ADMIN — INSULIN LISPRO 3 UNITS: 100 INJECTION, SOLUTION INTRAVENOUS; SUBCUTANEOUS at 12:44

## 2024-09-18 RX ADMIN — INSULIN LISPRO 1 UNITS: 100 INJECTION, SOLUTION INTRAVENOUS; SUBCUTANEOUS at 08:15

## 2024-09-18 RX ADMIN — LEVOTHYROXINE SODIUM 88 MCG: 88 TABLET ORAL at 06:18

## 2024-09-18 RX ADMIN — DOCUSATE SODIUM 100 MG: 100 CAPSULE, LIQUID FILLED ORAL at 17:00

## 2024-09-18 RX ADMIN — INSULIN LISPRO 1 UNITS: 100 INJECTION, SOLUTION INTRAVENOUS; SUBCUTANEOUS at 17:01

## 2024-09-18 RX ADMIN — AMLODIPINE BESYLATE 5 MG: 5 TABLET ORAL at 08:14

## 2024-09-18 RX ADMIN — Medication 1000 UNITS: at 08:14

## 2024-09-18 RX ADMIN — FERROUS SULFATE TAB 325 MG (65 MG ELEMENTAL FE) 325 MG: 325 (65 FE) TAB at 08:14

## 2024-09-18 RX ADMIN — SENNOSIDES 17.2 MG: 8.6 TABLET, FILM COATED ORAL at 08:14

## 2024-09-18 RX ADMIN — PANTOPRAZOLE SODIUM 40 MG: 40 TABLET, DELAYED RELEASE ORAL at 17:00

## 2024-09-18 RX ADMIN — PANTOPRAZOLE SODIUM 40 MG: 40 TABLET, DELAYED RELEASE ORAL at 06:18

## 2024-09-18 RX ADMIN — LISINOPRIL 10 MG: 10 TABLET ORAL at 08:14

## 2024-09-18 NOTE — ASSESSMENT & PLAN NOTE
S/p fall  WBAT RLE in knee immobilizer  No immediate orthopedic intervention anticipated  Multimodal pain regimen  PT/OT consult postop  Outpatient follow-up with orthopedics

## 2024-09-18 NOTE — ASSESSMENT & PLAN NOTE
Lab Results   Component Value Date    HGBA1C 7.6 (H) 07/11/2024       Recent Labs     09/17/24  1641 09/17/24  2121 09/18/24  0726 09/18/24  1104   POCGLU 249* 231* 181* 252*       Blood Sugar Average: Last 72 hrs:  (P) 195.7035462644747862    - Follows with endocrinology outpatient  - Continue home Lantus 30 u QHS  - Hold home metformin  - Recently discontinued januvia   - SSI Q6 hours while NPO, adjust as diet is restarted post-op. Consistent carb diet post-op  - Monitor blood glucose

## 2024-09-18 NOTE — ASSESSMENT & PLAN NOTE
First-line treatment is behavior modification  Maintain sleep-wake cycle, avoid nighttime interruptions  Avoid caffeine throughout the day  Avoid napping throughout the day  Encourage physical activity throughout the day  Avoid sedative hypnotics including benzodiazepines and benadryl  Patient reports she could not sleep last night  Will add melatonin 3 mg nightly as needed

## 2024-09-18 NOTE — ASSESSMENT & PLAN NOTE
- Status post mechanical fall with the below noted injuries.  - Fall precautions.  - Geriatric Medicine consultation for evaluation, medication review and recommendations.  - PT and OT evaluation and treatment as indicated. Recommending inpatient rehab.   - Case Management consultation for disposition planning. Patient and family prefer a home discharge.

## 2024-09-18 NOTE — PLAN OF CARE
Problem: PAIN - ADULT  Goal: Verbalizes/displays adequate comfort level or baseline comfort level  Description: Interventions:  - Encourage patient to monitor pain and request assistance  - Assess pain using appropriate pain scale  - Administer analgesics based on type and severity of pain and evaluate response  - Implement non-pharmacological measures as appropriate and evaluate response  - Consider cultural and social influences on pain and pain management  - Notify physician/advanced practitioner if interventions unsuccessful or patient reports new pain  Outcome: Progressing     Problem: INFECTION - ADULT  Goal: Absence or prevention of progression during hospitalization  Description: INTERVENTIONS:  - Assess and monitor for signs and symptoms of infection  - Monitor lab/diagnostic results  - Monitor all insertion sites, i.e. indwelling lines, tubes, and drains  - Monitor endotracheal if appropriate and nasal secretions for changes in amount and color  - [place] appropriate cooling/warming therapies per order  - Administer medications as ordered  - Instruct and encourage patient and family to use good hand hygiene technique  - Identify and instruct in appropriate isolation precautions for identified infection/condition  Outcome: Progressing     Problem: SAFETY ADULT  Goal: Patient will remain free of falls  Description: INTERVENTIONS:  - Educate patient/family on patient safety including physical limitations  - Instruct patient to call for assistance with activity   - Consult OT/PT to assist with strengthening/mobility   - Keep Call bell within reach  - Keep bed low and locked with side rails adjusted as appropriate  - Keep care items and personal belongings within reach  - Initiate and maintain comfort rounds  - Make Fall Risk Sign visible to staff  - Apply yellow socks and bracelet for high fall risk patients  - Consider moving patient to room near nurses station  Outcome: Progressing

## 2024-09-18 NOTE — PROGRESS NOTES
Patient:    MRN:  [id number]    [place] Request ID:  [id number]    Level of care reserved:  Home Health Agency    Partner Reserved:  [facility], [place] [id number] [phone]    Clinical needs requested:    Geography searched:  [id number]    Start of Service:    Request sent:  4:31pm EDT on 9/17/2024 by Adonis Amaya    Partner reserved:  9:37am EDT on 9/18/2024 by Adonis Amaya    Choice list shared:  9:37am EDT on 9/18/2024 by Adonis Amaya

## 2024-09-18 NOTE — CASE MANAGEMENT
Case Management Discharge Planning Note    Patient name Jadyn Ac  Location S /S -01 MRN [id number]  : 1950 Date 2024       Current Admission Date: 9/15/2024  Current Admission Diagnosis:Right patella fracture   Patient Active Problem List    Diagnosis Date Noted Date Diagnosed    Paroxysmal A-fib (HCC) 2024     Encounter for medication review 2024     At risk for delirium 2024     Ambulatory dysfunction 2024     Vision impairment 2024     Insomnia 2024     Fall 09/15/2024     Right patella fracture 09/15/2024     Fracture of left ankle 09/15/2024     Atrial fibrillation with rapid ventricular response (HCC) 08/10/2024     Elevated serum creatinine 2024     Pleural nodule 2024     Choledocholithiasis 2024     Cholelithiasis 2024     Generalized weakness 2024     Leukocytosis 2024     Gnosticist exemption 2024     Primary hypertension 2022     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(HCC) 2022     Acquired hypothyroidism 2022     Steroid-induced hyperglycemia 2022     Diverticulitis 2018     Polymyositis (HCC)      Hyperlipidemia      Type 2 diabetes mellitus with hyperglycemia, with long-term current use of insulin (HCC)      Asthma      Chronic pain        LOS (days): 3  Geometric Mean LOS (GMLOS) (days): 4.2  Days to GMLOS:1.1     OBJECTIVE:  Risk of Unplanned Readmission Score: 19.06         Current admission status: Inpatient   Preferred Pharmacy:   [facility] [address]  Phone: [phone] Fax: [phone]    Primary Care Provider: Herlinda Castorena MD    Primary Insurance: [employer] REP  Secondary Insurance:     DISCHARGE DETAILS:    Discharge planning discussed with:: MARCUS left for daughterJennifer, spoke with sonMarino over phone  Freedom of Choice: Yes  Comments - Freedom of Choice: [facility]  CM  contacted family/caregiver?: Yes  Were Treatment Team discharge recommendations reviewed with patient/caregiver?: Yes  Did patient/caregiver verbalize understanding of patient care needs?: Yes  Were patient/caregiver advised of the risks associated with not following Treatment Team discharge recommendations?: Yes    Contacts  Patient Contacts: Marino  Relationship to Patient:: Family (son)  Contact Method: Phone  Phone Number: [phone]  Reason/Outcome: Discharge Planning, Emergency Contact, Referral, Continuity of Care    Requested Home Health Care         Is the patient interested in [facility] at discharge?: Yes  Home Health Discipline requested:: Nursing, Occupational Therapy, Physical Therapy  Home Health Agency Name:: [facility] Follow-Up Provider:: PCP  Home Health Services Needed:: Gait/ADL Training, Evaluate Functional Status and Safety, Strengthening/Theraputic Exercises to Improve Function  Homebound Criteria Met:: Uses an Assist Device (i.e. cane, walker, etc)  Supporting Clincal Findings:: Limited Endurance    DME Referral Provided  Referral made for DME?: No    Other Referral/Resources/Interventions Provided:  Referral Comments: CM notified by trauma AP that patient is medically clear for d/c today. Call made for daughterJennifer- no response. Call made to son Marino. CM confirmed they are not able to order the DME family was hoping for (no medical companies carry, insurance would not cover). CM notified son of this who expressed understanding-- son stating only other DME they would like is a bed pan and large chucks pad-- nursing made aware. [facility] able to accept- reserved in [facility]- F2F and AVS uploaded to [facility]. Confirmed with family they do not need transport home. Request from trauma AP to price check fondaparinux- call made to pharmacy who reports med is $5.00-- AP made aware. No further CM needs anticipated at this time.    Would you like to participate in our [facility]  service program?  : No - Declined    Treatment Team Recommendation: Short Term Rehab, SNF  Discharge Destination Plan:: Home with Home Health Care  Transport at Discharge : Family    IMM Given (Date):: 09/18/24  IMM Given to:: Family (reviewed with son, over phone)  IMM reviewed with patient's caregiver, patient's caregiver agrees with discharge determination.

## 2024-09-18 NOTE — TELEPHONE ENCOUNTER
"Called pharmacy and clarified that the script is correct. On call provider notes: \"2.5 mg is 7 mL every day until 10/23.\"     Pharmacy thankful for the call back. Pharmacy notes the amount will not be enough for the duration so additional script will need to be sent. Currently the pharmacy states it will be about 17 or 18 syringes.   "

## 2024-09-18 NOTE — ASSESSMENT & PLAN NOTE
At a baseline ambulates with walker  PT/OT following  Fall precautions  Out of bed as tolerated  Encourage early and frequent mobilization  Encourage adequate hydration and nutrition  Provide adequate pain management   Goal is rehab  Continue with PT/OT for continued strength and balance training

## 2024-09-18 NOTE — PROGRESS NOTES
Progress Note - Orthopedics   Name: Jadyn Ac 74 y.o. adult I MRN: [id number]  Unit/Bed#: S -01 I Date of Admission: 9/15/2024   Date of Service: 9/18/2024 I Hospital Day: 3    Assessment & Plan  Fracture of left ankle  Now s/p ORIF 9/16/2024 with Dr. Sylvester (POD 2)  NWB LLE in splint  Pain management per primary  Monitor for signs/symptoms of blood loss anemia, transfuse for hgb of less than 7, per primary team.   DVT: patient takes arixtra at baseline.   Medical management per primary  Patient to follow up with Dr. Sylvester upon discharge.     Right patella fracture  WBAT RLE in knee immobilizer  No immediate orthopedic intervention anticipated  PT/OT  Follow up with orthopedics at discharge    Ok for discharge from Orthopedics service perspective. Orthopedics service will follow. Please contact the SecureChat role for the Orthopedics service with any questions/concerns.    History of Present Illness   74 y.o.adult seen and examined at bedside. States that her pain is very well controlled this morning. No acute complaints. Feeling great. Eager for discharge when able.     Objective      Temp:  [97.6 °F (36.4 °C)-98.4 °F (36.9 °C)] 98.2 °F (36.8 °C)  HR:  [] 87  Resp:  [15] 15  BP: (108-140)/(51-67) 115/67  O2 Device: None (Room air)          I/O         09/16 0701 09/17 0700 09/17 0701  09/18 0700 09/18 0701  09/19 0700    P.O. 240 480     I.V. (mL/kg)       IV Piggyback       Total Intake(mL/kg) 240 (2.3) 480 (4.6)     Urine (mL/kg/hr)       Total Output       Net +240 +480            Unmeasured Urine Occurrence 3 x            Lines/Drains/Airways       Active Status       None                  Physical Exam Musculoskeletal: bilaterallower  Right lower extremity: KI in place, generalized swelling about the right knee with some mild ttp. +EHL/FHL, +plantarflexion/dorsiflexion.  Left lower extremity: splint in place. +EHL/FHL.   Digits are warm and well perfused bilaterally.   Sensation intact  "to saphenous, sural, tibial, superficial peroneal nerve, and deep peroneal  No calf swelling or tenderness to palpation      Lab Results: I have reviewed the following results: CBC/BMP:   .     09/18/24  0507   WBC 11.87*   HGB 7.9*   HCT 23.7*      SODIUM 137   K 3.8      CO2 24   BUN 15   CREATININE 0.65   GLUC 212*      Recent Labs     09/15/24  2035 09/15/24  2053 09/16/24  0831 09/17/24 0519 09/18/24  0507   WBC  --   --  13.10* 13.95* 11.87*   HGB  --   --  9.6* 8.5* 7.9*   HCT  --   --  28.4* 25.4* 23.7*   PLT  --    < > 299 278 251   BUN  --   --  13 14 15   CREATININE  --   --  0.48* 0.73 0.65   INR 1.05  --   --   --   --     < > = values in this interval not displayed.     Blood Culture:    Lab Results   Component Value Date    BLOODCX No Growth After 5 Days. 08/09/2024    BLOODCX No Growth After 5 Days. 08/09/2024     Wound Culture: No results found for: \"WOUNDCULT\"  "

## 2024-09-18 NOTE — ASSESSMENT & PLAN NOTE
[place] fall precautions   Assess patient frequently for physical needs, encourage use of assistant devices as needed and directed by PT/OT  Identify cognitive and physical deficits and behaviors that affect risk of falls  Consider moving patient closer to nursing station to monitor more closely for impulsive behavior which may increase risk of falls  Educate patient/family on patient safety including physical limitations and importance of using call bell for assistance   Modify environment to reduce risk of injury including disconnecting from pole when not in use, ensuring adequate lighting in room and restroom, ensuring that path to restroom is clear and free of trip hazards  PT/OT consulted to assist with strengthening/mobility and assist with discharge planning to appropriate level of care

## 2024-09-18 NOTE — DISCHARGE SUMMARY
Discharge Summary - Trauma   Name: Jadyn Ac 74 y.o. adult I MRN: [id number]  Unit/Bed#: S -01 I Date of Admission: 9/15/2024   Date of Service: 9/18/2024 I Hospital Day: 3     Assessment & Plan  Right patella fracture  - Closed right patellar fracture, present on admission.  - Status post fall on 9/15.  - Appreciate Orthopedic surgery evaluation, recommendations and interventions as noted.  - Non operative management  - Maintain WBAT RLE in knee immobilizer.  - Monitor right lower extremity neurovascular exam.  - Continue multimodal analgesic regimen.  - Continue DVT prophylaxis.  - PT and OT evaluation and treatment as indicated.  - Outpatient follow up with Orthopedic surgery for re-evaluation.    Type 2 diabetes mellitus with hyperglycemia, with long-term current use of insulin ([facility])  Lab Results   Component Value Date    HGBA1C 7.6 (H) 07/11/2024       Recent Labs     09/17/24  1641 09/17/24  2121 09/18/24  0726 09/18/24  1104   POCGLU 249* 231* 181* 252*       Blood Sugar Average: Last 72 hrs:  (P) 195.3290281892213615    - Follows with endocrinology outpatient  - Continue home Lantus 30 u QHS  - Hold home metformin  - Recently discontinued januvia   - SSI Q6 hours while NPO, adjust as diet is restarted post-op. Consistent carb diet post-op  - Monitor blood glucose  Acquired hypothyroidism  - Continue home synthroid  - Geriatric medicine consult  Primary hypertension  - Continue home amlodipine  - ACEI was resumed   - BP well controlled  Fall  - Status post mechanical fall with the below noted injuries.  - Fall precautions.  - Geriatric Medicine consultation for evaluation, medication review and recommendations.  - PT and OT evaluation and treatment as indicated. Recommending inpatient rehab.   - Case Management consultation for disposition planning. Patient and family prefer a home discharge.     Fracture of left ankle  - Closed left ankle fracture, present on admission.  - Status post fall on  "9/15.  - Appreciate Orthopedic surgery evaluation, recommendations and interventions as noted.  - S/p reduction and splint in ED  - S/p ORIF  - Maintain NWB LLE in splint   - Monitor left lower extremity neurovascular exam.  - Continue multimodal analgesic regimen.  - Continue DVT prophylaxis.  - PT and OT evaluation and treatment as indicated.  - Outpatient follow up with Orthopedic surgery for re-evaluation.    Paroxysmal A-fib (HCC)  - found to have new onset atrial fibrillation on recent hospitalization at [facility]  - plans to follow up outpatient with cardiology in October  - Rate controlled  - Not on any rate controlling medications or anticoagulation  Encounter for medication review    At risk for delirium    Ambulatory dysfunction    Vision impairment    Insomnia      Admission Date: 9/15/2024 1257  Discharge Date: 09/18/24  Admitting Diagnosis: Patella fracture [S82.009A]  Injury of head, initial encounter [S09.90XA]  Acute strain of neck muscle, initial encounter [S16.1XXA]  Closed fracture of left ankle, initial encounter [S82.892A]  Closed avulsion fracture of distal end of left fibula, initial encounter [S82.832A]  Discharge Diagnosis:   Medical Problems       Resolved Problems  Date Reviewed: 9/18/2024   None         HPI: As documented by Emilie COTO who evaluated the patient on admission, \"Jadyn Ac is a 74 y.o. adult who presents after coming out of the bathroom and fell.  Immediate pain in right knee and left ankle.  Did not sstrike her head, no LOC.  HAs a history of myositis.  Family at bedside and very supportive.  Will order pain medication.  PAtient is highly allergic to Ibuprofen.  Will consult ORthopedics.\"    Procedures Performed:   Orders Placed This Encounter   Procedures    Orthopedic injury treatment    Splint application    Splint application       Summary of Hospital Course:  Patient was seen by orthopedics and taken to the OR on 9/16 for ORIF L ankle. SHe is to be NWB on " the LLE. She has a non operative R patella fracture which is being managed in a knee immobilizer, NWB. She has adequate pain control. She worked with PT/OT who recommended inpatient rehab. Patient and family prefer a home discharge. She will d/c home today. She will d/c on ASA 81 mg BID for 35 days for DVT ppx. She will f/u with orthopedics in 2 weeks.     Significant Findings, Care, Treatment and Services Provided:   XR ankle 2 vw left    Result Date: 9/17/2024  Impression: Fluoroscopy provided for procedure guidance. Please refer to the separate procedure note for additional details. Workstation performed: [id number]     XR ankle 3+ vw left    Result Date: 9/16/2024  Impression: Lateral malleolus fracture. Improved alignment as above. Overlying splint obscures fine osseous detail. Computerized Assisted Algorithm (CAA) may have been used to analyze all applicable images. Workstation performed: [id number]     XR ankle 2 vw left    Result Date: 9/16/2024  Impression: Lateral malleolus fracture with improved alignment of the ankle joint status post splinting as above. Persistent slight widening of the medial mortise. Computerized Assisted Algorithm (CAA) may have been used to analyze all applicable images. Workstation performed: [id number]     XR tibia fibula 2 vw left    Result Date: 9/16/2024  Impression: Left distal fibula/lateral malleolar minimally displaced fracture with widening of the medial ankle mortise and anterior tibiotalar joint. Computerized Assisted Algorithm (CAA) may have been used to analyze all applicable images. Workstation performed: [id number]     XR knee 4+ vw right injury    Result Date: 9/16/2024  Impression: Right superior patella fracture. Computerized Assisted Algorithm (CAA) may have been used to analyze all applicable images. Workstation performed: [id number]     XR ankle 3+ views LEFT    Result Date: 9/16/2024  Impression: Distal left tibial fracture. Widening of the medial ankle  mortise and anterior tibiotalar joint. Computerized Assisted Algorithm (CAA) may have been used to analyze all applicable images. Workstation performed: [id number]     CT spine cervical without contrast    Result Date: 9/15/2024  Impression: No cervical spine fracture or traumatic malalignment. Stable multilevel cervical spondylosis. Workstation performed: [id number]     CT head without contrast    Result Date: 9/15/2024  Impression: No acute intracranial abnormality. 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 Workstation performed: [id number]        Complications: none    Discharge Day Visit / Exam:   /73   Pulse 88   Temp 98.5 °F (36.9 °C)   Resp 15   Wt 105 kg (230 lb 13.2 oz)   SpO2 95%   BMI 32.19 kg/m²   Physical Exam  Constitutional:       Appearance: Normal appearance.   HENT:      Head: Normocephalic.      Nose: Nose normal.      Mouth/Throat:      Mouth: Mucous membranes are moist.   Eyes:      Pupils: Pupils are equal, round, and reactive to light.   Cardiovascular:      Rate and Rhythm: Normal rate and regular rhythm.      Pulses: Normal pulses.   Pulmonary:      Effort: Pulmonary effort is normal. No respiratory distress.      Breath sounds: Normal breath sounds.   Abdominal:      General: Abdomen is flat. There is no distension.      Palpations: Abdomen is soft.      Tenderness: There is no abdominal tenderness.   Musculoskeletal:         General: Tenderness present. No swelling or deformity. Normal range of motion.      Cervical back: Normal range of motion and neck supple. No tenderness.      Comments: + LLE with splint in place, NVI distally   Skin:     General: Skin is warm and dry.   Neurological:      General: No focal deficit present.      Mental Status: She is alert and oriented to person, place, and time.      Sensory: No sensory deficit.      Motor: No weakness.   Psychiatric:         Behavior: Behavior normal.          Discussion with Family: Updated son via phone    Condition at Discharge:  good       Discharge instructions/Information to patient and family:   See After Visit Summary (AVS) for information provided to patient and family.      Provisions for Follow-Up Care:  See after visit summary for information related to follow-up care and any pertinent home health orders.      PCP: Herlinda Castorena MD    Disposition: Home with family    Planned Readmission: No     Discharge Medications:  See after visit summary for reconciled discharge medications provided to patient and family.      Discharge Statement:  I have spent a total time of 25 minutes in caring for this patient on the day of the visit/encounter. .

## 2024-09-18 NOTE — TELEPHONE ENCOUNTER
[facility] calling in regarding trauma script:     fondaparinux (ARIXTRA) 5 mg/0.4 mL [[id number]]    Order Details  Dose: 2.5 mg Route: Subcutaneous Frequency: Every 24 hours   Dispense Quantity: 7 mL Refills: 0          Sig: Inject 2.5 mg under the skin every 24 hours   Pharmacy states this medication is very strong and this is an unusual script as this would be 17 syringes total. Pharmacy would like clarification if the provider meant for this amount or if they meant 7 syringes total instead.     Messaged on call provider for clarification.

## 2024-09-18 NOTE — ASSESSMENT & PLAN NOTE
Now s/p ORIF 9/16/2024 with Dr. Sylvester (POD 2)  JAYNE LLE in splint  Pain management per primary  Monitor for signs/symptoms of blood loss anemia, transfuse for hgb of less than 7, per primary team.   DVT: patient takes arixtra at baseline.   Medical management per primary  Patient to follow up with Dr. Sylvester upon discharge.

## 2024-09-18 NOTE — ASSESSMENT & PLAN NOTE
- found to have new onset atrial fibrillation on recent hospitalization at [facility]  - plans to follow up outpatient with cardiology in October  - Rate controlled  - Not on any rate controlling medications or anticoagulation

## 2024-09-18 NOTE — ED ATTENDING ATTESTATION
9/15/2024  IHunter MD, saw and evaluated the patient. I have discussed the patient with the resident/non-physician practitioner and agree with the resident's/non-physician practitioner's findings, Plan of Care, and MDM as documented in the resident's/non-physician practitioner's note, except where noted. All available labs and Radiology studies were reviewed.  I was present for key portions of any procedure(s) performed by the resident/non-physician practitioner and I was immediately available to provide assistance.       At this point I agree with the current assessment done in the Emergency Department.  I have conducted an independent evaluation of this patient a history and physical is as follows:see h and p above     ED Course  ED Course as of 09/18/24 1215   Sun Sep 15, 2024   1403 R knee xray xray - pos patellar xray   1408 Left ankle/tib fib xray - medial ankle mortise widening- left distal fibula fx    1413 Er md note- pt seen and thoroughly evaluated by er md - case d/w er resident - all imaging reviewed by er md -- 74 yr femalE with polymYositis-- walks with a WALKER-- DEOS FALL--AT 4 AM GOT UP TO GO TO THE BATHROOM  AND LEGS GAVE OUT- PT FELL BACK ON TILE  ERIKA WITH POSTERIOR HEAD/NECK INJURY - AND R KNEE AND LEFT ANKLE INJURY -- PT HAS CHRONIC BACK AND HIP PAIN - NO NEW OR PAIN IN LOW BACK/HIPS - NO LOC- PT REMEMBERS FALLING - NO MEDICAL SYMPTOMS CAUSED FALL -WAS NOT ON GROUND FOR LONG  DAUGHTER CALLED SON WHO HELPED BUT PT BACK IN BED BY SON- STATES WAS ACTING AT BASELINE-- - THIS AM COULD NOT GET OUT OF BED DO TO PAIN IN LEFT ANKLE/ R KNEE- AVSS- MIDL TACHYCARDIA-- PULSE OX 96 % ON RA- INTERPRETATION IS NORMAL- NO INTERVENTION - POS OCCIPITAL SCALP TENDERNESS- NO ECCHYMOSIS/ HEMATOMA/ CONTUSION- LEFT SIDED POSTERIOR CERVICAL MUSCLE TENDERNESS - NO PMT C/T/L/S SPINE- RRR S1/S2-CTA-B/SOFT NT/ND- RLE- POS ANTERIOR PROX TIBIAL ECCHYMOSIS/ TENDENRESS-  MIDL ACL LAXITY --  PT UNABLE TO  FLE/X/XT AT R KNEE- NORMAL RLE STRENGTH/SENSATION/PULSE-=ROM/CAP REFILL - LLE- POS LATERAL MALLEOLAR TENDERNESS STS- NT AT LEFT ACHILLES TENDON - NORMAL LLE DISTAL PUL-SENSATION /CAP REFILL    1450 ER MD NOTE- CASE D/W TRAUMA PENDING- WILL COME TO SE PT- FAMILY AWARE OF PENDING ADMIT   1525 ER MD NOTE- LEFT ANKLE ANKLE- POST REDUCTION -- GOOD REDUCTION OF MORTISE--    1531 ER MD NOTE- Pt RE-EVALUATED- RESTING IN NAD- PAIN UNDER CONTROL -- AWAITING TRAUMA EVAL AND ADMIT-- WILL CONT TO EVAL PT WHILE IN ER          Critical Care Time  Procedures

## 2024-09-18 NOTE — PROGRESS NOTES
Progress Note - Geriatric Medicine   Name: Jadyn Ac 74 y.o. adult I MRN: [id number]  Unit/Bed#: S -01 I Date of Admission: 9/15/2024   Date of Service: 9/18/2024 I Hospital Day: 3     Assessment & Plan  Fall  [place] fall precautions   Assess patient frequently for physical needs, encourage use of assistant devices as needed and directed by PT/OT  Identify cognitive and physical deficits and behaviors that affect risk of falls  Consider moving patient closer to nursing station to monitor more closely for impulsive behavior which may increase risk of falls  Educate patient/family on patient safety including physical limitations and importance of using call bell for assistance   Modify environment to reduce risk of injury including disconnecting from pole when not in use, ensuring adequate lighting in room and restroom, ensuring that path to restroom is clear and free of trip hazards  PT/OT consulted to assist with strengthening/mobility and assist with discharge planning to appropriate level of care    Right patella fracture  S/p fall  WBAT RLE in knee immobilizer  No immediate orthopedic intervention anticipated  Multimodal pain regimen  PT/OT consult postop  Outpatient follow-up with orthopedics  Fracture of left ankle  S/p fall  X-ray/ - Distal left tibial fracture. Widening of the medial ankle mortise and anterior tibiotalar joint.   Podiatry was consulted and is following  9/16 surgical repair ORIF NWB to LLE in splint  Multimodal pain regimen including geriatric pain protocol-currently on Tylenol 975 every 8, oxycodone 2.5 mg every 4 as needed for moderate pain, oxycodone 5 mg Q4.  For severe pain  PT/OT consult   Management per orthopedics    At risk for delirium  Patient is alert oriented x4  No cognitive testing on file  No history of dementia or cognitive impairment  Recommend checking vitamin B12 levels  At risk for delirium due to age, medications, anesthesia, acute pain  Recommend  delirium precautions  Maintain sleep-wake cycle, avoid nighttime interruptions  minimize overnight interruptions, group overnight vitals/labs/nursing checks as possible  dim lights, close blinds and turn off tv to minimize stimulation and encourage sleep environment in evenings  Provide adequate pain control  Avoid urinary retention and constipation  Provide frequent and early mobilization  Provide frequent redirection and reorientation as needed  Avoid medications that may worsen or precipitate delirium such as tramadol, benzodiazepines, anticholinergics, and Benadryl  Redirect unwanted behaviors as first-line therapy, avoid physical restraints as able to  Continue to monitor    Insomnia  First-line treatment is behavior modification  Maintain sleep-wake cycle, avoid nighttime interruptions  Avoid caffeine throughout the day  Avoid napping throughout the day  Encourage physical activity throughout the day  Avoid sedative hypnotics including benzodiazepines and benadryl  Patient reports she could not sleep last night  Will add melatonin 3 mg nightly as needed    Ambulatory dysfunction  At a baseline ambulates with walker  PT/OT following  Fall precautions  Out of bed as tolerated  Encourage early and frequent mobilization  Encourage adequate hydration and nutrition  Provide adequate pain management   Goal is rehab  Continue with PT/OT for continued strength and balance training         Subjective:   Jadyn is a 74-year-old female being seen for a geriatrics follow-up.  Upon exam patient was lying bed, sleeping.  She arouses easily to voice.  She was reporting moderate pain on exam.  Appetite is stable.  She had trouble sleeping overnight.  Per nursing no acute concerns or issues at this time.    Review of Systems   Constitutional:  Positive for activity change. Negative for appetite change, chills, fatigue and fever.   HENT:  Negative for ear pain, sore throat and trouble swallowing.    Eyes:  Negative for pain.    Respiratory:  Negative for cough, chest tightness and shortness of breath.    Cardiovascular:  Negative for chest pain and palpitations.   Gastrointestinal:  Positive for constipation. Negative for abdominal pain, diarrhea, nausea and vomiting.   Genitourinary:  Negative for difficulty urinating and dysuria.   Musculoskeletal:  Positive for arthralgias, back pain and gait problem.   Neurological:  Positive for headaches. Negative for dizziness and light-headedness.   Psychiatric/Behavioral:  Positive for sleep disturbance. Negative for dysphoric mood. The patient is not nervous/anxious.          Objective:     Vitals: Blood pressure 131/73, pulse 88, temperature 98.5 °F (36.9 °C), resp. rate 15, weight 105 kg (230 lb 13.2 oz), SpO2 95%.,Body mass index is 32.19 kg/m².      Intake/Output Summary (Last 24 hours) at 9/18/2024 1202  Last data filed at 9/18/2024 0934  Gross per 24 hour   Intake 720 ml   Output --   Net 720 ml       Current Medications: Reviewed    Physical Exam:   Physical Exam  Vitals reviewed.   Constitutional:       General: She is sleeping. She is not in acute distress.     Appearance: Normal appearance. She is obese. She is not ill-appearing.   HENT:      Head: Normocephalic.   Cardiovascular:      Rate and Rhythm: Normal rate and regular rhythm.      Pulses: Normal pulses.      Heart sounds: Normal heart sounds. No murmur heard.  Pulmonary:      Effort: Pulmonary effort is normal. No respiratory distress.      Breath sounds: Normal breath sounds.   Abdominal:      General: Bowel sounds are normal. There is no distension.      Tenderness: There is no abdominal tenderness. There is no guarding.   Musculoskeletal:         General: Signs of injury present.      Right lower leg: Edema present.      Left lower leg: Edema present.      Comments: LLE ace wrapped s/p surgey  RLE in leg immobilizer   Skin:     General: Skin is warm and dry.   Neurological:      General: No focal deficit present.       "Mental Status: She is oriented to person, place, and time. Mental status is at baseline.      Cranial Nerves: No cranial nerve deficit.      Motor: Weakness present.      Gait: Gait abnormal.   Psychiatric:         Mood and Affect: Mood normal.         Behavior: Behavior normal.          Invasive Devices       Peripheral Intravenous Line  Duration             Peripheral IV 09/15/24 Left;Ventral (anterior) Forearm 2 days    Peripheral IV 09/16/24 Right Forearm 1 day                    Lab, Imaging and other studies: No pertinent imaging studies reviewed.  Reviewed CBC, BMP    Please note:  Voice-recognition software may have been used in the preparation of this document.  Occasional wrong word or \"sound-alike\" substitutions may have occurred due to the inherent limitations of voice recognition software.  Interpretation should be guided by context.     "

## 2024-09-18 NOTE — ASSESSMENT & PLAN NOTE
S/p fall  X-ray/ - Distal left tibial fracture. Widening of the medial ankle mortise and anterior tibiotalar joint.   Podiatry was consulted and is following  9/16 surgical repair ORIF NWB to LLE in splint  Multimodal pain regimen including geriatric pain protocol-currently on Tylenol 975 every 8, oxycodone 2.5 mg every 4 as needed for moderate pain, oxycodone 5 mg Q4.  For severe pain  PT/OT consult   Management per orthopedics

## 2024-09-18 NOTE — ASSESSMENT & PLAN NOTE
Patient is alert oriented x4  No cognitive testing on file  No history of dementia or cognitive impairment  Recommend checking vitamin B12 levels  At risk for delirium due to age, medications, anesthesia, acute pain  Recommend delirium precautions  Maintain sleep-wake cycle, avoid nighttime interruptions  minimize overnight interruptions, group overnight vitals/labs/nursing checks as possible  dim lights, close blinds and turn off tv to minimize stimulation and encourage sleep environment in evenings  Provide adequate pain control  Avoid urinary retention and constipation  Provide frequent and early mobilization  Provide frequent redirection and reorientation as needed  Avoid medications that may worsen or precipitate delirium such as tramadol, benzodiazepines, anticholinergics, and Benadryl  Redirect unwanted behaviors as first-line therapy, avoid physical restraints as able to  Continue to monitor

## 2024-09-19 ENCOUNTER — TRANSITIONAL CARE MANAGEMENT (OUTPATIENT)
Dept: FAMILY MEDICINE CLINIC | Facility: CLINIC | Age: 74
End: 2024-09-19

## 2024-09-19 DIAGNOSIS — Z71.89 COMPLEX CARE COORDINATION: Primary | ICD-10-CM

## 2024-09-19 NOTE — UTILIZATION REVIEW
NOTIFICATION OF ADMISSION DISCHARGE   This is a Notification of Discharge from [facility]. Please be advised that this patient has been discharge from our facility. Below you will find the admission and discharge date and time including the patient’s disposition.   UTILIZATION REVIEW CONTACT:  Cally Chaudhari  Utilization   Network Utilization Review Department  Phone: [phone] x carefully listen to the prompts. All voicemails are confidential.  Email: [email or web address]     ADMISSION INFORMATION  PRESENTATION DATE: 9/15/2024 12:57 PM  OBERVATION ADMISSION DATE: N/A  INPATIENT ADMISSION DATE: 9/15/24  4:19 PM   DISCHARGE DATE: 9/18/2024  9:29 PM   DISPOSITION:Home with Home Health Care    Network Utilization Review Department  ATTENTION: Please call with any questions or concerns to [phone] and carefully listen to the prompts so that you are directed to the right person. All voicemails are confidential.   For Discharge needs, contact Care Management DC Support Team at [phone] opt. 2  Send all requests for admission clinical reviews, approved or denied determinations and any other requests to dedicated fax number below belonging to the campus where the patient is receiving treatment. List of dedicated fax numbers for the Facilities:  FACILITY NAME UR FAX NUMBER   ADMISSION DENIALS (Administrative/Medical Necessity) [phone]   DISCHARGE SUPPORT TEAM ([facility]) [phone]   PARENT CHILD HEALTH (Maternity/NICU/Pediatrics) [phone]   [facility] [phone]   [facility] [phone]   [facility] [phone]   [facility] [phone]   [facility] [phone]   [facility] [phone]   [facility] [phone]   [facility]  [phone]   [facility] [phone]   [facility] [phone]   [facility] [phone]   [facility] [phone]

## 2024-09-20 ENCOUNTER — PATIENT OUTREACH (OUTPATIENT)
Dept: CASE MANAGEMENT | Facility: OTHER | Age: 74
End: 2024-09-20

## 2024-09-20 NOTE — PROGRESS NOTES
[facility] referral received and chart review completed. Pt was hospitalized 9/15-9/18 with right patella fracture and left ankle fracture s/p ORIF. Pt discharged home with [facility]. Pt is to be non-wt bearing on LLE.    Call placed to Jadyn for care management. Introduced self as covering NOAH MAN. Jadyn states that she is terrible. She has been laying in bed and using bed pan since being home from hospital. She does report that she has pain medications and her pain is adequately controlled.   Confirmed she has virtual PCP appointment scheduled for 9/25. Macieldhavalmaxx states that she has follow up appointment with ortho in 2 weeks as planned. She expresses some hesitation in regards to being able to get bear weight long enough to get to ortho appointment.    She states that she has all medications. She has been giving herself Arixtra injection for DVT prophylaxis without issue.   She confirms [facility] has been to her home.   Jadyn consent to follow up care management. Informed her that Maria Luisa Kasper RN CM would continue outreach.     RN CM on care team and note routed to same.

## 2024-09-21 LAB
C AG RBC QL: NEGATIVE
E AG RBC QL: NEGATIVE

## 2024-09-25 ENCOUNTER — TELEMEDICINE (OUTPATIENT)
Dept: FAMILY MEDICINE CLINIC | Facility: CLINIC | Age: 74
End: 2024-09-25
Payer: COMMERCIAL

## 2024-09-25 DIAGNOSIS — S82.001S CLOSED NONDISPLACED FRACTURE OF RIGHT PATELLA, UNSPECIFIED FRACTURE MORPHOLOGY, SEQUELA: ICD-10-CM

## 2024-09-25 DIAGNOSIS — S82.892S CLOSED FRACTURE OF LEFT ANKLE, SEQUELA: ICD-10-CM
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
## 2024-09-25 DIAGNOSIS — I10 ESSENTIAL HYPERTENSION: Primary | ICD-10-CM

## 2024-09-25 PROCEDURE — 99495 TRANSJ CARE MGMT MOD F2F 14D: CPT | Performed by: FAMILY MEDICINE

## 2024-09-25 RX ORDER — BENAZEPRIL HYDROCHLORIDE 10 MG/1
10 TABLET ORAL DAILY
Qty: 90 TABLET | Refills: 1 | Status: SHIPPED | OUTPATIENT
Start: 2024-09-25

## 2024-09-25 NOTE — ASSESSMENT & PLAN NOTE
WBAT per orthopedics. Pain controlled on prn tylenol. She has follow up scheduled with orthopedics.

## 2024-09-25 NOTE — PROGRESS NOTES
Virtual TCM Visit:    Verification of patient location:    Patient is located at Home in the following state in which I hold an active license [place]    Assessment & Plan  Essential hypertension  Stable on home reads done by visiting nurse. Continue amlodipine and benazepril.   Orders:    benazepril (LOTENSIN) 10 mg tablet; Take 1 tablet (10 mg total) by mouth daily    Fall, sequela         Closed nondisplaced fracture of right patella, unspecified fracture morphology, sequela  WBAT per orthopedics. Pain controlled on prn tylenol. She has follow up scheduled with orthopedics.        Closed fracture of left ankle, sequela  S/p ORIF on 9/16/24. WBAT per orthopedics. Pain controlled on prn tylenol. She has follow up scheduled with orthopedics.             Encounter provider Herlinda Castorena MD     Provider located at [facility]  [address]  [address]  [facility] [address]    After connecting through [employer], the patient was identified by name and date of birth. Jadyn Ac was informed that this is a telemedicine visit and that the visit is being conducted through the [employer] Teams platform. She agrees to proceed..  My office door was closed. No one else was in the room.  She acknowledged consent and understanding of privacy and security of the video platform. The patient has agreed to participate and understands they can discontinue the visit at any time.    Patient is aware this is a billable service.    History of Present Illness     Transitional Care Management Review:   Jadyn Ac is a 74 y.o. adult here for TCM follow up.    During the TCM phone call patient stated:  TCM Call       Date and time call was made  9/19/2024  2:13 PM    Hospital care reviewed  Records reviewed    Patient was hospitialized at  [facility]    Date of Admission  09/15/24    Date of discharge  09/18/24    Diagnosis  Right patella fracture    Disposition  Home    Were the patients  medications reviewed and updated  No  Jadyn states she is not able to review with me at this time but knows to call if any questions    Current Symptoms  None    Fatigue severity  Mild          TCM Call       Post hospital issues  None    Should patient be enrolled in anticoag monitoring?  No    Scheduled for follow up?  Yes    Patients specialists  Other (comment)    Other specialists names  Zakiya Sylvester MD (Orthopedic Surgery)    Other specialists contcat #  [facility] (Trauma Surgery); As needed    Did you obtain your prescribed medications  Yes    Do you need help managing your prescriptions or medications  No    Is transportation to your appointment needed  Yes    Specify why  She does not drive    I have advised the patient to call PCP with any new or worsening symptoms  Adonis Echols    Living Arrangements  Children    Support System  Family    The type of support provided  Physical; Emotional    Do you have social support  Yes, as much as I need    Are you recieving any outpatient services  No    Are you recieving home care services  Yes    Types of home care services  Nurse visit; Home PT    Have you fallen in the last 12 months  Yes    Interperter language line needed  No    Counseling  Patient    Counseling topics  Activities of daily living; Importance of RX compliance; patient and family education; instructions for management; Risk factor reduction; Home health agency benefits    Comments  Jadyn states that she is doing ok. Her pain is controlled. She knows to go to the ER if any severe, uncontrolled pain, etc Adonis CORTEZ  Jadyn has visit today for hospital discharge f/u. She was admitted from 9/15 to 9/18/24 after a mechanical fall that led to a left ankle fracture (s/p ORIF on 9/16/24) as well as a right patellar fracture. She was under the care of the trauma services and seen by orthopedics. Pain has been well controlled with tylenol. She has  visiting nurses and home PT/OT. She has f/u scheduled with ortho in 2 weeks. WBAT.   Review of Systems   Constitutional: Negative.    HENT: Negative.     Eyes: Negative.    Respiratory: Negative.     Cardiovascular: Negative.    Gastrointestinal: Negative.    Endocrine: Negative.    Genitourinary: Negative.    Musculoskeletal: Negative.    Neurological: Negative.    Hematological: Negative.    Psychiatric/Behavioral: Negative.       Objective     There were no vitals taken for this visit.    Physical Exam  Constitutional:       General: She is not in acute distress.     Appearance: Normal appearance.   HENT:      Head: Normocephalic and atraumatic.   Pulmonary:      Effort: Pulmonary effort is normal.   Neurological:      Mental Status: She is alert.   Psychiatric:         Mood and Affect: Mood normal.         Behavior: Behavior normal.         Thought Content: Thought content normal.         Judgment: Judgment normal.       Medications have been reviewed by provider in current encounter      Herlinda Castorena MD      VIRTUAL VISIT DISCLAIMER    Jadyn Ac verbally agrees to participate in Virtual Care Services. Pt is aware that Virtual Care Services could be limited without vital signs or the ability to perform a full hands-on physical exam. Jadyn Ac understands she or the provider may request at any time to terminate the video visit and request the patient to seek care or treatment in person.

## 2024-09-25 NOTE — ASSESSMENT & PLAN NOTE
S/p ORIF on 9/16/24. WBAT per orthopedics. Pain controlled on prn tylenol. She has follow up scheduled with orthopedics.

## 2024-09-27 LAB
ALBUMIN SERPL-MCNC: 3.6 G/DL (ref 3.8–4.8)
ALP SERPL-CCNC: 84 IU/L (ref 44–121)
ALT SERPL-CCNC: 11 IU/L (ref 0–32)
AST SERPL-CCNC: 11 IU/L (ref 0–40)
BILIRUB SERPL-MCNC: 0.3 MG/DL (ref 0–1.2)
BUN SERPL-MCNC: 14 MG/DL (ref 8–27)
BUN/CREAT SERPL: 38 (ref 12–28)
CALCIUM SERPL-MCNC: 8.6 MG/DL (ref 8.7–10.3)
CHLORIDE SERPL-SCNC: 102 MMOL/L (ref 96–106)
CO2 SERPL-SCNC: 23 MMOL/L (ref 20–29)
CREAT SERPL-MCNC: 0.37 MG/DL (ref 0.57–1)
EGFR: 106 ML/MIN/1.73
GLOBULIN SER-MCNC: 2.5 G/DL (ref 1.5–4.5)
GLUCOSE SERPL-MCNC: 66 MG/DL (ref 70–99)
POTASSIUM SERPL-SCNC: 4.2 MMOL/L (ref 3.5–5.2)
PROT SERPL-MCNC: 6.1 G/DL (ref 6–8.5)
SODIUM SERPL-SCNC: 140 MMOL/L (ref 134–144)

## 2024-09-30 NOTE — TELEPHONE ENCOUNTER
8/30/2024 10:39 AM returned call to Liana as requested    Left message on her voicemail to call the office.   Sejal Cruz, DO    No

## 2024-10-01 ENCOUNTER — NURSE TRIAGE (OUTPATIENT)
Age: 74
End: 2024-10-01

## 2024-10-01 ENCOUNTER — PATIENT OUTREACH (OUTPATIENT)
Dept: CASE MANAGEMENT | Facility: HOSPITAL | Age: 74
End: 2024-10-01

## 2024-10-01 NOTE — PROGRESS NOTES
Chart reviewed. Patient had a Virtual PCP [facility] apt on 9/25.  Patient w/right patella fracture and left ankle fracture s/p fall WBAT per ortho (s/p ORIF on 9/16). Patient controlled adequately on Tylenol prn  Patient has services through [facility]  Cardiology apt on 10/3 for new onset AF-virtual  Ortho post op apt on 10/11    This RNCM called patient for follow up. Patient says she is not so good. She says it is still very difficult to get around. She says she lives with her daughter and she and other family members help her out. She confirms she has PT and OT through [facility]. She says she has a virtual apt with cardiology on 10/3 but is still unsure if she will be able to go to her Orthopedic apt on 10/11 because she cannot get into the car because of fractures in both legs. WC van [place] cross state [place]. She says she is hoping she will be able to maneuver better in 2 weeks. Patient says she has all meds and does not need refills. She denies any questions or concerns regarding.    Patient is agreeable to follow up. RNCM to follow.

## 2024-10-01 NOTE — TELEPHONE ENCOUNTER
"Patient scheduled for consult October 3rd 840am. Requesting if this can be done virtually since patient fell and fractured her ankle in 2 places and her knee, cannot move, has not had surgery yet. Or should it be rescheduled.    Please advise Jadyn at [phone]            Reason for Disposition   Nursing judgment     Will contact provider to see if a virtual appointment is possible in light of the circumstances or if it should be rescheduled.    Answer Assessment - Initial Assessment Questions  1. REASON FOR CALL or QUESTION: \"What is your reason for calling today?\" or \"How can I best help you?\" or \"What question do you have that I can help answer?\"      Can you tell me if my appointment is virtual or in person? I fell and broke my ankle in 2 places and also my knee. I can't move. I see the surgeon on the 11th.    Protocols used: Information Only Call - No Triage-ADULT-[place]    "

## 2024-10-08 ENCOUNTER — TELEMEDICINE (OUTPATIENT)
Dept: FAMILY MEDICINE CLINIC | Facility: CLINIC | Age: 74
End: 2024-10-08
Payer: COMMERCIAL

## 2024-10-08 ENCOUNTER — TELEPHONE (OUTPATIENT)
Age: 74
End: 2024-10-08

## 2024-10-08 DIAGNOSIS — K62.5 RECTAL BLEEDING: Primary | ICD-10-CM

## 2024-10-08 DIAGNOSIS — S82.892A CLOSED FRACTURE OF LEFT ANKLE, INITIAL ENCOUNTER: ICD-10-CM

## 2024-10-08 PROCEDURE — G2211 COMPLEX E/M VISIT ADD ON: HCPCS | Performed by: FAMILY MEDICINE

## 2024-10-08 PROCEDURE — 99213 OFFICE O/P EST LOW 20 MIN: CPT | Performed by: FAMILY MEDICINE

## 2024-10-08 RX ORDER — FONDAPARINUX SODIUM 5 MG/.4ML
2.5 INJECTION SUBCUTANEOUS EVERY 24 HOURS
Qty: 2.8 ML | Refills: 0 | Status: SHIPPED | OUTPATIENT
Start: 2024-10-08 | End: 2024-10-22

## 2024-10-08 RX ORDER — FONDAPARINUX SODIUM 5 MG/.4ML
2.5 INJECTION SUBCUTANEOUS EVERY 24 HOURS
Qty: 7.2 ML | Refills: 0 | OUTPATIENT
Start: 2024-10-08

## 2024-10-08 NOTE — TELEPHONE ENCOUNTER
Called pt and let her know that she needs a total of 35 days of Arixtra per orthopedics. She will continue it for an additional 2 weeks. Refill sent to pharmacy.

## 2024-10-08 NOTE — TELEPHONE ENCOUNTER
Patient calling to see if she needs to continue on her Fondaparinux injections?  She took her last one yesterday. It looks like it was prescribed by trauma provider Sue Moore PA-C, however, Jadyn states that Dr. Castorena's name is on her script.  Either way, if she needs to continue on it, she will need a high priority refill.  Please advise patient.

## 2024-10-08 NOTE — PROGRESS NOTES
Virtual Brief Visit  Name: Jadyn Ac      : 1950      MRN: [id number]  Encounter Provider: Herlinda Castorena MD  Encounter Date: 10/8/2024   Encounter department: [facility]    This Visit is being completed by telephone. The Patient is located at Home and in the following state in which I hold an active license [place]    The patient was identified by name and date of birth. Jadyn Ac was informed that this is a telemedicine visit and that the visit is being conducted through the Microsoft Teams platform. She agrees to proceed..  My office door was closed. No one else was in the room.  She acknowledged consent and understanding of privacy and security of the video platform. The patient has agreed to participate and understands they can discontinue the visit at any time.    Patient is aware this is a billable service.     Assessment & Plan  Rectal bleeding  I do recommend follow up with GI for her rectal bleeding given that it has been worsening. It would need in person evaluation.   Orders:    Ambulatory referral to Gastroenterology; Future         History of Present Illness   Jadyn reports persistent rectal bleeding over the past few months. Thought to be 2/2 hemorrhoids, but she has tried OTC preparation H, stool softener, high fiber diet with no improvement. Has been gradually worsening. It is difficult for her to come in for doctor visits due to her recent fall and subsequent injuries and needs the assistance of others.         Visit Time  Total Visit Duration: 15 min.

## 2024-10-09 ENCOUNTER — PATIENT OUTREACH (OUTPATIENT)
Dept: CASE MANAGEMENT | Facility: HOSPITAL | Age: 74
End: 2024-10-09

## 2024-10-09 NOTE — PROGRESS NOTES
Chart reviewed. Patient had Virtual PCP [facility] apt on 10/8. A referral was placed for GI. This RNCM called patient for follow up. Patient says she is doing well. She says the pain is getting better and she is taking Tylenol as needed. She is getting around easier. Patient says she has an Orthopedic apt this Friday and she will have transport with her daughter. She says this will be the first time she has gotten into a car since her surgery. She is very nervous. Encouragement given.    Patient says she has all of her medications and declined to review. She says she has all meds and takes them as directed. She denies any questions or concerns regarding. She is aware to call for refills before she has 1 week left. Patient says her daughter has been helping her with her shopping, getting medications and housework.     Patient says her Diabetes has been well controlled. She denies any s/s of hypoglycemia. She says this am her FBS was 77. She ate breakfast and no issues. Patient says her FBS's have been running between  on average. She denies any questions or concerns on diabetes.

## 2024-10-11 ENCOUNTER — OFFICE VISIT (OUTPATIENT)
Dept: OBGYN CLINIC | Facility: CLINIC | Age: 74
End: 2024-10-11

## 2024-10-11 ENCOUNTER — APPOINTMENT (OUTPATIENT)
Dept: RADIOLOGY | Facility: AMBULARY SURGERY CENTER | Age: 74
End: 2024-10-11
Attending: ORTHOPAEDIC SURGERY
Payer: COMMERCIAL

## 2024-10-11 VITALS — HEIGHT: 71 IN | BODY MASS INDEX: 32.19 KG/M2

## 2024-10-11 DIAGNOSIS — Z79.4 TYPE 2 DIABETES MELLITUS WITH HYPERGLYCEMIA, WITH LONG-TERM CURRENT USE OF INSULIN (HCC): ICD-10-CM

## 2024-10-11 DIAGNOSIS — Z98.890 STATUS POST SURGERY: ICD-10-CM

## 2024-10-11 DIAGNOSIS — M25.561 RIGHT KNEE PAIN, UNSPECIFIED CHRONICITY: ICD-10-CM

## 2024-10-11 DIAGNOSIS — M33.20 POLYMYOSITIS (HCC): ICD-10-CM

## 2024-10-11 DIAGNOSIS — S82.001A CLOSED NONDISPLACED FRACTURE OF RIGHT PATELLA, UNSPECIFIED FRACTURE MORPHOLOGY, INITIAL ENCOUNTER: ICD-10-CM

## 2024-10-11 DIAGNOSIS — Z98.890 STATUS POST SURGERY: Primary | ICD-10-CM

## 2024-10-11 DIAGNOSIS — S82.66XA NONDISPLACED FRACTURE OF LATERAL MALLEOLUS OF UNSPECIFIED FIBULA, INITIAL ENCOUNTER FOR CLOSED FRACTURE: ICD-10-CM

## 2024-10-11 DIAGNOSIS — E11.65 TYPE 2 DIABETES MELLITUS WITH HYPERGLYCEMIA, WITH LONG-TERM CURRENT USE OF INSULIN (HCC): ICD-10-CM

## 2024-10-11 PROCEDURE — 73610 X-RAY EXAM OF ANKLE: CPT

## 2024-10-11 PROCEDURE — 73560 X-RAY EXAM OF KNEE 1 OR 2: CPT

## 2024-10-11 PROCEDURE — 99024 POSTOP FOLLOW-UP VISIT: CPT | Performed by: ORTHOPAEDIC SURGERY

## 2024-10-11 NOTE — PROGRESS NOTES
Assessment:  1. Status post surgery  EXTERNAL Referral to Home Health    CANCELED: XR knee 1 or 2 vw right      2. Right knee pain, unspecified chronicity  XR knee 1 or 2 vw right    EXTERNAL Referral to Home Health          Plan:    Patient is 3 weeks status post left ankle open reduction with internal fixation due to lateral malleolus fracture, date of surgery 9/16/2024 and right knee patella fracture being treated nonoperatively due to a fall on 9/15/2024  X-ray right knee and left ankle was reviewed in the office today  Patient may start to bear weight on the left lower extremity for transfers and progress to weightbearing as tolerated  Sutures removed in the office and Steri-Strips were applied over the incision site.  Patient is aware she may start to shower but do not submerge incision site in water or scrub the incision site  Patient is continue with home health physical therapy with [place] visiting nurse.  Continue working on range of motion exercises to the left ankle.  Patient may start to bear weight on the left lower extremity for transfers and progress to weightbearing as tolerated  Provided patient with an Aircast for the left lower extremity  Provided patient with a Tubigrip to help decrease swelling  Will repeat x-ray of the left ankle and right knee at the next office visit  Patient is a follow-up        The above stated was discussed in layman's terms and the patient expressed understanding.  All questions were answered to the patient's satisfaction.         Subjective:   Jadyn Ac is a 74 y.o. adult who presents for today 3 weeks status post left ankle open reduction with internal fixation, due to lateral malleolus fracture. date of surgery 9/16/2024  and treating nonoperatively right patella fracture , DOI 9/15/24.  Patient is present in wheelchair today and with her 2 sons in the room today.  Patient states she is doing well.  She notes minimal pain in her left ankle and right knee.   She has been doing home health physical therapy with [facility] Dog[facility].     Review of systems negative unless otherwise specified in HPI    Past Medical History:   Diagnosis Date    Acute diverticulitis 9/21/2018    Anemia     Asthma     Chronic pain     bulging discs in back,polymyositis    Diabetes mellitus (HCC)     Disease of thyroid gland     Diverticulitis     Hyperlipidemia     Hypertension     Polymyositis (HCC)        Past Surgical History:   Procedure Laterality Date    EYE SURGERY Bilateral     CATAREACT REMOVED FROM BOTH EYES 2 MTHS AGO    HYSTERECTOMY      ORIF TIBIA & FIBULA FRACTURES Left 9/16/2024    Procedure: OPEN REDUCTION W/ INTERNAL FIXATION (ORIF) ANKLE and all associated procedures;  Surgeon: Zakiya Sylvester MD;  Location: [facility];  Service: Orthopedics    SINUS SURGERY      THYROIDECTOMY      TUBAL LIGATION         Family History   Problem Relation Age of Onset    Hepatitis Sister         acute hepatits C virus    Hypertension Daughter     Kidney disease Daughter     Hypertension Son     Mental illness Son        Social History     Occupational History    Not on file   Tobacco Use    Smoking status: Never    Smokeless tobacco: Never   Vaping Use    Vaping status: Never Used   Substance and Sexual Activity    Alcohol use: Not Currently    Drug use: No    Sexual activity: Not Currently         Current Outpatient Medications:     acetaminophen (TYLENOL) 325 mg tablet, Take 3 tablets (975 mg total) by mouth every 8 (eight) hours, Disp: , Rfl:     albuterol (PROVENTIL HFA,VENTOLIN HFA) 90 mcg/act inhaler, INHALE 2 PUFFS EVERY 6 (SIX) HOURS AS NEEDED FOR WHEEZING, Disp: 8.5 g, Rfl: 1    amLODIPine (NORVASC) 5 mg tablet, TAKE 1 TABLET BY MOUTH DAILY, Disp: 90 tablet, Rfl: 1    benazepril (LOTENSIN) 10 mg tablet, Take 1 tablet (10 mg total) by mouth daily, Disp: 90 tablet, Rfl: 1    Blood Glucose Monitoring Suppl (OneTouch Verio) w/Device KIT, Use 2 (two) times a day, Disp: 1 kit, Rfl: 0     Cholecalciferol 25 MCG (1000 UT) tablet, Take 1 tablet by mouth daily, Disp: , Rfl:     docusate sodium (COLACE) 100 mg capsule, Take 1 capsule (100 mg total) by mouth 2 (two) times a day, Disp: , Rfl:     famotidine (PEPCID) 20 mg tablet, Take 1 tablet (20 mg total) by mouth 2 (two) times a day, Disp: 60 tablet, Rfl: 0    ferrous sulfate 325 (65 Fe) mg tablet, Every 12 hours, Disp: , Rfl:     fondaparinux (ARIXTRA) 5 mg/0.4 mL, Inject 2.5 mg under the skin every 24 hours for 14 days, Disp: 2.8 mL, Rfl: 0    insulin glargine (LANTUS) 100 units/mL subcutaneous injection, Inject 30 Units under the skin daily at bedtime, Disp: 15 mL, Rfl: 3    Insulin Pen Needle (Pen Needles) 31G X 5 MM MISC, USE DAILY FOR INSULIN ADMINISTRATION, Disp: 100 each, Rfl: 3    Lancets (OneTouch Delica Plus [id number]) MISC, Use daily as directed., Disp: 100 each, Rfl: 0    predniSONE 5 mg tablet, TAKE 1-1/2 TABS DAILY, Disp: 45 tablet, Rfl: 5    senna (SENOKOT) 8.6 mg, Take 2 tablets (17.2 mg total) by mouth daily, Disp: , Rfl:     Synthroid 88 MCG tablet, TAKE ONE TABLET EVERY DAY IN THE EARLY MORNING ON AN EMPTY STOMACH., Disp: 90 tablet, Rfl: 1    Allergies   Allergen Reactions    Anaprox [Naproxen Sodium] Shortness Of Breath    Levofloxacin Hives    Sulfamethoxazole-Trimethoprim Anaphylaxis    Jardiance [Empagliflozin] GI Intolerance    Penicillins Swelling and Hives    Pork-Derived Products - Food Allergy Other (See Comments)     Lutheran belief, not true allergy    Shellfish-Derived Products - Food Allergy Other (See Comments)     Lutheran belief, not true allergy    Alendronate Rash    Aspirin Rash    Azathioprine Rash    Metronidazole Rash    Sulfa Antibiotics Rash            Vitals:     Body mass index is 32.19 kg/m².  Wt Readings from Last 3 Encounters:   09/15/24 105 kg (230 lb 13.2 oz)   08/21/24 110 kg (242 lb)   08/11/24 110 kg (242 lb 8.1 oz)       Objective:            Physical Exam  Physical Exam:      General Appearance:   "  Alert, cooperative, no distress, appears stated age   Head:    Normocephalic, without obvious abnormality, atraumatic   Eyes:    conjunctiva/corneas clear, both eyes         Nose:   Nares normal, septum midline, no drainage    Throat:   Lips normal; teeth and gums normal   Neck:    symmetrical, trachea midline, ;     thyroid:  no enlargement/   Back:     Symmetric, no curvature, ROM normal   Lungs:   No audible wheezing or labored breathing   Chest Wall:    No tenderness or deformity    Heart:    Regular rate and rhythm                         Pulses:   2+ and symmetric all extremities   Skin:   Skin color, texture, turgor normal, no rashes or lesions   Neurologic:   normal strength, sensation and reflexes     throughout                       Ortho Exam  Left ankle  Surgical incision healing well  Sutures intact over the incision site  No erythema  Mild generalized swelling  Sensation intact over the deep peroneal nerve  Good EHL function  Neurovascular intact distally    Right knee  Skin intact  Erythema  No swelling  No pain with passive extension of the right knee      Diagnostics, reviewed and taken today if performed as documented:    The attending physician has personally reviewed the pertinent films in PACS and interpretation is as follows: X-ray left ankle demonstrates status post ORIF adequate alignment of hardware with no sign of loosening    X-ray right knee demonstrates no interval change of patella fracture from prior films    Procedures, if performed today:    Procedures    None performed      Scribe Attestation      I,:  Becka Ball MA am acting as a scribe while in the presence of the attending physician.:       I,:  Zakiya Sylvester MD personally performed the services described in this documentation    as scribed in my presence.:               Portions of the record may have been created with voice recognition software.  Occasional wrong word or \"sound a like\" substitutions may have " occurred due to the inherent limitations of voice recognition software.  Read the chart carefully and recognize, using context, where substitutions have occurred.

## 2024-10-14 ENCOUNTER — TELEPHONE (OUTPATIENT)
Dept: OBGYN CLINIC | Facility: HOSPITAL | Age: 74
End: 2024-10-14

## 2024-10-14 NOTE — TELEPHONE ENCOUNTER
Higinio       Would like to clarify (possible typo in OVN) requesting WB status of the right nonop  leg. States she is 2-3/10 during transfers and a bit of swelling. Skin warm intact, neg tingling and neg numbness.       Please advise     Izzy Maldonado visiting nurse [phone] may leave detailed message

## 2024-10-15 DIAGNOSIS — Z79.4 TYPE 2 DIABETES MELLITUS WITH HYPERGLYCEMIA, WITH LONG-TERM CURRENT USE OF INSULIN (HCC): ICD-10-CM

## 2024-10-15 DIAGNOSIS — I10 PRIMARY HYPERTENSION: ICD-10-CM

## 2024-10-15 DIAGNOSIS — E11.65 TYPE 2 DIABETES MELLITUS WITH HYPERGLYCEMIA, WITH LONG-TERM CURRENT USE OF INSULIN (HCC): ICD-10-CM

## 2024-10-16 RX ORDER — BLOOD SUGAR DIAGNOSTIC
STRIP MISCELLANEOUS
Qty: 100 STRIP | Refills: 1 | Status: SHIPPED | OUTPATIENT
Start: 2024-10-16

## 2024-10-16 NOTE — TELEPHONE ENCOUNTER
Called and spoke to Izzy and relayed what the letter from MATTEO Soni said, she stated understanding

## 2024-10-21 ENCOUNTER — TELEPHONE (OUTPATIENT)
Age: 74
End: 2024-10-21

## 2024-10-21 NOTE — TELEPHONE ENCOUNTER
[facility] Report:    Ruth from [facility] calls in:  PT that is working with the patient at home, was exposed to scabies, by another patient.    Patient was possibly exposed during treatment on the following dates,  9/19, 9/24, 9/25 and 10/3.    Please advise.

## 2024-10-21 NOTE — TELEPHONE ENCOUNTER
If the therapist that was exposed is not having symptoms, would just recommend monitoring for any body rashes and should follow up if she develops anything. CHICA Ludwig

## 2024-10-24 ENCOUNTER — PATIENT OUTREACH (OUTPATIENT)
Dept: CASE MANAGEMENT | Facility: HOSPITAL | Age: 74
End: 2024-10-24

## 2024-10-24 NOTE — PROGRESS NOTES
"Patient had in person apt on 10/11 w/Ortho -3 weeks s/p open reduction with internal fixation due to lateral malleolus fx 9/16 2\" fall on 9/15-Patient presented with 2 sons via WC-per notes: Patient may start to bear weight on LLE for transfers and progress to weightbearing as tolerated, sutures removed and replaced with steri-strips, air-cast to LLE-no medication changes    This RNCM called patient for follow up. There was no answer and a message was left with a request for a call back.    RNCM to follow.  "

## 2024-10-28 ENCOUNTER — TELEPHONE (OUTPATIENT)
Age: 74
End: 2024-10-28

## 2024-10-28 ENCOUNTER — PREP FOR PROCEDURE (OUTPATIENT)
Age: 74
End: 2024-10-28

## 2024-10-28 DIAGNOSIS — Z86.0100 HISTORY OF COLON POLYPS: Primary | ICD-10-CM

## 2024-10-31 ENCOUNTER — PATIENT OUTREACH (OUTPATIENT)
Dept: CASE MANAGEMENT | Facility: HOSPITAL | Age: 74
End: 2024-10-31

## 2024-10-31 NOTE — LETTER
Date: 10/31/24  Dear Jadyn Ac,   My name is Maria Luisa; I am a registered nurse care manager working with Dr Castorena's office.  I have not been able to reach you and would like to set a time that I can talk with you over the phone.  My work is to help patients that have complex medical conditions get the care they need. This includes patients who may have been in the hospital or emergency room.    Please call me with any questions you may have. I look forward to speaking with you.  Sincerely,  Maria Luisa Kasper, NOAHCM  [phone]  Outpatient Care Manager

## 2024-10-31 NOTE — PROGRESS NOTES
Chart reviewed and this RNCM called patient. There was no answer and a message was left with a request for a call back.    Outreach #2 and an UTR letter sent via My Chart, CCM episode closed and this RNCM removed self from care team.

## 2024-11-05 ENCOUNTER — HOSPITAL ENCOUNTER (OUTPATIENT)
Dept: RADIOLOGY | Facility: HOSPITAL | Age: 74
Discharge: HOME | End: 2024-11-05
Attending: ORTHOPAEDIC SURGERY
Payer: COMMERCIAL

## 2024-11-05 ENCOUNTER — OFFICE VISIT (OUTPATIENT)
Dept: OBGYN CLINIC | Facility: CLINIC | Age: 74
End: 2024-11-05

## 2024-11-05 VITALS — HEART RATE: 112 BPM | OXYGEN SATURATION: 97 % | BODY MASS INDEX: 32.2 KG/M2 | WEIGHT: 230 LBS | HEIGHT: 71 IN

## 2024-11-05 DIAGNOSIS — Z98.890 STATUS POST SURGERY: Primary | ICD-10-CM

## 2024-11-05 DIAGNOSIS — S82.66XA NONDISPLACED FRACTURE OF LATERAL MALLEOLUS OF UNSPECIFIED FIBULA, INITIAL ENCOUNTER FOR CLOSED FRACTURE: ICD-10-CM

## 2024-11-05 DIAGNOSIS — Z98.890 STATUS POST SURGERY: ICD-10-CM

## 2024-11-05 PROCEDURE — 99024 POSTOP FOLLOW-UP VISIT: CPT | Performed by: ORTHOPAEDIC SURGERY

## 2024-11-05 PROCEDURE — 73560 X-RAY EXAM OF KNEE 1 OR 2: CPT

## 2024-11-05 PROCEDURE — 73610 X-RAY EXAM OF ANKLE: CPT

## 2024-11-05 NOTE — PROGRESS NOTES
74 y.o.adult presents for 6 weeks postoperative visit status post surgical fixation of left ankle fracture fixation and nonoperative treatment right patella fracture.  Patient states he has been doing well she is needed significant assistance for ambulation due to her previous history of polymyositis limiting her right lower extremity motion.  Patient states her pain is well under control in her right knee as well as her left ankle she is getting home physical therapy.  Patient states she is utilizing a brace for her right knee as she had previously for fracture which is able to help her ambulatory status she states her ankles pain is well under control and she is doing well with motion of her left ankle.    Review of Systems  Review of systems negative unless otherwise specified in HPI    Past Medical History  Past Medical History:   Diagnosis Date    Acute diverticulitis 9/21/2018    Anemia     Asthma     Chronic pain     bulging discs in back,polymyositis    Diabetes mellitus (HCC)     Disease of thyroid gland     Diverticulitis     Hyperlipidemia     Hypertension     Polymyositis (HCC)        Past Surgical History  Past Surgical History:   Procedure Laterality Date    EYE SURGERY Bilateral     CATAREACT REMOVED FROM BOTH EYES 2 MTHS AGO    HYSTERECTOMY      ORIF TIBIA & FIBULA FRACTURES Left 9/16/2024    Procedure: OPEN REDUCTION W/ INTERNAL FIXATION (ORIF) ANKLE and all associated procedures;  Surgeon: Zakiya Sylvester MD;  [facility]: [facility];  Service: Orthopedics    SINUS SURGERY      THYROIDECTOMY      TUBAL LIGATION         Current Medications  Current Outpatient Medications on File Prior to Visit   Medication Sig Dispense Refill    acetaminophen (TYLENOL) 325 mg tablet Take 3 tablets (975 mg total) by mouth every 8 (eight) hours      albuterol (PROVENTIL HFA,VENTOLIN HFA) 90 mcg/act inhaler INHALE 2 PUFFS EVERY 6 (SIX) HOURS AS NEEDED FOR WHEEZING 8.5 g 1    amLODIPine (NORVASC) 5 mg tablet TAKE 1  TABLET BY MOUTH DAILY 90 tablet 1    benazepril (LOTENSIN) 10 mg tablet Take 1 tablet (10 mg total) by mouth daily 90 tablet 1    Blood Glucose Monitoring Suppl (OneTouch Verio) w/Device KIT Use 2 (two) times a day 1 kit 0    Cholecalciferol 25 MCG (1000 UT) tablet Take 1 tablet by mouth daily      docusate sodium (COLACE) 100 mg capsule Take 1 capsule (100 mg total) by mouth 2 (two) times a day      famotidine (PEPCID) 20 mg tablet Take 1 tablet (20 mg total) by mouth 2 (two) times a day 60 tablet 0    ferrous sulfate 325 (65 Fe) mg tablet Every 12 hours      fondaparinux (ARIXTRA) 5 mg/0.4 mL Inject 2.5 mg under the skin every 24 hours for 14 days 2.8 mL 0    insulin glargine (LANTUS) 100 units/mL subcutaneous injection Inject 30 Units under the skin daily at bedtime 15 mL 3    Insulin Pen Needle (Pen Needles) 31G X 5 MM MISC USE DAILY FOR INSULIN ADMINISTRATION 100 each 3    Lancets (OneTouch Delica Plus [id number]) MISC Use daily as directed. 100 each 0    OneTouch Verio test strip CHECK BLOOD GLUCOSE TWICE DAILY 100 strip 1    predniSONE 5 mg tablet TAKE 1-1/2 TABS DAILY 45 tablet 5    senna (SENOKOT) 8.6 mg Take 2 tablets (17.2 mg total) by mouth daily      Synthroid 88 MCG tablet TAKE ONE TABLET EVERY DAY IN THE EARLY MORNING ON AN EMPTY STOMACH. 90 tablet 1     No current facility-administered medications on file prior to visit.       Recent Labs (HCT,HGB,PT,INR,ESR,CRP,GLU,HgA1C)  0   Lab Value Date/Time    HCT 23.7 (L) 09/18/2024 0507    HGB 8.1 (L) 09/18/2024 1159    WBC 11.87 (H) 09/18/2024 0507    INR 1.05 09/15/2024 2035    ESR 58 (H) 04/11/2024 0000    CRP 16 (H) 04/11/2024 0000    CRP 16.6 (H) 05/05/2021 0832    HGBA1C 7.6 (H) 07/11/2024 0000         Physical exam  General: Awake, Alert, Oriented  Eyes: Pupils equal, round and reactive to light  Heart: regular rate and rhythm  Lungs: No audible wheezing    left Ankle  Examination patient's left ankle well-healed lateral incision no swelling no  ecchymosis active ankle dorsi plantarflexion version eversion motion within normal limits no pain to palpation medial and lateral malleoli distal pulse present sensation superficial deep peroneal nerve sensation intact  Examination patient's right knee mild pain to palpation over the patellar region Limited right knee motion and as per patient and family this is her baseline of her right knee motion.  Stable to varus and valgus stress active ankle dorsi and flexion.  Distal pulses present    Imaging  X-rays of the right knee reviewed x-rays reveal evidence of a patella fracture in acceptable alignment x-rays left ankle reviewed x-rays reveal ankle mortise well aligned no evidence of hardware failure or cut out    Assessment:  Status post 6 weeks left ankle fracture ORIF and nonoperative treatment of right patella fracture  Plan:  Weightbearing as tolerated left lower extremity CAM Walker boot, weightbearing as tolerated right lower extremity  Physical therapy range of motion stretching strengthening gait training  Over-the-counter pain medication as needed  Follow-up in 6 weeks time repeat x-rays left ankle right knee 2 views

## 2024-11-11 DIAGNOSIS — I10 ESSENTIAL HYPERTENSION: ICD-10-CM

## 2024-11-12 RX ORDER — AMLODIPINE BESYLATE 5 MG/1
5 TABLET ORAL DAILY
Qty: 90 TABLET | Refills: 1 | Status: SHIPPED | OUTPATIENT
Start: 2024-11-12

## 2024-12-02 ENCOUNTER — TELEPHONE (OUTPATIENT)
Age: 74
End: 2024-12-02

## 2024-12-02 NOTE — TELEPHONE ENCOUNTER
The pt called stating in September she fell and broke her left ankle and her right knee cap and is asking if her upcoming appt can be virtual as she is not walking/driving just yet. Please call her back and let her know

## 2024-12-02 NOTE — TELEPHONE ENCOUNTER
See if we can offer to reschedule. I do not see that she has been seen in the office since March 2022.

## 2024-12-02 NOTE — TELEPHONE ENCOUNTER
Spoke with patient and rescheduled appointment for April.  She is unsure if she can come in any sooner due to injury.

## 2024-12-12 ENCOUNTER — TELEPHONE (OUTPATIENT)
Dept: GASTROENTEROLOGY | Facility: AMBULARY SURGERY CENTER | Age: 74
End: 2024-12-12

## 2024-12-12 DIAGNOSIS — I48.91 ATRIAL FIB/FLUTTER, TRANSIENT (HCC): ICD-10-CM

## 2024-12-12 DIAGNOSIS — K59.00 CONSTIPATION, UNSPECIFIED CONSTIPATION TYPE: Primary | ICD-10-CM

## 2024-12-12 DIAGNOSIS — I48.92 ATRIAL FIB/FLUTTER, TRANSIENT (HCC): ICD-10-CM

## 2024-12-12 RX ORDER — POLYETHYLENE GLYCOL 3350 17 G/17G
17 POWDER, FOR SOLUTION ORAL DAILY
Qty: 510 G | Refills: 0 | Status: SHIPPED | OUTPATIENT
Start: 2024-12-12 | End: 2024-12-12

## 2024-12-12 NOTE — TELEPHONE ENCOUNTER
Patient was seen in the office with me 9/5/2024 after diverticulitis.  I recommended holding off on colonoscopy until she saw cardiology to clear her as she had new diagnosis of A-fib the month prior and refused starting medications for this.  It appears our office then called her for open access colonoscopy for colon cancer screening for which they scheduled her for a colonoscopy this month which I was unaware of.  I had a reminder to check in on her cardiology appointment which was scheduled for 12/10 in regards to their recommendations however patient was a no-show for this appointment.  I called and spoke to patient's daughter.  I informed her at this time would need to send a message to anesthesiologist to see if they would require cardiac clearance prior to procedure.  If this is the case we would need to cancel procedures. I sent surgical optimization referral and will await their recs.

## 2024-12-13 ENCOUNTER — TELEPHONE (OUTPATIENT)
Dept: GASTROENTEROLOGY | Facility: AMBULARY SURGERY CENTER | Age: 74
End: 2024-12-13

## 2024-12-13 NOTE — TELEPHONE ENCOUNTER
I called and spoke to patient's daughter again.  She reports that the patient does not want to do the procedure on 12/26 that she would need to come in on [place] for admission and prep.  She would like to reschedule.  The patient reports she does not want to have this done until around April.  Patient's daughter is aware that colonoscopy has been recommended due to episode of diverticulitis to rule out underlying lesion or mass in the colon.  She reports her mother was adamant that she does not want any until then.  She will try to reschedule her cardiologist appointment and hopefully see them prior to this to make sure that she is cleared.  Otherwise, anesthesia below stated she can be cleared from a cardiology standpoint on the day prior as she will be getting admitted anyway.      ----- Message from HUNTER Liang MD sent at 12/13/2024 10:26 AM EST -----  Yes. Please obtain CC on the admission prior to the colonoscopy

## 2024-12-16 NOTE — TELEPHONE ENCOUNTER
Procedure cancelled due to mobility issues and new need for cardiac clearance - per daughter pt to be rescheduled in April.  Called to reschedule.  KUSUM

## 2024-12-17 DIAGNOSIS — K57.92 DIVERTICULITIS: Primary | ICD-10-CM

## 2024-12-17 NOTE — TELEPHONE ENCOUNTER
Lan Zhang,     I have scheduled the patient for April 14 @ [facility].  Could you please enter the ADT 21.  Thank you.

## 2024-12-17 NOTE — TELEPHONE ENCOUNTER
Scheduled date of colonoscopy (as of today): 04/14/2025  Physician performing colonoscopy: Dr. Sotelo  [facility]   Bowel prep reviewed with patient:   Instructions reviewed with patient by: Miralax   Clearances: N/A     Pt unable to walk - requesting admission for prep.

## 2024-12-23 ENCOUNTER — TELEPHONE (OUTPATIENT)
Dept: ENDOCRINOLOGY | Facility: CLINIC | Age: 74
End: 2024-12-23

## 2024-12-23 DIAGNOSIS — I10 PRIMARY HYPERTENSION: ICD-10-CM

## 2024-12-23 DIAGNOSIS — Z79.4 TYPE 2 DIABETES MELLITUS WITH HYPERGLYCEMIA, WITH LONG-TERM CURRENT USE OF INSULIN (HCC): ICD-10-CM

## 2024-12-23 DIAGNOSIS — E11.65 TYPE 2 DIABETES MELLITUS WITH HYPERGLYCEMIA, WITH LONG-TERM CURRENT USE OF INSULIN (HCC): ICD-10-CM

## 2024-12-23 RX ORDER — METFORMIN HYDROCHLORIDE 500 MG/1
1000 TABLET, EXTENDED RELEASE ORAL 2 TIMES DAILY WITH MEALS
Qty: 360 TABLET | Refills: 1 | Status: SHIPPED | OUTPATIENT
Start: 2024-12-23

## 2024-12-23 NOTE — TELEPHONE ENCOUNTER
Patient called to request a refill for their metFORMIN (GLUCOPHAGE-XR) 500 mg 24 hr tablet  advised a refill was requested on 12/23 and is pending approval. Patient verbalized understanding and is in agreement.

## 2025-01-17 DIAGNOSIS — M33.20 POLYMYOSITIS (HCC): ICD-10-CM

## 2025-01-17 RX ORDER — PREDNISONE 5 MG/1
TABLET ORAL
Qty: 45 TABLET | Refills: 5 | Status: SHIPPED | OUTPATIENT
Start: 2025-01-17

## 2025-02-03 DIAGNOSIS — E11.65 TYPE 2 DIABETES MELLITUS WITH HYPERGLYCEMIA, WITH LONG-TERM CURRENT USE OF INSULIN (HCC): ICD-10-CM

## 2025-02-03 DIAGNOSIS — I10 ESSENTIAL HYPERTENSION: ICD-10-CM

## 2025-02-03 DIAGNOSIS — E03.9 ACQUIRED HYPOTHYROIDISM: ICD-10-CM

## 2025-02-03 DIAGNOSIS — Z79.4 TYPE 2 DIABETES MELLITUS WITH HYPERGLYCEMIA, WITH LONG-TERM CURRENT USE OF INSULIN (HCC): ICD-10-CM

## 2025-02-03 DIAGNOSIS — I10 PRIMARY HYPERTENSION: ICD-10-CM

## 2025-02-04 RX ORDER — LEVOTHYROXINE SODIUM 88 MCG
TABLET ORAL
Qty: 90 TABLET | Refills: 1 | Status: SHIPPED | OUTPATIENT
Start: 2025-02-04

## 2025-02-04 RX ORDER — BLOOD SUGAR DIAGNOSTIC
STRIP MISCELLANEOUS
Qty: 100 STRIP | Refills: 1 | Status: SHIPPED | OUTPATIENT
Start: 2025-02-04

## 2025-02-04 RX ORDER — BENAZEPRIL HYDROCHLORIDE 10 MG/1
10 TABLET ORAL DAILY
Qty: 90 TABLET | Refills: 1 | Status: SHIPPED | OUTPATIENT
Start: 2025-02-04

## 2025-02-04 RX ORDER — INSULIN GLARGINE 100 [IU]/ML
INJECTION, SOLUTION SUBCUTANEOUS
Qty: 15 ML | Refills: 3 | Status: SHIPPED | OUTPATIENT
Start: 2025-02-04

## 2025-02-21 ENCOUNTER — APPOINTMENT (OUTPATIENT)
Dept: RADIOLOGY | Facility: AMBULARY SURGERY CENTER | Age: 75
End: 2025-02-21
Attending: ORTHOPAEDIC SURGERY
Payer: COMMERCIAL

## 2025-02-21 ENCOUNTER — OFFICE VISIT (OUTPATIENT)
Dept: OBGYN CLINIC | Facility: CLINIC | Age: 75
End: 2025-02-21
Payer: COMMERCIAL

## 2025-02-21 VITALS — HEIGHT: 71 IN | WEIGHT: 230 LBS | BODY MASS INDEX: 32.2 KG/M2

## 2025-02-21 DIAGNOSIS — Z98.890 STATUS POST SURGERY: Primary | ICD-10-CM

## 2025-02-21 DIAGNOSIS — S82.001A CLOSED NONDISPLACED FRACTURE OF RIGHT PATELLA, UNSPECIFIED FRACTURE MORPHOLOGY, INITIAL ENCOUNTER: ICD-10-CM

## 2025-02-21 DIAGNOSIS — S82.66XA NONDISPLACED FRACTURE OF LATERAL MALLEOLUS OF UNSPECIFIED FIBULA, INITIAL ENCOUNTER FOR CLOSED FRACTURE: ICD-10-CM

## 2025-02-21 DIAGNOSIS — Z98.890 STATUS POST SURGERY: ICD-10-CM

## 2025-02-21 PROCEDURE — 99214 OFFICE O/P EST MOD 30 MIN: CPT | Performed by: ORTHOPAEDIC SURGERY

## 2025-02-21 PROCEDURE — 73560 X-RAY EXAM OF KNEE 1 OR 2: CPT

## 2025-02-21 PROCEDURE — 73610 X-RAY EXAM OF ANKLE: CPT

## 2025-02-21 NOTE — ASSESSMENT & PLAN NOTE
New x-rays of the right knee were obtained today and reviewed.  Patient has decreased active motion secondary to stroke affecting right side.  Orders:    XR knee 1 or 2 vw right; Future

## 2025-02-21 NOTE — ASSESSMENT & PLAN NOTE
New x-rays of the left ankle were obtained today and reviewed with the patient and her sons noting that the ankle mortise is in appropriate position along with the hardware with no signs of failure.  On exam patient has significant weakness in most planes of the ankle and is recommended to have her home [facility] visiting nurses to provide strengthening to the left ankle.  A referral was placed to start these.  Patient was provided 3 different resistance Thera-Band's along with home exercise program to take with her.  The exercises were labeled with range of motion versus strengthening.  These were demonstrated to her son's into the patient to help with her home exercise program.  We will follow-up with the patient in 6 months with new x-rays of the left ankle  Orders:    XR ankle 3+ vw left; Future

## 2025-02-21 NOTE — PROGRESS NOTES
Assessment:  Assessment & Plan  Status post surgery  New x-rays of the left ankle were obtained today and reviewed with the patient and her sons noting that the ankle mortise is in appropriate position along with the hardware with no signs of failure.  On exam patient has significant weakness in most planes of the ankle and is recommended to have her home health-[place] visiting nurses to provide strengthening to the left ankle.  A referral was placed to start these.  Patient was provided 3 different resistance Thera-Band's along with home exercise program to take with her.  The exercises were labeled with range of motion versus strengthening.  These were demonstrated to her son's into the patient to help with her home exercise program.  We will follow-up with the patient in 6 months with new x-rays of the left ankle  Orders:    XR ankle 3+ vw left; Future    Closed nondisplaced fracture of right patella, unspecified fracture morphology, initial encounter  New x-rays of the right knee were obtained today and reviewed.  Patient has decreased active motion secondary to stroke affecting right side.  Orders:    XR knee 1 or 2 vw right; Future    Nondisplaced fracture of lateral malleolus of unspecified fibula, initial encounter for closed fracture    Orders:    XR ankle 3+ vw left; Future    EXTERNAL Referral to Home Health; Future          To do next visit:  Return in about 6 months (around 8/21/2025) for left ankle.    The above stated was discussed in layman's terms and the patient expressed understanding.  All questions were answered to the patient's satisfaction.       Scribe Attestation      I,:  Teresita Cantor am acting as a scribe while in the presence of the attending physician.:       I,:  Zakiya Sylvester MD personally performed the services described in this documentation    as scribed in my presence.:               Subjective:   Jadyn Ac is a 74 y.o. female who presents today for a follow up for  her left ankle and right knee. She is 5 months post surgical fixation of left ankle fracture fixation, 9/16/2024 and nonoperative treatment right patella fracture. This is a chronic injury which was re-injured after fall. She is able to walk to the bathroom without assistance.  She is accompanied with her 2 sons today in the office and in a wheelchair. Patient with some lateral ankle pain and weakness.  She has a hx of a stroke that effected the right side.     Review of systems negative unless otherwise specified in HPI  Review of Systems   Constitutional:  Negative for chills, fever and unexpected weight change.   HENT:  Negative for hearing loss, nosebleeds and sore throat.    Eyes:  Negative for pain, redness and visual disturbance.   Respiratory:  Negative for cough, shortness of breath and wheezing.    Cardiovascular:  Negative for chest pain, palpitations and leg swelling.   Gastrointestinal:  Negative for abdominal pain and nausea.   Genitourinary:  Negative for dyspareunia, dysuria and frequency.   Skin:  Negative for rash and wound.   Neurological:  Negative for dizziness, numbness and headaches.   Psychiatric/Behavioral:  Negative for decreased concentration and suicidal ideas. The patient is not nervous/anxious.        Past Medical History:   Diagnosis Date    Acute diverticulitis 9/21/2018    Anemia     Asthma     Chronic pain     bulging discs in back,polymyositis    Diabetes mellitus (HCC)     Disease of thyroid gland     Diverticulitis     Hyperlipidemia     Hypertension     Polymyositis (HCC)        Past Surgical History:   Procedure Laterality Date    EYE SURGERY Bilateral     CATAREACT REMOVED FROM BOTH EYES 2 MTHS AGO    HYSTERECTOMY      ORIF TIBIA & FIBULA FRACTURES Left 9/16/2024    Procedure: OPEN REDUCTION W/ INTERNAL FIXATION (ORIF) ANKLE and all associated procedures;  Surgeon: Zakiya Sylvester MD;  [facility];  Service: Orthopedics    SINUS SURGERY      THYROIDECTOMY       TUBAL LIGATION         Family History   Problem Relation Age of Onset    Hepatitis Sister         acute hepatits C virus    Hypertension Daughter     Kidney disease Daughter     Hypertension Son     Mental illness Son        Social History     Occupational History    Not on file   Tobacco Use    Smoking status: Never    Smokeless tobacco: Never   Vaping Use    Vaping status: Never Used   Substance and Sexual Activity    Alcohol use: Not Currently    Drug use: No    Sexual activity: Not Currently         Current Outpatient Medications:     acetaminophen (TYLENOL) 325 mg tablet, Take 3 tablets (975 mg total) by mouth every 8 (eight) hours, Disp: , Rfl:     albuterol (PROVENTIL HFA,VENTOLIN HFA) 90 mcg/act inhaler, INHALE 2 PUFFS EVERY 6 (SIX) HOURS AS NEEDED FOR WHEEZING, Disp: 8.5 g, Rfl: 1    amLODIPine (NORVASC) 5 mg tablet, TAKE 1 TABLET BY MOUTH DAILY, Disp: 90 tablet, Rfl: 1    benazepril (LOTENSIN) 10 mg tablet, TAKE 1 TABLET BY MOUTH DAILY, Disp: 90 tablet, Rfl: 1    Blood Glucose Monitoring Suppl (OneTouch Verio) w/Device KIT, Use 2 (two) times a day, Disp: 1 kit, Rfl: 0    Cholecalciferol 25 MCG (1000 UT) tablet, Take 1 tablet by mouth daily, Disp: , Rfl:     docusate sodium (COLACE) 100 mg capsule, Take 1 capsule (100 mg total) by mouth 2 (two) times a day, Disp: , Rfl:     famotidine (PEPCID) 20 mg tablet, Take 1 tablet (20 mg total) by mouth 2 (two) times a day, Disp: 60 tablet, Rfl: 0    ferrous sulfate 325 (65 Fe) mg tablet, Every 12 hours, Disp: , Rfl:     fondaparinux (ARIXTRA) 5 mg/0.4 mL, Inject 2.5 mg under the skin every 24 hours for 14 days, Disp: 2.8 mL, Rfl: 0    Insulin Pen Needle (Pen Needles) 31G X 5 MM MISC, USE DAILY FOR INSULIN ADMINISTRATION, Disp: 100 each, Rfl: 3    Lancets (OneTouch Delica Plus [id number]) MISC, Use daily as directed., Disp: 100 each, Rfl: 0    Lantus SoloStar 100 units/mL SOPN, INJECT 30 UNITS UNDER THE SKIN DAILY AT BEDTIME, Disp: 15 mL, Rfl: 3    metFORMIN  (GLUCOPHAGE-XR) 500 mg 24 hr tablet, TAKE TWO TABLETS BY MOUTH TWICE DAILY WITH MEALS, Disp: 360 tablet, Rfl: 1    OneTouch Verio test strip, CHECK BLOOD GLUCOSE TWICE DAILY, Disp: 100 strip, Rfl: 1    predniSONE 5 mg tablet, TAKE 1-1/2 TABS DAILY, Disp: 45 tablet, Rfl: 5    senna (SENOKOT) 8.6 mg, Take 2 tablets (17.2 mg total) by mouth daily, Disp: , Rfl:     Synthroid 88 MCG tablet, TAKE ONE TABLET EVERY MORNING ON AN EMPTY STOMACH, Disp: 90 tablet, Rfl: 1    Allergies   Allergen Reactions    Anaprox [Naproxen Sodium] Shortness Of Breath    Levofloxacin Hives    Sulfamethoxazole-Trimethoprim Anaphylaxis    Jardiance [Empagliflozin] GI Intolerance    Penicillins Swelling and Hives    Pork-Derived Products - Food Allergy Other (See Comments)     Pentecostal belief, not true allergy    Shellfish-Derived Products - Food Allergy Other (See Comments)     Pentecostal belief, not true allergy    Alendronate Rash    Aspirin Rash    Azathioprine Rash    Metronidazole Rash    Sulfa Antibiotics Rash          There were no vitals filed for this visit.    Body mass index is 32.08 kg/m².  Wt Readings from Last 3 Encounters:   02/21/25 104 kg (230 lb)   11/05/24 104 kg (230 lb)   09/15/24 105 kg (230 lb 13.2 oz)       Objective:                    Left Ankle Exam     Muscle Strength   Dorsiflexion:  4/5   Plantar flexion:  5/5   Posterior tibial:  3/5  Peroneal muscle:  3/5    Other   Erythema: absent  Sensation: normal  Pulse: present      Right Knee Exam     Tenderness   The patient is experiencing tenderness in the medial joint line and lateral joint line.    Range of Motion   Extension:  0   Flexion:  90     Tests   Varus: negative Valgus: negative    Other   Erythema: absent  Sensation: normal  Pulse: present  Swelling: none  Effusion: no effusion present    Comments:  Calf is soft and nontender        Distally neurovascularly unchanged    Diagnostics, reviewed and taken today if performed as documented:  The attending  "physician has personally reviewed the pertinent films in PACS and interpretation is as follows:  Xrays left ankle 3 views: ankle mortise is in appropriate position along with the hardware with no signs of failure.  Xrays of right knee 3 views: no interval change of patella fracture    Procedures, if performed today:    Procedures    None performed      Portions of the record may have been created with voice recognition software.  Occasional wrong word or \"sound a like\" substitutions may have occurred due to the inherent limitations of voice recognition software.  Read the chart carefully and recognize, using context, where substitutions have occurred.  "

## 2025-03-19 ENCOUNTER — TELEPHONE (OUTPATIENT)
Age: 75
End: 2025-03-19

## 2025-03-19 NOTE — TELEPHONE ENCOUNTER
Patients GI provider:  Dr. Sotelo    Number to return call: [phone]    Reason for call: Pts daughter called in to advise that the pt is scheduled to see the cardiologist on 04/07 for clearance for the procedure. She also mentioned that they want to cancel the pre-procedure admission stay due to the high costs of hospital admission. Please contact the pt back to confirm this; her daughter can be reached at [phone].      Scheduled procedure/appointment date if applicable: Procedure 04/14/25

## 2025-03-20 ENCOUNTER — PREP FOR PROCEDURE (OUTPATIENT)
Dept: GASTROENTEROLOGY | Facility: CLINIC | Age: 75
End: 2025-03-20

## 2025-03-20 DIAGNOSIS — K57.92 DIVERTICULITIS: Primary | ICD-10-CM

## 2025-03-20 DIAGNOSIS — Z86.0100 HISTORY OF COLON POLYPS: ICD-10-CM

## 2025-03-20 NOTE — TELEPHONE ENCOUNTER
Patients GI provider:  Jose A Hays    Number to return call: Jennifer Boo (Daughter)  [phone]   Reason for call: Patient's daughter Jennifer called in to confirm the hospital stay has been cancelled and is requesting a call back to discuss what prep patient will doing. Jennifer stated to please send prep instructions to email address [email or web address]. thank you.    Scheduled procedure/appointment date if applicable: Apt/procedure 4/14/2025

## 2025-03-20 NOTE — TELEPHONE ENCOUNTER
Susana from [facility] called in to add patient back on for colonoscopy. Patient has been r/s for 4/14/2025 with Dr. Sotelo at the [facility] center.      Scheduled date of colonoscopy (as of today):4/14/2025  Physician performing colonoscopy:Dr. Sotelo  [facility] of colonoscopy[facility]  Bowel prep reviewed with patient:Req. pre-procedure admission for prep   Instructions reviewed with patient by:Tacho. pre-procedure admission for prep   Clearances:

## 2025-03-21 ENCOUNTER — TELEPHONE (OUTPATIENT)
Age: 75
End: 2025-03-21

## 2025-03-21 NOTE — TELEPHONE ENCOUNTER
The pt called asking if her appt on 4-3-25 can be virtual due to her breaking her left ankle and hurting her right knee. I did ask if this was the same fall from previous visits or a new one and she said same from previous. If you can check to see if it can be virtual and give the patient a call back please.

## 2025-03-21 NOTE — TELEPHONE ENCOUNTER
Spoke to patient to and informed her she needs to come to the office in person for visit, patient stated she needs to cancel and call back when her ankle feels better

## 2025-03-26 ENCOUNTER — VBI (OUTPATIENT)
Dept: ADMINISTRATIVE | Facility: OTHER | Age: 75
End: 2025-03-26

## 2025-03-26 NOTE — TELEPHONE ENCOUNTER
Patient contacted to schedule Annual Wellness Visit.   Message sent to Clerical staff to see if a Virtual Wellness visit could be scheduled.    Thank you.  Fran Burkett MA  [facility]

## 2025-03-28 ENCOUNTER — TELEPHONE (OUTPATIENT)
Age: 75
End: 2025-03-28

## 2025-03-28 ENCOUNTER — TELEMEDICINE (OUTPATIENT)
Dept: FAMILY MEDICINE CLINIC | Facility: CLINIC | Age: 75
End: 2025-03-28
Payer: COMMERCIAL

## 2025-03-28 DIAGNOSIS — K57.92 DIVERTICULITIS: ICD-10-CM

## 2025-03-28 DIAGNOSIS — Z78.0 POST-MENOPAUSAL: ICD-10-CM

## 2025-03-28 DIAGNOSIS — Z00.00 MEDICARE ANNUAL WELLNESS VISIT, INITIAL: Primary | ICD-10-CM

## 2025-03-28 DIAGNOSIS — Z12.31 ENCOUNTER FOR SCREENING MAMMOGRAM FOR BREAST CANCER: ICD-10-CM

## 2025-03-28 DIAGNOSIS — E11.65 TYPE 2 DIABETES MELLITUS WITH HYPERGLYCEMIA, WITH LONG-TERM CURRENT USE OF INSULIN (HCC): ICD-10-CM

## 2025-03-28 DIAGNOSIS — Z79.4 TYPE 2 DIABETES MELLITUS WITH HYPERGLYCEMIA, WITH LONG-TERM CURRENT USE OF INSULIN (HCC): ICD-10-CM

## 2025-03-28 PROCEDURE — G0439 PPPS, SUBSEQ VISIT: HCPCS | Performed by: FAMILY MEDICINE

## 2025-03-28 RX ORDER — FAMOTIDINE 20 MG/1
20 TABLET, FILM COATED ORAL 2 TIMES DAILY
Qty: 60 TABLET | Refills: 1 | Status: SHIPPED | OUTPATIENT
Start: 2025-03-28 | End: 2025-04-27

## 2025-03-28 NOTE — ASSESSMENT & PLAN NOTE
Lab Results   Component Value Date    HGBA1C 7.6 (H) 07/11/2024       Orders:    Albumin / creatinine urine ratio; Future    Hemoglobin A1C ([facility]); Future    CBC and differential; Future    Comprehensive metabolic panel; Future    Lipid Panel with Direct LDL reflex; Future

## 2025-03-28 NOTE — PROGRESS NOTES
Name: Jadyn Ac      : 1950      MRN: [id number]  Encounter Provider: Herlinda Castorena MD  Encounter Date: 3/28/2025   Encounter department: [facility]    Assessment & Plan  Medicare annual wellness visit, initial         Encounter for screening mammogram for breast cancer    Orders:    Mammo screening bilateral w 3d and cad; Future    Type 2 diabetes mellitus with hyperglycemia, with long-term current use of insulin (HCC)    Lab Results   Component Value Date    HGBA1C 7.6 (H) 2024       Orders:    Albumin / creatinine urine ratio; Future    Hemoglobin A1C ([facility]); Future    CBC and differential; Future    Comprehensive metabolic panel; Future    Lipid Panel with Direct LDL reflex; Future    Diverticulitis    Orders:    famotidine (PEPCID) 20 mg tablet; Take 1 tablet (20 mg total) by mouth 2 (two) times a day    Post-menopausal    Orders:    DXA bone density spine hip and pelvis; Future        Depression Screening and Follow-up Plan: Patient was screened for depression during today's encounter. They screened negative with a PHQ-2 score of 0.      Urinary Incontinence Plan of Care: counseling topics discussed: limiting fluid intake 3-4 hours before bed.       Preventive health issues were discussed with patient, and age appropriate screening tests were ordered as noted in patient's After Visit Summary. Personalized health advice and appropriate referrals for health education or preventive services given if needed, as noted in patient's After Visit Summary.    History of Present Illness         Patient Care Team:  Herlinda Castorena MD as PCP - General (Family Medicine)  Herlinda Castorena MD as PCP - PCP-[facility] ([facility])  Crystal Swartz MD (Endocrinology)  CHICA Mario (Endocrinology)    Review of Systems   Constitutional: Negative.    HENT: Negative.     Eyes: Negative.    Respiratory: Negative.     Cardiovascular: Negative.    Gastrointestinal: Negative.    Endocrine:  Negative.    Genitourinary: Negative.    Musculoskeletal: Negative.    Skin: Negative.    Allergic/Immunologic: Negative.    Neurological: Negative.    Hematological: Negative.    Psychiatric/Behavioral: Negative.       Annual Wellness Visit Questionnaire       Health Risk Assessment:   Patient rates overall health as fair. Patient feels that their physical health rating is same. Patient is satisfied with their life. Eyesight was rated as same. Hearing was rated as same. Patient feels that their emotional and mental health rating is same. Patients states they are never, rarely angry. Patient states they are always unusually tired/fatigued. Pain experienced in the last 7 days has been a lot. Patient's pain rating has been 10/10. Patient states that she has experienced no weight loss or gain in last 6 months.     Depression Screening:   PHQ-2 Score: 0      Home Safety:  Patient has trouble with stairs inside or outside of their home. Patient has working smoke alarms and has working carbon monoxide detector.     Medications:   Patient is currently taking over-the-counter supplements. OTC medications include: see medication list. Patient is able to manage medications.     Activities of Daily Living (ADLs)/Instrumental Activities of Daily Living (IADLs):   Walk and transfer into and out of bed and chair?: No  Dress and groom yourself?: No    Bathe or shower yourself?: Yes    Feed yourself? No  Do your laundry/housekeeping?: No  Manage your money, pay your bills and track your expenses?: No  Make your own meals?: No    Do your own shopping?: No    Advance Care Planning:   Living will: Yes    Advanced directive: Yes      PREVENTIVE SCREENINGS      Cardiovascular Screening:    General: Screening Not Indicated and History Lipid Disorder      Diabetes Screening:     General: Screening Not Indicated and History Diabetes      Colorectal Cancer Screening:     General: Screening Current      Breast Cancer Screening:     General:  Risks and Benefits Discussed    Due for: Mammogram        Cervical Cancer Screening:    General: Screening Not Indicated      Osteoporosis Screening:    General: Screening Not Indicated and History Osteoporosis      Abdominal Aortic Aneurysm (AAA) Screening:        General: Screening Not Indicated      Lung Cancer Screening:     General: Screening Not Indicated      Hepatitis C Screening:    General: Screening Current    Screening, Brief Intervention, and Referral to Treatment (SBIRT)     Screening      AUDIT-C Screenin) How often did you have a drink containing alcohol in the past year? never  2) How many drinks did you have on a typical day when you were drinking in the past year? 0  3) How often did you have 6 or more drinks on one occasion in the past year? never    AUDIT-C Score: 0  Interpretation: Score 0-2 (female): Negative screen for alcohol misuse    Brief Intervention  Alcohol & drug use screenings were reviewed. No concerns regarding substance use disorder identified.     Social Drivers of Health     Financial Resource Strain: Low Risk  (2023)    Overall Financial Resource Strain (CARDIA)     Difficulty of Paying Living Expenses: Not hard at all   Food Insecurity: No Food Insecurity (3/28/2025)    Hunger Vital Sign     Worried About Running Out of Food in the Last Year: Never true     Ran Out of Food in the Last Year: Never true   Transportation Needs: No Transportation Needs (3/28/2025)    PRAPARE - Transportation     Lack of Transportation (Medical): No     Lack of Transportation (Non-Medical): No   Housing Stability: Low Risk  (3/28/2025)    Housing Stability Vital Sign     Unable to Pay for Housing in the Last Year: No     Number of Times Moved in the Last Year: 0     Homeless in the Last Year: No   Utilities: Not At Risk (3/28/2025)    [facility] Utilities     Threatened with loss of utilities: No     No results found.    Objective   There were no vitals taken for this visit.      Physical  Exam  Constitutional:       General: She is not in acute distress.     Appearance: Normal appearance. She is not ill-appearing, toxic-appearing or diaphoretic.   HENT:      Head: Normocephalic and atraumatic.   Eyes:      General:         Right eye: No discharge.         Left eye: No discharge.      Conjunctiva/sclera: Conjunctivae normal.   Pulmonary:      Effort: Pulmonary effort is normal.   Neurological:      Mental Status: She is alert.   Psychiatric:         Mood and Affect: Mood normal.         Behavior: Behavior normal.         Thought Content: Thought content normal.         Judgment: Judgment normal.         Virtual AWV Consent    Reason for visit is AWV    Provider located at [facility]  [address]  [address]  [facility] [address]    Recent Visits  No visits were found meeting these conditions.  Showing recent visits within past 7 days and meeting all other requirements  Today's Visits  Date Type Provider Dept   03/28/25 Telemedicine Herlinda Castorena MD [facility]   Showing today's visits and meeting all other requirements  Future Appointments  No visits were found meeting these conditions.  Showing future appointments within next 150 days and meeting all other requirements       Administrative Statements   Encounter provider Herlinda Castorena MD    The Patient is located at Home and in the following state in which I hold an active license [place].    The patient was identified by name and date of birth. Jadyn Ac was informed that this is a telemedicine visit and that the visit is being conducted through the Epic Embedded platform. She agrees to proceed..  My office door was closed. No one else was in the room.  She acknowledged consent and understanding of privacy and security of the video platform. The patient has agreed to participate and understands they can discontinue the visit at any time.    I have spent a total time of 30 minutes in caring for this patient  on the day of the visit/encounter including Instructions for management, not including the time spent for establishing the audio/video connection.

## 2025-03-28 NOTE — PATIENT INSTRUCTIONS

## 2025-03-28 NOTE — TELEPHONE ENCOUNTER
[facility]. Stated that the recommendation for patient's with diabetes between the ages of 40-74 is to take a statin to reduce risk of cardiovascular disease. Prescribe if warranted.

## 2025-03-28 NOTE — PROGRESS NOTES
Name: Jadyn Ac      : 1950      MRN: [id number]  Encounter Provider: Herlinda Castorena MD  Encounter Date: 3/28/2025   Encounter department: [facility]    Assessment & Plan       Preventive health issues were discussed with patient, and age appropriate screening tests were ordered as noted in patient's After Visit Summary. Personalized health advice and appropriate referrals for health education or preventive services given if needed, as noted in patient's After Visit Summary.    History of Present Illness   {?Quick Links Encounters * My Last Note * Last Note in Specialty * Snapshot * Since Last Visit * History :32561}  [facility]   Patient Care Team:  Herlinda Castorena MD as PCP - General (Family Medicine)  Herlinda Castorena MD as PCP - PCP-[facility] ([facility])  Crystal Swartz MD (Endocrinology)  CHICA Mario (Endocrinology)    Review of Systems  Medical History Reviewed by provider this encounter:       Annual Wellness Visit Questionnaire       Health Risk Assessment:   Patient rates overall health as fair. Patient feels that their physical health rating is same. Patient is satisfied with their life. Eyesight was rated as same. Hearing was rated as same. Patient feels that their emotional and mental health rating is same. Patients states they are never, rarely angry. Patient states they are always unusually tired/fatigued. Pain experienced in the last 7 days has been a lot. Patient's pain rating has been 10/10. Patient states that she has experienced no weight loss or gain in last 6 months.     Depression Screening:   PHQ-2 Score: 0      Fall Risk Screening:   In the past year, patient has experienced: history of falling in past year    Number of falls: 1  Injured during fall?: Yes    Feels unsteady when standing or walking?: Yes    Worried about falling?: Yes      Urinary Incontinence Screening:   Patient has leaked urine accidently in the last six months.     Home Safety:  Patient has  trouble with stairs inside or outside of their home. Patient has working smoke alarms and has working carbon monoxide detector.     Medications:   Patient is currently taking over-the-counter supplements. OTC medications include: see medication list. Patient is able to manage medications.     Activities of Daily Living (ADLs)/Instrumental Activities of Daily Living (IADLs):   Walk and transfer into and out of bed and chair?: No  Dress and groom yourself?: No    Bathe or shower yourself?: No    Feed yourself? Yes  Do your laundry/housekeeping?: No  Manage your money, pay your bills and track your expenses?: No  Make your own meals?: No    Do your own shopping?: No    Advance Care Planning:   Living will: Yes    Advanced directive: Yes      PREVENTIVE SCREENINGS      Cardiovascular Screening:    General: Screening Not Indicated and History Lipid Disorder      Diabetes Screening:     General: Screening Not Indicated and History Diabetes      Colorectal Cancer Screening:     General: Screening Current      Cervical Cancer Screening:    General: Screening Not Indicated      Osteoporosis Screening:    General: Screening Not Indicated and History Osteoporosis      Lung Cancer Screening:     General: Screening Not Indicated      Hepatitis C Screening:    General: Screening Current    Screening, Brief Intervention, and Referral to Treatment (SBIRT)     Screening      AUDIT-C Screenin) How often did you have a drink containing alcohol in the past year? never  2) How many drinks did you have on a typical day when you were drinking in the past year? 0  3) How often did you have 6 or more drinks on one occasion in the past year? never    AUDIT-C Score: 0  Interpretation: Score 0-2 (female): Negative screen for alcohol misuse    Social Drivers of Health     Financial Resource Strain: Low Risk  (2023)    Overall Financial Resource Strain (CARDIA)     Difficulty of Paying Living Expenses: Not hard at all   Food Insecurity:  No Food Insecurity (3/28/2025)    Hunger Vital Sign     Worried About Running Out of Food in the Last Year: Never true     Ran Out of Food in the Last Year: Never true   Transportation Needs: No Transportation Needs (3/28/2025)    PRAPARE - Transportation     Lack of Transportation (Medical): No     Lack of Transportation (Non-Medical): No   Housing Stability: Low Risk  (3/28/2025)    Housing Stability Vital Sign     Unable to Pay for Housing in the Last Year: No     Number of Times Moved in the Last Year: 0     Homeless in the Last Year: No   Utilities: Not At Risk (3/28/2025)    [facility] Utilities     Threatened with loss of utilities: No     No results found.    Objective {?Quick Links Trend Vitals * Enter New Vitals * Results Review * Timeline (Adult) * Labs * Imaging * Cardiology * Procedures * Lung Cancer Screening * Surgical eConsent :94122}  There were no vitals taken for this visit.    Physical Exam  {Administrative / Billing Section (Optional):85510}

## 2025-04-02 NOTE — PROGRESS NOTES
Consultation - Cardiology Office  [facility].    Jadyn Ac 74 y.o. female MRN: [id number]  : 1950  Unit/Bed#:  Encounter: [id number]        Assessment & Plan  Paroxysmal atrial fibrillation (HCC)  On EKGs from 2024  [id number] score is 6  Not on anticoagulation ?  Will start on Eliquis 5 mg twice daily    TTE, 2024  EF 60%, G1 DD,  Trace MR, TR and AZ  Primary hypertension  On amlodipine 5 mg and benazepril 10 mg  BP is elevated at 140/70 but according to the patient is usually in the 100s at home.  She is going to monitor her blood pressure at home and call us if it is elevated more than 130 systolic consistently  Mild intermittent asthma without complication    Type 2 diabetes mellitus with hyperglycemia, with long-term current use of insulin (HCC)    Lab Results   Component Value Date    HGBA1C 7.6 (H) 2024     Polymyositis (HCC)    Type 2 diabetes mellitus with other specified complication, without long-term current use of insulin (HCC)    Lab Results   Component Value Date    HGBA1C 7.6 (H) 2024        RECOMMENDATIONS:  Eliquis 5 mg twice daily  2 weeks samples given  30-day ambulatory cardiac monitoring  Patient to monitor her blood pressure at home and to call us if her systolic BP is consistently more than 130            Thank you for your consultation.  If you have any question please call me at [phone]      Primary Care Physician Requesting Consult: Herlinda Castorena MD      Reason for Consult / Principal Problem: Newly detected atrial fibrillation        HPI :     Jadyn Ac is a 74 y.o. year old female who was referred by primary care doctor for management of atrial fibrillation.  This was detected on EKGs done on 2024 at [facility].  She is currently on no anticoagulation.  I discussed the indication and risk informing her that her [id number] score for having strokes is very high at 6.  Patient and the family are  agreeable to starting on anticoagulation.  Will also do 30-day ambulatory cardiac monitoring      Review of Systems   Musculoskeletal:  Positive for arthralgias, gait problem and myalgias.   All other systems reviewed and are negative.        Historical Information   Past Medical History:   Diagnosis Date    Acute diverticulitis 9/21/2018    Anemia     Asthma     Chronic pain     bulging discs in back,polymyositis    Diabetes mellitus (HCC)     Disease of thyroid gland     Diverticulitis     Hyperlipidemia     Hypertension     Polymyositis (HCC)      Past Surgical History:   Procedure Laterality Date    EYE SURGERY Bilateral     CATAREACT REMOVED FROM BOTH EYES 2 MTHS AGO    HYSTERECTOMY      ORIF TIBIA & FIBULA FRACTURES Left 9/16/2024    Procedure: OPEN REDUCTION W/ INTERNAL FIXATION (ORIF) ANKLE and all associated procedures;  Surgeon: Zakiya Sylvester MD;  Location: [facility];  Service: Orthopedics    SINUS SURGERY      THYROIDECTOMY      TUBAL LIGATION       Social History     Substance and Sexual Activity   Alcohol Use Not Currently     Social History     Substance and Sexual Activity   Drug Use No     Social History     Tobacco Use   Smoking Status Never   Smokeless Tobacco Never     Family History:   Family History   Problem Relation Age of Onset    Hepatitis Sister         acute hepatits C virus    Hypertension Daughter     Kidney disease Daughter     Hypertension Son     Mental illness Son        Meds/Allergies     Allergies   Allergen Reactions    Anaprox [Naproxen Sodium] Shortness Of Breath    Levofloxacin Hives    Sulfamethoxazole-Trimethoprim Anaphylaxis    Jardiance [Empagliflozin] GI Intolerance    Penicillins Swelling and Hives    Pork-Derived Products - Food Allergy Other (See Comments)     Orthodoxy belief, not true allergy    Shellfish-Derived Products - Food Allergy Other (See Comments)     Orthodoxy belief, not true allergy    Alendronate Rash    Aspirin Rash    Azathioprine Rash     Metronidazole Rash    Sulfa Antibiotics Rash       Current Outpatient Medications:     acetaminophen (TYLENOL) 325 mg tablet, Take 3 tablets (975 mg total) by mouth every 8 (eight) hours, Disp: , Rfl:     albuterol (PROVENTIL HFA,VENTOLIN HFA) 90 mcg/act inhaler, INHALE 2 PUFFS EVERY 6 (SIX) HOURS AS NEEDED FOR WHEEZING, Disp: 8.5 g, Rfl: 1    amLODIPine (NORVASC) 5 mg tablet, TAKE 1 TABLET BY MOUTH DAILY, Disp: 90 tablet, Rfl: 1    apixaban (Eliquis) 5 mg, Take 1 tablet (5 mg total) by mouth 2 (two) times a day, Disp: 180 tablet, Rfl: 2    benazepril (LOTENSIN) 10 mg tablet, TAKE 1 TABLET BY MOUTH DAILY, Disp: 90 tablet, Rfl: 1    Blood Glucose Monitoring Suppl (OneTouch Verio) w/Device KIT, Use 2 (two) times a day, Disp: 1 kit, Rfl: 0    Cholecalciferol 25 MCG (1000 UT) tablet, Take 1 tablet by mouth daily, Disp: , Rfl:     docusate sodium (COLACE) 100 mg capsule, Take 1 capsule (100 mg total) by mouth 2 (two) times a day, Disp: , Rfl:     famotidine (PEPCID) 20 mg tablet, Take 1 tablet (20 mg total) by mouth 2 (two) times a day, Disp: 60 tablet, Rfl: 1    ferrous sulfate 325 (65 Fe) mg tablet, Every 12 hours, Disp: , Rfl:     Insulin Pen Needle (Pen Needles) 31G X 5 MM MISC, USE DAILY FOR INSULIN ADMINISTRATION, Disp: 100 each, Rfl: 3    Lancets (OneTouch Delica Plus [id number]) MISC, Use daily as directed., Disp: 100 each, Rfl: 0    Lantus SoloStar 100 units/mL SOPN, INJECT 30 UNITS UNDER THE SKIN DAILY AT BEDTIME, Disp: 15 mL, Rfl: 3    metFORMIN (GLUCOPHAGE-XR) 500 mg 24 hr tablet, TAKE TWO TABLETS BY MOUTH TWICE DAILY WITH MEALS, Disp: 360 tablet, Rfl: 1    OneTouch Verio test strip, CHECK BLOOD GLUCOSE TWICE DAILY, Disp: 100 strip, Rfl: 1    predniSONE 5 mg tablet, TAKE 1-1/2 TABS DAILY, Disp: 45 tablet, Rfl: 5    senna (SENOKOT) 8.6 mg, Take 2 tablets (17.2 mg total) by mouth daily, Disp: , Rfl:     Synthroid 88 MCG tablet, TAKE ONE TABLET EVERY MORNING ON AN EMPTY STOMACH, Disp: 90 tablet, Rfl: 1     fondaparinux (ARIXTRA) 5 mg/0.4 mL, Inject 2.5 mg under the skin every 24 hours for 14 days, Disp: 2.8 mL, Rfl: 0    Vitals: Blood pressure 140/70, pulse 91, SpO2 99%.    There is no height or weight on file to calculate BMI.  Vitals:     BP Readings from Last 3 Encounters:   04/07/25 140/70   09/18/24 112/81   08/11/24 117/68       Physical Exam  PHYSICAL EXAMINATION:  Neurologic:  Alert & oriented x 3, no new focal deficits, Not in any acute distress,  Constitutional: Obese  Eyes:  Pupil equal and reacting to light, conjunctiva normal, No JVP, No LNP   HENT:  Atraumatic, oropharynx moist, Neck- normal range of motion, no tenderness,  Neck supple   Respiratory:  Bilateral air entry, mostly clear to auscultation  Cardiovascular: S1-S2 regular with a I/VI systolic murmur   GI:  Soft, nondistended, normal bowel sounds, nontender, no hepatosplenomegaly appreciated.  Musculoskeletal: no tenderness, no deformities.   Skin:  Well hydrated, no rash   Lymphatic:  No lymphadenopathy noted   Extremities:  No significant edema     Diagnostic Studies Review Cardio:      EKG: Sinus rhythm with PACs, heart rate 91/min, LVH    Cardiac testing:   No results found for this or any previous visit.        Imaging:  Chest X-Ray:   No Chest XR results available for this patient.    CT-scan of the chest:     No CTA results available for this patient.  Lab Review   Lab Results   Component Value Date    WBC 11.87 (H) 09/18/2024    HGB 8.1 (L) 09/18/2024    HCT 23.7 (L) 09/18/2024    MCV 69 (L) 09/18/2024    RDW 18.9 (H) 09/18/2024     09/18/2024     BMP:  Lab Results   Component Value Date    SODIUM 140 09/26/2024    K 4.2 09/26/2024     09/26/2024    CO2 23 09/26/2024    BUN 14 09/26/2024    CREATININE 0.37 (L) 09/26/2024    GLUC 66 (L) 09/26/2024    GLUF 130 (H) 03/13/2020    CALCIUM 7.3 (L) 09/18/2024    CORRECTEDCA 8.6 08/04/2024    EGFR 106 09/26/2024    MG 1.8 (L) 08/05/2024     LFT:  Lab Results   Component Value Date     "AST 11 09/26/2024    ALT 11 09/26/2024    ALKPHOS 165 (H) 08/10/2024    TP 6.1 09/26/2024    ALB 3.6 (L) 09/26/2024      Lab Results   Component Value Date    FFW1CDZFVICH 0.544 08/09/2024     No components found for: \"TSH3\"  Lab Results   Component Value Date    HGBA1C 7.6 (H) 07/11/2024     Lipid Profile:   Lab Results   Component Value Date    CHOLESTEROL 184 07/11/2024    HDL 72 07/11/2024    LDLCALC 96 07/11/2024    TRIG 90 07/11/2024     Lab Results   Component Value Date    CHOLESTEROL 184 07/11/2024    CHOLESTEROL 202 (H) 10/20/2023     Lab Results   Component Value Date    CKTOTAL 254 (H) 04/11/2024    TROPONINI <0.02 05/05/2021     No results found for: \"NTBNP\"   No results found for this or any previous visit (from the past 4 weeks).        Dr. Tony Katz MD, FACC      \"This note has been constructed using a voice recognition system.Therefore there may be syntax, spelling, and/or grammatical errors. Please call if you have any questions. \"  "

## 2025-04-02 NOTE — ASSESSMENT & PLAN NOTE
On amlodipine 5 mg and benazepril 10 mg  BP is elevated at 140/70 but according to the patient is usually in the 100s at home.  She is going to monitor her blood pressure at home and call us if it is elevated more than 130 systolic consistently

## 2025-04-07 ENCOUNTER — TELEPHONE (OUTPATIENT)
Dept: CARDIOLOGY CLINIC | Facility: CLINIC | Age: 75
End: 2025-04-07

## 2025-04-07 ENCOUNTER — CONSULT (OUTPATIENT)
Dept: CARDIOLOGY CLINIC | Facility: CLINIC | Age: 75
End: 2025-04-07
Payer: COMMERCIAL

## 2025-04-07 ENCOUNTER — TELEPHONE (OUTPATIENT)
Dept: GASTROENTEROLOGY | Facility: AMBULARY SURGERY CENTER | Age: 75
End: 2025-04-07

## 2025-04-07 VITALS — DIASTOLIC BLOOD PRESSURE: 70 MMHG | OXYGEN SATURATION: 99 % | SYSTOLIC BLOOD PRESSURE: 140 MMHG | HEART RATE: 91 BPM

## 2025-04-07 DIAGNOSIS — I10 PRIMARY HYPERTENSION: ICD-10-CM

## 2025-04-07 DIAGNOSIS — M33.20 POLYMYOSITIS (HCC): ICD-10-CM

## 2025-04-07 DIAGNOSIS — E11.65 TYPE 2 DIABETES MELLITUS WITH HYPERGLYCEMIA, WITH LONG-TERM CURRENT USE OF INSULIN (HCC): ICD-10-CM

## 2025-04-07 DIAGNOSIS — Z79.4 TYPE 2 DIABETES MELLITUS WITH HYPERGLYCEMIA, WITH LONG-TERM CURRENT USE OF INSULIN (HCC): ICD-10-CM

## 2025-04-07 DIAGNOSIS — I48.91 NEW ONSET A-FIB (HCC): ICD-10-CM

## 2025-04-07 DIAGNOSIS — J45.20 MILD INTERMITTENT ASTHMA WITHOUT COMPLICATION: ICD-10-CM

## 2025-04-07 DIAGNOSIS — E11.69 TYPE 2 DIABETES MELLITUS WITH OTHER SPECIFIED COMPLICATION, WITHOUT LONG-TERM CURRENT USE OF INSULIN (HCC): ICD-10-CM

## 2025-04-07 DIAGNOSIS — I48.91 ATRIAL FIBRILLATION WITH RAPID VENTRICULAR RESPONSE (HCC): Primary | ICD-10-CM

## 2025-04-07 DIAGNOSIS — I48.0 PAROXYSMAL ATRIAL FIBRILLATION (HCC): ICD-10-CM

## 2025-04-07 PROCEDURE — 99203 OFFICE O/P NEW LOW 30 MIN: CPT | Performed by: INTERNAL MEDICINE

## 2025-04-07 PROCEDURE — 93000 ELECTROCARDIOGRAM COMPLETE: CPT | Performed by: INTERNAL MEDICINE

## 2025-04-07 NOTE — TELEPHONE ENCOUNTER
Medication : Eliquis   Dose : 5mg  Lot # : [id number]  Exp : 02/2026    Quantity  1    Medication : Eliquis  Dose : 5mg  Lot # : [id number]  Exp : 01/2026    Quantity 1    logged

## 2025-04-07 NOTE — TELEPHONE ENCOUNTER
Our mutual patient is scheduled for procedure: colonoscopy    On: April 14 , 2025     With: Dr. Jocelyne Sotelo MD    He/She is taking the following blood thinner: Eliquis (Apixaban)    Can this be stopped  5 days prior to the procedure    Physician Approving clearance: Dr. Tony Katz MD       I realize patient was just prescribed Eliquis today. Would it be reasonable to clear the patient? Thank you.

## 2025-04-08 ENCOUNTER — TELEPHONE (OUTPATIENT)
Dept: GASTROENTEROLOGY | Facility: AMBULARY SURGERY CENTER | Age: 75
End: 2025-04-08

## 2025-04-08 DIAGNOSIS — K57.92 DIVERTICULITIS: Primary | ICD-10-CM

## 2025-04-08 NOTE — TELEPHONE ENCOUNTER
Spoke to patient and confirmed colonoscopy. Patient was originally going to be admitted for prep but canceled. Please advise appropriate prep. Thank you.

## 2025-04-08 NOTE — TELEPHONE ENCOUNTER
I sent prescription for GoLytely to patient's pharmacy.  Please provide patient with instructions for GoLytely prep.

## 2025-04-11 ENCOUNTER — TELEPHONE (OUTPATIENT)
Age: 75
End: 2025-04-11

## 2025-04-11 ENCOUNTER — ANESTHESIA (OUTPATIENT)
Dept: ANESTHESIOLOGY | Facility: HOSPITAL | Age: 75
End: 2025-04-11

## 2025-04-11 ENCOUNTER — ANESTHESIA EVENT (OUTPATIENT)
Dept: ANESTHESIOLOGY | Facility: HOSPITAL | Age: 75
End: 2025-04-11

## 2025-04-11 NOTE — ANESTHESIA PREPROCEDURE EVALUATION
Procedure:  PRE-OP ONLY    Relevant Problems   CARDIO   (+) Atrial fibrillation with rapid ventricular response (HCC)   (+) Hyperlipidemia   (+) Paroxysmal A-fib (HCC)   (+) Primary hypertension      ENDO   (+) Acquired hypothyroidism   (+) Type 2 diabetes mellitus with hyperglycemia, with long-term current use of insulin (HCC)   (+) Type 2 diabetes mellitus with other specified complication, without long-term current use of insulin (HCC)      MUSCULOSKELETAL   (+) Polymyositis (HCC)      NEURO/PSYCH   (+) Chronic pain      PULMONARY   (+) Asthma        Physical Exam    Airway    Mallampati score: II  TM Distance: >3 FB  Neck ROM: full     Dental   No notable dental hx     Cardiovascular  Cardiovascular exam normal    Pulmonary  Pulmonary exam normal     Other Findings  post-pubertal.    Left Ventricle: Left ventricular cavity size is normal. Wall thickness is normal. The left ventricular ejection fraction is 60%. Systolic function is normal. Wall motion is normal. Diastolic function is mildly abnormal, consistent with grade I (abnormal) relaxation.    Aortic Valve: There is aortic valve sclerosis.       SR, PAC's  Anesthesia Plan  ASA Score- 3     Anesthesia Type- IV sedation with anesthesia with ASA Monitors.         Additional Monitors:     Airway Plan:            Plan Factors-Exercise tolerance (METS): >4 METS.    Chart reviewed.  Imaging results reviewed. Existing labs reviewed. Patient summary reviewed.    Patient is not a current smoker.      Obstructive sleep apnea risk education given perioperatively.        Induction- intravenous.    Postoperative Plan-     Perioperative Resuscitation Plan - Level 1 - Full Code.       Informed Consent-       NPO Status:  No vitals data found for the desired time range.

## 2025-04-14 ENCOUNTER — HOSPITAL ENCOUNTER (OUTPATIENT)
Dept: GASTROENTEROLOGY | Facility: HOSPITAL | Age: 75
Setting detail: OUTPATIENT SURGERY
Discharge: HOME | End: 2025-04-14
Attending: INTERNAL MEDICINE
Payer: COMMERCIAL

## 2025-04-14 ENCOUNTER — ANESTHESIA (OUTPATIENT)
Dept: GASTROENTEROLOGY | Facility: HOSPITAL | Age: 75
End: 2025-04-14
Payer: COMMERCIAL

## 2025-04-14 ENCOUNTER — ANESTHESIA EVENT (OUTPATIENT)
Dept: GASTROENTEROLOGY | Facility: HOSPITAL | Age: 75
End: 2025-04-14
Payer: COMMERCIAL

## 2025-04-14 VITALS
SYSTOLIC BLOOD PRESSURE: 100 MMHG | RESPIRATION RATE: 18 BRPM | BODY MASS INDEX: 32.2 KG/M2 | TEMPERATURE: 97.3 F | WEIGHT: 230 LBS | OXYGEN SATURATION: 7 % | DIASTOLIC BLOOD PRESSURE: 62 MMHG | HEIGHT: 71 IN | HEART RATE: 85 BPM

## 2025-04-14 DIAGNOSIS — K57.92 DIVERTICULITIS: ICD-10-CM

## 2025-04-14 LAB — GLUCOSE SERPL-MCNC: 270 MG/DL (ref 65–140)

## 2025-04-14 PROCEDURE — 82948 REAGENT STRIP/BLOOD GLUCOSE: CPT

## 2025-04-14 PROCEDURE — 88305 TISSUE EXAM BY PATHOLOGIST: CPT | Performed by: PATHOLOGY

## 2025-04-14 PROCEDURE — 45385 COLONOSCOPY W/LESION REMOVAL: CPT | Performed by: INTERNAL MEDICINE

## 2025-04-14 RX ORDER — PROPOFOL 10 MG/ML
INJECTION, EMULSION INTRAVENOUS CONTINUOUS PRN
Status: DISCONTINUED | OUTPATIENT
Start: 2025-04-14 | End: 2025-04-14

## 2025-04-14 RX ORDER — PHENYLEPHRINE HCL IN 0.9% NACL 1 MG/10 ML
SYRINGE (ML) INTRAVENOUS AS NEEDED
Status: DISCONTINUED | OUTPATIENT
Start: 2025-04-14 | End: 2025-04-14

## 2025-04-14 RX ORDER — SODIUM CHLORIDE, SODIUM LACTATE, POTASSIUM CHLORIDE, CALCIUM CHLORIDE 600; 310; 30; 20 MG/100ML; MG/100ML; MG/100ML; MG/100ML
100 INJECTION, SOLUTION INTRAVENOUS CONTINUOUS
Status: CANCELLED | OUTPATIENT
Start: 2025-04-14

## 2025-04-14 RX ORDER — SODIUM CHLORIDE, SODIUM LACTATE, POTASSIUM CHLORIDE, CALCIUM CHLORIDE 600; 310; 30; 20 MG/100ML; MG/100ML; MG/100ML; MG/100ML
100 INJECTION, SOLUTION INTRAVENOUS CONTINUOUS
Status: DISCONTINUED | OUTPATIENT
Start: 2025-04-14 | End: 2025-04-18 | Stop reason: HOSPADM

## 2025-04-14 RX ORDER — SODIUM CHLORIDE, SODIUM LACTATE, POTASSIUM CHLORIDE, CALCIUM CHLORIDE 600; 310; 30; 20 MG/100ML; MG/100ML; MG/100ML; MG/100ML
INJECTION, SOLUTION INTRAVENOUS CONTINUOUS PRN
Status: DISCONTINUED | OUTPATIENT
Start: 2025-04-14 | End: 2025-04-14

## 2025-04-14 RX ORDER — PROPOFOL 10 MG/ML
INJECTION, EMULSION INTRAVENOUS AS NEEDED
Status: DISCONTINUED | OUTPATIENT
Start: 2025-04-14 | End: 2025-04-14

## 2025-04-14 RX ADMIN — PROPOFOL 100 MCG/KG/MIN: 10 INJECTION, EMULSION INTRAVENOUS at 08:48

## 2025-04-14 RX ADMIN — PROPOFOL 100 MG: 10 INJECTION, EMULSION INTRAVENOUS at 08:47

## 2025-04-14 RX ADMIN — Medication 200 MCG: at 09:05

## 2025-04-14 RX ADMIN — SODIUM CHLORIDE, SODIUM LACTATE, POTASSIUM CHLORIDE, AND CALCIUM CHLORIDE: .6; .31; .03; .02 INJECTION, SOLUTION INTRAVENOUS at 08:40

## 2025-04-14 RX ADMIN — Medication 200 MCG: at 08:49

## 2025-04-14 NOTE — H&P
History and Physical -  [facility]  Jadyn Ac 74 y.o. female MRN: [id number]                  HPI: Jadyn Ac is a 74 y.o. year old female who presents for diverticular disease.      REVIEW OF SYSTEMS: Per the HPI, and otherwise unremarkable.    Historical Information   Past Medical History:   Diagnosis Date    A-fib (HCC)     Acute diverticulitis 09/21/2018    Anemia     Asthma     Chronic pain     bulging discs in back,polymyositis    Colon polyp     Diabetes mellitus (HCC)     Disease of thyroid gland     Diverticulitis     Diverticulosis     Hyperlipidemia     Hypertension     Polymyositis (HCC)     Sleep apnea      Past Surgical History:   Procedure Laterality Date    COLONOSCOPY      EYE SURGERY Bilateral     CATAREACT REMOVED FROM BOTH EYES 2 MTHS AGO    HYSTERECTOMY      ORIF TIBIA & FIBULA FRACTURES Left 09/16/2024    Procedure: OPEN REDUCTION W/ INTERNAL FIXATION (ORIF) ANKLE and all associated procedures;  Surgeon: Zakiya Sylvester MD;  Location: [facility];  Service: Orthopedics    SINUS SURGERY      THYROIDECTOMY      TUBAL LIGATION       Social History   Social History     Substance and Sexual Activity   Alcohol Use Not Currently     Social History     Substance and Sexual Activity   Drug Use No     Social History     Tobacco Use   Smoking Status Never   Smokeless Tobacco Never     Family History   Problem Relation Age of Onset    Hepatitis Sister         acute hepatits C virus    Hypertension Daughter     Kidney disease Daughter     Hypertension Son     Mental illness Son        Meds/Allergies       Current Outpatient Medications:     acetaminophen (TYLENOL) 325 mg tablet    amLODIPine (NORVASC) 5 mg tablet    benazepril (LOTENSIN) 10 mg tablet    Cholecalciferol 25 MCG (1000 UT) tablet    docusate sodium (COLACE) 100 mg capsule    famotidine (PEPCID) 20 mg tablet    ferrous sulfate 325 (65 Fe) mg tablet    Lantus SoloStar 100 units/mL SOPN    metFORMIN  "(GLUCOPHAGE-XR) 500 mg 24 hr tablet    predniSONE 5 mg tablet    Synthroid 88 MCG tablet    albuterol (PROVENTIL HFA,VENTOLIN HFA) 90 mcg/act inhaler    apixaban (Eliquis) 5 mg    Blood Glucose Monitoring Suppl (OneTouch Verio) w/Device KIT    Insulin Pen Needle (Pen Needles) 31G X 5 MM MISC    Lancets (OneTouch Delica Plus [id number]) MISC    OneTouch Verio test strip    senna (SENOKOT) 8.6 mg    Current Facility-Administered Medications:     lactated ringers infusion, 100 mL/hr, Intravenous, Continuous    Allergies   Allergen Reactions    Anaprox [Naproxen Sodium] Shortness Of Breath    Levofloxacin Hives    Sulfamethoxazole-Trimethoprim Anaphylaxis    Jardiance [Empagliflozin] GI Intolerance    Penicillins Swelling and Hives    Pork-Derived Products - Food Allergy Other (See Comments)     Congregational belief, not true allergy    Shellfish-Derived Products - Food Allergy Other (See Comments)     Congregational belief, not true allergy    Alendronate Rash    Aspirin Rash    Azathioprine Rash    Metronidazole Rash    Sulfa Antibiotics Rash       Objective     /62   Pulse 95   Temp (!) 96.2 °F (35.7 °C) (Temporal)   Resp 14   Ht 5' 11\" (1.803 m)   Wt 104 kg (230 lb)   SpO2 96%   BMI 32.08 kg/m²       PHYSICAL EXAM    Gen: NAD  Head: NCAT  CV: RRR  CHEST: Clear  ABD: soft, NT/ND  EXT: no edema      ASSESSMENT/PLAN:  This is a 74 y.o. year old female here for colonoscopy, and she is stable and optimized for her procedure.        "

## 2025-04-14 NOTE — ANESTHESIA PREPROCEDURE EVALUATION
Medical History    History Comments   Asthma    Chronic pain bulging discs in back,polymyositis   Disease of thyroid gland    Diabetes mellitus (HCC)    Hyperlipidemia    Hypertension    Polymyositis (HCC)    Anemia    Diverticulitis    Acute diverticulitis    Colon polyp    Diverticulosis    Procedure:  COLONOSCOPY  Anes wnl  Relevant Problems   CARDIO   (+) Atrial fibrillation with rapid ventricular response (HCC)   (+) Hyperlipidemia   (+) Paroxysmal A-fib (HCC)   (+) Primary hypertension      ENDO   (+) Acquired hypothyroidism   (+) Type 2 diabetes mellitus with hyperglycemia, with long-term current use of insulin (HCC)   (+) Type 2 diabetes mellitus with other specified complication, without long-term current use of insulin (HCC)      MUSCULOSKELETAL   (+) Polymyositis (HCC)      NEURO/PSYCH   (+) Chronic pain      PULMONARY   (+) Asthma        Physical Exam    Airway    Mallampati score: II  TM Distance: >3 FB  Neck ROM: full     Dental       Cardiovascular  Rate: normal    Pulmonary  Pulmonary exam normal     Other Findings  Per pt denies anything remaining that is loose or removeablepost-pubertal.      Anesthesia Plan  ASA Score- 3     Anesthesia Type- IV sedation with anesthesia with ASA Monitors.         Additional Monitors:     Airway Plan:            Plan Factors-Exercise tolerance (METS): >4 METS.    Chart reviewed.    Patient summary reviewed.    Patient is not a current smoker.              Induction- intravenous.    Postoperative Plan-         Informed Consent- Anesthetic plan and risks discussed with patient.  I personally reviewed this patient with the CRNA. Discussed and agreed on the Anesthesia Plan with the CRNA..      NPO Status:  No vitals data found for the desired time range.

## 2025-04-14 NOTE — ANESTHESIA POSTPROCEDURE EVALUATION
Post-Op Assessment Note    CV Status:  Stable  Pain Score: 0    Pain management: adequate       Mental Status:  Alert and awake   Hydration Status:  Stable and euvolemic   PONV Controlled:  None   Airway Patency:  Patent     Post Op Vitals Reviewed: Yes    No anethesia notable event occurred.    Staff: CRNA           Last Filed PACU Vitals:  Vitals Value Taken Time   Temp     Pulse 81    BP 96/53    Resp 16    SpO2 99

## 2025-04-15 ENCOUNTER — TELEPHONE (OUTPATIENT)
Age: 75
End: 2025-04-15

## 2025-04-15 NOTE — TELEPHONE ENCOUNTER
I have placed order on [employer] site for MCOT x30 days.  I sent patient a [employer] message of the same.     Clinicals were uploaded on site..   And I will call patient tomorrow to follow up.

## 2025-04-15 NOTE — TELEPHONE ENCOUNTER
Spoke with patient, states was seen in office 4/7/25 and provider ordered a monitor to be mailed to her but she has not received it yet.    Verified address on file is correct.    Please verify the monitor was ordered from the company.    Jadyn can be reached at [phone]

## 2025-04-16 ENCOUNTER — RESULTS FOLLOW-UP (OUTPATIENT)
Age: 75
End: 2025-04-16

## 2025-04-16 PROCEDURE — 88305 TISSUE EXAM BY PATHOLOGIST: CPT | Performed by: PATHOLOGY

## 2025-04-17 ENCOUNTER — TELEPHONE (OUTPATIENT)
Age: 75
End: 2025-04-17

## 2025-04-17 NOTE — TELEPHONE ENCOUNTER
Patient calling to have office send labs from 3/28 to a mobile lab so that she can have her labs drawn at home as she can not walk.  Patient will be using a company called [employer].  Once received patient will be able to schedule with them    Please fax to [employer] at [phone]      [employer] phone number is [phone]      Please note once lab orders are faxed   Thank you!

## 2025-05-02 DIAGNOSIS — K57.92 DIVERTICULITIS: ICD-10-CM

## 2025-05-02 RX ORDER — FAMOTIDINE 20 MG/1
20 TABLET, FILM COATED ORAL 2 TIMES DAILY
Qty: 60 TABLET | Refills: 2 | OUTPATIENT
Start: 2025-05-02

## 2025-05-08 DIAGNOSIS — I10 PRIMARY HYPERTENSION: ICD-10-CM

## 2025-05-08 DIAGNOSIS — Z79.4 TYPE 2 DIABETES MELLITUS WITH HYPERGLYCEMIA, WITH LONG-TERM CURRENT USE OF INSULIN (HCC): ICD-10-CM

## 2025-05-08 DIAGNOSIS — E11.65 TYPE 2 DIABETES MELLITUS WITH HYPERGLYCEMIA, WITH LONG-TERM CURRENT USE OF INSULIN (HCC): ICD-10-CM

## 2025-05-08 RX ORDER — PEN NEEDLE, DIABETIC 30 GX3/16"
NEEDLE, DISPOSABLE MISCELLANEOUS
Qty: 100 EACH | Refills: 1 | Status: SHIPPED | OUTPATIENT
Start: 2025-05-08

## 2025-05-21 ENCOUNTER — DOCUMENTATION (OUTPATIENT)
Dept: ADMINISTRATIVE | Facility: OTHER | Age: 75
End: 2025-05-21

## 2025-05-21 NOTE — PROGRESS NOTES
05/21/25 10:58 AM    Osteoporosis Management Post Fracture outreach is not required; there is an active DEXA screening order on file.    Thank you.  Kaycee Sharma MA  [facility]

## 2025-05-22 ENCOUNTER — RESULTS FOLLOW-UP (OUTPATIENT)
Dept: CARDIOLOGY CLINIC | Facility: CLINIC | Age: 75
End: 2025-05-22

## 2025-05-22 ENCOUNTER — CLINICAL SUPPORT (OUTPATIENT)
Dept: CARDIOLOGY CLINIC | Facility: CLINIC | Age: 75
End: 2025-05-22
Payer: COMMERCIAL

## 2025-05-22 DIAGNOSIS — I48.0 PAROXYSMAL ATRIAL FIBRILLATION (HCC): ICD-10-CM

## 2025-05-22 PROCEDURE — 93228 REMOTE 30 DAY ECG REV/REPORT: CPT

## 2025-05-23 NOTE — TELEPHONE ENCOUNTER
Pt called back to ask if she needed to continue taking Eliquis as there was no episodes of afib seen on her monitor.  Please review and advise, thank you.

## 2025-05-23 NOTE — TELEPHONE ENCOUNTER
----- Message from Tony Katz MD sent at 5/22/2025  1:31 PM EDT -----  30-day ambulatory cardiac monitor report  Monitoring.  26 days 17 hours  Indication: Paroxysmal atrial fibrillation    Underlying rhythm was sinus with average heart rate of 78 and range of  bpm  There were no PACs and <1% PVCs.  There was 1 NSVT of 4 beats at 126 bpm  There were no episodes of atrial fibrillation or flutter detected  There were no patient triggered episodes  ----- Message -----  From: Geovanna Nguyen MA  Sent: 5/22/2025   9:19 AM EDT  To: Tony Katz MD

## 2025-07-07 DIAGNOSIS — I10 ESSENTIAL HYPERTENSION: ICD-10-CM

## 2025-07-09 RX ORDER — AMLODIPINE BESYLATE 5 MG/1
5 TABLET ORAL DAILY
Qty: 90 TABLET | Refills: 1 | Status: SHIPPED | OUTPATIENT
Start: 2025-07-09

## 2025-07-11 ENCOUNTER — TELEPHONE (OUTPATIENT)
Age: 75
End: 2025-07-11

## 2025-07-11 NOTE — TELEPHONE ENCOUNTER
Emilie from [facility] called to inform us that it looks like this patient may have diabetes but is not taking a statin. She was calling for recommendation statin therapy for the patient.     They are going to fax over the recommendation.

## 2025-07-21 DIAGNOSIS — E11.65 TYPE 2 DIABETES MELLITUS WITH HYPERGLYCEMIA, WITH LONG-TERM CURRENT USE OF INSULIN (HCC): ICD-10-CM

## 2025-07-21 DIAGNOSIS — Z79.4 TYPE 2 DIABETES MELLITUS WITH HYPERGLYCEMIA, WITH LONG-TERM CURRENT USE OF INSULIN (HCC): ICD-10-CM

## 2025-07-21 DIAGNOSIS — I10 PRIMARY HYPERTENSION: ICD-10-CM

## 2025-07-21 DIAGNOSIS — M33.20 POLYMYOSITIS (HCC): ICD-10-CM

## 2025-07-22 RX ORDER — BLOOD SUGAR DIAGNOSTIC
STRIP MISCELLANEOUS
Qty: 100 STRIP | Refills: 1 | Status: SHIPPED | OUTPATIENT
Start: 2025-07-22

## 2025-07-22 RX ORDER — INSULIN GLARGINE 100 [IU]/ML
INJECTION, SOLUTION SUBCUTANEOUS
Qty: 15 ML | Refills: 3 | Status: SHIPPED | OUTPATIENT
Start: 2025-07-22

## 2025-07-22 RX ORDER — PREDNISONE 5 MG/1
TABLET ORAL
Qty: 45 TABLET | Refills: 5 | Status: SHIPPED | OUTPATIENT
Start: 2025-07-22

## 2025-08-02 DIAGNOSIS — I10 ESSENTIAL HYPERTENSION: ICD-10-CM

## 2025-08-03 ENCOUNTER — APPOINTMENT (EMERGENCY)
Dept: RADIOLOGY | Facility: HOSPITAL | Age: 75
End: 2025-08-03
Payer: COMMERCIAL

## 2025-08-03 ENCOUNTER — HOSPITAL ENCOUNTER (EMERGENCY)
Facility: HOSPITAL | Age: 75
Discharge: HOME | End: 2025-08-03
Attending: EMERGENCY MEDICINE
Payer: COMMERCIAL

## 2025-08-03 ENCOUNTER — APPOINTMENT (EMERGENCY)
Dept: CT IMAGING | Facility: HOSPITAL | Age: 75
End: 2025-08-03
Payer: COMMERCIAL

## 2025-08-04 RX ORDER — BENAZEPRIL HYDROCHLORIDE 10 MG/1
10 TABLET ORAL DAILY
Qty: 90 TABLET | Refills: 1 | Status: SHIPPED | OUTPATIENT
Start: 2025-08-04

## 2025-08-05 DIAGNOSIS — E03.9 ACQUIRED HYPOTHYROIDISM: ICD-10-CM

## 2025-08-06 RX ORDER — LEVOTHYROXINE SODIUM 88 MCG
TABLET ORAL
Qty: 30 TABLET | Refills: 0 | Status: SHIPPED | OUTPATIENT
Start: 2025-08-06

## 2025-08-07 DIAGNOSIS — E11.65 TYPE 2 DIABETES MELLITUS WITH HYPERGLYCEMIA, WITH LONG-TERM CURRENT USE OF INSULIN (HCC): ICD-10-CM

## 2025-08-07 DIAGNOSIS — Z79.4 TYPE 2 DIABETES MELLITUS WITH HYPERGLYCEMIA, WITH LONG-TERM CURRENT USE OF INSULIN (HCC): ICD-10-CM

## 2025-08-07 RX ORDER — LANCETS 30 GAUGE
EACH MISCELLANEOUS DAILY
Qty: 100 EACH | Refills: 1 | Status: SHIPPED | OUTPATIENT
Start: 2025-08-07

## 2025-08-14 ENCOUNTER — TELEMEDICINE (OUTPATIENT)
Dept: FAMILY MEDICINE CLINIC | Facility: CLINIC | Age: 75
End: 2025-08-14
Payer: COMMERCIAL

## 2025-08-14 ENCOUNTER — TELEPHONE (OUTPATIENT)
Age: 75
End: 2025-08-14

## 2025-08-20 ENCOUNTER — TELEPHONE (OUTPATIENT)
Age: 75
End: 2025-08-20

## (undated) DEVICE — C-ARM: Brand: UNBRANDED

## (undated) DEVICE — ACE WRAP 6 IN UNSTERILE

## (undated) DEVICE — 2.7MM DRILL BIT QC 125MM

## (undated) DEVICE — 2.5MM DRILL BIT QC 135MM 45MM CALIBRATION

## (undated) DEVICE — DECANTER: Brand: UNBRANDED

## (undated) DEVICE — SUT VICRYL 2-0 CT-1 27 IN J259H

## (undated) DEVICE — 3.5MM DRILL BIT QC 150MM

## (undated) DEVICE — 2.0MM DRILL BIT WITH DEPTH MARK/QC/140MM

## (undated) DEVICE — PENCIL ELECTROSURG E-Z CLEAN -0035H

## (undated) DEVICE — GLOVE SRG BIOGEL 8

## (undated) DEVICE — SUT ETHILON 2-0 PS 18 IN 585H

## (undated) DEVICE — 2.7MM CORTEX SCREW SELF-TAPPING 18MM: Type: IMPLANTABLE DEVICE | Site: ANKLE | Status: NON-FUNCTIONAL

## (undated) DEVICE — GLOVE INDICATOR PI UNDERGLOVE SZ 8.5 BLUE

## (undated) DEVICE — 2.0MM DRILL BIT QC 110MM 30MM CALIBRATION

## (undated) DEVICE — SKN PRP WNG SPNGE PVP SCRB STR: Brand: MEDLINE INDUSTRIES, INC.

## (undated) DEVICE — INTENDED FOR TISSUE SEPARATION, AND OTHER PROCEDURES THAT REQUIRE A SHARP SURGICAL BLADE TO PUNCTURE OR CUT.: Brand: BARD-PARKER SAFETY BLADES SIZE 10, STERILE

## (undated) DEVICE — CHLORAPREP HI-LITE 26ML ORANGE

## (undated) DEVICE — 1.6MM KIRSCHNER WIRE WITH 5MM THREAD-TROCAR POINT 150MM
Type: IMPLANTABLE DEVICE | Site: ANKLE | Status: NON-FUNCTIONAL
Removed: 2024-09-16

## (undated) DEVICE — U-DRAPE: Brand: CONVERTORS

## (undated) DEVICE — 3M™ IOBAN™ 2 ANTIMICROBIAL INCISE DRAPE 6650EZ: Brand: IOBAN™ 2

## (undated) DEVICE — PADDING CAST 4 IN  COTTON STRL

## (undated) DEVICE — SPONGE SCRUB 4 PCT CHLORHEXIDINE

## (undated) DEVICE — PAD CAST 4 IN COTTON NON STERILE

## (undated) DEVICE — 2.5MM DRILL BIT QC 170MM 80MM CALIBRATION

## (undated) DEVICE — KERLIX BANDAGE ROLL: Brand: KERLIX

## (undated) DEVICE — PAD GROUNDING DUAL ADULT

## (undated) DEVICE — SILVER-COATED ANTIMICROBIAL BARRIER DRESSING: Brand: ACTICOAT   4" X 8"

## (undated) DEVICE — SUT VICRYL 1 CT-1 27 IN J261H

## (undated) DEVICE — GAUZE SPONGES,16 PLY: Brand: CURITY

## (undated) DEVICE — BETHLEHEM UNIVERSAL  MIONR EXT: Brand: CARDINAL HEALTH